# Patient Record
Sex: FEMALE | Race: WHITE | NOT HISPANIC OR LATINO | Employment: PART TIME
[De-identification: names, ages, dates, MRNs, and addresses within clinical notes are randomized per-mention and may not be internally consistent; named-entity substitution may affect disease eponyms.]

---

## 2017-02-21 ENCOUNTER — RECORDS - HEALTHEAST (OUTPATIENT)
Dept: ADMINISTRATIVE | Facility: OTHER | Age: 27
End: 2017-02-21

## 2017-03-01 ENCOUNTER — OFFICE VISIT - HEALTHEAST (OUTPATIENT)
Dept: FAMILY MEDICINE | Facility: CLINIC | Age: 27
End: 2017-03-01

## 2017-03-01 DIAGNOSIS — M35.9 AUTOIMMUNE DISEASE (H): ICD-10-CM

## 2017-03-01 ASSESSMENT — MIFFLIN-ST. JEOR: SCORE: 1395.39

## 2017-03-02 ENCOUNTER — COMMUNICATION - HEALTHEAST (OUTPATIENT)
Dept: ENDOCRINOLOGY | Facility: CLINIC | Age: 27
End: 2017-03-02

## 2017-03-02 LAB — ANA SER QL: 0 U

## 2017-03-07 ENCOUNTER — COMMUNICATION - HEALTHEAST (OUTPATIENT)
Dept: FAMILY MEDICINE | Facility: CLINIC | Age: 27
End: 2017-03-07

## 2017-03-08 ENCOUNTER — COMMUNICATION - HEALTHEAST (OUTPATIENT)
Dept: FAMILY MEDICINE | Facility: CLINIC | Age: 27
End: 2017-03-08

## 2017-03-17 ENCOUNTER — AMBULATORY - HEALTHEAST (OUTPATIENT)
Dept: LAB | Facility: CLINIC | Age: 27
End: 2017-03-17

## 2017-03-17 DIAGNOSIS — M35.9 AUTOIMMUNE DISEASE (H): ICD-10-CM

## 2017-03-31 ENCOUNTER — RECORDS - HEALTHEAST (OUTPATIENT)
Dept: ADMINISTRATIVE | Facility: OTHER | Age: 27
End: 2017-03-31

## 2017-03-31 LAB — PAP SMEAR - HIM PATIENT REPORTED: NORMAL

## 2017-04-07 ENCOUNTER — AMBULATORY - HEALTHEAST (OUTPATIENT)
Dept: FAMILY MEDICINE | Facility: CLINIC | Age: 27
End: 2017-04-07

## 2017-04-07 ENCOUNTER — OFFICE VISIT - HEALTHEAST (OUTPATIENT)
Dept: FAMILY MEDICINE | Facility: CLINIC | Age: 27
End: 2017-04-07

## 2017-04-07 DIAGNOSIS — M35.9 AUTOIMMUNE DISEASE (H): ICD-10-CM

## 2017-04-07 DIAGNOSIS — R53.83 FATIGUE: ICD-10-CM

## 2017-04-07 ASSESSMENT — MIFFLIN-ST. JEOR: SCORE: 1413.54

## 2017-04-12 ENCOUNTER — COMMUNICATION - HEALTHEAST (OUTPATIENT)
Dept: FAMILY MEDICINE | Facility: CLINIC | Age: 27
End: 2017-04-12

## 2017-04-28 ENCOUNTER — COMMUNICATION - HEALTHEAST (OUTPATIENT)
Dept: FAMILY MEDICINE | Facility: CLINIC | Age: 27
End: 2017-04-28

## 2017-05-02 ENCOUNTER — AMBULATORY - HEALTHEAST (OUTPATIENT)
Dept: LAB | Facility: CLINIC | Age: 27
End: 2017-05-02

## 2017-05-02 ENCOUNTER — COMMUNICATION - HEALTHEAST (OUTPATIENT)
Dept: FAMILY MEDICINE | Facility: CLINIC | Age: 27
End: 2017-05-02

## 2017-05-02 ENCOUNTER — OFFICE VISIT - HEALTHEAST (OUTPATIENT)
Dept: FAMILY MEDICINE | Facility: CLINIC | Age: 27
End: 2017-05-02

## 2017-05-02 DIAGNOSIS — G47.00 INSOMNIA: ICD-10-CM

## 2017-05-02 DIAGNOSIS — K58.9 IRRITABLE BOWEL SYNDROME: ICD-10-CM

## 2017-05-02 DIAGNOSIS — N92.1 METRORRHAGIA: ICD-10-CM

## 2017-05-02 DIAGNOSIS — M35.9 AUTOIMMUNE DISEASE (H): ICD-10-CM

## 2017-05-02 DIAGNOSIS — E06.3 HASHIMOTO'S DISEASE: ICD-10-CM

## 2017-05-02 ASSESSMENT — MIFFLIN-ST. JEOR: SCORE: 1427.14

## 2017-05-05 ENCOUNTER — COMMUNICATION - HEALTHEAST (OUTPATIENT)
Dept: FAMILY MEDICINE | Facility: CLINIC | Age: 27
End: 2017-05-05

## 2017-05-08 ENCOUNTER — AMBULATORY - HEALTHEAST (OUTPATIENT)
Dept: FAMILY MEDICINE | Facility: CLINIC | Age: 27
End: 2017-05-08

## 2017-05-08 ENCOUNTER — COMMUNICATION - HEALTHEAST (OUTPATIENT)
Dept: FAMILY MEDICINE | Facility: CLINIC | Age: 27
End: 2017-05-08

## 2017-05-09 ENCOUNTER — COMMUNICATION - HEALTHEAST (OUTPATIENT)
Dept: FAMILY MEDICINE | Facility: CLINIC | Age: 27
End: 2017-05-09

## 2017-05-09 ENCOUNTER — OFFICE VISIT - HEALTHEAST (OUTPATIENT)
Dept: FAMILY MEDICINE | Facility: CLINIC | Age: 27
End: 2017-05-09

## 2017-05-09 DIAGNOSIS — R42 POSTURAL DIZZINESS WITH PRESYNCOPE: ICD-10-CM

## 2017-05-09 DIAGNOSIS — K58.9 IRRITABLE BOWEL SYNDROME: ICD-10-CM

## 2017-05-09 DIAGNOSIS — R55 POSTURAL DIZZINESS WITH PRESYNCOPE: ICD-10-CM

## 2017-05-09 DIAGNOSIS — E27.9 ADRENAL DISORDER (H): ICD-10-CM

## 2017-05-09 DIAGNOSIS — E06.3 HASHIMOTO'S DISEASE: ICD-10-CM

## 2017-05-09 ASSESSMENT — MIFFLIN-ST. JEOR: SCORE: 1436.22

## 2017-05-10 ENCOUNTER — COMMUNICATION - HEALTHEAST (OUTPATIENT)
Dept: FAMILY MEDICINE | Facility: CLINIC | Age: 27
End: 2017-05-10

## 2017-05-12 ENCOUNTER — OFFICE VISIT - HEALTHEAST (OUTPATIENT)
Dept: ENDOCRINOLOGY | Facility: CLINIC | Age: 27
End: 2017-05-12

## 2017-05-12 ENCOUNTER — RECORDS - HEALTHEAST (OUTPATIENT)
Dept: ADMINISTRATIVE | Facility: OTHER | Age: 27
End: 2017-05-12

## 2017-05-12 ENCOUNTER — COMMUNICATION - HEALTHEAST (OUTPATIENT)
Dept: ENDOCRINOLOGY | Facility: CLINIC | Age: 27
End: 2017-05-12

## 2017-05-12 DIAGNOSIS — E03.9 HYPOTHYROID: ICD-10-CM

## 2017-05-12 LAB
CREAT SERPL-MCNC: 0.82 MG/DL (ref 0.6–1.1)
GFR SERPL CREATININE-BSD FRML MDRD: >60 ML/MIN/1.73M2
HBA1C MFR BLD: 5.3 % (ref 3.5–6)

## 2017-05-12 ASSESSMENT — MIFFLIN-ST. JEOR: SCORE: 1424.42

## 2017-05-15 LAB
GLIADIN IGA SER-ACNC: 0.4 U/ML
GLIADIN IGG SER-ACNC: 0.4 U/ML
IGA SERPL-MCNC: 86 MG/DL (ref 65–400)
TTG IGA SER-ACNC: <0.1 U/ML
TTG IGG SER-ACNC: <0.6 U/ML

## 2017-05-16 ENCOUNTER — COMMUNICATION - HEALTHEAST (OUTPATIENT)
Dept: FAMILY MEDICINE | Facility: CLINIC | Age: 27
End: 2017-05-16

## 2017-05-16 LAB
MISCELLANEOUS TEST DEPT. - HE HISTORICAL: NORMAL
PERFORMING LAB: NORMAL
SPECIMEN STATUS: NORMAL
TEST NAME: NORMAL

## 2017-05-18 ENCOUNTER — COMMUNICATION - HEALTHEAST (OUTPATIENT)
Dept: FAMILY MEDICINE | Facility: CLINIC | Age: 27
End: 2017-05-18

## 2017-05-18 ENCOUNTER — COMMUNICATION - HEALTHEAST (OUTPATIENT)
Dept: ENDOCRINOLOGY | Facility: CLINIC | Age: 27
End: 2017-05-18

## 2017-05-18 ENCOUNTER — AMBULATORY - HEALTHEAST (OUTPATIENT)
Dept: ENDOCRINOLOGY | Facility: CLINIC | Age: 27
End: 2017-05-18

## 2017-05-18 ENCOUNTER — AMBULATORY - HEALTHEAST (OUTPATIENT)
Dept: LAB | Facility: CLINIC | Age: 27
End: 2017-05-18

## 2017-05-18 DIAGNOSIS — M35.9 AUTOIMMUNE DISEASE (H): ICD-10-CM

## 2017-05-18 DIAGNOSIS — E06.3 HASHIMOTO'S DISEASE: ICD-10-CM

## 2017-05-18 DIAGNOSIS — E03.9 HYPOTHYROID: ICD-10-CM

## 2017-05-20 ENCOUNTER — COMMUNICATION - HEALTHEAST (OUTPATIENT)
Dept: FAMILY MEDICINE | Facility: CLINIC | Age: 27
End: 2017-05-20

## 2017-05-23 ENCOUNTER — COMMUNICATION - HEALTHEAST (OUTPATIENT)
Dept: FAMILY MEDICINE | Facility: CLINIC | Age: 27
End: 2017-05-23

## 2017-05-23 ENCOUNTER — COMMUNICATION - HEALTHEAST (OUTPATIENT)
Dept: ENDOCRINOLOGY | Facility: CLINIC | Age: 27
End: 2017-05-23

## 2017-05-23 ENCOUNTER — AMBULATORY - HEALTHEAST (OUTPATIENT)
Dept: LAB | Facility: CLINIC | Age: 27
End: 2017-05-23

## 2017-05-23 ENCOUNTER — AMBULATORY - HEALTHEAST (OUTPATIENT)
Dept: FAMILY MEDICINE | Facility: CLINIC | Age: 27
End: 2017-05-23

## 2017-05-23 DIAGNOSIS — R53.83 FATIGUE: ICD-10-CM

## 2017-05-23 DIAGNOSIS — M35.9 AUTOIMMUNE DISEASE (H): ICD-10-CM

## 2017-05-23 DIAGNOSIS — E06.3 HASHIMOTO'S DISEASE: ICD-10-CM

## 2017-05-25 ENCOUNTER — COMMUNICATION - HEALTHEAST (OUTPATIENT)
Dept: FAMILY MEDICINE | Facility: CLINIC | Age: 27
End: 2017-05-25

## 2017-05-26 ENCOUNTER — COMMUNICATION - HEALTHEAST (OUTPATIENT)
Dept: FAMILY MEDICINE | Facility: CLINIC | Age: 27
End: 2017-05-26

## 2017-05-30 ENCOUNTER — AMBULATORY - HEALTHEAST (OUTPATIENT)
Dept: ENDOCRINOLOGY | Facility: CLINIC | Age: 27
End: 2017-05-30

## 2017-05-30 DIAGNOSIS — E06.3 HASHIMOTO'S DISEASE: ICD-10-CM

## 2017-06-22 ENCOUNTER — COMMUNICATION - HEALTHEAST (OUTPATIENT)
Dept: FAMILY MEDICINE | Facility: CLINIC | Age: 27
End: 2017-06-22

## 2017-06-23 ENCOUNTER — RECORDS - HEALTHEAST (OUTPATIENT)
Dept: ADMINISTRATIVE | Facility: OTHER | Age: 27
End: 2017-06-23

## 2017-06-24 ENCOUNTER — COMMUNICATION - HEALTHEAST (OUTPATIENT)
Dept: FAMILY MEDICINE | Facility: CLINIC | Age: 27
End: 2017-06-24

## 2017-06-24 DIAGNOSIS — M35.9 AUTOIMMUNE DISEASE (H): ICD-10-CM

## 2017-07-12 ENCOUNTER — AMBULATORY - HEALTHEAST (OUTPATIENT)
Dept: LAB | Facility: CLINIC | Age: 27
End: 2017-07-12

## 2017-07-12 DIAGNOSIS — M35.9 AUTOIMMUNE DISEASE (H): ICD-10-CM

## 2017-07-12 LAB
CHOLEST SERPL-MCNC: 272 MG/DL
FASTING STATUS PATIENT QL REPORTED: YES
HBA1C MFR BLD: 5.1 % (ref 3.5–6)
HDLC SERPL-MCNC: 78 MG/DL
LDLC SERPL CALC-MCNC: 182 MG/DL
TRIGL SERPL-MCNC: 60 MG/DL

## 2017-07-16 ENCOUNTER — COMMUNICATION - HEALTHEAST (OUTPATIENT)
Dept: FAMILY MEDICINE | Facility: CLINIC | Age: 27
End: 2017-07-16

## 2017-07-28 ENCOUNTER — COMMUNICATION - HEALTHEAST (OUTPATIENT)
Dept: FAMILY MEDICINE | Facility: CLINIC | Age: 27
End: 2017-07-28

## 2017-07-31 ENCOUNTER — COMMUNICATION - HEALTHEAST (OUTPATIENT)
Dept: FAMILY MEDICINE | Facility: CLINIC | Age: 27
End: 2017-07-31

## 2017-07-31 ENCOUNTER — RECORDS - HEALTHEAST (OUTPATIENT)
Dept: ADMINISTRATIVE | Facility: OTHER | Age: 27
End: 2017-07-31

## 2017-08-07 ENCOUNTER — RECORDS - HEALTHEAST (OUTPATIENT)
Dept: ADMINISTRATIVE | Facility: OTHER | Age: 27
End: 2017-08-07

## 2017-08-07 ENCOUNTER — OFFICE VISIT - HEALTHEAST (OUTPATIENT)
Dept: FAMILY MEDICINE | Facility: CLINIC | Age: 27
End: 2017-08-07

## 2017-08-07 DIAGNOSIS — M35.9 AUTOIMMUNE DISEASE (H): ICD-10-CM

## 2017-08-07 DIAGNOSIS — T78.40XA ALLERGY: ICD-10-CM

## 2017-08-07 DIAGNOSIS — R53.83 OTHER MALAISE AND FATIGUE: ICD-10-CM

## 2017-08-07 DIAGNOSIS — M99.01 CERVICAL SEGMENT DYSFUNCTION: ICD-10-CM

## 2017-08-07 DIAGNOSIS — E06.3 HASHIMOTO'S DISEASE: ICD-10-CM

## 2017-08-07 DIAGNOSIS — E78.00 PURE HYPERCHOLESTEROLEMIA: ICD-10-CM

## 2017-08-07 DIAGNOSIS — R53.81 OTHER MALAISE AND FATIGUE: ICD-10-CM

## 2017-08-07 ASSESSMENT — MIFFLIN-ST. JEOR: SCORE: 1411.27

## 2017-08-17 ENCOUNTER — COMMUNICATION - HEALTHEAST (OUTPATIENT)
Dept: LAB | Facility: CLINIC | Age: 27
End: 2017-08-17

## 2017-08-23 ENCOUNTER — COMMUNICATION - HEALTHEAST (OUTPATIENT)
Dept: FAMILY MEDICINE | Facility: CLINIC | Age: 27
End: 2017-08-23

## 2017-08-27 ENCOUNTER — COMMUNICATION - HEALTHEAST (OUTPATIENT)
Dept: FAMILY MEDICINE | Facility: CLINIC | Age: 27
End: 2017-08-27

## 2017-08-28 ENCOUNTER — OFFICE VISIT - HEALTHEAST (OUTPATIENT)
Dept: ALLERGY | Facility: CLINIC | Age: 27
End: 2017-08-28

## 2017-08-28 ENCOUNTER — RECORDS - HEALTHEAST (OUTPATIENT)
Dept: ADMINISTRATIVE | Facility: OTHER | Age: 27
End: 2017-08-28

## 2017-08-28 DIAGNOSIS — R06.02 SHORTNESS OF BREATH: ICD-10-CM

## 2017-08-28 DIAGNOSIS — M99.01 CERVICAL SEGMENT DYSFUNCTION: ICD-10-CM

## 2017-08-28 DIAGNOSIS — R53.83 OTHER MALAISE AND FATIGUE: ICD-10-CM

## 2017-08-28 DIAGNOSIS — R09.81 NASAL CONGESTION: ICD-10-CM

## 2017-08-28 DIAGNOSIS — E78.00 PURE HYPERCHOLESTEROLEMIA: ICD-10-CM

## 2017-08-28 DIAGNOSIS — R53.81 OTHER MALAISE AND FATIGUE: ICD-10-CM

## 2017-08-29 ENCOUNTER — COMMUNICATION - HEALTHEAST (OUTPATIENT)
Dept: FAMILY MEDICINE | Facility: CLINIC | Age: 27
End: 2017-08-29

## 2017-09-08 ENCOUNTER — COMMUNICATION - HEALTHEAST (OUTPATIENT)
Dept: FAMILY MEDICINE | Facility: CLINIC | Age: 27
End: 2017-09-08

## 2017-09-12 ENCOUNTER — COMMUNICATION - HEALTHEAST (OUTPATIENT)
Dept: FAMILY MEDICINE | Facility: CLINIC | Age: 27
End: 2017-09-12

## 2017-09-14 ENCOUNTER — COMMUNICATION - HEALTHEAST (OUTPATIENT)
Dept: FAMILY MEDICINE | Facility: CLINIC | Age: 27
End: 2017-09-14

## 2017-09-18 ENCOUNTER — AMBULATORY - HEALTHEAST (OUTPATIENT)
Dept: LAB | Facility: CLINIC | Age: 27
End: 2017-09-18

## 2017-09-18 DIAGNOSIS — M99.01 CERVICAL SEGMENT DYSFUNCTION: ICD-10-CM

## 2017-09-18 DIAGNOSIS — R53.81 OTHER MALAISE AND FATIGUE: ICD-10-CM

## 2017-09-18 DIAGNOSIS — E78.00 PURE HYPERCHOLESTEROLEMIA: ICD-10-CM

## 2017-09-18 DIAGNOSIS — R53.83 OTHER MALAISE AND FATIGUE: ICD-10-CM

## 2017-10-03 ENCOUNTER — COMMUNICATION - HEALTHEAST (OUTPATIENT)
Dept: FAMILY MEDICINE | Facility: CLINIC | Age: 27
End: 2017-10-03

## 2017-10-03 DIAGNOSIS — E61.1 IRON DEFICIENCY: ICD-10-CM

## 2017-10-03 DIAGNOSIS — T45.2X1A VITAMIN D TOXICITY: ICD-10-CM

## 2017-10-03 DIAGNOSIS — R53.83 FATIGUE: ICD-10-CM

## 2017-11-10 ENCOUNTER — AMBULATORY - HEALTHEAST (OUTPATIENT)
Dept: LAB | Facility: CLINIC | Age: 27
End: 2017-11-10

## 2017-11-10 DIAGNOSIS — T45.2X1A VITAMIN D TOXICITY: ICD-10-CM

## 2017-11-10 DIAGNOSIS — R53.83 FATIGUE: ICD-10-CM

## 2017-11-10 DIAGNOSIS — E61.1 IRON DEFICIENCY: ICD-10-CM

## 2018-02-09 ENCOUNTER — COMMUNICATION - HEALTHEAST (OUTPATIENT)
Dept: FAMILY MEDICINE | Facility: CLINIC | Age: 28
End: 2018-02-09

## 2018-02-15 ENCOUNTER — AMBULATORY - HEALTHEAST (OUTPATIENT)
Dept: FAMILY MEDICINE | Facility: CLINIC | Age: 28
End: 2018-02-15

## 2018-02-15 DIAGNOSIS — R53.83 FATIGUE: ICD-10-CM

## 2018-02-15 DIAGNOSIS — E06.3 HASHIMOTO'S DISEASE: ICD-10-CM

## 2018-02-15 DIAGNOSIS — E61.1 IRON DEFICIENCY: ICD-10-CM

## 2018-02-15 DIAGNOSIS — T45.2X1A VITAMIN D TOXICITY: ICD-10-CM

## 2018-02-28 ENCOUNTER — AMBULATORY - HEALTHEAST (OUTPATIENT)
Dept: LAB | Facility: CLINIC | Age: 28
End: 2018-02-28

## 2018-02-28 DIAGNOSIS — E06.3 HASHIMOTO'S DISEASE: ICD-10-CM

## 2018-02-28 DIAGNOSIS — E61.1 IRON DEFICIENCY: ICD-10-CM

## 2018-02-28 DIAGNOSIS — R53.83 FATIGUE: ICD-10-CM

## 2018-02-28 DIAGNOSIS — T45.2X1A VITAMIN D TOXICITY: ICD-10-CM

## 2018-02-28 LAB
ALBUMIN SERPL-MCNC: 3.6 G/DL (ref 3.5–5)
ALP SERPL-CCNC: 47 U/L (ref 45–120)
ALT SERPL W P-5'-P-CCNC: 22 U/L (ref 0–45)
ANION GAP SERPL CALCULATED.3IONS-SCNC: 9 MMOL/L (ref 5–18)
AST SERPL W P-5'-P-CCNC: 20 U/L (ref 0–40)
BILIRUB SERPL-MCNC: 0.5 MG/DL (ref 0–1)
BUN SERPL-MCNC: 24 MG/DL (ref 8–22)
CALCIUM SERPL-MCNC: 8.9 MG/DL (ref 8.5–10.5)
CHLORIDE BLD-SCNC: 105 MMOL/L (ref 98–107)
CO2 SERPL-SCNC: 25 MMOL/L (ref 22–31)
CREAT SERPL-MCNC: 0.76 MG/DL (ref 0.6–1.1)
ERYTHROCYTE [DISTWIDTH] IN BLOOD BY AUTOMATED COUNT: 11.4 % (ref 11–14.5)
FERRITIN SERPL-MCNC: 48 NG/ML (ref 10–130)
GFR SERPL CREATININE-BSD FRML MDRD: >60 ML/MIN/1.73M2
GLUCOSE BLD-MCNC: 84 MG/DL (ref 70–125)
HCT VFR BLD AUTO: 40.4 % (ref 35–47)
HGB BLD-MCNC: 13.5 G/DL (ref 12–16)
IRON SATN MFR SERPL: 27 % (ref 20–50)
IRON SERPL-MCNC: 81 UG/DL (ref 42–175)
MCH RBC QN AUTO: 31.1 PG (ref 27–34)
MCHC RBC AUTO-ENTMCNC: 33.3 G/DL (ref 32–36)
MCV RBC AUTO: 93 FL (ref 80–100)
PLATELET # BLD AUTO: 296 THOU/UL (ref 140–440)
PMV BLD AUTO: 7.8 FL (ref 7–10)
POTASSIUM BLD-SCNC: 4.1 MMOL/L (ref 3.5–5)
PROT SERPL-MCNC: 6.7 G/DL (ref 6–8)
RBC # BLD AUTO: 4.33 MILL/UL (ref 3.8–5.4)
SODIUM SERPL-SCNC: 139 MMOL/L (ref 136–145)
T3FREE SERPL-MCNC: 2 PG/ML (ref 1.9–3.9)
T4 FREE SERPL-MCNC: 0.9 NG/DL (ref 0.7–1.8)
TIBC SERPL-MCNC: 305 UG/DL (ref 313–563)
TRANSFERRIN SERPL-MCNC: 244 MG/DL (ref 212–360)
TSH SERPL DL<=0.005 MIU/L-ACNC: 0.24 UIU/ML (ref 0.3–5)
WBC: 4.4 THOU/UL (ref 4–11)

## 2018-03-01 LAB
25(OH)D3 SERPL-MCNC: 54.3 NG/ML (ref 30–80)
FASTING STATUS PATIENT QL REPORTED: YES
HOMOCYSTEINE PLASMA - HISTORICAL: 4 UMOL/L (ref 0–13)
THYROGLOBULIN ANTIBODY, S - HISTORICAL: <1.8 IU/ML
THYROID PEROXIDASE ANTIBODIES - HISTORICAL: 25.7 IU/ML (ref 0–5.6)

## 2018-03-04 LAB
MAGNESIUM,RBC - HISTORICAL: 4.7 MG/DL (ref 3.5–7.1)
SPECIMEN STATUS: NORMAL

## 2018-03-06 ENCOUNTER — COMMUNICATION - HEALTHEAST (OUTPATIENT)
Dept: FAMILY MEDICINE | Facility: CLINIC | Age: 28
End: 2018-03-06

## 2018-03-16 ENCOUNTER — COMMUNICATION - HEALTHEAST (OUTPATIENT)
Dept: FAMILY MEDICINE | Facility: CLINIC | Age: 28
End: 2018-03-16

## 2018-03-21 ENCOUNTER — RECORDS - HEALTHEAST (OUTPATIENT)
Dept: ADMINISTRATIVE | Facility: OTHER | Age: 28
End: 2018-03-21

## 2018-03-23 ENCOUNTER — COMMUNICATION - HEALTHEAST (OUTPATIENT)
Dept: FAMILY MEDICINE | Facility: CLINIC | Age: 28
End: 2018-03-23

## 2018-04-04 ENCOUNTER — OFFICE VISIT - HEALTHEAST (OUTPATIENT)
Dept: FAMILY MEDICINE | Facility: CLINIC | Age: 28
End: 2018-04-04

## 2018-04-04 DIAGNOSIS — E06.3 HASHIMOTO'S DISEASE: ICD-10-CM

## 2018-04-04 DIAGNOSIS — K92.9 DIGESTIVE DISORDER: ICD-10-CM

## 2018-04-04 DIAGNOSIS — K58.9 IRRITABLE BOWEL SYNDROME: ICD-10-CM

## 2018-04-04 ASSESSMENT — MIFFLIN-ST. JEOR: SCORE: 1427.14

## 2018-04-06 ENCOUNTER — AMBULATORY - HEALTHEAST (OUTPATIENT)
Dept: FAMILY MEDICINE | Facility: CLINIC | Age: 28
End: 2018-04-06

## 2018-04-06 ENCOUNTER — AMBULATORY - HEALTHEAST (OUTPATIENT)
Dept: LAB | Facility: CLINIC | Age: 28
End: 2018-04-06

## 2018-04-06 DIAGNOSIS — K92.9 DIGESTIVE DISORDER: ICD-10-CM

## 2018-04-06 DIAGNOSIS — E03.9 HYPOTHYROID: ICD-10-CM

## 2018-04-09 LAB — O+P STL MICRO: NORMAL

## 2018-04-12 ENCOUNTER — RECORDS - HEALTHEAST (OUTPATIENT)
Dept: ADMINISTRATIVE | Facility: OTHER | Age: 28
End: 2018-04-12

## 2018-04-14 ENCOUNTER — COMMUNICATION - HEALTHEAST (OUTPATIENT)
Dept: FAMILY MEDICINE | Facility: CLINIC | Age: 28
End: 2018-04-14

## 2018-08-27 ENCOUNTER — COMMUNICATION - HEALTHEAST (OUTPATIENT)
Dept: FAMILY MEDICINE | Facility: CLINIC | Age: 28
End: 2018-08-27

## 2018-08-28 ENCOUNTER — COMMUNICATION - HEALTHEAST (OUTPATIENT)
Dept: FAMILY MEDICINE | Facility: CLINIC | Age: 28
End: 2018-08-28

## 2018-08-29 ENCOUNTER — RECORDS - HEALTHEAST (OUTPATIENT)
Dept: ADMINISTRATIVE | Facility: OTHER | Age: 28
End: 2018-08-29

## 2018-10-09 ENCOUNTER — COMMUNICATION - HEALTHEAST (OUTPATIENT)
Dept: FAMILY MEDICINE | Facility: CLINIC | Age: 28
End: 2018-10-09

## 2019-01-18 ENCOUNTER — COMMUNICATION - HEALTHEAST (OUTPATIENT)
Dept: FAMILY MEDICINE | Facility: CLINIC | Age: 29
End: 2019-01-18

## 2019-02-16 ENCOUNTER — COMMUNICATION - HEALTHEAST (OUTPATIENT)
Dept: FAMILY MEDICINE | Facility: CLINIC | Age: 29
End: 2019-02-16

## 2019-02-16 DIAGNOSIS — R53.83 FATIGUE, UNSPECIFIED TYPE: ICD-10-CM

## 2019-02-16 DIAGNOSIS — E06.3 HASHIMOTO'S DISEASE: ICD-10-CM

## 2019-02-19 ENCOUNTER — COMMUNICATION - HEALTHEAST (OUTPATIENT)
Dept: FAMILY MEDICINE | Facility: CLINIC | Age: 29
End: 2019-02-19

## 2019-02-19 DIAGNOSIS — R53.82 CHRONIC FATIGUE: ICD-10-CM

## 2019-02-19 DIAGNOSIS — M35.9 AUTOIMMUNE DISEASE (H): ICD-10-CM

## 2019-03-12 ENCOUNTER — AMBULATORY - HEALTHEAST (OUTPATIENT)
Dept: LAB | Facility: CLINIC | Age: 29
End: 2019-03-12

## 2019-03-12 DIAGNOSIS — M35.9 AUTOIMMUNE DISEASE (H): ICD-10-CM

## 2019-03-12 DIAGNOSIS — E06.3 HASHIMOTO'S DISEASE: ICD-10-CM

## 2019-03-12 DIAGNOSIS — R53.83 FATIGUE, UNSPECIFIED TYPE: ICD-10-CM

## 2019-03-12 DIAGNOSIS — R53.82 CHRONIC FATIGUE: ICD-10-CM

## 2019-03-12 LAB
ALBUMIN SERPL-MCNC: 3.4 G/DL (ref 3.5–5)
ALP SERPL-CCNC: 55 U/L (ref 45–120)
ALT SERPL W P-5'-P-CCNC: <9 U/L (ref 0–45)
ANION GAP SERPL CALCULATED.3IONS-SCNC: 9 MMOL/L (ref 5–18)
AST SERPL W P-5'-P-CCNC: 15 U/L (ref 0–40)
BILIRUB SERPL-MCNC: 0.5 MG/DL (ref 0–1)
BUN SERPL-MCNC: 7 MG/DL (ref 8–22)
CALCIUM SERPL-MCNC: 9.1 MG/DL (ref 8.5–10.5)
CHLORIDE BLD-SCNC: 107 MMOL/L (ref 98–107)
CO2 SERPL-SCNC: 26 MMOL/L (ref 22–31)
CREAT SERPL-MCNC: 0.66 MG/DL (ref 0.6–1.1)
ERYTHROCYTE [DISTWIDTH] IN BLOOD BY AUTOMATED COUNT: 11.7 % (ref 11–14.5)
FERRITIN SERPL-MCNC: 61 NG/ML (ref 10–130)
GFR SERPL CREATININE-BSD FRML MDRD: >60 ML/MIN/1.73M2
GLUCOSE BLD-MCNC: 79 MG/DL (ref 70–125)
HCT VFR BLD AUTO: 37.9 % (ref 35–47)
HGB BLD-MCNC: 12.6 G/DL (ref 12–16)
IRON SATN MFR SERPL: 46 % (ref 20–50)
IRON SERPL-MCNC: 129 UG/DL (ref 42–175)
MCH RBC QN AUTO: 31.6 PG (ref 27–34)
MCHC RBC AUTO-ENTMCNC: 33.2 G/DL (ref 32–36)
MCV RBC AUTO: 95 FL (ref 80–100)
PLATELET # BLD AUTO: 251 THOU/UL (ref 140–440)
PMV BLD AUTO: 7.8 FL (ref 7–10)
POTASSIUM BLD-SCNC: 4.2 MMOL/L (ref 3.5–5)
PROT SERPL-MCNC: 5.9 G/DL (ref 6–8)
RBC # BLD AUTO: 3.97 MILL/UL (ref 3.8–5.4)
SODIUM SERPL-SCNC: 142 MMOL/L (ref 136–145)
T3FREE SERPL-MCNC: 2.8 PG/ML (ref 1.9–3.9)
T4 FREE SERPL-MCNC: 1.1 NG/DL (ref 0.7–1.8)
THYROID PEROXIDASE ANTIBODIES - HISTORICAL: 28.1 IU/ML (ref 0–5.6)
TIBC SERPL-MCNC: 283 UG/DL (ref 313–563)
TRANSFERRIN SERPL-MCNC: 226 MG/DL (ref 212–360)
TSH SERPL DL<=0.005 MIU/L-ACNC: 0.08 UIU/ML (ref 0.3–5)
VIT B12 SERPL-MCNC: 1132 PG/ML (ref 213–816)
WBC: 5.2 THOU/UL (ref 4–11)

## 2019-03-13 LAB — 25(OH)D3 SERPL-MCNC: 38 NG/ML (ref 30–80)

## 2019-03-14 ENCOUNTER — COMMUNICATION - HEALTHEAST (OUTPATIENT)
Dept: FAMILY MEDICINE | Facility: CLINIC | Age: 29
End: 2019-03-14

## 2019-03-14 LAB
IF BLOCK AB SER QL RIA: NEGATIVE
MAGNESIUM,RBC - HISTORICAL: 5.2 MG/DL (ref 3.5–7.1)
SPECIMEN STATUS: NORMAL
T3REVERSE SERPL-MCNC: 20.9 NG/DL (ref 9–27)

## 2019-03-15 ENCOUNTER — OFFICE VISIT - HEALTHEAST (OUTPATIENT)
Dept: FAMILY MEDICINE | Facility: CLINIC | Age: 29
End: 2019-03-15

## 2019-03-15 DIAGNOSIS — Z00.00 ROUTINE GENERAL MEDICAL EXAMINATION AT A HEALTH CARE FACILITY: ICD-10-CM

## 2019-03-15 DIAGNOSIS — E06.3 HYPOTHYROIDISM DUE TO HASHIMOTO'S THYROIDITIS: ICD-10-CM

## 2019-03-15 ASSESSMENT — MIFFLIN-ST. JEOR: SCORE: 1445.85

## 2019-03-17 ENCOUNTER — COMMUNICATION - HEALTHEAST (OUTPATIENT)
Dept: FAMILY MEDICINE | Facility: CLINIC | Age: 29
End: 2019-03-17

## 2019-03-17 DIAGNOSIS — E03.9 HYPOTHYROID: ICD-10-CM

## 2019-03-19 ENCOUNTER — COMMUNICATION - HEALTHEAST (OUTPATIENT)
Dept: FAMILY MEDICINE | Facility: CLINIC | Age: 29
End: 2019-03-19

## 2019-03-19 DIAGNOSIS — E06.3 HYPOTHYROIDISM DUE TO HASHIMOTO'S THYROIDITIS: ICD-10-CM

## 2019-03-25 ENCOUNTER — RECORDS - HEALTHEAST (OUTPATIENT)
Dept: HEALTH INFORMATION MANAGEMENT | Facility: CLINIC | Age: 29
End: 2019-03-25

## 2019-07-02 ENCOUNTER — COMMUNICATION - HEALTHEAST (OUTPATIENT)
Dept: FAMILY MEDICINE | Facility: CLINIC | Age: 29
End: 2019-07-02

## 2019-07-02 DIAGNOSIS — Z00.00 PREVENTATIVE HEALTH CARE: ICD-10-CM

## 2020-01-29 ENCOUNTER — OFFICE VISIT - HEALTHEAST (OUTPATIENT)
Dept: FAMILY MEDICINE | Facility: CLINIC | Age: 30
End: 2020-01-29

## 2020-01-29 DIAGNOSIS — E06.3 HYPOTHYROIDISM DUE TO HASHIMOTO'S THYROIDITIS: ICD-10-CM

## 2020-01-29 DIAGNOSIS — N92.6 IRREGULAR MENSES: ICD-10-CM

## 2020-01-29 DIAGNOSIS — R53.82 CHRONIC FATIGUE: ICD-10-CM

## 2020-01-29 DIAGNOSIS — Z86.2 HISTORY OF PERNICIOUS ANEMIA: ICD-10-CM

## 2020-01-31 ENCOUNTER — AMBULATORY - HEALTHEAST (OUTPATIENT)
Dept: LAB | Facility: CLINIC | Age: 30
End: 2020-01-31

## 2020-01-31 DIAGNOSIS — E06.3 HYPOTHYROIDISM DUE TO HASHIMOTO'S THYROIDITIS: ICD-10-CM

## 2020-01-31 DIAGNOSIS — Z86.2 HISTORY OF PERNICIOUS ANEMIA: ICD-10-CM

## 2020-01-31 DIAGNOSIS — R53.82 CHRONIC FATIGUE: ICD-10-CM

## 2020-01-31 DIAGNOSIS — N92.6 IRREGULAR MENSES: ICD-10-CM

## 2020-01-31 LAB
BASOPHILS # BLD AUTO: 0 THOU/UL (ref 0–0.2)
BASOPHILS NFR BLD AUTO: 0 % (ref 0–2)
EOSINOPHIL # BLD AUTO: 0 THOU/UL (ref 0–0.4)
EOSINOPHIL NFR BLD AUTO: 1 % (ref 0–6)
ERYTHROCYTE [DISTWIDTH] IN BLOOD BY AUTOMATED COUNT: 11 % (ref 11–14.5)
FERRITIN SERPL-MCNC: 32 NG/ML
HCT VFR BLD AUTO: 39.2 %
HGB BLD-MCNC: 13 G/DL
IRON SATN MFR SERPL: 47 % (ref 20–50)
IRON SERPL-MCNC: 145 UG/DL
LYMPHOCYTES # BLD AUTO: 1.7 THOU/UL (ref 0.8–4.4)
LYMPHOCYTES NFR BLD AUTO: 39 % (ref 20–40)
MCH RBC QN AUTO: 33 PG (ref 27–34)
MCHC RBC AUTO-ENTMCNC: 33.3 G/DL (ref 32–36)
MCV RBC AUTO: 99 FL
MONOCYTES # BLD AUTO: 0.3 THOU/UL (ref 0–0.9)
MONOCYTES NFR BLD AUTO: 7 % (ref 2–10)
NEUTROPHILS # BLD AUTO: 2.4 THOU/UL (ref 2–7.7)
NEUTROPHILS NFR BLD AUTO: 53 % (ref 50–70)
PLATELET # BLD AUTO: 253 THOU/UL (ref 140–440)
PMV BLD AUTO: 8.1 FL (ref 7–10)
RBC # BLD AUTO: 3.95 MILL/UL
T3FREE SERPL-MCNC: 2.5 PG/ML (ref 1.9–3.9)
T4 FREE SERPL-MCNC: 0.9 NG/DL (ref 0.7–1.8)
TIBC SERPL-MCNC: 307 UG/DL (ref 313–563)
TRANSFERRIN SERPL-MCNC: 246 MG/DL (ref 212–360)
TSH SERPL DL<=0.005 MIU/L-ACNC: 0.94 UIU/ML (ref 0.3–5)
VIT B12 SERPL-MCNC: 1476 PG/ML (ref 213–816)
WBC: 4.5 THOU/UL (ref 4–11)

## 2020-02-03 LAB
25(OH)D3 SERPL-MCNC: 41.7 NG/ML (ref 30–80)
25(OH)D3 SERPL-MCNC: 41.7 NG/ML (ref 30–80)
T3REVERSE SERPL-MCNC: 17 NG/DL (ref 9–27)

## 2020-02-04 LAB — THYROID PEROXIDASE ANTIBODIES - HISTORICAL: 22.3 IU/ML (ref 0–5.6)

## 2020-03-13 ENCOUNTER — COMMUNICATION - HEALTHEAST (OUTPATIENT)
Dept: FAMILY MEDICINE | Facility: CLINIC | Age: 30
End: 2020-03-13

## 2020-03-13 DIAGNOSIS — E03.9 HYPOTHYROID: ICD-10-CM

## 2020-03-13 DIAGNOSIS — E06.3 HYPOTHYROIDISM DUE TO HASHIMOTO'S THYROIDITIS: ICD-10-CM

## 2020-03-16 ENCOUNTER — COMMUNICATION - HEALTHEAST (OUTPATIENT)
Dept: FAMILY MEDICINE | Facility: CLINIC | Age: 30
End: 2020-03-16

## 2020-06-03 ENCOUNTER — VIRTUAL VISIT (OUTPATIENT)
Dept: FAMILY MEDICINE | Facility: OTHER | Age: 30
End: 2020-06-03

## 2020-06-03 ENCOUNTER — AMBULATORY - HEALTHEAST (OUTPATIENT)
Dept: FAMILY MEDICINE | Facility: CLINIC | Age: 30
End: 2020-06-03

## 2020-06-03 DIAGNOSIS — Z20.822 SUSPECTED COVID-19 VIRUS INFECTION: ICD-10-CM

## 2020-06-04 ENCOUNTER — AMBULATORY - HEALTHEAST (OUTPATIENT)
Dept: FAMILY MEDICINE | Facility: CLINIC | Age: 30
End: 2020-06-04

## 2020-06-04 ENCOUNTER — NURSE TRIAGE (OUTPATIENT)
Dept: NURSING | Facility: CLINIC | Age: 30
End: 2020-06-04

## 2020-06-04 DIAGNOSIS — Z20.822 SUSPECTED COVID-19 VIRUS INFECTION: ICD-10-CM

## 2020-06-04 NOTE — PROGRESS NOTES
"Date: 2020 15:26:12  Clinician: Kuldip Wright  Clinician NPI: [id number]  Patient: Agustina Nam  Patient : 1990  Patient Address: [address], [place] [address]  Patient Phone: [phone]  Visit Protocol: URI  Patient Summary:  Agustina is a 29 year old ( : 1990 ) female who initiated a Visit for COVID-19 (Coronavirus) evaluation and screening. When asked the question \"Please sign me up to receive news, health information and promotions. \", Agustina responded \"No\".    Agustina states her symptoms started suddenly 3-4 days ago.   Her symptoms consist of a sore throat, malaise, myalgia, facial pain or pressure, and nasal congestion.   Symptom details     Nasal secretions: The color of her mucus is clear and white.    Sore throat: Agustina reports having mild throat pain (1-3 on a 10 point pain scale), does not have exudate on her tonsils, and can swallow liquids. She is not sure if the lymph nodes in her neck are enlarged. A rash has not appeared on the skin since the sore throat started.     Facial pain or pressure: The facial pain or pressure feels worse when bending over or leaning forward.      Agustina denies having wheezing, nausea, teeth pain, ageusia, diarrhea, anosmia, fever, cough, vomiting, rhinitis, ear pain, headache, and chills. She also denies having recent facial or sinus surgery in the past 60 days, double sickening (worsening symptoms after initial improvement), and taking antibiotic medication for the symptoms. She is not experiencing dyspnea.   Precipitating events  Agustina is not sure if she has been exposed to someone with strep throat. She has not recently been exposed to someone with influenza. Agustina has not been in close contact with any high risk individuals.   Pertinent COVID-19 (Coronavirus) information  In the past 14 days, Agustina has not worked in a congregate living setting.   She does not work or volunteer as healthcare worker or a  and does " not work or volunteer in a healthcare facility.   Agustina also has not lived in a congregate living setting in the past 14 days. She does not live with a healthcare worker.   Agustina has not had a close contact with a laboratory-confirmed COVID-19 patient within 14 days of symptom onset.   Pertinent medical history  Agustina does not get yeast infections when she takes antibiotics.   Agustina needs a return to work/school note.   Weight: 145 lbs   Agustina does not smoke or use smokeless tobacco.   She denies pregnancy and denies breastfeeding. She has menstruated in the past month.   Additional information as reported by the patient (free text): I have Hashimoto's autoimmune disease   Weight: 145 lbs    MEDICATIONS: liothyronine oral, levothyroxine oral, ALLERGIES: NKDA  Clinician Response:  Dear Agustina Berrios,  Let's get you tested. Please see the contact number below to schedule your test and general covid instructions. ROXANNA Wright MD        Your symptoms show that you may have coronavirus (COVID-19). This illness can cause fever, cough and trouble breathing. Many people get a mild case and get better on their own. Some people can get very sick.  What should I do?  We would like to test you for this virus. This will be a curbside test done outside the clinic.  Please call [phone] to schedule your visit. Explain that you were referred by [facility] to have a COVID-19 test. Be ready to share your [facility] visit ID number.  Starting now:  Stay at least 6 feet away from others. (If someone will drive you to your test, stay in the backseat, as far away from the  as you can.)   Don't go to work, school or anywhere else. When it's time for your test, go straight to the testing site. Don't make any stops on the way there or back.   Wash your hands and face often. Use soap and water.   Cover your mouth and nose with a mask, tissue or washcloth.   Don't touch anyone. No hugging, kissing or handshakes.  While at home   Stay  "home and away from others (self-isolate) until:  You've had no fever---and no medicine that reduces fever---for 3 full days (72 hours). And...  Your other symptoms have gotten better. For example, your cough or breathing has improved. And...  At least 10 days have passed since your symptoms started.  During this time:  Stay in your own room (and use your own bathroom), if you can.  Don't go to work, school or anywhere else.  Stay away from others in your home. No hugging, kissing or shaking hands.  Don't let anyone visit.  Cover your mouth and nose with a mask, tissue or washcloth to avoid spreading germs.  Clean \"high touch\" surfaces often (doorknobs, counters, handles, etc.). Use a household cleaning spray or wipes.  Wash your hands and face often. Use soap and water.  How can I take care of myself?  1. Get lots of rest. Drink extra fluids (unless your doctor has told you not to).  2. Take Tylenol (acetaminophen) for fever or pain. If you have liver or kidney problems, ask your family doctor if it's okay to take Tylenol.  Adults can take either:   650 mg (two 325 mg pills) every 4 to 6 hours, or...  1,000 mg (two 500 mg pills) every 8 hours as needed.   Note: Don't take more than 3,000 mg in one day.   Acetaminophen is found in many medicines (both prescribed and over-the-counter medicines). Read all labels to be sure you don't take too much.   For children, check the Tylenol bottle for the right dose. The dose is based on the child's age or weight.  3. If you have other health problems (like cancer, heart failure, an organ transplant or severe kidney disease): Call your specialty clinic if you don't feel better in the next 2 days.  4. Know when to call 911: If your breathing is so bad that it keeps you from doing normal activities, call 911 or go to the emergency room. Tell them that you've been staying home and may have COVID-19.  5. Sign up for [facility]. We know it's scary to hear that you might have " COVID-19. We want to track your symptoms to make sure you're okay over the next 2 weeks. Please look for an email from [employer]---this is a free, online program that we'll use to keep in touch. To sign up, follow the link in the email. Learn more at [email or web address].  6. The following will serve as your written order for this Covid Test ordered by me for the indication of suspected Covid [Z20.828]: The test will be ordered in [employer], our electronic health record after you are scheduled and will show as ordered and authorized by Oziel Schwartz MD   Order: Covid-19 (Coronavirus) PCR for SYMPTOMATIC testing from [facility]  Where can I get more information?  To learn more about COVID-19 and how to care for yourself at home, please visit the CDC website at https://www.cdc.gov/coronavirus/2019-ncov/about/steps-when-sick.html.  For more about your care at [facility], please visit [email or web address]  If you'd like to be part of a COVID-19 clinical trial (research study) at the [facility], go to [email or web address] for details.         Diagnosis: Cough  Diagnosis ICD: R05

## 2020-06-04 NOTE — TELEPHONE ENCOUNTER
Patient had visit and testing this morning but has additional questions.  I referred her to [facility] health website and [facility] for current up to date info.    Debi Mo RN  [facility] Nurse Advisor

## 2020-06-05 ENCOUNTER — COMMUNICATION - HEALTHEAST (OUTPATIENT)
Dept: FAMILY MEDICINE | Facility: CLINIC | Age: 30
End: 2020-06-05

## 2020-07-10 ENCOUNTER — COMMUNICATION - HEALTHEAST (OUTPATIENT)
Dept: FAMILY MEDICINE | Facility: CLINIC | Age: 30
End: 2020-07-10

## 2020-07-10 ENCOUNTER — OFFICE VISIT - HEALTHEAST (OUTPATIENT)
Dept: FAMILY MEDICINE | Facility: CLINIC | Age: 30
End: 2020-07-10

## 2020-07-10 DIAGNOSIS — M54.50 LOW BACK PAIN OF OVER 3 MONTHS DURATION: ICD-10-CM

## 2020-07-10 DIAGNOSIS — M79.661 RIGHT CALF PAIN: ICD-10-CM

## 2020-07-10 DIAGNOSIS — G44.209 TENSION HEADACHE: ICD-10-CM

## 2020-07-10 DIAGNOSIS — E06.3 HASHIMOTO'S DISEASE: ICD-10-CM

## 2020-08-19 ENCOUNTER — COMMUNICATION - HEALTHEAST (OUTPATIENT)
Dept: FAMILY MEDICINE | Facility: CLINIC | Age: 30
End: 2020-08-19

## 2020-08-19 DIAGNOSIS — E03.9 HYPOTHYROID: ICD-10-CM

## 2020-08-19 DIAGNOSIS — E06.3 HYPOTHYROIDISM DUE TO HASHIMOTO'S THYROIDITIS: ICD-10-CM

## 2020-10-27 ENCOUNTER — AMBULATORY - HEALTHEAST (OUTPATIENT)
Dept: FAMILY MEDICINE | Facility: CLINIC | Age: 30
End: 2020-10-27

## 2020-10-27 ENCOUNTER — VIRTUAL VISIT (OUTPATIENT)
Dept: FAMILY MEDICINE | Facility: OTHER | Age: 30
End: 2020-10-27

## 2020-10-27 DIAGNOSIS — Z20.822 SUSPECTED COVID-19 VIRUS INFECTION: ICD-10-CM

## 2020-10-27 NOTE — PROGRESS NOTES
"Date: 10/27/2020 09:37:19  Clinician: Michael Riley  Clinician NPI: [id number]  Patient: Agustina Nam  Patient : 1990  Patient Address: [address], [place] [address]  Patient Phone: [phone]  Visit Protocol: URI  Patient Summary:  Agustina is a 29 year old ( : 1990 ) female who initiated a [facility] Visit for COVID-19 (Coronavirus) evaluation and screening. When asked the question \"Please sign me up to receive news, health information and promotions. \", Agustina responded \"Yes\".    Agustina states her symptoms started gradually 3-4 days ago. After her symptoms started, they improved and then got worse again.   Her symptoms consist of a headache, nasal congestion, anosmia, facial pain or pressure, myalgia, malaise, and a sore throat. She is experiencing mild difficulty breathing with activities but can speak normally in full sentences.   Symptom details     Nasal secretions: The color of her mucus is white, clear, and yellow.    Sore throat: Agustina reports having moderate throat pain (4-6 on a 10 point pain scale), does not have exudate on her tonsils, and can swallow liquids. The lymph nodes in her neck are not enlarged. A rash has not appeared on the skin since the sore throat started.     Facial pain or pressure: The facial pain or pressure does not feel worse when bending or leaning forward.     Headache: She states the headache is mild (1-3 on a 10 point pain scale).      Agustina denies having ear pain, wheezing, fever, enlarged lymph nodes, cough, vomiting, rhinitis, nausea, chills, teeth pain, ageusia, and diarrhea. She also denies having a sinus infection within the past year, taking antibiotic medication in the past month, and having recent facial or sinus surgery in the past 60 days.   Precipitating events  Within the past week, Agustina has not been exposed to someone with strep throat. She has not recently been exposed to someone with influenza. Agustina has not been in close contact " with any high risk individuals.   Pertinent COVID-19 (Coronavirus) information  In the past 14 days, Agustina has not worked in a congregate living setting.   She does not work or volunteer as healthcare worker or a  and does not work or volunteer in a healthcare facility.   Agustina also has not lived in a congregate living setting in the past 14 days. She does not live with a healthcare worker.   Agustina has had a close contact with a laboratory-confirmed COVID-19 patient within 14 days of symptom onset. Additional information about contact with COVID-19 (Coronavirus) patient as reported by the patient (free text): Outside, socially distanced in a park, I saw my friend on October 10. He was diagnosed with COVID-19 on October 24. 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   Since December 2019, Agustina and has had upper respiratory infection (URI) or influenza-like illness. Has not been diagnosed with lab-confirmed COVID-19 test      Date(s) of previous URI or influenza-like illness (free-text): various and ongoing, don't remember details     Symptoms Agustina experienced during previous URI or influenza-like illness as reported by the patient (free-text): cought, sore throat, chest congestion, nasal congestion, stomach pains, IBS        Pertinent medical history  Agustina does not get yeast infections when she takes antibiotics.   Agustina does not need a return to work/school note.   Weight: 140 lbs   Agustina does not smoke or use smokeless tobacco.   She denies pregnancy and denies breastfeeding. She has menstruated in the past month.   Additional information as reported by the patient (free text): I have Hashimoto's Autoimmune disease   Weight: 140 lbs    MEDICATIONS: levothyroxine oral, liothyronine oral, ALLERGIES: NKDA  Clinician Response:  Dear Agustina,   Your symptoms show that you may have coronavirus (COVID-19). This illness  can cause fever, cough and trouble breathing. Many people get a mild case and get better on their own. Some people can get very sick.  What should I do?  We would like to test you for this virus.   1. Please call [phone] to schedule your visit. Explain that you were referred by [facility] to have a COVID-19 test. Be ready to share your [facility] visit ID number.  Please note that if you are assessed for Covid-19 testing and receive an order for testing from [facility], that the scheduling of your Covid test at [facility] may be delayed by three or four days or more due to limited availability for testing. Additional options for testing can be found on the [place] Covid-19 Response website. https://[place].gov/covid19/    The following will serve as your written order for this COVID Test, ordered by me, for the indication of suspected COVID [Z20.828]: The test will be ordered in [employer], our electronic health record, after you are scheduled. It will show as ordered and authorized by Romain Schwartz MD.  Order: COVID-19 (Coronavirus) PCR for SYMPTOMATIC testing from [facility].   2. When it's time for your COVID test:  Stay at least 6 feet away from others. (If someone will drive you to your test, stay in the backseat, as far away from the  as you can.)   Cover your mouth and nose with a mask, tissue or washcloth.  Go straight to the testing site. Don't make any stops on the way there or back.      3.Starting now: Stay home and away from others (self-isolate) until:   You've had no fever---and no medicine that reduces fever---for one full day (24 hours). And...   Your other symptoms have gotten better. For example, your cough or breathing has improved. And...   At least 10 days have passed since your symptoms started.       During this time, don't leave the house except for testing or medical care.   Stay in your own room, even for meals. Use your own bathroom if you can.   Stay away from others in your home. No hugging,  "kissing or shaking hands. No visitors.  Don't go to work, school or anywhere else.    Clean \"high touch\" surfaces often (doorknobs, counters, handles, etc.). Use a household cleaning spray or wipes. You'll find a full list of  on the EPA website: www.epa.gov/pesticide-registration/list-n-disinfectants-use-against-sars-cov-2.   Cover your mouth and nose with a mask, tissue or washcloth to avoid spreading germs.  Wash your hands and face often. Use soap and water.  Caregivers in these groups are at risk for severe illness due to COVID-19:  o People 65 years and older  o People who live in a nursing home or long-term care facility  o People with chronic disease (lung, heart, cancer, diabetes, kidney, liver, immunologic)  o People who have a weakened immune system, including those who:   Are in cancer treatment  Take medicine that weakens the immune system, such as corticosteroids  Had a bone marrow or organ transplant  Have an immune deficiency  Have poorly controlled HIV or AIDS  Are obese (body mass index of 40 or higher)  Smoke regularly   o Caregivers should wear gloves while washing dishes, handling laundry and cleaning bedrooms and bathrooms.  o Use caution when washing and drying laundry: Don't shake dirty laundry, and use the warmest water setting that you can.  o For more tips, go to www.cdc.gov/coronavirus/2019-ncov/downloads/10Things.pdf.    4.Sign up for [employer]. We know it's scary to hear that you might have COVID-19. We want to track your symptoms to make sure you're okay over the next 2 weeks. Please look for an email from [employer]---this is a free, online program that we'll use to keep in touch. To sign up, follow the link in the email. Learn more at [email or web address]  How can I take care of myself?   Get lots of rest. Drink extra fluids (unless a doctor has told you not to).   Take Tylenol (acetaminophen) for fever or pain. If you have liver or kidney problems, ask your " family doctor if it's okay to take Tylenol.   Adults can take either:    650 mg (two 325 mg pills) every 4 to 6 hours, or...   1,000 mg (two 500 mg pills) every 8 hours as needed.    Note: Don't take more than 3,000 mg in one day. Acetaminophen is found in many medicines (both prescribed and over-the-counter medicines). Read all labels to be sure you don't take too much.   For children, check the Tylenol bottle for the right dose. The dose is based on the child's age or weight.    If you have other health problems (like cancer, heart failure, an organ transplant or severe kidney disease): Call your specialty clinic if you don't feel better in the next 2 days.       Know when to call 911. Emergency warning signs include:    Trouble breathing or shortness of breath Pain or pressure in the chest that doesn't go away Feeling confused like you haven't felt before, or not being able to wake up Bluish-colored lips or face.  Where can I get more information?   [facility] -- About COVID-19: [email or web address]   CDC -- What to Do If You're Sick: www.cdc.gov/coronavirus/2019-ncov/about/steps-when-sick.html   CDC -- Ending Home Isolation: www.cdc.gov/coronavirus/2019-ncov/hcp/disposition-in-home-patients.html   CDC -- Caring for Someone: www.cdc.gov/coronavirus/2019-ncov/if-you-are-sick/care-for-someone.html   [facility] -- Interim Guidance for Hospital Discharge to Home: www.health.[facility].[place]/diseases/coronavirus/hcp/hospdischarge.pdf   [facility] clinical trials (COVID-19 research studies): [email or web address]/umn-[email or web address]-trials    Below are the COVID-19 hotlines at the [place] Department of Health ([facility]). Interpreters are available.    For health questions: Call 420-457-2058 or 1-436.771.8197 (7 a.m. to 7 p.m.) For questions about schools and childcare: Call 706-134-7276 or 1-268.581.7180 (7 a.m. to 7 p.m.)    Diagnosis: Contact with and (suspected) exposure to other viral communicable  diseases  Diagnosis ICD: Z20.828

## 2020-10-29 ENCOUNTER — COMMUNICATION - HEALTHEAST (OUTPATIENT)
Dept: SCHEDULING | Facility: CLINIC | Age: 30
End: 2020-10-29

## 2021-03-19 ENCOUNTER — COMMUNICATION - HEALTHEAST (OUTPATIENT)
Dept: FAMILY MEDICINE | Facility: CLINIC | Age: 31
End: 2021-03-19

## 2021-03-19 DIAGNOSIS — E03.9 HYPOTHYROID: ICD-10-CM

## 2021-03-19 DIAGNOSIS — E06.3 HYPOTHYROIDISM DUE TO HASHIMOTO'S THYROIDITIS: ICD-10-CM

## 2021-05-26 NOTE — TELEPHONE ENCOUNTER
Leia calling from  compounding pharmacy to ask about medication if it should be the same as before the compounding version.    Writer gave verbal RX/auth to do the compounding medication the same as before per Mylene Tracey MD note below with 30 day supply dispense and 1 year of refills.    Leia took verbal and will get RX ready for patient.

## 2021-05-30 VITALS — HEIGHT: 68 IN | BODY MASS INDEX: 21.67 KG/M2 | WEIGHT: 143 LBS

## 2021-05-30 VITALS — WEIGHT: 136 LBS | HEIGHT: 68 IN | BODY MASS INDEX: 20.61 KG/M2

## 2021-05-30 VITALS — BODY MASS INDEX: 21.22 KG/M2 | WEIGHT: 140 LBS | HEIGHT: 68 IN

## 2021-05-30 NOTE — TELEPHONE ENCOUNTER
Who is calling:  Patient  Reason for Call:  Patient is calling to ask her blood type. Unable to find in medical history or lab records. Patient is not sure if she is A-. Patient is asking if she can have her blood typed at her next appointment.  Date of last appointment with primary care: 3/15/19  Okay to leave a detailed message: Yes [phone]

## 2021-05-30 NOTE — TELEPHONE ENCOUNTER
"Informed patient that orders were placed. Patient said she only wanted to know if her blood type was on file. Other than that it is not urgent. Said if her insurance was not going to cover it then she didn't want to do it.     Informed patient that we can provide her with \"cost of care\" number to let her know how much it would be. Patient declined and said to cancel orders.     No further action required.     CARLEE/DARRELL   "

## 2021-05-30 NOTE — TELEPHONE ENCOUNTER
Left message to call back for: Patient  Information to relay to patient:  Please let pt know that I also double checked her chart and could not find any blood typing labs done on her.  I can forward the message to Dr. Tracey to see if she will place a lab order for this. Currently pt does not have any upcoming appointments, does she want to schedule something with Dr. Tracey?

## 2021-05-31 VITALS — BODY MASS INDEX: 21.98 KG/M2 | HEIGHT: 68 IN | WEIGHT: 145 LBS

## 2021-05-31 VITALS — WEIGHT: 139.5 LBS | HEIGHT: 68 IN | BODY MASS INDEX: 21.14 KG/M2

## 2021-05-31 VITALS — WEIGHT: 142.4 LBS | BODY MASS INDEX: 21.58 KG/M2 | HEIGHT: 68 IN

## 2021-06-01 VITALS — HEIGHT: 68 IN | WEIGHT: 143 LBS | BODY MASS INDEX: 21.67 KG/M2

## 2021-06-02 VITALS — BODY MASS INDEX: 22.43 KG/M2 | WEIGHT: 148 LBS | HEIGHT: 68 IN

## 2021-06-04 VITALS
WEIGHT: 146.12 LBS | DIASTOLIC BLOOD PRESSURE: 64 MMHG | BODY MASS INDEX: 22.38 KG/M2 | HEART RATE: 72 BPM | OXYGEN SATURATION: 98 % | SYSTOLIC BLOOD PRESSURE: 108 MMHG

## 2021-06-05 NOTE — PROGRESS NOTES
HPI:  Patient is a 28 yo female who sees Dr. Tracey who would like a follow up of the following issues.    1) Hypothyroidism: Difficult to control.  Diagnosed while living in [facility].  Low in T3 andT4.  Moved to [facility] after college, off of T3, and got really sick.  Has been taking current dose for the past two years.  Patient is working with  Nutritionist as well to decrease her TPO.    2) Hx of pernicious anemia.  Was able to go off of B12.  History of vegan and vegatarian.    Along with a history of iron and protein levels.  Now eating meat.  Doing more paleo, whole foods, nondairy.  Getting through whole foods and whole food supplement.    3) irregular menses:    Got menses 12-13.    Has always been irregular.  Would often miss a month, but then bleed for 7-10 days.  The first year of college had menses every 2 weeks - was vegan during that time.  Was on OCP - but had side effects.  Has a menses every 23 days, bleeds for 5 days.    LMP: 1/21/2020.  Bad cramping 1/22/2020 bad abdominal cramping.  Lasted through Thursday at 5pm.  No history of dysmenorrhea.  Not currently sexually active.  This menses - day 1 was heavier.  Typically heavy is middle of second.      Older sister - had a mirena.  Had one baby.  No issues with menses.    Mother: no relevant. History.     Stress: Now in [place] to become a .      Patient Active Problem List    Diagnosis Date Noted     Fatigue 10/11/2017     Postural dizziness with presyncope 05/16/2017     Adrenal disorder (H) 05/16/2017     Irritable bowel syndrome 05/07/2017     Hashimoto's disease 05/07/2017     Autoimmune disease (H) 04/09/2017     Metrorrhagia      Chronic Sinusitis        Current Outpatient Medications:      levothyroxine (SYNTHROID, LEVOTHROID) 75 MCG tablet, Take 1 tablet (75 mcg total) by mouth daily., Disp: 90 tablet, Rfl: 0     liothyronine (CYTOMEL) 5 MCG tablet, Taking 7 mcg daily- compounded formulation., Disp: 180 tablet,  Rfl: 3    Objective     /64 (Patient Site: Right Arm, Patient Position: Sitting, Cuff Size: Adult Regular)   Pulse 72   Wt 146 lb 1.9 oz (66.3 kg)   LMP 01/21/2020   SpO2 98%   BMI 22.38 kg/m    General appearance: alert, appears stated age and cooperative  Neck: no adenopathy, supple, symmetrical, trachea midline and thyroid not enlarged, symmetric, no tenderness/mass/nodules  Lungs: clear to auscultation bilaterally  Heart: regular rate and rhythm, S1, S2 normal, no murmur, click, rub or gallop  Extremities: extremities normal, atraumatic, no cyanosis or edema  Pulses: 2+ and symmetric     Agustina was seen today for labs only.    Diagnoses and all orders for this visit:    Hypothyroidism due to Hashimoto's thyroiditis  -     Thyroid Peroxidase Antibody; Future  -     T3 (Triiodthyronine), Free; Future  -     T3 (Triiodothyronine), Reverse; Future  -     T4, Free; Future  -     Thyroid Stimulating Hormone (TSH); Future  Patient is due for labs. She is following an antiinflammatory diet so would like the TPO Ab rechecked.  She is clinically euthyroid, but was difficult to control in the past.    Irregular menses  -     HM1 (CBC and Differential); Future  Discussed the workup for this and differential.  It could be thyroid, PCOS, or premature ovarian failure. Patient is not looking to get pregnant, nor would she want hormones to regulate her symptoms, and therefore declined further workup there.  Discussed if her symptoms were to worsen, fewer menses or new symptoms, would recommend a lower threshhold for her workup.    History of pernicious anemia  -     Ferritin; Future  -     Iron and Transferrin Iron Binding Capacity; Future  -     Vitamin B12; Future  Patient would like a follow up of her labs.    Chronic fatigue  -     Vitamin D, Total (25-Hydroxy); Future  Patient would like a follow up of her labs.    > 30 min spent with patient.  > 50% in counseling and coordination of care.

## 2021-06-06 NOTE — TELEPHONE ENCOUNTER
Medication Question or Clarification  Who is calling: Pharmacy  What medication are you calling about (include dose and sig)?:   levothyroxine (SYNTHROID, LEVOTHROID) 75 MCG tablet  90 tablet  0  3/13/2020      Sig - Route: Take 1 tablet (75 mcg total) by mouth daily. - Oral       liothyronine (CYTOMEL) 5 MCG tablet  180 tablet  0  3/13/2020      Sig: Taking 7 mcg daily- compounded formulation.           Who prescribed the medication?: Dr Tracey  What is your question/concern?: Pharmacy states they combine these two medication together so the patient only has to take one.  States needs to know if ok to dispense 180 quantity?  Please call.  Requested Pharmacy: [facility]  Okay to leave a detailed message?: Yes

## 2021-06-06 NOTE — TELEPHONE ENCOUNTER
FYI: Pt's informed. She saw Dr. Somers recently on 1/29/20. She is trying to avoid co-pay and prefer doing physical once a year for renewal. She did send in a my-chart message to Dr. Somers today and will wait for Dr. Somers to reply back instead of scheduling appointment right now. xl

## 2021-06-06 NOTE — TELEPHONE ENCOUNTER
OK to combine meds and dispense as #180 quantity.    Please let pt know she is due for virtual visit- either phone or e-visit.    Mylene Tracey

## 2021-06-09 NOTE — PROGRESS NOTES
ZACHERY Berrios is a 26 year old who is here to FU her issues. She reports that she is feeling somehwat better. She has a little more energy. She did see another clinician who started her back on her thyroid supplement and that made her feel better. She is still having trouble sleeping, althoiugh the L-tryptophan she is taking is helping somewhat. She returns to go over her recent labs.     O - VS_ BP - 88/52  Height and weight are noted. This young lady is alert and in NAD. We went over her labs today. Her abnormals were a low TSH, and IF blocking antibody which was positive, a B12 level which was high, and the remainder of the labs were normal. We discussed the low TSH and what that signified. She understood that this would mean she should decrease her dose of thyroid. We discussed that the IF could be a false + due to her taking B12. She also inquired about getting her hormones checked as she still has not had a period for about the last 5-6 months. She says she was told at one point that she had low progesterone. She also still has trouble with sleep.     ASS - 1. 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              2. Secondary amenorrhea              3. Overcorrectionn of thyroid              4. Insomnia     PLAN - 1. She will decrease her dose of thyroid and will plan to recheck FT4, FT3, Rt3, TSH in 6 weeks.                2. Will check EBV antibody titers as she also told me she has been told she has chronic EBV.               3. Hormones checked today                4. She is interested in medical marijuana so i will register her on the site.                5. She will make an appt. To see Dr. Tracey for her SIBO    Length of time spent was 35 minutes - >50% of this time spent in going over labs, discussion and plans for going forrward.

## 2021-06-09 NOTE — PROGRESS NOTES
"Agustina Nam is a 29 y.o. adult who is being evaluated via a billable telephone visit.      The patient has been notified of following:     \"This telephone visit will be conducted via a call between you and your physician/provider. We have found that certain health care needs can be provided without the need for a physical exam.  This service lets us provide the care you need with a short phone conversation.  If a prescription is necessary we can send it directly to your pharmacy.  If lab work is needed we can place an order for that and you can then stop by our lab to have the test done at a later time.    Telephone visits are billed at different rates depending on your insurance coverage. During this emergency period, for some insurers they may be billed the same as an in-person visit.  Please reach out to your insurance provider with any questions.    If during the course of the call the physician/provider feels a telephone visit is not appropriate, you will not be charged for this service.\"    Patient has given verbal consent to a Telephone visit? Yes    What phone number would you like to be contacted at? [phone]     Patient would like to receive their AVS by AVS Preference: Quan.    Additional provider notes: no     SUBJECTIVE: Agustina Nam is a 29 y.o. adult :  1) Hypothyroidism -  She renewed her thyroid medicine - taking levothyroxine and Cytomel.  Feels that this combination of medicines is working well for her.  Denies any fatigue.  Would like to continue on same dose.    2) Chronic pain - She has had ongoing issues with lower back pain and hips.  Pain flares up if she has prolonged sitting.  No radiation of pain or numbness in legs.  She does feel weak if she stands for periods of time.  She has seen chiro in past but no MRIs or other scans.    She also will get muscle spasms and tingling in her right calf.  Makes it hard to sleep at night.  Needs to wear compression stockings.  She is " taking some magnesium but magnesium alone not helpful for this.    She also gets frequent headaches.  Getting a headache once a week.  Has lot of tension in neck and upper back and feels this is causing her headaches.    She was getting bodywork before pandemic for her chronic pain but has not been able to go back to get this care.  Also has done acupuncture and chiropractic care but unable to get manual therapies. Is nervous about going back to these providers.  She is doing more work from home now at desk. Pain now is 4/10.  Pain will go up to 7-8 / 10. She sometimes uses tiger balm heat.  Rarely takes OTC pain meds.      She is interested in certification for the medial cannabis program.  She was using a combination of CBD / THC in [facility] in the past.  Just CBD was not helpful.  She was in the medical cannabis program in [place] several years ago - she used a pen and capsules.  Helped a lot with sleep.      SH: Lives with parents.  Working and going to seminary school.  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    OBJECTIVE: no distress    She has had normal free T3 / T4 in 1/20.    Assessment/Plan:  1. Hashimoto's disease  Thyroid function stable on replacement    2. Low back pain of over 3 months duration  I will certify her for medical cannabis program. Discussed other options for her pain - turmeric / anti-inflammatory botanicals.  She does have a number of allergies so needs to be careful with new supplements.  She needs yearly follow-ups.  Touch base with me in a couple of months by My Chart to see how she is doing.  Needs face to face visit if any changes in her pain.    3. Tension headache  As above    4. Right calf pain  Continue magnesium.  As above.        Phone call duration:  27 minutes    MAGDA oJn

## 2021-06-09 NOTE — PROGRESS NOTES
ZACHERY Berrios is a 26 year old lady who is here with Mom today. I regularlly see Nikhil. I have not seen Agustina for some time. She has been in [place]. While there she was diagnosed with Hashimoto's thyroiditis. She has been treated various ways, but has continued to have trouble with a wide variety of sx. Most recently she has been on natural thyroid, or porcine-derived thyroid. She says that this seems to work the best for her. She still, however, has other sx. These include excessive thrist, excessive urination, ENT sx ( swollen throat, runny nose, GI sx ( constipation plus diarrhea, excess gas), Neuro sx (headaches, eye twitiching, brain fog), Psychologic sx (anxiety, depression, irritability), muscle cramping in various locations, sweating, amennorrhea for the last 3 months, easy bruising, vertigo She has been diagnosed with various things - including Hashimoto's, Euthyroid sick syndrome, Adrenal fatigue, Mercury elevation, and SIBO.   She is on a large number of supplements that include miinerals, digestive enzymes , iodine . Even with all of these, she continues to feel ill. She has come home because she and her parents have felt that they want to oversee efforts to get her better.     O - VS _ BP - 100/62 Height and weight are noted. It is noted that she has lost, by her weights, 25 pounds in the last year unintentionally. This young lady is alert and in NAD.   We spent the time discussing her sx, what she has done for these so far and  What she needs.     ASS - 1. Constellation of sx with diagnosis of Hashimoto's thyroiditis. Other sx suggestive of a wide variety of diagnoses, including diabetes, pituitary problem, adrenal deficiency,                 Vs. Other.     PLAN - 1. She had a list of labs she would like drawn and I drew most of these. There are several that are not done in the context of a primary care office, such as hydrogen lactose and fructose    tests.                2. When labs come back, I will  "fill her thyroid. This will be with Osbaldo thyroid.               3. I did give referral to endocrinology.                4. I think I will consult with Dr. Tracey as well to see if these symptoms would be appropriate for a gut issue treatment course - i,e, \"leaky gut\".               5. She also requested consideration for medical marijuana. I will wait until we get the results back because certainly if there is another etiology for these symptoms                    It would be more appropriate to directly treat that.     Length of time spent was 30 minutes -  >50% of this time spent in review of her material, meds, and discussion on how to go forward with this.   "

## 2021-06-10 NOTE — TELEPHONE ENCOUNTER
Refill Approved    Rx renewed per Medication Renewal Policy. Medication was last renewed on 3/13.20    Fe Ayala, [facility] Triage/Med Refill 8/19/2020     Requested Prescriptions   Pending Prescriptions Disp Refills     levothyroxine (SYNTHROID, LEVOTHROID) 75 MCG tablet 90 tablet 0     Sig: Take 1 tablet (75 mcg total) by mouth daily.       Thyroid Hormones Protocol Passed - 8/19/2020  1:37 PM        Passed - Provider visit in past 12 months or next 3 months     Last office visit with prescriber/PCP: 4/4/2018 Mylene Tracey MD OR same dept: 1/29/2020 Chandrika Somers MD OR same specialty: 1/29/2020 Chandrika Somers MD  Last physical: 3/15/2019 Last MTM visit: Visit date not found   Next visit within 3 mo: Visit date not found  Next physical within 3 mo: Visit date not found  Prescriber OR PCP: Mylene Tracey MD  Last diagnosis associated with med order: 1. Hypothyroidism due to Hashimoto's thyroiditis  - levothyroxine (SYNTHROID, LEVOTHROID) 75 MCG tablet; Take 1 tablet (75 mcg total) by mouth daily.  Dispense: 90 tablet; Refill: 0    2. Hypothyroid  - liothyronine (CYTOMEL) 5 MCG tablet; Taking 7 mcg daily- compounded formulation.  Dispense: 180 tablet; Refill: 0    If protocol passes may refill for 12 months if within 3 months of last provider visit (or a total of 15 months).             Passed - TSH on file in past 12 months for patient age 12 & older     TSH   Date Value Ref Range Status   01/31/2020 0.94 0.30 - 5.00 uIU/mL Final                      liothyronine (CYTOMEL) 5 MCG tablet 180 tablet 0     Sig: Taking 7 mcg daily- compounded formulation.       Thyroid Hormones Protocol Passed - 8/19/2020  1:37 PM        Passed - Provider visit in past 12 months or next 3 months     Last office visit with prescriber/PCP: 4/4/2018 Mylene Tracey MD OR same dept: 1/29/2020 Chandrika Somers MD OR same specialty: 1/29/2020 Chandrika Somers MD  Last physical: 3/15/2019 Last MTM visit:  Visit date not found   Next visit within 3 mo: Visit date not found  Next physical within 3 mo: Visit date not found  Prescriber OR PCP: Mylene Tracey MD  Last diagnosis associated with med order: 1. Hypothyroidism due to Hashimoto's thyroiditis  - levothyroxine (SYNTHROID, LEVOTHROID) 75 MCG tablet; Take 1 tablet (75 mcg total) by mouth daily.  Dispense: 90 tablet; Refill: 0    2. Hypothyroid  - liothyronine (CYTOMEL) 5 MCG tablet; Taking 7 mcg daily- compounded formulation.  Dispense: 180 tablet; Refill: 0    If protocol passes may refill for 12 months if within 3 months of last provider visit (or a total of 15 months).             Passed - TSH on file in past 12 months for patient age 12 & older     TSH   Date Value Ref Range Status   01/31/2020 0.94 0.30 - 5.00 uIU/mL Final

## 2021-06-10 NOTE — PROGRESS NOTES
SUBJECTIVE: Agustina Nam is a 26 y.o. female with:  Chief Complaint   Patient presents with     Follow-up     from last visit 5/2/17    1) Insomnia- Sleep has been better.  She is taking 5-HTP before bed and she is sleeping better.  She slept well one night with no supplements.  She has turned of wi-fi in the home and avoids EMR exposure around her bed which she thinks is helpful.    2) Hypothyroidism-  She has been on thyroid replacement since Sept 2014.  She is taking WP thyroid 65 mg - 2 tabs daily since March 21.  She was taking 2 1/2 tabs before then.  She feels her anxiety/ panic attacks are much better.  She is also taking CBD oil daily.    3) SIBO - She was diagnosed with SIBO in [place] with a fructose breath test. Stool test showed low lactobacillus.  She took a course of oregano oil for 5 weeks but has not had further treatment. She felt well initially after the treatment but she has a lot of food intolerances now and eats a very limited diet.  She has daily bloating and acupuncture has helped with that.  She is taking digestive enzymes and feels they are helpful.  She follows a autoimmune paleo/ low FODMAP/ low histamine / low salcylate diet.  Overall she feels the best she has in awhile and thinks her current group of supplements has been helping.    4) She reports about 5 episodes of syncope in her life.  None recently. She has had numerous episodes of pre-syncope. It is hard to relate any episodes to certain food groups.      5) Adrenal dysregulation - She did have salivary cortisol testing that showed flat curve in am and elevation of cortisol in pm. She is taking a number of supplements to support her adrenals.  She has had elevated RT3 also last time she had blood work.    6) She has concerns about toxicity/ mold exposure / possible Lyme's. She does not have any amalgams.  No obvious toxic exposure.  She did have heavy metals test that came back elevated for mercury.  Conventional lyme testing  "is negative.    OBJECTIVE: /64 (Patient Site: Right Arm, Patient Position: Sitting, Cuff Size: Adult Regular)  Pulse 60  Resp 8  Ht 5' 8\" (1.727 m)  Wt 145 lb (65.8 kg)  LMP 04/14/2017 (Exact Date)  Breastfeeding? No  BMI 22.05 kg/m2 no distress    Recent Results (from the past 240 hour(s))   Creatinine   Result Value Ref Range    Creatinine 0.82 0.60 - 1.10 mg/dL    GFR MDRD Af Amer >60 >60 mL/min/1.73m2    GFR MDRD Non Af Amer >60 >60 mL/min/1.73m2   Electrolyte Profile   Result Value Ref Range    Sodium 144 136 - 145 mmol/L    Potassium 4.3 3.5 - 5.0 mmol/L    CO2 21 (L) 22 - 31 mmol/L    Chloride 109 (H) 98 - 107 mmol/L    Anion Gap, Calculation 14 5 - 18 mmol/L   Glycosylated Hemoglobin A1c   Result Value Ref Range    Hemoglobin A1c 5.3 3.5 - 6.0 %   T3, Total   Result Value Ref Range    T3, Total 153 45 - 175 ng/dL   T4, Free   Result Value Ref Range    Free T4 0.8 0.7 - 1.8 ng/dL   Thyroid Stimulating Hormone (TSH)   Result Value Ref Range    TSH 0.02 (L) 0.30 - 5.00 uIU/mL   Thyroid Peroxidase Antibody   Result Value Ref Range    Thyroid Peroxidase Ab 36.7 (H) 0.0 - 5.6 IU/mL   Calcium   Result Value Ref Range    Calcium 9.9 8.5 - 10.5 mg/dL   Parathyroid Hormone Intact   Result Value Ref Range    PTH 38 10 - 86 pg/mL   Vitamin D, Total (25-Hydroxy)   Result Value Ref Range    Vitamin D, Total (25-Hydroxy) 66.0 30.0 - 80.0 ng/mL   Phosphorus   Result Value Ref Range    Phosphorus 3.5 2.5 - 4.5 mg/dL   Cortisol   Result Value Ref Range    Cortisol 6.4 ug/dL   Luteinizing Hormone (LH)   Result Value Ref Range    LH 3.2 mIU/mL   Follicle Stimulating Hormone (FSH)   Result Value Ref Range    FSH 4.5 mIU/mL   Adrenocorticotropic Hormone (ACTH)   Result Value Ref Range    Adrenocorticotropic Hormone 12 pg/mL   Celiac(Gluten)Antibody Panel ($$$)   Result Value Ref Range    Gliadin IgA 0.4 0.0-<7.0 U/mL    Gliadin IgG 0.4 0.0-<7.0 U/mL    Tissue Transglutaminase IgG AB <0.6 0.0-<7.0 U/mL    Tissue " Transglutaminase IgA AB <0.1 0.0-<7.0 U/mL    Immunoglobulin A 86 65 - 400 mg/dL   Please check random urinary iodine. - Misc. Lab Test   Result Value Ref Range    Miscellanous Test Dept. Chemistry Reference Test     Test Name Iodine, Random, Urine      Performing Lab       [facility] - [address] [address]  : Jim Locke III, M.D.      Scan Result See Scanned Report       Agustina was seen today for follow-up.    Diagnoses and all orders for this visit:    Hashimoto's disease- Her TSH is a little low and T3/T4 normal so I do not recommend going up on her thyroid medicine.  We should recheck in a couple of months and have her stay at current dose.    Irritable bowel syndrome - I would like her to do Lachelle stool test but cost is an issue. We discussed using the Biocidin herbal supplement plan to treat SIBO.      Postural dizziness with presyncope - I suspect she has some instability with her autonomic nervous system.  Consider tilt-table testing in the future.    Adrenal disorder - Her last RT3 was normal range so I suspect this is improving with supplements/ lifestyle treatments.    Given her complex history and my limited experience in functional medicine, I strongly recommend she consider a consult with Dr. Adis Elias, an experienced functional medicine practitioner.  We also discussed a protocol to decrease toxins in the body - I think this is safe and she is going to look into it.      Patient Instructions   Mercout - test for mercury exposure    The Toxic Solution- Dr. Jeronimo Vang  Nine week program:    Two-week jumpstart Diet:  Remove toxins, Dirty Dozen / Clean Fifteen ( ewg.org)  Avoid processed foods/ gluten/ dairy/ beef/ chicken/ farmed fish/ soy/ alcohol/ salt and sugar    Two-week gut protocol: Kill bad bacteria/ repair the gut/ heal gut walls    Two-week liver detox protocol: Nutritional and herbal support for  liver's detox enzymes    Two-week kidney detox protocol: Nutritional and herbal support/ improve microcirculation    Optional week: Intense full-body detox- caloric restriction/ saunas/ green drinks    Consider evaluation for lyme disease-  Dr. Dionna Sung, [facility]     Bioclear Cleansing Program:    Biocidin- Start at 2 drops 3x a day before meals and work up to 5 drops 3x a day over a couple of weeks  Biotonic- 10 drops twice a day ( can take with Biocidin)  Proflora- 5 pumps a day in 1/4 cp water- take 1 hour apart from BiocThe Backscr[employer]chers    [email or web address]            She will update me by My Chart with her choices regarding this plan of care.    30 minutes spent with the patient.  Over 50% were spent in counseling and coordination of care.  Mylene Tracey

## 2021-06-10 NOTE — PROGRESS NOTES
Progress Note    Reason for Visit:  Chief Complaint     Consult          Progress Note:    HPI:    This patient seen in consultation at the request of the primary care physician because of hypothyroidism due to Hashimoto's thyroiditis.    Thank you for referring this pleasant 26-year-old female patient who came to the clinic accompanied by her mom.  In 2014 she had symptoms of fatigue and tiredness her TSH was checked and it was mildly elevated at 2.56 she has positive anti-TPO antibodies.    The patient was started on Synthroid and very quickly switched to [place] Thyroid/nature thyroid.    Right now she is taken WB nature thyroid 65 mg 2 tablets daily.    Component      Latest Ref Rng & Units 3/1/2017 4/7/2017 5/2/2017   Testosterone, Bioavailable,S      ng/dL  SEE BELOW    Testosterone, Total      8 - 60 ng/dL  22    Vitamin D, Total (25-Hydroxy)      30.0 - 80.0 ng/mL 64.4  77.5   Vitamin B-12      213 - 816 pg/mL >2000 (H) >2000 (H)    Free T4      0.7 - 1.8 ng/dL 1.0  0.7   Thyroglobulin Antibody, Serum      <4.0 IU/mL <1.8     TSH      0.30 - 5.00 uIU/mL 0.01 (L)  0.01 (L)   Thyroid Peroxidase Ab      0.0 - 5.6 IU/mL 41.7 (H)  35.8 (H)   Progesterone      ng/mL  0.2    FSH      mIU/mL  3.3    Estradiol      pg/mL  230    Thiamin (Vitamin B1), Whole Blood      70 - 180 nmol/L  118    Folate      >=3.5 ng/mL  16.8        Review of Systems:    Nervous System: No headache, dizziness, fainting or memory loss. No tingling sensation of hand or feet.  Ears: No hearing loss or ringing in the ears  Eyes: No blurring of vision, redness, itching or dryness.  Nose: No nosebleed or loss of smell  Mouth: No mouth sores or loss of taste  Throat: No hoarseness or difficulty swallowing  Neck: No enlarged thyroid or lymph nodes.  Heart: No chest pain, palpitation or irregular heartbeat. No swelling of hands or feet  Lungs: No shortness of breath, cough, night sweats, wheezing or hemoptysis.  Gastrointestinal: No nausea or  vomiting, constipation or diarrhea.  No acid reflux, abdominal pain or blood in stools.  Kidney/Bladdr: No polyuria, polydipsia, nocturia or hematuria.  Genital/Sexual: No loss of libido  Skin: No rash, hair loss or hirsutism.  No abnormal striae  Muscles/Joints/Bones: No morning stiffness, muscle aches and pain or loss of height.    Current Medications:  Current Outpatient Prescriptions   Medication Sig     ADRENAL CORTEX, PORCINE, ORAL Take 250 mg by mouth.     aluminum-magnesium hydroxide-simethicone 200-200-20 mg/5 mL Susp 15 mL, viscous lidocaine HC 2 % Soln 15 mL Take by mouth once. Adrenal Cocktail 2-3/day  Per Drink: 1 t acerola cherry powder (whole food Vit C)  1/4 Himalayan Salt (trace minerals)  1/4 t cream of tartat (potassium=124mg)  4oz hot water     BETAINE ORAL Take 650 mg by mouth 3 (three) times a day. With HCL/Pepsin , tk 5 caps per meal po     ENZYMES,DIGESTIVE (DIGESTIVE ENZYMES ORAL) Take 1 capsule by mouth 3 (three) times a day with meals. Brand: DigestGold ATP Pro  Amylase 23,000DU  (Enzymedica) Protease 80,000 HUT  Glucoamylase 50 LUL  ATPro: mag Vitrate, phytase, coq10 25mg - 300 million ICU  Alpha Galactosise 450 GaIU  Cellulase Therea-blend 3,000CU  Lipase Thera-blend 4,000 FCCFIP  Lactase 900 ALU  Beta Glucanase 25 BGU*  Maltase 200DP  Xylanase 550DP  Invertase 240 CROSS  Pectinase (w/Phytase)  Hemicellulase (30 HCU)     folic acid (FOLVITE) 400 MCG tablet Take 400 mcg by mouth daily. Folate L-5-MTHF (Pure Encapsulations)     MAGNESIUM MALATE MISC Take 360 mg by mouth daily.     MAGNESIUM ORAL Take 150 mg by mouth daily. Magnesium L-Threonate ('s Best)     OMEGA-3S/DHA/EPA/FISH OIL (OMEGA 3 ORAL) Take by mouth. Ultimate Omega (Nordic Natural)  1280mg  HRL080vi  DHA 450mg  Other omega 3s 1180mg     pyridoxine, vitamin B6, (B-6) 500 MG tablet Take 500 mg by mouth daily. P5P 50 Activated B6 (Pure Encapsulations     UNABLE TO FIND Take by mouth. Med Name: Trace Mineral Drops (Concen Trace)  1/2 tsp per tsp  Magnesium 250mg  cholride 650mg  Potassium 3mg  Sulfate:40mglithium 1.5mg  Boron 1mg     UNABLE TO FIND Take by mouth. Med Name:Nigella Seed Oil 1/2 T     WP THYROID 65 mg Tab Take 65 mg by mouth 2 (two) times a day.        Patients Active Problems:  Patient Active Problem List   Diagnosis     Metrorrhagia     Chronic Sinusitis     Autoimmune disease     Irritable bowel syndrome     Hashimoto's disease       History:   reports that she has never smoked. She has never used smokeless tobacco. She reports that she uses illicit drugs. She reports that she does not drink alcohol.   reports that she has never smoked. She has never used smokeless tobacco. She reports that she uses illicit drugs. She reports that she does not drink alcohol.  History   Smoking Status     Never Smoker   Smokeless Tobacco     Never Used      reports that she has never smoked. She has never used smokeless tobacco. She reports that she uses illicit drugs. She reports that she does not drink alcohol.  History   Sexual Activity     Sexual activity: No     No past medical history on file.  Family History   Problem Relation Age of Onset     Depression Mother      Arthritis Mother      Anxiety disorder Mother      Plantar fasciitis Mother      Chronic infections Mother      Chronic Yeast Infection     Arthritis Father      Depression Father      Anxiety disorder Father      Sinusitis Father      Irritable bowel syndrome Father      Sinusitis Maternal Grandmother      Chronic Sinus Congestion     Arthritis Maternal Grandmother      Uterine cancer Paternal Grandmother      Irritable bowel syndrome Paternal Grandmother      Hyperthyroidism Paternal Grandfather      Skin cancer Paternal Grandfather      Prostate cancer Paternal Grandfather      No past medical history on file.  No past surgical history on file.    Vitals   vitals were not taken for this visit.        Exam  General appearance: The patient looked well, not in acute  distress.  Eyes: no evidence of thyroid eye disease.   Retinal exam: No evidence of diabetic retinopathy.  Mouth and Throat: Normal  Neck: No evidence of thyromegaly, enlarged lymph node or tenderness  Chest: Trachea is central. Chest is clear to auscultation and percussion. Breat sounds are normal.  Cardiovascular exam: JVP is not raised. Heart sounds are normal, no murmurs or rub  Peripheral pulses are palpable.   Abdomen: No masses or tenderness.    Back: No vertebral tenderness or kyphosis.  Extremities: No evidence of leg edema.   Skin: Normal to touch.  No abnormal striae  Neurologic exam:  Visual fields are intact by confrontation, grossly intact. No evidence of peripheral neuropathy.  Detailed foot exam normal.        Diagnosis:  No diagnosis found.    Orders:   No orders of the defined types were placed in this encounter.        Assessment and Plan:  Hypothyroidism due to Hashimoto's thyroiditis.    The patient is taking WP nature thyroid 65 mg 2 tablets daily.    She is taking 1500  g of B12 injection once a week her B12 is more than 2000.    She is taken adrenal fatigue tablets 250 mg getting that over-the-counter she is taking a lot of other supplements.    She said that she is allergic to gluten but have never been tested for it.    She has a lot of anxiety difficulty sleeping palpitation.    Grandfather has hypothyroidism.    I did discuss the pathophysiology of the disease with the patient she is mainly treating herself and seeing different providers and chiropractors and having symptoms of fatigue cold intolerance and said that when she switches to levothyroxine while she was in [place] and she crashed when she did not take her B12 injection on a weekly basis she crashed.    I did advise the patient to discontinue the nature thyroid medication takes Synthroid 50  g plus Cytomel 5  g as her thyroid function is not normal now and we need to normalize her thyroid function since she has been  overmedicated for a long time.    I will check serum cortisol I will check for celiac disease.    We will check her thyroid function test every 6 weeks and she will return in 3 month    I did spend 60 minutes with the patient more than 50% was spent on counseling and managing her care.    I was asked to see the patient again she has multiple questions and she is dictating her treatment and management and what medication she wants to take and what medication she does not.  I think the patient is harming herself and I find it difficult to to continue looking after her if she continued in this track.
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
## 2021-06-12 NOTE — PROGRESS NOTES
"SUBJECTIVE: Agustina Nam is a 26 y.o. female with:  Chief Complaint   Patient presents with     Follow-up     new allergy sx     Medication Questions     need b12 medication - currently does not have once from PCP     Sinus Problem     chronic x 1 week     Sore Throat     itchy throat x 1 week    1) She feels she always has a sore throat but then gets more acute symptoms. She notices this when she touches raw foods and/ or eats certains foods.  She can also notice this if she drinks reverse osmosis water.  The throat feels \"cottony\" and is hard to swallow.  She is constantly pulling shirt way from neck.  She is constantly clearing her throat.  She will also get a constant drip from her nose.  She has been wearing gloves with food prep and stopped drinking reverse osmosis water. She is trying to avoid foods high in salcylates.  She has a scratchy sensation in her throat even now.  She would get symptoms daily and could last 5-60 minutes.  No hives or skin rashes.  She has not tried any meds for this problem.  She has never seen allergist in the past.  She was exposed to black mold when she was living in [place] and worried about exposure in her house here in [place].    2) Hypothyroidism - She is taking thyroid replacement from [facility] pharmacy which is compounded. She is on T3 and T4.  Just went up on her dose of levothyroxine to 75 mcg daily.    3) B12 deficiency - She was prescribed B12 shots by a functional medicine doctor in [place] and would like to continue them.  She feels poorly when stopping the shots.  She was diagnosed with pernicious anemia.    OBJECTIVE: /60 (Patient Site: Right Arm, Patient Position: Sitting, Cuff Size: Adult Regular)  Pulse 80  Temp 99.3  F (37.4  C) (Oral)   Resp 12  Ht 5' 8\" (1.727 m)  Wt 139 lb 8 oz (63.3 kg)  LMP 07/18/2017  Breastfeeding? No  BMI 21.21 kg/m2   No acute distress.  HEENT: Head is atraumatic and/normocephalic.  PERRL.  Conjunctiva clear.  " Tympanic membranes grey with normal landmarks and normal light reflexes.  No nasal discharge.  Oropharynx is pink and moist.  Sinuses nontender.  Neck: Supple.  No lymphadenopathy or thyromegaly.  Lungs: Clear to auscultation.  No wheezing, retractions, or tachypnea.  Heart: RRR. S1 and S2 normal.  No murmurs, rubs, or gallops.  Neuro: Awake, alert, oriented x 3.  Normal strength and tone.  Normal gait.     Agustina was seen today for follow-up, medication questions, sinus problem and sore throat.    Diagnoses and all orders for this visit:    Allergy- Will refer to allergy to see if she has IgE reaction to foods.  -     Ambulatory referral to Allergy    Hashimoto's disease- Continue on current dose of thyroid medicine and recheck labs in 8 weeks.    Autoimmune disease - She needs to update me about her B12 prescription if she wants a refill.    Mylene Tracey

## 2021-06-12 NOTE — PROGRESS NOTES
Assessment:    Chronic symptoms of throat tightness and drainage.  No found allergic sensitivity today.  History of shortness of breath.  Normal spirometry and nitric oxide testing today.    Possible globus sensation.  Also consider vocal cord dysfunction.    Symptoms suggestive of food sensitivities.  Symptoms are not consistent with classic IgE mediated food allergy.    Plan:    With negative allergy testing, I would recommend further evaluation by ENT and rhinoscopy with visualization of vocal cords.    A trial of fluticasone nasal spray 1 spray each nostril twice daily could be used.  ____________________________________________________________________________     Artery is here with persistent symptoms of throat tightness and irritation.  She feels something is in her throat.  Occasionally she coughs up yellow mucus.  She does have coughing occasional shortness of breath.  She describes heart palpitations.  She does describe nasal drainage especially with eating.  She has chronic nasal congestion.  Her nasal drainage tends to be posterior.  She has had throat irritation when handling fruits and vegetables and foods with salicylate.  This is touching it without ingestion.  She is identified gluten as a trigger for fatigue mental fogginess.  Also stomach cramps.  She avoids gluten and avoids milk and other grains.  Regarding her throat symptoms she reports that it always seems irritated.  No drooling.  Occasional hoarse voice.    Review of symptoms:  As above, otherwise negative    Past medical history: Hashimoto's thyroiditis, pernicious anemia, vertigo, irritable bowel syndrome    Allergies: No known allergies to medications, latex, foods or hymenoptera venom    Family history: Cousin and mother with environmental allergies.    Social history: Currently has lived in the same house since February 2017.  There is a history of water leaks within the home however none recently.  There are 2 cats at home.  Patient  works as a  and [employer].  Currently she is a non-smoker.    Medications: reviewed in chart    Physical Exam:  General:  Alert and Oriented X 3.  Eyes:  Sclera clear.  Ears: TMs translucent grey with bony landmarks visible. Nose: Pale, boggy mucosal membranes.  Throat: Pink, moist.  No lesions.  Neck: Supple.  No lymphadenopathy.  Lungs: CTA.  CV: Regular rate and rhythm. Extremities: Well perfused.  No clubbing or cyanosis. Skin: No rash    Last Percutaneous Allergy Test Results  Trees  Papo, White  1:20 H  (W/F in mm): 0/0 (08/28/17 1029)  Birch Mix 1:20 H (W/F in mm): 0/0 (08/28/17 1029)  Sharon, Common 1:20 H (W/F in mm): 0/0 (08/28/17 1029)  Elm, American 1:20 H (W/F in mm): 0/0 (08/28/17 1029)  Stanly, Shagbark 1:20 H (W/F in mm): 0/0 (08/28/17 1029)  Maple, Hard/Sugar 1:20 H (W/F in mm): 0/0 (08/28/17 1029)  Bowman Mix 1:20 H (W/F in mm): 0/0 (08/28/17 1029)  [place], Red 1:20 H (W/F in mm): 0/0 (08/28/17 1029)  [place], American 1:20 H (W/F in mm): 0/0 (08/28/17 1029)  [place] Tree 1:20 H (W/F in mm): 0/0 (08/28/17 1029)  Dust Mites  D. Pteronyssinus Mite 30,000 AU/ML H (W/F in mm): 0/0 (08/28/17 1029)  D. Farinae Mite 30,000 AU/ML H (W/F in mm: 0/0 (08/28/17 1029)  Grasses  Grass Mix #4 10,000 BAU/ML H: 0/0 (08/28/17 1029)  Yusef Grass 1:20 H (W/F in mm): 0/0 (08/28/17 1029)  Cockroach  Cockroach Mix 1:10 H (W/F in mm): 0/0 (08/28/17 1029)  Molds/Fungi  Alternaria Tenuis 1:10 H (W/F in mm): 0/0 (08/28/17 1029)  Aspergillus Fumigatus 1:10 H (W/F in mm): 0/0 (08/28/17 1029)  Homodendrum Cladosporioides 1:10 H (W/F in mm): 0/0 (08/28/17 1029)  Penicillin Notatum 1:10 H (W/F in mm): 0/0 (08/28/17 1029)  Epicoccum 1:10 H (W/F in mm): 0/0 (08/28/17 1029)  Weeds  Ragweed, Short 1:20 H (W/F in mm): 0/0 (08/28/17 1029)  Dock, Sorrel 1:20 H (W/F in mm): 0/0 (08/28/17 1029)  Lamb's Quarter 1:20 H (W/F in mm): 0/0 (08/28/17 1029)  Pigweed, Rough Red Root 1:20 H  (W/F in mm): 0/0 (08/28/17  1029)  Plantain, English 1:20 H  (W/F in mm): 0/0 (08/28/17 1029)  Sagebrush, Mugwort 1:20 H  (W/F in mm): 0/0 (08/28/17 1029)  Animal  Cat 10,000 BAU/ML H (W/F in mm): 0/0 (08/28/17 1029)  Dog 1:10 H (W/F in mm): 0/0 (08/28/17 1029)  Controls  Device Type: QUINTIP (08/28/17 1029)  Neg. control: 50% Glycerine/Saline H (W/F in mm): 0/0 (08/28/17 1029)  Pos. control: Histamine 6mg/ML (W/F in mms): 6/28 (08/28/17 1029)     Spirometry: FEV1 to FVC ratio is 87%.  FEV1 is 3.57 L which is 97% of predicted.  FVC is 4.12 L which is 96% of predicted.  This is normal spirometry    Nitric oxide is 7 ppb which is within normal limits    This transcription uses voice recognition software, which may contain typographical errors.

## 2021-06-15 PROBLEM — R42 POSTURAL DIZZINESS WITH PRESYNCOPE: Status: ACTIVE | Noted: 2017-05-16

## 2021-06-15 PROBLEM — E27.9 ADRENAL DISORDER (H): Status: ACTIVE | Noted: 2017-05-16

## 2021-06-15 PROBLEM — M35.9 AUTOIMMUNE DISEASE (H): Status: ACTIVE | Noted: 2017-04-09

## 2021-06-15 PROBLEM — K58.9 IRRITABLE BOWEL SYNDROME: Status: ACTIVE | Noted: 2017-05-07

## 2021-06-15 PROBLEM — E06.3 HASHIMOTO'S DISEASE: Status: ACTIVE | Noted: 2017-05-07

## 2021-06-15 PROBLEM — R55 POSTURAL DIZZINESS WITH PRESYNCOPE: Status: ACTIVE | Noted: 2017-05-16

## 2021-06-16 ENCOUNTER — OFFICE VISIT - HEALTHEAST (OUTPATIENT)
Dept: FAMILY MEDICINE | Facility: CLINIC | Age: 31
End: 2021-06-16

## 2021-06-16 DIAGNOSIS — E03.9 HYPOTHYROID: ICD-10-CM

## 2021-06-16 DIAGNOSIS — Z86.2 HISTORY OF ANEMIA: ICD-10-CM

## 2021-06-16 DIAGNOSIS — E06.3 HYPOTHYROIDISM DUE TO HASHIMOTO'S THYROIDITIS: ICD-10-CM

## 2021-06-16 PROBLEM — G44.209 TENSION HEADACHE: Status: ACTIVE | Noted: 2020-07-10

## 2021-06-16 PROBLEM — R53.83 FATIGUE: Status: ACTIVE | Noted: 2017-10-11

## 2021-06-16 PROBLEM — M79.661 RIGHT CALF PAIN: Status: ACTIVE | Noted: 2020-07-10

## 2021-06-16 PROBLEM — M54.50 LOW BACK PAIN OF OVER 3 MONTHS DURATION: Status: ACTIVE | Noted: 2020-07-10

## 2021-06-16 LAB
FERRITIN SERPL-MCNC: 9 NG/ML
IRON SATN MFR SERPL: 40 % (ref 20–50)
IRON SERPL-MCNC: 133 UG/DL
T3FREE SERPL-MCNC: 3.9 PG/ML (ref 1.9–3.9)
T4 FREE SERPL-MCNC: 1.2 NG/DL (ref 0.7–1.8)
TIBC SERPL-MCNC: 329 UG/DL (ref 313–563)
TRANSFERRIN SERPL-MCNC: 263 MG/DL (ref 212–360)
TSH SERPL DL<=0.005 MIU/L-ACNC: 0.17 UIU/ML (ref 0.3–5)

## 2021-06-16 RX ORDER — LIOTHYRONINE SODIUM 5 UG/1
TABLET ORAL
Qty: 90 TABLET | Refills: 3 | Status: SHIPPED | OUTPATIENT
Start: 2021-06-16 | End: 2021-09-21

## 2021-06-16 RX ORDER — LEVOTHYROXINE SODIUM 75 UG/1
75 TABLET ORAL DAILY
Qty: 90 TABLET | Refills: 3 | Status: SHIPPED | OUTPATIENT
Start: 2021-06-16 | End: 2021-09-21

## 2021-06-16 NOTE — TELEPHONE ENCOUNTER
Call pt - Rustam is due for lab work and I wanted to follow-up regarding symptoms discussed during our last visit.  Please let Ray know about changes in the system as need to know f/u plan before refilling meds.    The [facility] is permanently closed.  I will be practicing at the [facility] [place].  I am currently doing both face to face and virtual visits ( phone/ video).  If you feel that it is too far to travel there for care and/ or prefer a provider in the [facility], I highly recommend establishing care with one of the following two new providers:  Dr. Debi Lowery at [facility] or Dr. Chelo Park at [facility].

## 2021-06-17 LAB — THYROID PEROXIDASE ANTIBODIES - HISTORICAL: 11.2 IU/ML (ref 0–5.6)

## 2021-06-17 NOTE — PROGRESS NOTES
"SUBJECTIVE: Agustina Nam is a 27 y.o. female with:  Chief Complaint   Patient presents with     Follow-up     labs     Parasite concerns    She has been seeing a nutritionist who did muscle testing which suggested parasitic infection.  She is doing some enemas and taking parasitic herbs.  She is doing garlic and coffee enemas on a daily basis and stool is formed to loose.  She is using Standard Process products for her digestion.  Overall she feels better.  Has more energy. Still with lot of food intolerances. She thinks she is seeing evidence of worms in her stool and wants my advice about whether she should continue with her current plan.    She continues to have problems with eating fruits/ vegetables, even if cooked.  Two days ago,  she had a migraine headache and had hard time sleeping.  She ate cooked zucchini in the am.  She used to get lot of bloating from eating certain foods but is avoiding a lot now. She is eating beef / broth.  Taking a digestive enzyme.    She continues to feel well on compounded thyroid replacement.  She is taking a combination of T3/T4.  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    OBJECTIVE: /68 (Patient Site: Left Arm, Patient Position: Sitting, Cuff Size: Adult Regular)  Pulse 70  Ht 5' 8\" (1.727 m)  Wt 143 lb (64.9 kg)  BMI 21.74 kg/m2 no distress    Lab Results   Component Value Date    TSH 0.24 (L) 02/28/2018     Free T3 - 0.9  Free T4- 2    Agustina was seen today for follow-up and parasite concerns.    Diagnoses and all orders for this visit:    Digestive disorder- I advised her it was difficult for me to comment on the issue of parasitic infection since I did not have any objective data.  I advised her that kinesiology or muscle testing has not really been validated in scientific studies.  I suggested we submit a specimen for analysis.  I would recommend the [facility] Stool Effects testing and she will consider.  I also expressed my concern " about doing so many enemas.  -     Ova and Parasite, Stool; Future    Hashimoto's disease- Her TSH is a little suppressed but free T3/T4 are normal and she has more energy so will continue current dose of thyroid replacement.    Irritable bowel syndrome- Recommend Lachel[facility] testing as next step and she will consider.         Mylene Tracey

## 2021-06-20 NOTE — LETTER
Letter by Carmen Young RN at      Author: Carmen Young RN Service: -- Author Type: --    Filed:  Encounter Date: 6/5/2020 Status: (Other)       6/5/2020        Agustina Nam  [address]  [place] [address]    This letter provides a written record that you were tested for COVID-19 on 6/4/20.     Your result was negative.    This means that we didnt find the virus that causes COVID-19 in your sample. A test may show negative when you do actually have the virus. This can happen when the virus is in the early stages of infection, before you feel illness symptoms.    Even if you dont have symptoms, they may still appear. For safety, its very important to follow these rules.    Keep yourself away from others (self-isolation):      Stay home. Dont go to work, school or anywhere else.     Stay in your own room (and use your own bathroom), if you can.    Stay away from others in your home. No hugging, kissing or shaking hands. No visitors.    Clean high touch surfaces often (doorknobs, counters, handles, etc.). Use a household cleaning spray or wipes.    Cover your mouth and nose with a mask, tissue or washcloth to avoid spreading germs.    Wash your hands and face often with soap and water.    Stay in self-isolation until you meet ALL of the guidelines below:    1. You have had no fever for at least 72 hours (that is 3 full days of no fever without the use of medicine that reduces fevers), AND  2. other symptoms (such as cough, shortness of breath) have gotten better, AND  3. at least 10 days have passed since your symptoms first appeared.    Going back to work  Check with your employer for any guidelines to follow for going back to work.    Employers: This document serves as formal notice that your employee tested negative for COVID-19, as of the testing date shown above.    For questions regarding this letter or your Negative COVID-19 result, call [phone] between 8A to 6:30P (M-F) and 10A to  6:30P (weekends).

## 2021-06-23 NOTE — TELEPHONE ENCOUNTER
RN cannot approve Refill Request    RN can NOT refill this medication Protocol failed and NO refill given. Last office visit: 4/4/2018 Mylene Tracey MD Last Physical: Visit date not found Last MTM visit: Visit date not found Last visit same specialty: 4/4/2018 Mylene Tracey MD.  Next visit within 3 mo: Visit date not found  Next physical within 3 mo: Visit date not found      Ruma Brito, [facility] Triage/Med Refill 1/18/2019    Requested Prescriptions   Pending Prescriptions Disp Refills     hydroxocobalamin 1,000 mcg/mL Soln  0     Sig: 10 ml/week    Cyanocobalamin (Vitamin B12)  Refill Protocol Failed - 1/18/2019  9:00 AM       Failed - Vitamin B12 level in last 12 months    Vitamin B-12   Date Value Ref Range Status   09/18/2017 >2,000 (H) 213 - 816 pg/mL Final            Passed - PCP or prescribing provider visit in past 12 months      Last office visit with prescriber/PCP: 4/4/2018 Mylene Tracey MD OR same dept: 4/4/2018 Mylene Tracey MD OR same specialty: 4/4/2018 Mylene Tracey MD Last physical: Visit date not found Last MTM visit: Visit date not found    Next visit within 3 mo: Visit date not found  Next physical within 3 mo: Visit date not found  Prescriber OR PCP: Mylene Tracey MD  Last diagnosis associated with med order: There are no diagnoses linked to this encounter.            Passed - CBC in last 12 months    WBC   Date Value Ref Range Status   02/28/2018 4.4 4.0 - 11.0 thou/uL Final     RBC   Date Value Ref Range Status   02/28/2018 4.33 3.80 - 5.40 mill/uL Final     Hemoglobin   Date Value Ref Range Status   02/28/2018 13.5 12.0 - 16.0 g/dL Final     Hematocrit   Date Value Ref Range Status   02/28/2018 40.4 35.0 - 47.0 % Final     MCV   Date Value Ref Range Status   02/28/2018 93 80 - 100 fL Final     MCH   Date Value Ref Range Status   02/28/2018 31.1 27.0 - 34.0 pg Final     MCHC   Date Value Ref Range  Status   02/28/2018 33.3 32.0 - 36.0 g/dL Final     RDW   Date Value Ref Range Status   02/28/2018 11.4 11.0 - 14.5 % Final     Platelets   Date Value Ref Range Status   02/28/2018 296 140 - 440 thou/uL Final     MPV   Date Value Ref Range Status   02/28/2018 7.8 7.0 - 10.0 fL Final

## 2021-06-24 NOTE — TELEPHONE ENCOUNTER
Spoke to pt, she states disregard request as pt would perfer appointment to discuss medication and get blood drawn. I made an appointment for Annual exam with you for March 15th but pt is wondering if you need lab done prior to appt or during visit? Pt states you can respond back via [employer]. Please send [employer] message to pt regarding your suggestion on LAB work. Thank you.

## 2021-06-24 NOTE — TELEPHONE ENCOUNTER
Medication Request  Medication name: hydroxocobalamin 1,000 mcg/mL Soln  Pharmacy Name and Location: [facility]  Reason for request: Fax received from pharmacy requesting refill of the above medication  When did you use medication last?:  Unknown- RX is listed as no print on chart  Okay to leave a detailed message: no-speak to pharmacy staff

## 2021-06-25 NOTE — TELEPHONE ENCOUNTER
I do not have the request but call pharmacy and do verbal order if they will accept as I would like to continue both the compounded levothyroxine and the Cytomel at the same dose / same formulation as in the past - fill for 1 month with refills for 1 year.  I am out of office until 3/27/19 so may need DOD to sign for me.

## 2021-06-25 NOTE — TELEPHONE ENCOUNTER
Please call pharmacy and have them fax over a request for thyroid medicine as this was a special order.

## 2021-06-25 NOTE — TELEPHONE ENCOUNTER
Cytomel was refilled on 3/18/19.  DR. Somers, since you are doc on call and Dr. Tracey is out until 3/27, will you refill the levothyroxine?

## 2021-06-25 NOTE — PROGRESS NOTES
FEMALE ADULT PREVENTIVE EXAM    CHIEF COMPLAINT:  Female preventive exam.    SUBJECTIVE:  Agustina Nam is a 28 y.o. female who presents for her routine physical exam.    Patient would like to address the following concerns today:   Here to f/u on hypothyroidism/ B12 deficiency/ chronic fatigue.  She is seeing chiropractor / acupuncurist and using Standard Process supplements.  Overall feel better.      GYNE HISTORY  Menses: Irregular  Sexually Active: no  Contraception: no  Last Pap: 2017 normal  Abnormal Pap: no      She  has no past medical history on file.    Lab Results   Component Value Date    WBC 5.2 03/12/2019    HGB 12.6 03/12/2019    HCT 37.9 03/12/2019    MCV 95 03/12/2019     03/12/2019     03/12/2019    K 4.2 03/12/2019    BUN 7 (L) 03/12/2019     Lab Results   Component Value Date    CHOL 272 (H) 07/12/2017    HDL 78 07/12/2017    LDLCALC 182 (H) 07/12/2017    TRIG 60 07/12/2017     Lab Results   Component Value Date    TSH 0.08 (L) 03/12/2019     BP Readings from Last 3 Encounters:   03/15/19 104/70   04/04/18 110/68   08/28/17 96/64       Surgeries:  No past surgical history on file.    Family History:  Her family history includes Anxiety disorder in her father and mother; Arthritis in her father, maternal grandmother, and mother; Chronic infections in her mother; Depression in her father and mother; Hyperthyroidism in her paternal grandfather; Irritable bowel syndrome in her father and paternal grandmother; Plantar fasciitis in her mother; Prostate cancer in her paternal grandfather; Sinusitis in her father and maternal grandmother; Skin cancer in her paternal grandfather; Uterine cancer in her paternal grandmother.    Social History:  She  reports that  has never smoked. she has never used smokeless tobacco. She reports that she uses drugs. She reports that she does not drink alcohol. Single. Works at youth ministry.  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    Medications:    Current Outpatient Medications:      levothyroxine (SYNTHROID, LEVOTHROID) 75 MCG tablet, Take 1 tablet (75 mcg total) by mouth daily., Disp: 90 tablet, Rfl: 3     liothyronine (CYTOMEL) 5 MCG tablet, Taking 7 mcg daily- compounded formulation., Disp: 180 tablet, Rfl: 1     hydroxocobalamin 1,000 mcg/mL Soln, 10 ml/week, Disp: , Rfl: 0     safety needles 25 x 5/8 \" Ndle, Use 1 each As Directed once a week., Disp: 50 each, Rfl: 1  HELD MEDICATIONS: None.  Supplements: Standard Process    Allergies:  No latex allergies.  No Known Allergies         RISK BEHAVIOR & HEALTH HABITS  Regular Exercise: walks.  Balanced diet: yes- more variety now.  Calcium/vitamin D: yes  Stress management: no  Seat Belt Use: yes  Dental Care: YES    REVIEW OF SYSTEMS:  Complete head to toe review of systems is otherwise negative except as above.    OBJECTIVE:  VITAL SIGNS:    Vitals:    03/15/19 1111   BP: 104/70   Pulse: (!) 105   SpO2: 98%     GENERAL:  Patient alert, in no acute distress.  EYES: PERRLA. Extraoccular movements intact, pupils equal, reactive to light and accommodation.  Normal conjunctiva and lids.   ENT:  Hearing grossly normal.  Normal appearance to ears and nose.  Bilateral TM s, external canals, oropharynx normal. Normal lips, gums and teeth.   NECK:  Supple, without thyromegaly or mass.  RESP:  Clear to auscultation without crackles, wheezes or distress.  Normal respiratory effort.   CV:  Regular rate and rhythm without murmurs, rubs or gallops.  Normal pedal pulses.  No varicosities or edema.  ABDOMEN:  Soft, non-tender, without hepatosplenomegaly, masses, or hernias.   BREASTS:  Nontender, without masses, nipple discharge, erythema, or axillary adenopathy.    LYMPHATIC: No cervical or axillary lymphadenopathy.  No bruising.  NEURO:  CN II-XII intact, motor & sensory function all intact.  DTR and reflexes normal.  PSYCHIATRIC:  Alert & oriented with normal mood and affect.  Good judgment and " insight.  SKIN:  Normal inspection and palpation.  MUSCULOSKELETAL: Normal gait and station.  - Spine / Ribs / Pelvis: Normal inspection, ROM, stability and strength: Spine, Head, Neck, Upper and Lower Extremities.      Agustina was seen today for annual exam.    Diagnoses and all orders for this visit:    Routine general medical examination at a health care facility  Up to date with pap.  She thinks she had tetanus in [place] when she had finger injury in 2013- she will try to get records.    Hypothyroidism due to Hashimoto's thyroiditis- TPO antibodies stable.  TSH slightly suppressed but normal T3/ T4 so will continue her same dose thyroid hormone.    F/U yearly.         Mylene Tracey

## 2021-06-25 NOTE — TELEPHONE ENCOUNTER
Medication:  levothyroxine (SYNTHROID, LEVOTHROID) 75 MCG tablet  Pharmacy: [facility] pharmacy   Last OV: 03/15/19   Last Refill: HISTORICAL     Please advise on medication request.     CARLEE/MAGDA

## 2021-06-25 NOTE — TELEPHONE ENCOUNTER
Medication Request  Medication name: Levothyroxine 75 mcg tab   Pharmacy Name and Location: [facility]   Reason for request: Historical medication   When did you use medication last?:  Unknown   Patient offered appointment:  no, pharmacy calling  Okay to leave a detailed message: yes

## 2021-06-25 NOTE — TELEPHONE ENCOUNTER
Refill Approved    Rx renewed per Medication Renewal Policy. Medication was last renewed on 4/6/18.    eF Ayala, [facility] Triage/Med Refill 3/18/2019     Requested Prescriptions   Pending Prescriptions Disp Refills     liothyronine (CYTOMEL) 5 MCG tablet 180 tablet 1     Sig: Taking 7 mcg daily- compounded formulation.    Thyroid Hormones Protocol Passed - 3/17/2019  3:13 PM       Passed - Provider visit in past 12 months or next 3 months    Last office visit with prescriber/PCP: 4/4/2018 Mylene Tracey MD OR same dept: 4/4/2018 Mylene Tracey MD OR same specialty: 4/4/2018 Mylene Tracey MD  Last physical: 3/15/2019 Last MTM visit: Visit date not found   Next visit within 3 mo: Visit date not found  Next physical within 3 mo: Visit date not found  Prescriber OR PCP: Mylene Tracey MD  Last diagnosis associated with med order: 1. Hypothyroid  - liothyronine (CYTOMEL) 5 MCG tablet; Taking 7 mcg daily- compounded formulation.  Dispense: 180 tablet; Refill: 1    If protocol passes may refill for 12 months if within 3 months of last provider visit (or a total of 15 months).            Passed - TSH on file in past 12 months for patient age 12 & older    TSH   Date Value Ref Range Status   03/12/2019 0.08 (L) 0.30 - 5.00 uIU/mL Final

## 2021-06-26 NOTE — PROGRESS NOTES
Assessment & Plan     Agustina was seen today for medication check.    Diagnoses and all orders for this visit:    Hypothyroidism due to Hashimoto's thyroiditis  -     Thyroid Peroxidase Antibody  -     Thyroid Stimulating Hormone (TSH)  -     T4, Free  -     T3 (Triiodothyronine), Free  -     levothyroxine (SYNTHROID, LEVOTHROID) 75 MCG tablet; Take 1 tablet (75 mcg total) by mouth daily.  Patient is due for labs today.  Clinically, she is euthyroid.  Patient notes that she had thyroid scans in [place], but I do not see those scanned into the media.  Patient will send me a copy of this through her [employer]hart for her records.    Hypothyroid  -     liothyronine (CYTOMEL) 5 MCG tablet; Taking 7 mcg daily- compounded formulation.    History of anemia  -     Ferritin  -     Iron and Transferrin Iron Binding Capacity  Patient has a history of vitamin B12 deficiency leading to anemia.  That has since been corrected, we will recheck her iron levels.  Patient has been engaging in a high iron diet    Other orders  -     Tdap vaccine,  8yo or older,  IM  Patient states that she had her Tdap vaccine in [place] when she was getting stitches, that was in 2012, recommend her Tdap vaccine today.       Return in about 6 months (around 12/16/2021) for Annual physical.    Chandrika Somers MD  [facility]    Subjective   Agustina Nam is 30 y.o. and presents today for the following health issues   HPI     Patient is a 30-year-old female who presents for medication check.  Patient previously saw Dr. Tracey.    Her main medical issue is hypothyroidism: Patient was diagnosed in 2014 with Hashimoto's thyroiditis.  Patient has been taking a combination of levothyroxine and liothyronine thyroid knee for years.  Her current dose of levothyroxine 75 mcg and 70 mcg of liothyronine has been helpful for her for the past 3 years.  Patient is doing homeopathy as well is engaging in to manage her inflammation.   Patient has found that it has decreased her TPO antibodies in that time.  She notes no hair or skin changes.    Patient Active Problem List    Diagnosis Date Noted     Low back pain of over 3 months duration 07/10/2020     Tension headache 07/10/2020     Right calf pain 07/10/2020     Fatigue 10/11/2017     Postural dizziness with presyncope 05/16/2017     Adrenal disorder (H) 05/16/2017     Irritable bowel syndrome 05/07/2017     Hashimoto's disease 05/07/2017     Autoimmune disease (H) 04/09/2017     Metrorrhagia      Chronic Sinusitis      Current Outpatient Medications   Medication Instructions     levothyroxine (SYNTHROID, LEVOTHROID) 75 mcg, Oral, DAILY     liothyronine (CYTOMEL) 5 MCG tablet Taking 7 mcg daily- compounded formulation.     medical cannabis (Patient's own supply) Other, See admin instructions, (The purpose of this order is to document that the patient reports taking medical cannabis. This is not a prescription, and is not used to certify that the patient has a  qualifying medical condition.)      NON FORMULARY THC 5mg Caps 1 daily        Objective    BP 98/60 (Patient Site: Left Arm, Patient Position: Sitting, Cuff Size: Adult Regular)   Pulse 92   Wt 139 lb 4 oz (63.2 kg)   LMP 06/04/2021   SpO2 98%   BMI 21.33 kg/m    Body mass index is 21.33 kg/m .  Physical Exam  General: Patient is alert and oriented no acute distress  HEENT: TMs are clear, neck is supple no lymphadenopathy, no thyromegaly, no palpable nodules.  CV: Regular rate and rhythm without murmur  Lungs: Clear to auscultation bilaterally  Extremities: Pulses 2+, skin is warm and dry

## 2021-07-03 NOTE — ADDENDUM NOTE
Addendum Note by Britany Farris RT (R) at 9/12/2017  3:34 PM     Author: Britany Farris RT (R) Service: -- Author Type: Radiologic Technologist    Filed: 9/12/2017  3:34 PM Encounter Date: 8/28/2017 Status: Signed    : Britany Farris RT (R) (Radiologic Technologist)    Addended by: BRITANY FARRIS on: 9/12/2017 03:34 PM        Modules accepted: Orders

## 2021-07-03 NOTE — ADDENDUM NOTE
Addendum Note by Britany Farris RT (R) at 9/13/2017 10:13 AM     Author: Britany Farris RT (R) Service: -- Author Type: Radiologic Technologist    Filed: 9/13/2017 10:13 AM Encounter Date: 8/7/2017 Status: Signed    : Britany Farris RT (R) (Radiologic Technologist)    Addended by: BRITANY FARRIS on: 9/13/2017 10:13 AM        Modules accepted: Orders

## 2021-07-03 NOTE — ADDENDUM NOTE
Addendum Note by Ryan Padron RT (R) at 3/7/2017  2:00 PM     Author: Ryan Padron RT (R) Service: -- Author Type: Radiologic Technologist    Filed: 3/7/2017  2:00 PM Encounter Date: 3/1/2017 Status: Signed    : Ryan Padron RT (R) (Radiologic Technologist)    Addended by: RYAN PADRON on: 3/7/2017 02:00 PM        Modules accepted: Orders

## 2021-07-03 NOTE — ADDENDUM NOTE
Addendum Note by Marii Elise MD at 3/7/2017  1:26 PM     Author: Marii Elise MD Service: -- Author Type: Physician    Filed: 3/7/2017  1:26 PM Encounter Date: 3/1/2017 Status: Signed    : Marii Elise MD (Physician)    Addended by: MARII ELISE on: 3/7/2017 01:26 PM        Modules accepted: Orders

## 2021-07-03 NOTE — ADDENDUM NOTE
Addendum Note by Mylene Gastelum MD at 10/11/2017  5:23 PM     Author: Mylene Gastelum MD Service: -- Author Type: Physician    Filed: 10/11/2017  5:23 PM Encounter Date: 10/3/2017 Status: Signed    : Mylene Gastelum MD (Physician)    Addended by: MYLENE GASTELUM on: 10/11/2017 05:23 PM        Modules accepted: Orders

## 2021-07-03 NOTE — ADDENDUM NOTE
Addendum Note by Mylene Gastelum MD at 11/9/2017  5:15 PM     Author: Mylene Gastelum MD Service: -- Author Type: Physician    Filed: 11/9/2017  5:15 PM Encounter Date: 10/3/2017 Status: Signed    : Mylene Gastelum MD (Physician)    Addended by: MYLENE GASTELUM on: 11/9/2017 05:15 PM        Modules accepted: Orders

## 2021-07-03 NOTE — ADDENDUM NOTE
Addendum Note by Debi Caba CMA at 3/10/2017  9:14 AM     Author: Debi Caba CMA Service: -- Author Type: Certified Medical Assistant    Filed: 3/10/2017  9:14 AM Encounter Date: 3/1/2017 Status: Signed    : Debi Caba CMA (Certified Medical Assistant)    Addended by: DEBI CABA on: 3/10/2017 09:14 AM        Modules accepted: Orders

## 2021-07-04 NOTE — ADDENDUM NOTE
Addendum Note by Neftali Faria CMA at 4/1/2021  9:58 AM     Author: Neftali Faria CMA Service: -- Author Type: Medical Assistant    Filed: 4/1/2021  9:58 AM Encounter Date: 3/19/2021 Status: Signed    : Neftali Faria CMA (Medical Assistant)    Addended by: NEFTALI FARIA on: 4/1/2021 09:58 AM        Modules accepted: Orders

## 2021-07-06 VITALS
OXYGEN SATURATION: 98 % | BODY MASS INDEX: 21.33 KG/M2 | WEIGHT: 139.25 LBS | SYSTOLIC BLOOD PRESSURE: 98 MMHG | DIASTOLIC BLOOD PRESSURE: 60 MMHG | HEART RATE: 92 BPM

## 2021-08-15 ENCOUNTER — HEALTH MAINTENANCE LETTER (OUTPATIENT)
Age: 31
End: 2021-08-15

## 2021-08-17 ENCOUNTER — OFFICE VISIT (OUTPATIENT)
Dept: FAMILY MEDICINE | Facility: CLINIC | Age: 31
End: 2021-08-17
Payer: COMMERCIAL

## 2021-08-17 VITALS
HEART RATE: 81 BPM | HEIGHT: 68 IN | WEIGHT: 141 LBS | DIASTOLIC BLOOD PRESSURE: 60 MMHG | SYSTOLIC BLOOD PRESSURE: 122 MMHG | BODY MASS INDEX: 21.37 KG/M2 | OXYGEN SATURATION: 98 %

## 2021-08-17 DIAGNOSIS — M25.50 MULTIPLE JOINT PAIN: ICD-10-CM

## 2021-08-17 DIAGNOSIS — R53.82 CHRONIC FATIGUE: ICD-10-CM

## 2021-08-17 DIAGNOSIS — R10.2 PELVIC PAIN IN FEMALE: ICD-10-CM

## 2021-08-17 DIAGNOSIS — N92.1 METRORRHAGIA: ICD-10-CM

## 2021-08-17 DIAGNOSIS — N94.10 PAIN IN FEMALE GENITALIA ON INTERCOURSE: ICD-10-CM

## 2021-08-17 DIAGNOSIS — Z00.00 ENCOUNTER FOR ANNUAL HEALTH EXAMINATION: Primary | ICD-10-CM

## 2021-08-17 LAB
C REACTIVE PROTEIN LHE: <0.1 MG/DL (ref 0–0.8)
ERYTHROCYTE [DISTWIDTH] IN BLOOD BY AUTOMATED COUNT: 12.9 % (ref 10–15)
ERYTHROCYTE [SEDIMENTATION RATE] IN BLOOD BY WESTERGREN METHOD: 5 MM/HR (ref 0–20)
HCT VFR BLD AUTO: 37.4 % (ref 35–53)
HGB BLD-MCNC: 12.3 G/DL (ref 11.7–17.7)
MCH RBC QN AUTO: 30.8 PG (ref 26.5–33)
MCHC RBC AUTO-ENTMCNC: 32.9 G/DL (ref 31.5–36.5)
MCV RBC AUTO: 94 FL (ref 78–100)
PLATELET # BLD AUTO: 264 10E3/UL (ref 150–450)
RBC # BLD AUTO: 4 10E6/UL (ref 3.8–5.9)
WBC # BLD AUTO: 7.9 10E3/UL (ref 4–11)

## 2021-08-17 PROCEDURE — 82306 VITAMIN D 25 HYDROXY: CPT | Performed by: STUDENT IN AN ORGANIZED HEALTH CARE EDUCATION/TRAINING PROGRAM

## 2021-08-17 PROCEDURE — 85027 COMPLETE CBC AUTOMATED: CPT | Performed by: STUDENT IN AN ORGANIZED HEALTH CARE EDUCATION/TRAINING PROGRAM

## 2021-08-17 PROCEDURE — 85652 RBC SED RATE AUTOMATED: CPT | Performed by: STUDENT IN AN ORGANIZED HEALTH CARE EDUCATION/TRAINING PROGRAM

## 2021-08-17 PROCEDURE — 36415 COLL VENOUS BLD VENIPUNCTURE: CPT | Performed by: STUDENT IN AN ORGANIZED HEALTH CARE EDUCATION/TRAINING PROGRAM

## 2021-08-17 PROCEDURE — 99214 OFFICE O/P EST MOD 30 MIN: CPT | Mod: 25 | Performed by: STUDENT IN AN ORGANIZED HEALTH CARE EDUCATION/TRAINING PROGRAM

## 2021-08-17 PROCEDURE — 99395 PREV VISIT EST AGE 18-39: CPT | Performed by: STUDENT IN AN ORGANIZED HEALTH CARE EDUCATION/TRAINING PROGRAM

## 2021-08-17 PROCEDURE — 86141 C-REACTIVE PROTEIN HS: CPT | Performed by: STUDENT IN AN ORGANIZED HEALTH CARE EDUCATION/TRAINING PROGRAM

## 2021-08-17 ASSESSMENT — MIFFLIN-ST. JEOR: SCORE: 1419.82

## 2021-08-17 NOTE — PROGRESS NOTES
SUBJECTIVE:   CC: Augstina Nam is an 30 year old woman who presents for preventive health visit.     Patient has been advised of split billing requirements and indicates understanding: Yes  Healthy Habits:     Getting at least 3 servings of Calcium per day:  Yes    Bi-annual eye exam:  Yes    Dental care twice a year:  NO    Sleep apnea or symptoms of sleep apnea:  None    Diet:  Gluten-free/reduced and Other    Frequency of exercise:  None    Taking medications regularly:  Yes    Medication side effects:  None    PHQ-2 Total Score: 0    Additional concerns today:  Yes    Sees dental once yearly due to insurance coverage  Not exercising, too much pain    Patient presents to establish care. She has multiple concerns today.    Patient sees a naturopath and a chiropractor as well. Patient has a history of Hashimoto's disease. She is currently taking T4 and T3 for this. Her antibody levels have been coming down. Her TSH, T4, and T3 have been stable for a while. Patient's last TSH was a bit low and her T3 was high. The plan was to recheck in 3 months and change doses as appropriate then. Patient does not see an endocrinologist. Denies any anxiety. Patient does note some hair loss. Patient denies any palpitations.    Patient has been struggling with her periods. Patient notes in college she had quite sporadic periods. She did not have pain associated with them. Over the last few years she has developed more regular periods and they have become quite painful. Patient currently gets her cycle every 23 to 25 days. She utilizes Thinxs, notes she is changing them 4-5 times a day at max. Patient normally buys the 2 pad equivalents. Patient does note she gets constipation with her periods. She locates the pain in her lower back and abdomen. Patient is concerned for PCOS or endometriosis.    Patient has been struggling with throat pain recently. She notes that she has a fullness. She denies any history of allergies or  asthma. She notes it worsened with the air pollution. It is improved minimally. She does have some mucus and congestion. She is currently using a [place] pot. Patient does use sterile water. Patient is not currently taking any allergy medications or Flonase. Patient denies any gastric reflux. Discussed trying Flonase to see if helping with the postnasal drip could help with throat pain. Patient in agreement this plan.    Patient has been struggling with allover joint pain. When questioned further she notes it mostly locates in her low back hip flexors and right calf. Patient notes it is mostly affecting her right side. Patient notes that the pain in her calf comes and goes. She describes it as paresthesia, weakness, and cool sensation. Patient notes it is positional dependent but also will happen with activity. Patient denies any other issues with any other muscles. Patient has noted no change in the bulk of the muscle. Discussed keeping an eye on it. Advised that if it was worsened may consider further work-up if progressing or beginning to affect other muscles. In addition we will collect a CRP and ESR today.    Today's PHQ-2 Score:   PHQ-2 ( 1999 Pfizer) 8/16/2021   Q1: Little interest or pleasure in doing things 0   Q2: Feeling down, depressed or hopeless 0   PHQ-2 Score 0   Q1: Little interest or pleasure in doing things Not at all   Q2: Feeling down, depressed or hopeless Not at all   PHQ-2 Score 0     Abuse: Current or Past (Physical, Sexual or Emotional) - No  Do you feel safe in your environment? Yes    Have you ever done Advance Care Planning? (For example, a Health Directive, POLST, or a discussion with a medical provider or your loved ones about your wishes): No, advance care planning information given to patient to review.  Advanced care planning was discussed at today's visit.    Social History     Tobacco Use     Smoking status: Never Smoker     Smokeless tobacco: Never Used   Substance Use Topics      "Alcohol use: No       Alcohol Use 8/16/2021   Prescreen: >3 drinks/day or >7 drinks/week? Not Applicable       Reviewed orders with patient.  Reviewed health maintenance and updated orders accordingly - Yes  Lab work is in process    Breast Cancer Screening:  Any new diagnosis of family breast, ovarian, or bowel cancer? No    FHS-7: No flowsheet data found.    Patient under 40 years of age: Routine Mammogram Screening not recommended.   Pertinent mammograms are reviewed under the imaging tab.    History of abnormal Pap smear: NO - age 21-29 PAP every 3 years recommended  Patient on period today and would like to come back at a different time      Reviewed and updated as needed this visit by clinical staff  Tobacco  Allergies  Meds  Problems  Med Hx  Surg Hx  Fam Hx          Reviewed and updated as needed this visit by Provider  Tobacco  Allergies  Meds  Problems  Med Hx  Surg Hx  Fam Hx         Review of Systems  CONSTITUTIONAL: NEGATIVE for fever, chills, change in weight, some fatigue noted  INTEGUMENTARU/SKIN: NEGATIVE for worrisome rashes, moles or lesions  EYES: NEGATIVE for vision changes or irritation  ENT: NEGATIVE for ear, mouth, positive for throat pain  RESP: NEGATIVE for significant cough or SOB  BREAST: NEGATIVE for masses, tenderness or discharge  CV: NEGATIVE for chest pain, palpitations or peripheral edema  GI: NEGATIVE for nausea, abdominal pain, heartburn, or change in bowel habits  : NEGATIVE for unusual urinary or vaginal symptoms. Periods are regular.  MUSCULOSKELETAL: NEGATIVE for significant arthralgias. Having some paresthesias in the calf and myalgias of hip flexors and back   NEURO: NEGATIVE for weakness, dizziness or paresthesias  PSYCHIATRIC: NEGATIVE for changes in mood or affect     OBJECTIVE:   /60   Pulse 81   Ht 1.73 m (5' 8.11\")   Wt 64 kg (141 lb)   LMP 08/16/2021   SpO2 98%   BMI 21.37 kg/m    Physical Exam  GENERAL: healthy, alert and no " distress  EYES: Eyes grossly normal to inspection, PERRL and conjunctivae and sclerae normal  HENT: ear canals and TM's normal, nose and mouth without ulcers or lesions  NECK: no adenopathy, no asymmetry, masses, or scars and thyroid normal to palpation  RESP: lungs clear to auscultation - no rales, rhonchi or wheezes  CV: regular rate and rhythm, normal S1 S2, no S3 or S4, no murmur, click or rub, no peripheral edema and peripheral pulses strong  ABDOMEN: soft, nontender, no hepatosplenomegaly, no masses and bowel sounds normal  MS: no gross musculoskeletal defects noted, no edema, calves symmetric bilaterally  SKIN: no suspicious lesions or rashes  NEURO: Normal strength and tone, mentation intact and speech normal  PSYCH: mentation appears normal, affect normal/bright    Diagnostic Test Results:  Labs reviewed in Epic    ASSESSMENT/PLAN:   Agustina was seen today for physical and establish care.    Diagnoses and all orders for this visit:    Encounter for annual health examination: Patient here for annual wellness today. Patient having multiple concerns. See below. Patient due for Pap smear but has her menstrual cycle today. Patient does not want a completed at this time and will return. Patient due for HIV and hepatitis C screen. Patient notes she recently had these completed outside clinic and they were negative. She will work on getting these records for us.    Metrorrhagia: Patient is struggling with painful heavy periods. Currently they still are within the realm of normal. Patient has had a history of iron deficiency anemia that was recently treated. We will collect a CBC today and ensure that she is not having too much blood loss. Because of the pain will have her do a pelvic ultrasound to evaluate for possible polycystic ovary disease or fibroids. If she wanted to pursue further work-up for possible endometriosis we will send her over to gynecology. Patient will continue to monitor her cycles and let me  "know what she wants to do.  -     CBC with platelets; Future  -     US Pelvic Complete with Transvaginal; Future  -     CBC with platelets    Chronic fatigue: Patient has recently been struggling with fatigue. She is concerned about this in addition to her joint pain. Patient does have a history of Hashimoto's which her thyroid has currently been on the high side. She is not due for recheck again for another month. We will also check her hemoglobin and vitamin D levels.  -     CBC with platelets; Future  -     Vitamin D Deficiency; Future  -     CBC with platelets  -     Vitamin D Deficiency    Multiple joint pain: Patient having multiple joint or muscle pain. We will collect a screening ESR and CRP  -     ESR: Erythrocyte sedimentation rate; Future  -     CRP, inflammation; Future  -     ESR: Erythrocyte sedimentation rate  -     CRP, inflammation    Pelvic pain in female  -     US Pelvic Complete with Transvaginal; Future  -     Physical Therapy Referral; Future    Pain in female genitalia on intercourse: Patient did note pain with intercourse. She is interested in doing a pelvic floor physical therapy referral to treat this at this time.  -     Physical Therapy Referral; Future    Patient looking to renew her medical cannabis waiver. Advised that she will need to see Dr. Mckay to do this.    Patient has been advised of split billing requirements and indicates understanding: No  COUNSELING:  Reviewed preventive health counseling, as reflected in patient instructions       Regular exercise       Healthy diet/nutrition       Consider Hep C screening for all patients one time for ages 18-79 years       HIV screeninx in teen years, 1x in adult years, and at intervals if high risk    Estimated body mass index is 21.37 kg/m  as calculated from the following:    Height as of this encounter: 1.73 m (5' 8.11\").    Weight as of this encounter: 64 kg (141 lb).        Rustam Nam reports that Rustam Nam has never " smoked. Rustam Nam has never used smokeless tobacco.      Counseling Resources:  ATP IV Guidelines  Pooled Cohorts Equation Calculator  Breast Cancer Risk Calculator  BRCA-Related Cancer Risk Assessment: FHS-7 Tool  FRAX Risk Assessment  ICSI Preventive Guidelines  Dietary Guidelines for Americans, 2010  USDA's MyPlate  ASA Prophylaxis  Lung CA Screening    Chelo Park [facility]

## 2021-08-17 NOTE — PATIENT INSTRUCTIONS
Try Flonase for Nasal Congestion    Pelvic US, If wanting to work up further will send to GYN     May consider birthcontrol options for pain and bleeding    Schedule with Dr. Mckay for medical cannibus      [facility] , for pelvic floor PT

## 2021-08-18 LAB — DEPRECATED CALCIDIOL+CALCIFEROL SERPL-MC: 44 UG/L (ref 30–80)

## 2021-08-23 ENCOUNTER — VIRTUAL VISIT (OUTPATIENT)
Dept: FAMILY MEDICINE | Facility: CLINIC | Age: 31
End: 2021-08-23
Payer: COMMERCIAL

## 2021-08-23 DIAGNOSIS — M54.50 LOW BACK PAIN OF OVER 3 MONTHS DURATION: ICD-10-CM

## 2021-08-23 DIAGNOSIS — E06.3 HASHIMOTO'S DISEASE: Primary | ICD-10-CM

## 2021-08-23 PROCEDURE — 99213 OFFICE O/P EST LOW 20 MIN: CPT | Mod: GT | Performed by: FAMILY MEDICINE

## 2021-08-23 NOTE — PATIENT INSTRUCTIONS
Really nice to meet you Ray    Continue the medical cannabis    Follow through with Dr Park for tweaking of the thyroid medications    I appreciated a slight fullness in your left neck during the video exam today  I think he should just go ahead and do thyroid ultrasound and follow-up with those results with either myself or Dr Park    Actively Pursue Wellness    Eat Whole Foods with High Nutritional Value  -----High Fats Nuts, Olive Oil, Fish, Avocados  -----Lean Meats  -----Vegetables Colorful and In abundance  -----Fresh fruits  -----Beans, Legumes  and Whole Grains    Engage in Moderate Exercise  -----30-60 minutes daily    Get Intentional Restorative Sleep  -----8-10 hours    Cultivate and Maintain Healthy Relationships  -----Family and Friends  -----Work Place  -----Community      Remove and Harmful Factors  -----Stressors  Work and Home   -----Toxins:  Tobacco, Alcohol in Excess, Processed Sugars (White Stuff and High Fructose Corn Syrup, Pollutants (Air and Water)      Be Present and Mindful for Yourself and Family  -----Laugh Together  -----Talk Together and Listen to your Body and Family and Friends  -----Enjoy Meals together        There are Five Documented ways to Reduce Risk  for Chronic Illness:    Exercise Daily  30-60 minutes  No Tobacco Products  Red Wine 4-8 oz daily  BMI less than 25  Mediterranean Style Diet     Eat more Plants>> in abundance and of many colors!  Phyto-nutrients activate our health potential.  Choose:  More Colors. More Diversity  Get More Benefit.    Cultivate that inner Compost bin!    Jennifer

## 2021-08-23 NOTE — ASSESSMENT & PLAN NOTE
"Sleep helpful  Full body   Headaches   Neck Tension     4/10     If take too late \"sleep Hangover\"      Otherwise doing well managing pain    Plan continue medical cannabis  Certification done today        "

## 2021-08-23 NOTE — PROGRESS NOTES
"Rustam is a 30 year old who is being evaluated via a billable video visit.      How would you like to obtain your AVS? MyChart  If the video visit is dropped, the invitation should be resent by: Text to cell phone: [phone]  Will anyone else be joining your video visit? No          Video Start Time: Start: 08/23/2021 11:19 am   Stop: 08/23/2021 11:32 am    Problem List Items Addressed This Visit        Nervous and Auditory    Low back pain of over 3 months duration     Sleep helpful  Full body   Headaches   Neck Tension     4/10     If take too late \"sleep Hangover\"      Otherwise doing well managing pain    Plan continue medical cannabis  Certification done today                  Endocrine    Hashimoto's disease - Primary    Relevant Orders    US Thyroid (Completed)            Subjective   Rustam is a 30 year old who presents for the following health issues  HPI     Problem List Items Addressed This Visit        Nervous and Auditory    Low back pain of over 3 months duration     Sleep helpful  Full body   Headaches   Neck Tension     4/10     If take too late \"sleep Hangover\"      Otherwise doing well managing pain    Plan continue medical cannabis  Certification done today                  Endocrine    Hashimoto's disease - Primary    Relevant Orders    US Thyroid (Completed)          Patient Instructions   Really nice to meet you Rustam    Continue the medical cannabis    Follow through with Dr Park for tweaking of the thyroid medications    I appreciated a slight fullness in your left neck during the video exam today  I think he should just go ahead and do thyroid ultrasound and follow-up with those results with either myself or Dr Park    Actively Pursue Wellness    Eat Whole Foods with High Nutritional Value  -----High Fats Nuts, Olive Oil, Fish, Avocados  -----Lean Meats  -----Vegetables Colorful and In abundance  -----Fresh fruits  -----Beans, Legumes  and Whole Grains    Engage in Moderate " Exercise  -----30-60 minutes daily    Get Intentional Restorative Sleep  -----8-10 hours    Cultivate and Maintain Healthy Relationships  -----Family and Friends  -----Work Place  -----Community      Remove and Harmful Factors  -----Stressors  Work and Home   -----Toxins:  Tobacco, Alcohol in Excess, Processed Sugars (White Stuff and High Fructose Corn Syrup, Pollutants (Air and Water)      Be Present and Mindful for Yourself and Family  -----Laugh Together  -----Talk Together and Listen to your Body and Family and Friends  -----Enjoy Meals together        There are Five Documented ways to Reduce Risk  for Chronic Illness:    Exercise Daily  30-60 minutes  No Tobacco Products  Red Wine 4-8 oz daily  BMI less than 25  Mediterranean Style Diet     Eat more Plants>> in abundance and of many colors!  Phyto-nutrients activate our health potential.  Choose:  More Colors. More Diversity  Get More Benefit.    Cultivate that inner Compost bin!    DanL          Review of Systems         Objective           Vitals:  No vitals were obtained today due to virtual visit.    Physical Exam   Full range of affect  Nonpressured speech  Fullness of the left thyroid visualized on video interface    Tsh 0.12         Video-Visit Details    Type of service:  Video Visit    Video End Time:11:33 AM    Originating Location (pt. Location): Home    Distant Location (provider location):  [facility]     Platform used for Video Visit: [employer]

## 2021-08-27 ENCOUNTER — HOSPITAL ENCOUNTER (OUTPATIENT)
Dept: ULTRASOUND IMAGING | Facility: HOSPITAL | Age: 31
End: 2021-08-27
Attending: STUDENT IN AN ORGANIZED HEALTH CARE EDUCATION/TRAINING PROGRAM
Payer: COMMERCIAL

## 2021-08-27 ENCOUNTER — HOSPITAL ENCOUNTER (OUTPATIENT)
Dept: ULTRASOUND IMAGING | Facility: HOSPITAL | Age: 31
End: 2021-08-27
Attending: FAMILY MEDICINE
Payer: COMMERCIAL

## 2021-08-27 DIAGNOSIS — N92.1 METRORRHAGIA: ICD-10-CM

## 2021-08-27 DIAGNOSIS — R10.2 PELVIC PAIN IN FEMALE: ICD-10-CM

## 2021-08-27 DIAGNOSIS — E06.3 HASHIMOTO'S DISEASE: ICD-10-CM

## 2021-08-27 PROCEDURE — 76830 TRANSVAGINAL US NON-OB: CPT

## 2021-08-27 PROCEDURE — 76536 US EXAM OF HEAD AND NECK: CPT

## 2021-09-02 ENCOUNTER — MYC MEDICAL ADVICE (OUTPATIENT)
Dept: FAMILY MEDICINE | Facility: CLINIC | Age: 31
End: 2021-09-02

## 2021-09-02 DIAGNOSIS — E06.3 HASHIMOTO'S DISEASE: Primary | ICD-10-CM

## 2021-09-07 ENCOUNTER — LAB (OUTPATIENT)
Dept: LAB | Facility: CLINIC | Age: 31
End: 2021-09-07
Payer: COMMERCIAL

## 2021-09-07 DIAGNOSIS — E06.3 HASHIMOTO'S DISEASE: ICD-10-CM

## 2021-09-07 LAB
T3FREE SERPL-MCNC: 3.7 PG/ML (ref 1.9–3.9)
T4 FREE SERPL-MCNC: 1.04 NG/DL (ref 0.7–1.8)
TSH SERPL DL<=0.005 MIU/L-ACNC: 0.24 UIU/ML (ref 0.3–5)

## 2021-09-07 PROCEDURE — 36415 COLL VENOUS BLD VENIPUNCTURE: CPT

## 2021-09-07 PROCEDURE — 84439 ASSAY OF FREE THYROXINE: CPT

## 2021-09-07 PROCEDURE — 84443 ASSAY THYROID STIM HORMONE: CPT

## 2021-09-07 PROCEDURE — 84481 FREE ASSAY (FT-3): CPT

## 2021-09-09 ENCOUNTER — MYC MEDICAL ADVICE (OUTPATIENT)
Dept: FAMILY MEDICINE | Facility: CLINIC | Age: 31
End: 2021-09-09

## 2021-09-09 DIAGNOSIS — E06.3 HASHIMOTO'S DISEASE: Primary | ICD-10-CM

## 2021-09-10 RX ORDER — LEVOTHYROXINE SODIUM 50 UG/1
50 TABLET ORAL DAILY
Qty: 90 TABLET | Refills: 1 | Status: SHIPPED | OUTPATIENT
Start: 2021-09-10 | End: 2021-09-21

## 2021-09-10 NOTE — TELEPHONE ENCOUNTER
Now that I am reading this I think it would be better to drop your Liothyronine.  I just want you to confirm that you are on 7 because the lowest dose in pill form appears to be 5.  Do not see a 7 mcg pill.  If that is the case would stop you down to the fives and keep you on 75 mg of levothyroxine.    Chelo Park

## 2021-09-14 RX ORDER — LIOTHYRONINE SODIUM 5 UG/1
5 TABLET ORAL DAILY
Qty: 90 TABLET | Refills: 0 | Status: SHIPPED | OUTPATIENT
Start: 2021-09-14 | End: 2021-09-21

## 2021-09-15 ENCOUNTER — NURSE TRIAGE (OUTPATIENT)
Dept: NURSING | Facility: CLINIC | Age: 31
End: 2021-09-15

## 2021-09-15 NOTE — TELEPHONE ENCOUNTER
Lisette with [facility] and is needing clarification on Liothyronine 5 mcg if they actually intended to write for what she usually gets is a compound and what was written for was commercial.  Please phone Lisette at [phone].      COVID 19 Nurse Triage Plan/Patient Instructions    Please be aware that novel coronavirus (COVID-19) may be circulating in the community. If you develop symptoms such as fever, cough, or SOB or if you have concerns about the presence of another infection including coronavirus (COVID-19), please contact your health care provider or visit [email or web address].     Disposition/Instructions    Home care recommended. Follow home care protocol based instructions.    Thank you for taking steps to prevent the spread of this virus.  o Limit your contact with others.  o Wear a simple mask to cover your cough.  o Wash your hands well and often.    Resources    [facility]: About COVID-19: [email or web address]    CDC: What to Do If You're Sick: www.cdc.gov/coronavirus/2019-ncov/about/steps-when-sick.html    CDC: Ending Home Isolation: www.cdc.gov/coronavirus/2019-ncov/hcp/disposition-in-home-patients.html     CDC: Caring for Someone: www.cdc.gov/coronavirus/2019-ncov/if-you-are-sick/care-for-someone.html     [facility]: Interim Guidance for Hospital Discharge to Home: www.health.[facility].[place]/diseases/coronavirus/hcp/hospdischarge.pdf    [facility] clinical trials (COVID-19 research studies): clinicalaffairs.[facility]/n-clinical-trials     Below are the COVID-19 hotlines at the [place] Department of Health ([facility]). Interpreters are available.   o For health questions: Call 306-521-2537 or 1-635.664.6896 (7 a.m. to 7 p.m.)  o For questions about schools and childcare: Call 452-093-6960 or 1-672.397.8179 (7 a.m. to 7 p.m.)

## 2021-09-17 ENCOUNTER — MYC MEDICAL ADVICE (OUTPATIENT)
Dept: FAMILY MEDICINE | Facility: CLINIC | Age: 31
End: 2021-09-17

## 2021-09-17 DIAGNOSIS — E06.3 HASHIMOTO'S DISEASE: Primary | ICD-10-CM

## 2021-09-20 ENCOUNTER — NURSE TRIAGE (OUTPATIENT)
Dept: NURSING | Facility: CLINIC | Age: 31
End: 2021-09-20

## 2021-09-20 NOTE — TELEPHONE ENCOUNTER
"Triage Call:    -Patient calling, \"I am having really bad pain.\"  -Patient is crying on the phone.   -Low back pain started within the last week.  -Pain was significantly worse last night and even worse this morning.   -Denies any injuries.   -Denies any abdominal pain.   -Severe back pain currently. Pain is sudden. Sharp/stabbing.   -Weakness in both legs that is new, patient states is hard to bear weight on her legs.       -Per protocol, recommendations are for patient to go to ED/UCC or office with PCP approval. RN advised patient based on her symptoms ED is recommended. Patient agrees with disposition. Patient will have her mom and dad drive her to ER now. Advised to call back with any new or worsening sx. Patient verbalized understanding and agrees with plan.     Gale Rubio RN, BSN Nurse Triage Advisor 7:46 AM 9/20/2021     Reason for Disposition    Weakness of a leg or foot (e.g., unable to bear weight, dragging foot)    Patient sounds very sick or weak to the triager    Additional Information    Negative: Shock suspected (e.g., cold/pale/clammy skin, too weak to stand, low BP, rapid pulse)    Negative: Passed out (i.e., fainted, collapsed and was not responding)    Negative: Sounds like a life-threatening emergency to the triager    Negative: SEVERE back pain of sudden onset and age > 60    Negative: Major injury to the back (e.g., MVA, fall > 10 feet or 3 meters, penetrating injury, etc.)    Negative: Pain in the upper back over the ribs (rib cage) that radiates (travels) into the chest    Negative: Pain in the upper back over the ribs (rib cage) and worsened by coughing (or clearly increases with breathing)    Negative: Unable to urinate (or only a few drops) and bladder feels very full    Negative: Abdominal pain and age > 60    Negative: SEVERE abdominal pain (e.g., excruciating)    Negative: Loss of bladder or bowel control (urine or bowel incontinence; wetting self, leaking stool) of new " onset    Negative: Numbness (loss of sensation) in groin or rectal area    Negative: Blood in urine (red, pink, or tea-colored)    Negative: Pain radiates into groin, scrotum    Negative: Vomiting and pain over lower ribs of back (i.e., flank - kidney area)    Protocols used: BACK PAIN-A-[place]    COVID 19 Nurse Triage Plan/Patient Instructions    Please be aware that novel coronavirus (COVID-19) may be circulating in the community. If you develop symptoms such as fever, cough, or SOB or if you have concerns about the presence of another infection including coronavirus (COVID-19), please contact your health care provider or visit [email or web address].     Disposition/Instructions    ED Visit recommended. Follow protocol based instructions.     Bring Your Own Device:  Please also bring your smart device(s) (smart phones, tablets, laptops) and their charging cables for your personal use and to communicate with your care team during your visit.    Thank you for taking steps to prevent the spread of this virus.  o Limit your contact with others.  o Wear a simple mask to cover your cough.  o Wash your hands well and often.    Resources    [facility]: About COVID-19: [email or web address]    CDC: What to Do If You're Sick: www.cdc.gov/coronavirus/2019-ncov/about/steps-when-sick.html    CDC: Ending Home Isolation: www.cdc.gov/coronavirus/2019-ncov/hcp/disposition-in-home-patients.html     CDC: Caring for Someone: www.cdc.gov/coronavirus/2019-ncov/if-you-are-sick/care-for-someone.html     [facility]: Interim Guidance for Hospital Discharge to Home: www.health.[facility].[place]/diseases/coronavirus/hcp/hospdischarge.pdf    [facility] clinical trials (COVID-19 research studies): clinicalaffairs.[facility]/umn-clinical-trials     Below are the COVID-19 hotlines at the [place] Department of Health ([facility]). Interpreters are available.   o For health questions: Call 719-253-9318 or 1-689.887.6968 (7 a.m. to 7  p.m.)  o For questions about schools and childcare: Call 967-346-9251 or 1-641.107.3302 (7 a.m. to 7 p.m.)

## 2021-09-21 DIAGNOSIS — E06.3 HASHIMOTO'S DISEASE: ICD-10-CM

## 2021-09-22 ENCOUNTER — TELEPHONE (OUTPATIENT)
Dept: FAMILY MEDICINE | Facility: CLINIC | Age: 31
End: 2021-09-22

## 2021-09-22 NOTE — TELEPHONE ENCOUNTER
Reason for call: Patient needs an appointment to F/U as soon as possible     Phone number to reach patient:  [phone]    Best Time:  Anytime    Can we leave a detailed message on this number?  YES

## 2021-09-29 ENCOUNTER — OFFICE VISIT (OUTPATIENT)
Dept: FAMILY MEDICINE | Facility: CLINIC | Age: 31
End: 2021-09-29
Payer: COMMERCIAL

## 2021-09-29 VITALS — OXYGEN SATURATION: 98 % | WEIGHT: 140 LBS | HEART RATE: 96 BPM | BODY MASS INDEX: 21.22 KG/M2

## 2021-09-29 DIAGNOSIS — M54.50 LUMBAR BACK PAIN: ICD-10-CM

## 2021-09-29 DIAGNOSIS — M24.551 RIGHT HIP FLEXOR TIGHTNESS: ICD-10-CM

## 2021-09-29 DIAGNOSIS — K59.01 SLOW TRANSIT CONSTIPATION: Primary | ICD-10-CM

## 2021-09-29 PROBLEM — F41.9 ANXIETY: Status: ACTIVE | Noted: 2017-03-31

## 2021-09-29 PROCEDURE — 99214 OFFICE O/P EST MOD 30 MIN: CPT | Performed by: STUDENT IN AN ORGANIZED HEALTH CARE EDUCATION/TRAINING PROGRAM

## 2021-09-29 RX ORDER — LEVOTHYROXINE SODIUM 75 UG/1
75 TABLET ORAL DAILY
COMMUNITY
End: 2021-12-14 | Stop reason: ALTCHOICE

## 2021-09-29 RX ORDER — LIOTHYRONINE SODIUM 50 UG/1
50 TABLET ORAL DAILY
COMMUNITY
Start: 2021-09-21 | End: 2021-12-14 | Stop reason: ALTCHOICE

## 2021-09-29 RX ORDER — ACETAMINOPHEN 500 MG
500-1000 TABLET ORAL EVERY 6 HOURS PRN
COMMUNITY
Start: 2021-09-20 | End: 2022-02-04

## 2021-09-29 RX ORDER — LIDOCAINE 4 G/G
1 PATCH TOPICAL EVERY 12 HOURS
COMMUNITY
Start: 2021-09-20 | End: 2022-02-04

## 2021-09-29 RX ORDER — IBUPROFEN 600 MG/1
600 TABLET, FILM COATED ORAL PRN
COMMUNITY
Start: 2021-09-20 | End: 2022-06-10

## 2021-09-29 RX ORDER — CYCLOBENZAPRINE HCL 10 MG
10 TABLET ORAL 3 TIMES DAILY PRN
COMMUNITY
Start: 2021-09-20 | End: 2022-02-04

## 2021-09-29 NOTE — PROGRESS NOTES
ASSESSMENT AND PLAN     Agustina was seen today for hospital f/u, nausea and constipation.    Diagnoses and all orders for this visit:    Slow transit constipation: Patient struggles with ongoing constipation.  Patient notes this has not been a problem for a while due to diet changes made by her and her naturopath.  No eliciting cause of constipation noted.  Feels she is getting vagal symptoms associated with the constipation such as fever chills and vomiting.  Advised trial of MiraLAX or fiber supplementation.  In addition patient may follow-up with her naturopath to see if she has any additional options.  Patient in agreement with this plan.    Right hip flexor tightness: Currently struggling with ongoing right and left hip flexor tightness.  We will send her to PT for treatment of any muscle imbalances.  -     Physical Therapy Referral; Future    Lumbar back pain: Pain reproducible with palpation paraspinal muscles.  No red flag symptoms concerning for disc herniation or nerve impingement.  Discussed possibility of association with constipation due to sympathetic innervation to the colon.  Advised that we should focus on treating the constipation.  In addition advised treatment with physical therapy for core support.  -     Physical Therapy Referral; Future    RTC in 4 weeks for follow-up or sooner if develops new or worsening symptoms.    Spent 30 minutes face-to-face with patient evaluating symptoms and providing education    Chelo Park DO           SUBJECTIVE       Agustina Nam is a 30 year old  adult with a PMH significant for:     Patient Active Problem List   Diagnosis     Metrorrhagia     Chronic Sinusitis     Autoimmune disease (H)     Irritable bowel syndrome     Hashimoto's disease     Postural dizziness with presyncope     Adrenal disorder (H)     Fatigue     Low back pain of over 3 months duration     Tension headache     Right calf pain     Anxiety     Insomnia     Iodine deficiency      Pernicious anemia     Small intestinal bacterial overgrowth     Ray A Hommeyer presents with ongoing back pain and constipation.    Patient notes she has had on and off back pain for years.  Patient notes this often coincides with constipation episodes.  Patient has had normal bowel movements for the last 2 years after working with a naturopath.  Patient notes 1 week ago she woke up with an acute back ache.  Patient notes this pain is the worst that it has ever been.  This brought her to the ER.  Reviewed ER visit.  Urine was negative. patient was provided pain medications and sent home.  Patient notes she has been struggling with constipation since that time.  She has been utilizing enemas to help have a normal bowel movements.  Today she notes is the first day she has had a normal bowel movement for a while.  Patient notes while she was feeling ill she would get episodes of fevers chills and vomiting.  Patient felt lightheaded during these episodes.  Patient notes that these have happened in the past.  Patient denies any burning with urination.  Patient denies any overt fevers.  Patient denies any recent nasal drainage or congestion.  Patient denies any other signs or symptoms of a viral or bacterial infection.  She locates the pain over her left paraspinals.  Patient notes it will radiate to the gluten hip flexor as well.  The pain is associated with positioning.      PMH, Medications and Allergies were reviewed and updated as needed.        REVIEW OF SYSTEMS     Pertinent items are noted in HPI.        OBJECTIVE     Vitals:    09/29/21 1045   Pulse: 96   SpO2: 98%   Weight: 63.5 kg (140 lb)     Body mass index is 21.22 kg/m .    Constitutional: Awake, alert, cooperative, no apparent distress, and appears stated age.  Eyes: Lids and lashes normal, sclera clear, conjunctiva normal.  ENT: Normocephalic, without obvious abnormality, atramatic,   Musculoskeletal: Negative Edy sign bilaterally. no redness, warmth,  or swelling of the joints.  Full range of motion noted.  Pain to palpation of left paraspinal.  No pain to palpation of lumbosacral ligaments.    Neurologic: Awake, alert, oriented to name, place and time.  Cranial nerves II-XII are grossly intact.    No results found for this or any previous visit (from the past 24 hour(s)).

## 2021-09-29 NOTE — PATIENT INSTRUCTIONS
FIBER OPTIONS (start with two severing)     LONG CHAIN CARB  (START WITH 1 CAP FULL AND ADJUST as needed for soft stool every day (as little or as much as you need).       Aslo may touch base with natural path        Consider [facility] digestive [facility]

## 2021-10-10 ENCOUNTER — HEALTH MAINTENANCE LETTER (OUTPATIENT)
Age: 31
End: 2021-10-10

## 2021-10-28 ENCOUNTER — HOSPITAL ENCOUNTER (OUTPATIENT)
Dept: PHYSICAL THERAPY | Facility: REHABILITATION | Age: 31
End: 2021-10-28
Attending: STUDENT IN AN ORGANIZED HEALTH CARE EDUCATION/TRAINING PROGRAM
Payer: COMMERCIAL

## 2021-10-28 DIAGNOSIS — M54.50 CHRONIC RIGHT-SIDED LOW BACK PAIN WITHOUT SCIATICA: ICD-10-CM

## 2021-10-28 DIAGNOSIS — M79.661 RIGHT CALF PAIN: ICD-10-CM

## 2021-10-28 DIAGNOSIS — M24.551 RIGHT HIP FLEXOR TIGHTNESS: ICD-10-CM

## 2021-10-28 DIAGNOSIS — M54.50 LUMBAR BACK PAIN: ICD-10-CM

## 2021-10-28 DIAGNOSIS — M24.551 HIP FLEXOR TIGHTNESS, RIGHT: Primary | ICD-10-CM

## 2021-10-28 DIAGNOSIS — G89.29 CHRONIC RIGHT-SIDED LOW BACK PAIN WITHOUT SCIATICA: ICD-10-CM

## 2021-10-28 PROCEDURE — 97161 PT EVAL LOW COMPLEX 20 MIN: CPT | Mod: GP

## 2021-10-28 NOTE — PROGRESS NOTES
10/28/21 0900   General Information   Type of Visit Initial OP Ortho PT Evaluation   Start of Care Date 10/28/21   Referring Physician Dr. Park   Patient/Family Goals Statement Determine which stretches/exercises help her and which could potentially hurt her.    Orders Evaluate and Treat   Date of Order 09/29/21   Certification Required? No   Medical Diagnosis Right hip flexor tightness, low back pain   Surgical/Medical history reviewed Yes   General Information Comments Exercise: everytime she tries to exercise she gets injured.  Pt has been very active in the past. Walking and stretching are her main form of exercise.        Present No   Body Part(s)   Body Part(s) Lumbar Spine/SI   Presentation and Etiology   Pertinent history of current problem (include personal factors and/or comorbidities that impact the POC) Visited the ER a month ago (9/20) for severe low back pain.  Pt has chronic right-sided LBP hypothesized to be caused by constipation, pt also has pain in right hip flexor that is aggravated by activity and constant pain/tingling in right calf.   Sitting for too long causes pain in R low back, somtimes left. Low back pain started 8 years ago, and shortly after pt started noticing R calf and hip flexor pain.     Impairments A. Pain;L. Tingling;P. Bowel or bladder problems;F. Decreased strength and endurance   Functional Limitations perform desired leisure / sports activities   Symptom Location R calf pain - constant, R hip flexor pain - flares up with overuse, R low back pain - constant - occasional L low back pain   How/Where did it occur Other  (Chronic pain that began after a backpacking trip 8 yrs ago)   Onset date of current episode/exacerbation 10/28/13  (acute R low back pain 9/20 )   Chronicity Chronic   Pain rating (0-10 point scale) Best (/10);Worst (/10)   Best (/10) 3 (6/10 today in hip flexor - pain with walking)    Worst (/10) 9   9/20/21 severe low back pain went  to ER (not her acute px)    Pain quality H. Other  (tingling in R calf/weakness - worse with walking )   Frequency of pain/symptoms D. Other   Pain frequency comment LBP and calf tingling/pain constant and variable in intensity, hip flexor with activity   Pain/symptoms are: Other   Pain symptoms comment LBP aggrevated by constipation, hip from movement (walking/running), and calf tingling always present (fluctuates in intensity based on activity)   Pain/symptoms exacerbated by A. Sitting;B. Walking;J. ADL   Pain/symptoms eased by K. Other  (calf massage, stretch - low lunge with rotation, chiropract )   Pain eased by comment Stretching hip flexors with lunge, LBP reduced with successful bowel movements   Prior Level of Function   Prior Level of Function-ADLs Patient noted several intense activites and hobbies she can no longer participate in due to pain and discomfort.   Current Level of Function   Patient role/employment history A. Employed;B. Student   Employment Comments [employer]   Current equipment-Gait/Locomotion Other  (calf compression )   Fall Risk Screen   Fall screen completed by PT   Have you fallen 2 or more times in the past year? Yes   Have you fallen and had an injury in the past year? No   Is patient a fall risk? No   Abuse Screen (yes response referral indicated)   Feels Unsafe at Home or Work/School no   Feels Threatened by Someone no   Does Anyone Try to Keep You From Having Contact with Others or Doing Things Outside Your Home? no   Physical Signs of Abuse Present no   Lumbar Spine/SI Objective Findings   Posture normal    Flexion ROM fingertips to floor - neck pain - hinged in mid thoracic region   Extension ROM WFL - no pain    Right Side Bending ROM past lateral joint line - stretch on L    Left Side Bending ROM past lateral joint line - stretch on R    Lumbar ROM Comment Rotation: WFL    Hip Flexion (L2) Strength 4 B    Hip Abduction Strength screen in sitting 5  / 5 in S/L    Hip Adduction Strength screen in sitting 5    Hip Extension Strength screen in prone: 4 on L 5 on R    Knee Flexion Strength 5   Knee Extension (L3) Strength 5   Ankle Dorsiflexion (L4) Strength 5   Great Toe Extension (L5) Strength 5   Ankle Plantar Flexion (S1) Strength in sitting 5    Lumbar/Hip/Knee/Foot Strength Comments hip ER: 5 / IR 5   Hip Flexor Flexibility pain with overpressure on R side    Lumbar/SI Flexibility Comments ER screen in sitting pain on R    SLR Negative   Slump Test negative    Segmental Mobility Lumbar PIVMS indicate segmental mobility WNL   Palpation psoas pain provoked by palpation greater on R than L, proximal quad attachment tighter on R, QL normal, erectors tight from L1-T9   Planned Therapy Interventions   Planned Therapy Interventions manual therapy;stretching;strengthening;joint mobilization;ROM   Clinical Impression   Criteria for Skilled Therapeutic Interventions Met yes, treatment indicated   PT Diagnosis Right sided LBP w/o sciatica, right hip flexor pain, right calf pain   Influenced by the following impairments Pain with palpation to psoas, proximal quad femoris, paraspinals. Patient also demonstrated signs of hypermobility.     Functional limitations due to impairments Can no longer participate in active hobbies without pain or discomfort, proglonged sitting and walking    Clinical Presentation Stable/Uncomplicated   Clinical Presentation Rationale Unchanging   Clinical Decision Making (Complexity) Low complexity   Therapy Frequency 1 time/week   Predicted Duration of Therapy Intervention (days/wks) 12 Weeks   Risk & Benefits of therapy have been explained Yes   Patient, Family & other staff in agreement with plan of care Yes   Education Assessment   Barriers to Learning No barriers   ORTHO GOALS   PT Ortho Eval Goals 1;2;3;4   Ortho Goal 1   Goal Identifier Exercise   Goal Description Patient will return to desired exercise regimen without pain or discomfort  to allow for participation in active hobbies.   (Reassess goal based on findings of following treatment.)   Target Date 01/20/22   Ortho Goal 2   Goal Identifier Sitting   Goal Description Patient will be able to sit for 2 hours without provocation of LBP to allow for comfort in class.  (Reassess goal based on findings of following treatment.)   Target Date 01/20/22   Ortho Goal 3   Goal Identifier Walking   Goal Description Patient will be capable of walking 3 miles with pain < or = to 2/10 to facilitate her return to hiking.  (Reassess based on findings of following treatment)   Target Date 01/20/22   Ortho Goal 4   Goal Identifier Strength   Goal Description Patient will be able to achieve a hip flexion MMT grade of 5/5 to demonstrate imrpovement in hip flexor strength needed for desired hobbies.  (Reassess based on findings of following treatment)   Target Date 01/20/22   Total Evaluation Time   PT Idaal, Low Complexity Minutes (31446) 53

## 2021-10-29 NOTE — ADDENDUM NOTE
Encounter addended by: Raeann Melton, PT on: 10/29/2021 9:45 AM   Actions taken: Document created, Document edited, Clinical Note Signed, Flowsheet accepted

## 2021-10-29 NOTE — PROGRESS NOTES
[facility]          OUTPATIENT PHYSICAL THERAPY ORTHOPEDIC EVALUATION  PLAN OF TREATMENT FOR OUTPATIENT REHABILITATION  (COMPLETE FOR INITIAL CLAIMS ONLY)  Patient's Last Name, First Name, M.I.  YOB: 1990  Agustina Nam    Provider s Name:  [facility]   Medical Record No.  [id number]   Start of Care Date:  10/28/21   Onset Date:  10/28/13 (acute R low back pain 9/20 )   Type:     _X__PT   ___OT   ___SLP Medical Diagnosis:        PT Diagnosis:  Right sided LBP w/o sciatica, right hip flexor pain, right calf pain   Visits from SOC:  1      _________________________________________________________________________________  Plan of Treatment/Functional Goals:  manual therapy, stretching, strengthening, joint mobilization, ROM           Goals  Goal Identifier: Exercise  Goal Description: Patient will return to desired exercise regimen without pain or discomfort to allow for participation in active hobbies.  (Reassess goal based on findings of following treatment.)  Target Date: 01/20/22    Goal Identifier: Sitting  Goal Description: Patient will be able to sit for 2 hours without provocation of LBP to allow for comfort in class. (Reassess goal based on findings of following treatment.)  Target Date: 01/20/22    Goal Identifier: Walking  Goal Description: Patient will be capable of walking 3 miles with pain < or = to 2/10 to facilitate her return to hiking. (Reassess based on findings of following treatment)  Target Date: 01/20/22    Goal Identifier: Strength  Goal Description: Patient will be able to achieve a hip flexion MMT grade of 5/5 to demonstrate imrpovement in hip flexor strength needed for desired hobbies. (Reassess based on findings of following treatment)  Target Date: 01/20/22                                                Therapy Frequency:  1 time/week  Predicted Duration of Therapy Intervention:  12 Weeks    Raeann Melton,  PT                 I CERTIFY THE NEED FOR THESE SERVICES FURNISHED UNDER        THIS PLAN OF TREATMENT AND WHILE UNDER MY CARE     (Physician co-signature of this document indicates review and certification of the therapy plan).                       Certification Date From:      Certification Date To:       Referring Provider:  Dr. Park    Initial Assessment        See Epic Evaluation Start of Care Date: 10/28/21

## 2021-11-04 ENCOUNTER — HOSPITAL ENCOUNTER (OUTPATIENT)
Dept: PHYSICAL THERAPY | Facility: REHABILITATION | Age: 31
End: 2021-11-04
Payer: COMMERCIAL

## 2021-11-04 DIAGNOSIS — M24.551 RIGHT HIP FLEXOR TIGHTNESS: Primary | ICD-10-CM

## 2021-11-04 DIAGNOSIS — M54.50 LUMBAR BACK PAIN: ICD-10-CM

## 2021-11-04 DIAGNOSIS — M54.50 CHRONIC RIGHT-SIDED LOW BACK PAIN WITHOUT SCIATICA: ICD-10-CM

## 2021-11-04 DIAGNOSIS — M24.551 HIP FLEXOR TIGHTNESS, RIGHT: ICD-10-CM

## 2021-11-04 DIAGNOSIS — G89.29 CHRONIC RIGHT-SIDED LOW BACK PAIN WITHOUT SCIATICA: ICD-10-CM

## 2021-11-04 DIAGNOSIS — M79.661 RIGHT CALF PAIN: ICD-10-CM

## 2021-11-04 PROCEDURE — 97140 MANUAL THERAPY 1/> REGIONS: CPT | Mod: GP | Performed by: PHYSICAL THERAPIST

## 2021-11-04 PROCEDURE — 97110 THERAPEUTIC EXERCISES: CPT | Mod: GP | Performed by: PHYSICAL THERAPIST

## 2021-11-12 ENCOUNTER — HOSPITAL ENCOUNTER (OUTPATIENT)
Dept: PHYSICAL THERAPY | Facility: REHABILITATION | Age: 31
End: 2021-11-12
Payer: COMMERCIAL

## 2021-11-12 DIAGNOSIS — M54.50 CHRONIC RIGHT-SIDED LOW BACK PAIN WITHOUT SCIATICA: ICD-10-CM

## 2021-11-12 DIAGNOSIS — M24.551 HIP FLEXOR TIGHTNESS, RIGHT: ICD-10-CM

## 2021-11-12 DIAGNOSIS — M24.551 RIGHT HIP FLEXOR TIGHTNESS: Primary | ICD-10-CM

## 2021-11-12 DIAGNOSIS — G89.29 CHRONIC RIGHT-SIDED LOW BACK PAIN WITHOUT SCIATICA: ICD-10-CM

## 2021-11-12 DIAGNOSIS — M54.50 LUMBAR BACK PAIN: ICD-10-CM

## 2021-11-12 PROCEDURE — 97140 MANUAL THERAPY 1/> REGIONS: CPT | Mod: GP | Performed by: PHYSICAL THERAPIST

## 2021-11-12 NOTE — PROGRESS NOTES
11/12/21 1500   Signing Clinician's Name / Credentials   Signing clinician's name / credentials Lashay Lozano PT   Session Number   Session Number 3/12   Progress Note/Recertification   Progress Note Due Date 01/20/22   Adult Goals   PT Ortho Eval Goals 1;2;3;4   Ortho Goal 1   Goal Identifier Exercise   Goal Description Patient will return to desired exercise regimen without pain or discomfort to allow for participation in active hobbies. 30 min such as skiing.   (Reassess goal based on findings of following treatment.)   Target Date 01/20/22   Ortho Goal 2   Goal Identifier Sitting   Goal Description Patient will be able to sit for 2 hours without provocation of LBP to allow for comfort in class.  (Reassess goal based on findings of following treatment.)   Target Date 01/20/22   Ortho Goal 3   Goal Identifier Walking   Goal Description Patient will be capable of walking 2-3 miles with pain < or = to 2/10 to facilitate her return to hiking.  (Reassess based on findings of following treatment)   Target Date 01/20/22   Ortho Goal 4   Goal Identifier Strength   Goal Description Patient will be able to achieve a hip flexion MMT grade of 5/5 to demonstrate imrpovement in hip flexor strength needed for desired hobbies.  (Reassess based on findings of following treatment)   Target Date 01/20/22   Subjective Report   Subjective Report Vomited 1 time due to symptoms this week. Usually has associated hot flash type symptoms, (B) low back pain, (R) upper lumbar pain, (R) abdominal pain, Tends to be constipated. Long history of chronic symptoms. Taping helped with hip flexor soreness.    Objective Measure 1   Details Cranial scan (+) for vagus, LS post ganglionics, sacral parasympathetics, (B) abdominal viscera   Objective Measure 2   Details mod motility/fascial restrictions of colon, duodenum, sm intestine, sphincters, cecum, sigmoid, stomach, liver.    Objective Measure 3   Details Tender pts noted in (R) LQ.    Manual  Therapy   Manual Therapy: Mobilization, MFR, MLD, friction massage minutes (31267) 55   Skilled Intervention MFR, counterstrain, additional assessment of abdominal viscera and associated systems/structures.    Patient Response good. tolerated well.    Treatment Detail colon motility, sigmoid, iliocecal valve, DJ jct, duodenum, colon    Education   Learner Patient   Readiness Acceptance   Method Booklet/handout;Explanation;Demonstration   Response Verbalizes Understanding;Demonstrates Understanding   Plan   Home program hip flexor stretch with leg off table, S/L hip abd, bridge, S/L bridge, 90/90, bicycles with LE only    Plan for next session  review LE strengthening, counterstrain, K-tape    Comments   Comments  Patient presents with choronic (8 year) constant right sided LBP and low level tingling in R calf that can become painful. Patient also presents with tightness and pain in right hip flexors that is aggrevated with acitivity such as walking.  Mild hip flexor weakness on R 4/5.  Palpation: tightness pain in R psoas and hip flexor.   Total Session Time   Timed Code Treatment Minutes 55   Total Treatment Time (sum of timed and untimed services) 55   AMBULATORY CLINICS ONLY-MEDICAL AND TREATMENT DIAGNOSIS   PT Diagnosis Right sided LBP w/o sciatica, right hip flexor pain, right calf pain

## 2021-11-24 ENCOUNTER — HOSPITAL ENCOUNTER (OUTPATIENT)
Dept: PHYSICAL THERAPY | Facility: REHABILITATION | Age: 31
End: 2021-11-24
Payer: COMMERCIAL

## 2021-11-24 ENCOUNTER — MYC MEDICAL ADVICE (OUTPATIENT)
Dept: FAMILY MEDICINE | Facility: CLINIC | Age: 31
End: 2021-11-24

## 2021-11-24 DIAGNOSIS — M54.6 CHRONIC THORACIC BACK PAIN, UNSPECIFIED BACK PAIN LATERALITY: Primary | ICD-10-CM

## 2021-11-24 DIAGNOSIS — M54.50 LUMBAR BACK PAIN: ICD-10-CM

## 2021-11-24 DIAGNOSIS — M79.661 RIGHT CALF PAIN: ICD-10-CM

## 2021-11-24 DIAGNOSIS — G89.29 CHRONIC RIGHT-SIDED LOW BACK PAIN WITHOUT SCIATICA: Primary | ICD-10-CM

## 2021-11-24 DIAGNOSIS — M24.551 RIGHT HIP FLEXOR TIGHTNESS: ICD-10-CM

## 2021-11-24 DIAGNOSIS — G89.29 CHRONIC THORACIC BACK PAIN, UNSPECIFIED BACK PAIN LATERALITY: Primary | ICD-10-CM

## 2021-11-24 DIAGNOSIS — M54.50 CHRONIC RIGHT-SIDED LOW BACK PAIN WITHOUT SCIATICA: Primary | ICD-10-CM

## 2021-11-24 PROCEDURE — 97110 THERAPEUTIC EXERCISES: CPT | Mod: GP | Performed by: PHYSICAL THERAPIST

## 2021-11-24 PROCEDURE — 97140 MANUAL THERAPY 1/> REGIONS: CPT | Mod: GP | Performed by: PHYSICAL THERAPIST

## 2021-11-29 ENCOUNTER — HOSPITAL ENCOUNTER (OUTPATIENT)
Dept: PHYSICAL THERAPY | Facility: REHABILITATION | Age: 31
End: 2021-11-29
Payer: COMMERCIAL

## 2021-11-29 DIAGNOSIS — G89.29 CHRONIC RIGHT-SIDED LOW BACK PAIN WITHOUT SCIATICA: ICD-10-CM

## 2021-11-29 DIAGNOSIS — M24.551 HIP FLEXOR TIGHTNESS, RIGHT: ICD-10-CM

## 2021-11-29 DIAGNOSIS — M24.551 RIGHT HIP FLEXOR TIGHTNESS: Primary | ICD-10-CM

## 2021-11-29 DIAGNOSIS — M54.50 LUMBAR BACK PAIN: ICD-10-CM

## 2021-11-29 DIAGNOSIS — M79.661 RIGHT CALF PAIN: ICD-10-CM

## 2021-11-29 DIAGNOSIS — M54.50 CHRONIC RIGHT-SIDED LOW BACK PAIN WITHOUT SCIATICA: ICD-10-CM

## 2021-11-29 PROCEDURE — 97110 THERAPEUTIC EXERCISES: CPT | Mod: GP | Performed by: PHYSICAL THERAPIST

## 2021-11-29 PROCEDURE — 97140 MANUAL THERAPY 1/> REGIONS: CPT | Mod: GP | Performed by: PHYSICAL THERAPIST

## 2021-12-06 ENCOUNTER — HOSPITAL ENCOUNTER (OUTPATIENT)
Dept: PHYSICAL THERAPY | Facility: REHABILITATION | Age: 31
End: 2021-12-06
Payer: COMMERCIAL

## 2021-12-06 DIAGNOSIS — K59.00 CONSTIPATION, UNSPECIFIED CONSTIPATION TYPE: ICD-10-CM

## 2021-12-06 DIAGNOSIS — G89.29 CHRONIC RIGHT-SIDED LOW BACK PAIN WITHOUT SCIATICA: ICD-10-CM

## 2021-12-06 DIAGNOSIS — M24.551 HIP FLEXOR TIGHTNESS, RIGHT: ICD-10-CM

## 2021-12-06 DIAGNOSIS — M79.661 RIGHT CALF PAIN: ICD-10-CM

## 2021-12-06 DIAGNOSIS — M24.551 RIGHT HIP FLEXOR TIGHTNESS: Primary | ICD-10-CM

## 2021-12-06 DIAGNOSIS — M54.50 LUMBAR BACK PAIN: ICD-10-CM

## 2021-12-06 DIAGNOSIS — M54.50 CHRONIC RIGHT-SIDED LOW BACK PAIN WITHOUT SCIATICA: ICD-10-CM

## 2021-12-06 PROCEDURE — 97140 MANUAL THERAPY 1/> REGIONS: CPT | Mod: GP | Performed by: PHYSICAL THERAPIST

## 2021-12-06 NOTE — PROGRESS NOTES
12/06/21 0800   Signing Clinician's Name / Credentials   Signing clinician's name / credentials Lashay Lozano PT   Session Number   Session Number 6/12   Progress Note/Recertification   Progress Note Due Date 01/20/22   Adult Goals   PT Ortho Eval Goals 1;2;3;4   Ortho Goal 1   Goal Identifier Exercise   Goal Description Patient will return to desired exercise regimen without pain or discomfort to allow for participation in active hobbies. 30 min such as skiing.   (Reassess goal based on findings of following treatment.)   Goal Progress Recommended pt start to walk 1.5-2 miles and assess pain level at hip flexor    Target Date 01/20/22   Ortho Goal 2   Goal Identifier Sitting   Goal Description Patient will be able to sit for 2 hours without provocation of LBP to allow for comfort in class.  (Reassess goal based on findings of following treatment.)   Goal Progress Progressing pt able to sit on the floor with pillows for 90 min without pain.  Mild discomfort sitting for 1 hour in low back/hip on right but able to shift and stretch   Target Date 01/20/22   Ortho Goal 3   Goal Identifier Walking   Goal Description Patient will be capable of walking 2-3 miles with pain < or = to 2/10 to facilitate her return to hiking.  (Reassess based on findings of following treatment)   Goal Progress Progressing - Patient able to walk 1 mile w/o too much issue, doesn't try to go much further   Target Date 01/20/22   Ortho Goal 4   Goal Identifier Strength   Goal Description Patient will be able to achieve a hip flexion MMT grade of 5/5 to demonstrate imrpovement in hip flexor strength needed for desired hobbies.  (Reassess based on findings of following treatment)   Target Date 01/20/22   Subjective Report   Subjective Report Doing better. Legs hurt a lot yesterday, did the foam roller. Seems worse with prolonged sitting, standing. Tolerance has improved since the SOC.Has tried physillium husk and miralax, but having bloating and  smaller bowel movements throughout the day. Not emptying bowel completely with defecation.    Objective Measure 1   Details cranial scan (+) for (L) ganglia, sacral parasympathetics.    Objective Measure 2   Details Mult tender pts upper and lower abdomen, (B) mid/lower rib tubercles, Tenderness of (R0 upper quadrant, hepatic flexure. .    Manual Therapy   Manual Therapy: Mobilization, MFR, MLD, friction massage minutes ([id number]) 55   Skilled Intervention MFR, counterstrain   Patient Response good   Treatment Detail (L) KENDAL-N, SMES-N, (R) AREN-N, IMES-N, (L) HYPO-N, (R) PELV-N, (B) PGT8-12-N, hepatic flexure, transv colon,    Assessments Completed   Assessments Completed Patient is responding appropriately to PT interventions. HEP compliance is good.    Education   Learner Patient   Readiness Acceptance   Method Booklet/handout;Explanation;Demonstration   Response Verbalizes Understanding;Demonstrates Understanding   Plan   Homework strengthening 2-3x a week, stretching daily    Home program hip flexor stretch with leg off table, S/L hip abd, bridge, S/L bridge, 90/90, bicycles with LE only, plank, side plank, pigeon stretch, downward dog, heel drops off step, rows, scap retraction, foam roller pec stretch, foam roller thoracic mobs and upper back rolling    Updates to plan of care added ex's for postural retraining    Plan for next session Continue to modify HEP based on patient progress. Continue with counterstrain therapy.  Calf strengthening   Comments   Comments  Patient presents with chronic (8 year) constant right sided LBP and low level tingling in R calf that can become painful. Patient also presents with tightness and pain in right hip flexors that is aggrevated with acitivity such as walking. Patient reports feeling much better recently with minimal pain in low back (2/10) and no pain in right hip flexor.   Assessed chest pain today.    Total Session Time   Timed Code Treatment Minutes 55   Total Treatment  Time (sum of timed and untimed services) 55   AMBULATORY CLINICS ONLY-MEDICAL AND TREATMENT DIAGNOSIS   Medical Diagnosis Right hip flexor tightness, low back pain, chronic thoracic  back pain (new order added )   PT Diagnosis Right sided LBP w/o sciatica, right hip flexor pain, right calf pain

## 2021-12-13 ENCOUNTER — HOSPITAL ENCOUNTER (OUTPATIENT)
Dept: PHYSICAL THERAPY | Facility: REHABILITATION | Age: 31
End: 2021-12-13
Payer: COMMERCIAL

## 2021-12-13 ENCOUNTER — MYC MEDICAL ADVICE (OUTPATIENT)
Dept: FAMILY MEDICINE | Facility: CLINIC | Age: 31
End: 2021-12-13

## 2021-12-13 DIAGNOSIS — M79.661 RIGHT CALF PAIN: ICD-10-CM

## 2021-12-13 DIAGNOSIS — G89.29 CHRONIC RIGHT-SIDED LOW BACK PAIN WITHOUT SCIATICA: ICD-10-CM

## 2021-12-13 DIAGNOSIS — M24.551 HIP FLEXOR TIGHTNESS, RIGHT: ICD-10-CM

## 2021-12-13 DIAGNOSIS — E06.3 HASHIMOTO'S DISEASE: Primary | ICD-10-CM

## 2021-12-13 DIAGNOSIS — K59.00 CONSTIPATION, UNSPECIFIED CONSTIPATION TYPE: ICD-10-CM

## 2021-12-13 DIAGNOSIS — M24.551 RIGHT HIP FLEXOR TIGHTNESS: Primary | ICD-10-CM

## 2021-12-13 DIAGNOSIS — M54.50 LUMBAR BACK PAIN: ICD-10-CM

## 2021-12-13 DIAGNOSIS — M54.50 CHRONIC RIGHT-SIDED LOW BACK PAIN WITHOUT SCIATICA: ICD-10-CM

## 2021-12-13 PROCEDURE — 97140 MANUAL THERAPY 1/> REGIONS: CPT | Mod: GP | Performed by: PHYSICAL THERAPIST

## 2021-12-13 RX ORDER — LEVOTHYROXINE SODIUM 75 UG/1
75 TABLET ORAL DAILY
Qty: 30 TABLET | Refills: 0 | Status: CANCELLED | OUTPATIENT
Start: 2021-12-13

## 2021-12-13 NOTE — PROGRESS NOTES
12/13/21 0800   Signing Clinician's Name / Credentials   Signing clinician's name / credentials Lashay Lozano PT   Session Number   Session Number 7/12   Progress Note/Recertification   Progress Note Due Date 01/20/22   Ortho Goal 1   Goal Identifier Exercise   Goal Description Patient will return to desired exercise regimen without pain or discomfort to allow for participation in active hobbies. 30 min such as skiing.   (Reassess goal based on findings of following treatment.)   Goal Progress Recommended pt start to walk 1.5-2 miles and assess pain level at hip flexor    Target Date 01/20/22   Ortho Goal 2   Goal Identifier Sitting   Goal Description Patient will be able to sit for 2 hours without provocation of LBP to allow for comfort in class.  (Reassess goal based on findings of following treatment.)   Goal Progress Progressing pt able to sit on the floor with pillows for 90 min without pain.  Mild discomfort sitting for 1 hour in low back/hip on right but able to shift and stretch   Target Date 01/20/22   Ortho Goal 3   Goal Identifier Walking   Goal Description Patient will be capable of walking 2-3 miles with pain < or = to 2/10 to facilitate her return to hiking.  (Reassess based on findings of following treatment)   Goal Progress Progressing - Patient able to walk 1 mile w/o too much issue, doesn't try to go much further   Target Date 01/20/22   Ortho Goal 4   Goal Identifier Strength   Goal Description Patient will be able to achieve a hip flexion MMT grade of 5/5 to demonstrate imrpovement in hip flexor strength needed for desired hobbies.  (Reassess based on findings of following treatment)   Target Date 01/20/22   Subjective Report   Subjective Report Doing exercises every other day. Walked 2 miles yesterday and tolerated it well. Felt really constipated last night, had a bowel movement. Experimenting with an elimination diet (dairy, fiber). Improved bowel motility after treatment. Moderate  constipation today.  Hip flexor has been better.    Objective Measure 1   Details cranial scan (+) for parasympathetics, post ganglionics, abd/pelvic  viscers   Objective Measure 2   Details Mult tender pts (B) sacrum, post pelvis, upper lumbar spine, .Fascial restrictions of colon and sigmoid.    Manual Therapy   Manual Therapy: Mobilization, MFR, MLD, friction massage minutes ([id number]) 53   Skilled Intervention MFR, counterstrain   Patient Response good   Treatment Detail (L) PGl1-N, (R) PGL2-N, (L) PS2-N, (R) PS3-N, (R) PS4-N, (R) IMP-N, (R) EPF-V, pelvic diaphragm, desc colon, sigmoid,    Assessments Completed   Assessments Completed Patient is responding appropriately to PT interventions. HEP compliance is good.    Education   Learner Patient   Readiness Acceptance   Method Booklet/handout;Explanation;Demonstration   Response Verbalizes Understanding;Demonstrates Understanding   Plan   Homework strengthening 2-3x a week, stretching daily    Home program hip flexor stretch with leg off table, S/L hip abd, bridge, S/L bridge, 90/90, bicycles with LE only, plank, side plank, pigeon stretch, downward dog, heel drops off step, rows, scap retraction, foam roller pec stretch, foam roller thoracic mobs and upper back rolling    Updates to plan of care added ex's for postural retraining    Plan for next session Continue to modify HEP based on patient progress. Continue with counterstrain therapy.  Calf strengthening   Comments   Comments  Patient presents with chronic (8 year) constant right sided LBP and low level tingling in R calf that can become painful. Patient also presents with tightness and pain in right hip flexors that is aggrevated with acitivity such as walking. Patient reports feeling much better recently with minimal pain in low back (2/10) and no pain in right hip flexor.   Assessed chest pain today.    Total Session Time   Timed Code Treatment Minutes 53   Total Treatment Time (sum of timed and untimed  services) 53   AMBULATORY CLINICS ONLY-MEDICAL AND TREATMENT DIAGNOSIS   Medical Diagnosis Right hip flexor tightness, low back pain, chronic thoracic  back pain (new order added )   PT Diagnosis Right sided LBP w/o sciatica, right hip flexor pain, right calf pain

## 2021-12-17 ENCOUNTER — HOSPITAL ENCOUNTER (OUTPATIENT)
Dept: PHYSICAL THERAPY | Facility: REHABILITATION | Age: 31
End: 2021-12-17
Payer: COMMERCIAL

## 2021-12-17 DIAGNOSIS — M24.551 HIP FLEXOR TIGHTNESS, RIGHT: ICD-10-CM

## 2021-12-17 DIAGNOSIS — K59.00 CONSTIPATION, UNSPECIFIED CONSTIPATION TYPE: ICD-10-CM

## 2021-12-17 DIAGNOSIS — M54.50 LUMBAR BACK PAIN: ICD-10-CM

## 2021-12-17 DIAGNOSIS — M24.551 RIGHT HIP FLEXOR TIGHTNESS: Primary | ICD-10-CM

## 2021-12-17 DIAGNOSIS — M79.661 RIGHT CALF PAIN: ICD-10-CM

## 2021-12-17 DIAGNOSIS — M54.50 CHRONIC RIGHT-SIDED LOW BACK PAIN WITHOUT SCIATICA: ICD-10-CM

## 2021-12-17 DIAGNOSIS — G89.29 CHRONIC RIGHT-SIDED LOW BACK PAIN WITHOUT SCIATICA: ICD-10-CM

## 2021-12-17 PROCEDURE — 97140 MANUAL THERAPY 1/> REGIONS: CPT | Mod: GP | Performed by: PHYSICAL THERAPIST

## 2021-12-17 NOTE — PROGRESS NOTES
12/17/21 1300   Signing Clinician's Name / Credentials   Signing clinician's name / credentials Lashay Lozano PT   Session Number   Session Number 8/12   Progress Note/Recertification   Progress Note Due Date 01/20/22   Ortho Goal 1   Goal Identifier Exercise   Goal Description Patient will return to desired exercise regimen without pain or discomfort to allow for participation in active hobbies. 30 min such as skiing.   (Reassess goal based on findings of following treatment.)   Goal Progress Recommended pt start to walk 1.5-2 miles and assess pain level at hip flexor    Target Date 01/20/22   Ortho Goal 2   Goal Identifier Sitting   Goal Description Patient will be able to sit for 2 hours without provocation of LBP to allow for comfort in class.  (Reassess goal based on findings of following treatment.)   Goal Progress Progressing pt able to sit on the floor with pillows for 90 min without pain.  Mild discomfort sitting for 1 hour in low back/hip on right but able to shift and stretch   Target Date 01/20/22   Ortho Goal 3   Goal Identifier Walking   Goal Description Patient will be capable of walking 2-3 miles with pain < or = to 2/10 to facilitate her return to hiking.  (Reassess based on findings of following treatment)   Goal Progress Progressing - Patient able to walk 1 mile w/o too much issue, doesn't try to go much further   Target Date 01/20/22   Ortho Goal 4   Goal Identifier Strength   Goal Description Patient will be able to achieve a hip flexion MMT grade of 5/5 to demonstrate imrpovement in hip flexor strength needed for desired hobbies.  (Reassess based on findings of following treatment)   Target Date 01/20/22   Subjective Report   Subjective Report Has been doing pretty well. Started menstrual cycle. Has started some medicinal herbs, seems helpful. Had a bowel movement this morning, minimal constipation symptoms today (2/10). Had more lower abdominal pain last night, up to 7/10.    Objective  Measure 1   Details cranial scan (+) for sphincters, (B) abd viscera, visceral lymphatics.    Objective Measure 2   Details fascial restrictions of sphincters and (R) post/lat lower ribs.    Manual Therapy   Manual Therapy: Mobilization, MFR, MLD, friction massage minutes ([id number]) 30   Skilled Intervention MFR, counterstrain   Patient Response good   Treatment Detail iliocecal valve, DJ jct, pylorus, (R) LGI-LV, (R) MGI-LV,    Assessments Completed   Assessments Completed Patient is responding appropriately to PT interventions. HEP compliance is good.    Education   Learner Patient   Readiness Acceptance   Method Booklet/handout;Explanation;Demonstration   Response Verbalizes Understanding;Demonstrates Understanding   Plan   Homework strengthening 2-3x a week, stretching daily    Home program hip flexor stretch with leg off table, S/L hip abd, bridge, S/L bridge, 90/90, bicycles with LE only, plank, side plank, pigeon stretch, downward dog, heel drops off step, rows, scap retraction, foam roller pec stretch, foam roller thoracic mobs and upper back rolling    Updates to plan of care added ex's for postural retraining    Plan for next session Continue to modify HEP based on patient progress. Continue with counterstrain therapy.  Calf strengthening   Comments   Comments  Patient presents with chronic (8 year) constant right sided LBP and low level tingling in R calf that can become painful. Patient also presents with tightness and pain in right hip flexors that is aggrevated with acitivity such as walking. Patient reports feeling much better recently with minimal pain in low back (2/10) and no pain in right hip flexor.   Assessed chest pain today.    Total Session Time   Timed Code Treatment Minutes 30   Total Treatment Time (sum of timed and untimed services) 30   AMBULATORY CLINICS ONLY-MEDICAL AND TREATMENT DIAGNOSIS   Medical Diagnosis Right hip flexor tightness, low back pain, chronic thoracic  back pain (new  order added )   PT Diagnosis Right sided LBP w/o sciatica, right hip flexor pain, right calf pain

## 2022-01-04 ENCOUNTER — HOSPITAL ENCOUNTER (OUTPATIENT)
Dept: PHYSICAL THERAPY | Facility: REHABILITATION | Age: 32
End: 2022-01-04
Payer: COMMERCIAL

## 2022-01-04 DIAGNOSIS — M54.50 CHRONIC RIGHT-SIDED LOW BACK PAIN WITHOUT SCIATICA: ICD-10-CM

## 2022-01-04 DIAGNOSIS — M24.551 HIP FLEXOR TIGHTNESS, RIGHT: ICD-10-CM

## 2022-01-04 DIAGNOSIS — M54.50 LUMBAR BACK PAIN: ICD-10-CM

## 2022-01-04 DIAGNOSIS — G89.29 CHRONIC RIGHT-SIDED LOW BACK PAIN WITHOUT SCIATICA: ICD-10-CM

## 2022-01-04 DIAGNOSIS — M79.661 RIGHT CALF PAIN: ICD-10-CM

## 2022-01-04 DIAGNOSIS — K59.00 CONSTIPATION, UNSPECIFIED CONSTIPATION TYPE: ICD-10-CM

## 2022-01-04 DIAGNOSIS — M24.551 RIGHT HIP FLEXOR TIGHTNESS: Primary | ICD-10-CM

## 2022-01-04 PROCEDURE — 97140 MANUAL THERAPY 1/> REGIONS: CPT | Mod: GP | Performed by: PHYSICAL THERAPIST

## 2022-01-04 PROCEDURE — 97110 THERAPEUTIC EXERCISES: CPT | Mod: GP | Performed by: PHYSICAL THERAPIST

## 2022-01-10 ENCOUNTER — HOSPITAL ENCOUNTER (OUTPATIENT)
Dept: PHYSICAL THERAPY | Facility: REHABILITATION | Age: 32
End: 2022-01-10
Payer: COMMERCIAL

## 2022-01-10 ENCOUNTER — LAB (OUTPATIENT)
Dept: LAB | Facility: CLINIC | Age: 32
End: 2022-01-10

## 2022-01-10 DIAGNOSIS — E06.3 HASHIMOTO'S DISEASE: ICD-10-CM

## 2022-01-10 DIAGNOSIS — M54.50 LUMBAR BACK PAIN: ICD-10-CM

## 2022-01-10 DIAGNOSIS — M54.50 CHRONIC RIGHT-SIDED LOW BACK PAIN WITHOUT SCIATICA: ICD-10-CM

## 2022-01-10 DIAGNOSIS — M24.551 HIP FLEXOR TIGHTNESS, RIGHT: ICD-10-CM

## 2022-01-10 DIAGNOSIS — K59.00 CONSTIPATION, UNSPECIFIED CONSTIPATION TYPE: ICD-10-CM

## 2022-01-10 DIAGNOSIS — M24.551 RIGHT HIP FLEXOR TIGHTNESS: Primary | ICD-10-CM

## 2022-01-10 DIAGNOSIS — M79.661 RIGHT CALF PAIN: ICD-10-CM

## 2022-01-10 DIAGNOSIS — G89.29 CHRONIC RIGHT-SIDED LOW BACK PAIN WITHOUT SCIATICA: ICD-10-CM

## 2022-01-10 LAB
T3FREE SERPL-MCNC: 2.4 PG/ML (ref 1.6–3.9)
T4 FREE SERPL-MCNC: 0.98 NG/DL (ref 0.7–1.8)
TSH SERPL DL<=0.005 MIU/L-ACNC: 0.45 UIU/ML (ref 0.3–5)

## 2022-01-10 PROCEDURE — 84481 FREE ASSAY (FT-3): CPT

## 2022-01-10 PROCEDURE — 36415 COLL VENOUS BLD VENIPUNCTURE: CPT

## 2022-01-10 PROCEDURE — 97140 MANUAL THERAPY 1/> REGIONS: CPT | Mod: GP | Performed by: PHYSICAL THERAPIST

## 2022-01-10 PROCEDURE — 84439 ASSAY OF FREE THYROXINE: CPT

## 2022-01-10 PROCEDURE — 84443 ASSAY THYROID STIM HORMONE: CPT

## 2022-01-10 NOTE — PROGRESS NOTES
01/10/22 1200   Signing Clinician's Name / Credentials   Signing clinician's name / credentials Lashay Blake, PT   Session Number   Session Number 10/12   Progress Note/Recertification   Progress Note Due Date 01/20/22   Ortho Goal 1   Goal Identifier Exercise   Goal Description Patient will return to desired exercise regimen without pain or discomfort to allow for participation in active hobbies. 30 min such as skiing.   (Reassess goal based on findings of following treatment.)   Goal Progress Pt was walking 1.5-2 miles without hip flexor pain on occasion before the holidays.  December is very busy with finals, going out of town and [employer].  Goal: January to have some routine again - strengthening every other day 30 min.  Skiing in the past has been challenging.   Will focus on walking and trial skiing.    Target Date 01/20/22   Ortho Goal 2   Goal Identifier Sitting   Goal Description Patient will be able to sit for 2 hours without provocation of LBP to allow for comfort in class.  (Reassess goal based on findings of following treatment.)   Goal Progress Made it through plane rides without a lot of pain. Pressure in R low back.   Able to sit 4 hours.    Target Date 01/20/22   Ortho Goal 3   Goal Identifier Walking   Goal Description Patient will be capable of walking 2-3 miles with pain < or = to 2/10 to facilitate her return to hiking.  (Reassess based on findings of following treatment)   Goal Progress able to walk 1.5-2 miles.    Target Date 01/20/22   Ortho Goal 4   Goal Identifier Strength   Goal Description Patient will be able to achieve a hip flexion MMT grade of 5/5 to demonstrate imrpovement in hip flexor strength needed for desired hobbies.  (Reassess based on findings of following treatment)   Goal Progress 4/5 on R    Target Date 01/20/22   Subjective Report   Subjective Report Has been feeling better since returning home and getting back to her normal routine. Has been working on her exercises every  other day. Has switched to decaf coffee with benefit. Started menstrual cycle today. Usually is more constipated before/during period.     Objective Measure 1   Details Initial cranial scan (+) for lower quadrant viscera, sphincters.    Objective Measure 2   Details  Mod fascial restrictions of pelvic diaphragm, iliocecal valve, sphincter of oddi, pylorus. Tenderness of (B) sacrotub lig.    Objective Measure 3   Details moderately decreased motility/mobility of duodenum. sigmoid, desc colon,    Manual Therapy   Manual Therapy: Mobilization, MFR, MLD, friction massage minutes ([id number]) 55   Skilled Intervention  counterstrain, MFR   Patient Response good   Treatment Detail pelvic diaphragm, iliocecal valve, oddi, pylorus, duodenum motility, duodenum/D2/D3, sigmoid, desc colon, (B) EPF-V     Assessments Completed   Assessments Completed Patient is responding appropriately to PT interventions. HEP compliance is good.    Education   Learner Patient   Readiness Acceptance   Method Booklet/handout;Explanation;Demonstration   Response Verbalizes Understanding;Demonstrates Understanding   Plan   Homework strengthening 2-3x a week, stretching daily    Home program Stretching: hip flexor stretch with leg off table, pigeon stretch, downward dog (not printed on PTRX), heel drops off step, foam roller pec stretch, foam roller thoracic mobs and upper back rolling, lateral stretch on bolster (on her own HS/Add/ITB with strap), Strengthing: S/L hip abd, bridge/S/L bridge, bicycles, plank, side plank, rows, scapular retractions, birddog.    Plan for next session Continue to modify HEP based on patient progress. Continue with counterstrain therapy.  Add superman. Check arterial, visceral and sympathetic scans.    Comments   Comments  Patient presents with chronic (8 year) constant right sided LBP and low level tingling in R calf that can become painful. Patient also presents with tightness and pain in right hip flexors that is  aggrevated with acitivity such as walking. Patient reports feeling much better recently with minimal pain in low back (2/10) and no pain in right hip flexor.  Overall pt is improving - with mild setback from traveling during the holidays.    Total Session Time   Timed Code Treatment Minutes 55   Total Treatment Time (sum of timed and untimed services) 55   AMBULATORY CLINICS ONLY-MEDICAL AND TREATMENT DIAGNOSIS   Medical Diagnosis Right hip flexor tightness, low back pain, chronic thoracic  back pain (new order added )   PT Diagnosis Right sided LBP w/o sciatica, right hip flexor pain, right calf pain

## 2022-01-11 DIAGNOSIS — E06.3 HASHIMOTO'S DISEASE: ICD-10-CM

## 2022-01-17 ENCOUNTER — HOSPITAL ENCOUNTER (OUTPATIENT)
Dept: PHYSICAL THERAPY | Facility: REHABILITATION | Age: 32
End: 2022-01-17
Payer: COMMERCIAL

## 2022-01-17 DIAGNOSIS — K59.00 CONSTIPATION, UNSPECIFIED CONSTIPATION TYPE: ICD-10-CM

## 2022-01-17 DIAGNOSIS — G89.29 CHRONIC RIGHT-SIDED LOW BACK PAIN WITHOUT SCIATICA: ICD-10-CM

## 2022-01-17 DIAGNOSIS — M54.50 CHRONIC RIGHT-SIDED LOW BACK PAIN WITHOUT SCIATICA: ICD-10-CM

## 2022-01-17 DIAGNOSIS — M24.551 RIGHT HIP FLEXOR TIGHTNESS: Primary | ICD-10-CM

## 2022-01-17 DIAGNOSIS — M79.661 RIGHT CALF PAIN: ICD-10-CM

## 2022-01-17 DIAGNOSIS — M54.50 LUMBAR BACK PAIN: ICD-10-CM

## 2022-01-17 DIAGNOSIS — M24.551 HIP FLEXOR TIGHTNESS, RIGHT: ICD-10-CM

## 2022-01-17 PROCEDURE — 97140 MANUAL THERAPY 1/> REGIONS: CPT | Mod: GP | Performed by: PHYSICAL THERAPIST

## 2022-01-17 NOTE — PROGRESS NOTES
01/17/22 1400   Signing Clinician's Name / Credentials   Signing clinician's name / credentials Lashay Lozano PT   Session Number   Session Number 11/12 (needs updated POC/PN next visit)   Progress Note/Recertification   Progress Note Due Date 01/20/22   Ortho Goal 1   Goal Identifier Exercise   Goal Description Patient will return to desired exercise regimen without pain or discomfort to allow for participation in active hobbies. 30 min such as skiing.   (Reassess goal based on findings of following treatment.)   Goal Progress Pt was walking 1.5-2 miles without hip flexor pain on occasion before the holidays.  December is very busy with finals, going out of town and [employer].  Goal: January to have some routine again - strengthening every other day 30 min.  Skiing in the past has been challenging.   Will focus on walking and trial skiing.    Target Date 01/20/22   Ortho Goal 2   Goal Identifier Sitting   Goal Description Patient will be able to sit for 2 hours without provocation of LBP to allow for comfort in class.  (Reassess goal based on findings of following treatment.)   Goal Progress Made it through plane rides without a lot of pain. Pressure in R low back.   Able to sit 4 hours.    Target Date 01/20/22   Ortho Goal 3   Goal Identifier Walking   Goal Description Patient will be capable of walking 2-3 miles with pain < or = to 2/10 to facilitate her return to hiking.  (Reassess based on findings of following treatment)   Goal Progress able to walk 1.5-2 miles.    Target Date 01/20/22   Ortho Goal 4   Goal Identifier Strength   Goal Description Patient will be able to achieve a hip flexion MMT grade of 5/5 to demonstrate imrpovement in hip flexor strength needed for desired hobbies.  (Reassess based on findings of following treatment)   Goal Progress 4/5 on R    Target Date 01/20/22   Subjective Report   Subjective Report Drinking decaf coffee has still been helping with bowels. Consistently having morning  bowel movement. Noticing (R) hip flexor and calf symptoms at times, sore and achy. Some discomfort in (R) abdomen.    Objective Measure 1   Details initial cranial scan (+) for (R) lumbar myochains, (B) abdominal viscera.    Objective Measure 2   Details Tenderness/fascial restrictions (R) lumbar spine, (B) medial mid abdomen.    Objective Measure 3   Details Mod fascial restrictions of sphincters, cecum, small intestine,    Manual Therapy   Manual Therapy: Mobilization, MFR, MLD, friction massage minutes ([id number]) 55   Skilled Intervention  counterstrain, MFR   Patient Response good   Treatment Detail (R) LEL1-MS, (R) LEL3-MS, iliocecal valve, (R) kidney, CECM-V, (B) JEJ-V, sm intestine, DJ jct, pylorus,    Assessments Completed   Assessments Completed Patient is responding appropriately to PT interventions. HEP compliance is good.    Education   Learner Patient   Readiness Acceptance   Method Booklet/handout;Explanation;Demonstration   Response Verbalizes Understanding;Demonstrates Understanding   Plan   Homework strengthening 2-3x a week, stretching daily    Home program Stretching: hip flexor stretch with leg off table, pigeon stretch, downward dog (not printed on PTRX), heel drops off step, foam roller pec stretch, foam roller thoracic mobs and upper back rolling, lateral stretch on bolster (on her own HS/Add/ITB with strap), Strengthing: S/L hip abd, bridge/S/L bridge, bicycles, plank, side plank, rows, scapular retractions, birddog.    Plan for next session Continue to modify HEP based on patient progress. Continue with counterstrain therapy.  Add superman. Check arterial, visceral and sympathetic scans.    Comments   Comments  Patient presents with chronic (8 year) constant right sided LBP and low level tingling in R calf that can become painful. Patient also presents with tightness and pain in right hip flexors that is aggrevated with acitivity such as walking. Patient reports feeling much better recently  with minimal pain in low back (2/10) and no pain in right hip flexor.  Overall pt is improving - with mild setback from traveling during the holidays.    Total Session Time   Timed Code Treatment Minutes 55   Total Treatment Time (sum of timed and untimed services) 55   AMBULATORY CLINICS ONLY-MEDICAL AND TREATMENT DIAGNOSIS   Medical Diagnosis Right hip flexor tightness, low back pain, chronic thoracic  back pain (new order added )   PT Diagnosis Right sided LBP w/o sciatica, right hip flexor pain, right calf pain

## 2022-01-20 ENCOUNTER — HOSPITAL ENCOUNTER (OUTPATIENT)
Dept: PHYSICAL THERAPY | Facility: REHABILITATION | Age: 32
End: 2022-01-20
Payer: COMMERCIAL

## 2022-01-20 DIAGNOSIS — M24.551 HIP FLEXOR TIGHTNESS, RIGHT: ICD-10-CM

## 2022-01-20 DIAGNOSIS — K59.00 CONSTIPATION, UNSPECIFIED CONSTIPATION TYPE: ICD-10-CM

## 2022-01-20 DIAGNOSIS — M79.661 RIGHT CALF PAIN: ICD-10-CM

## 2022-01-20 DIAGNOSIS — M24.551 RIGHT HIP FLEXOR TIGHTNESS: Primary | ICD-10-CM

## 2022-01-20 DIAGNOSIS — G89.29 CHRONIC RIGHT-SIDED LOW BACK PAIN WITHOUT SCIATICA: ICD-10-CM

## 2022-01-20 DIAGNOSIS — M54.50 CHRONIC RIGHT-SIDED LOW BACK PAIN WITHOUT SCIATICA: ICD-10-CM

## 2022-01-20 DIAGNOSIS — M54.50 LUMBAR BACK PAIN: ICD-10-CM

## 2022-01-20 PROCEDURE — 97140 MANUAL THERAPY 1/> REGIONS: CPT | Mod: GP | Performed by: PHYSICAL THERAPIST

## 2022-01-20 NOTE — PROGRESS NOTES
[facility]    Outpatient Physical Therapy Progress Note  Patient: Agustina Nam  : 1990    Beginning/End Dates of Reporting Period:  10/21/21 to 22    Referring Provider: Chelo Park MD    Therapy Diagnosis: Right sided LBP w/o sciatica, right hip flexor pain, right calf pain     Client Self Report: Increased (R) hip/LBP the last few days. Consistent pain 4-5/10.. Digestion is okay, has found apples are helpful.     Objective Measurements:     Details: cranial scan (+) for abd viscera, mesentary.      Details: Mod tenderness of (R) lumbar region, interspinous spaces.      Goals:  Goal Identifier Exercise   Goal Description Patient will return to desired exercise regimen without pain or discomfort to allow for participation in active hobbies. 30 min such as skiing.  (Reassess goal based on findings of following treatment.)   Target Date 22   Date Met      Progress (detail required for progress note): Pt was walking 1.5-2 miles without hip flexor pain on occasion before the holidays.  December is very busy with finals, going out of town and [employer].  Goal: January to have some routine again - strengthening every other day 30 min.  Skiing in the past has been challenging.   Will focus on walking and trial skiing.      Goal Identifier Sitting   Goal Description Patient will be able to sit for 2 hours without provocation of LBP to allow for comfort in class. (Reassess goal based on findings of following treatment.)   Target Date 22   Date Met      Progress (detail required for progress note): Made it through plane rides without a lot of pain. Pressure in R low back.   Able to sit 4 hours.      Goal Identifier Walking   Goal Description Patient will be capable of walking 2-3 miles with pain < or = to 2/10 to facilitate her return to hiking. (Reassess based on findings of following  treatment)   Target Date 01/20/22   Date Met      Progress (detail required for progress note): able to walk 1.5-2 miles.      Goal Identifier Strength   Goal Description Patient will be able to achieve a hip flexion MMT grade of 5/5 to demonstrate imrpovement in hip flexor strength needed for desired hobbies. (Reassess based on findings of following treatment)   Target Date 01/20/22   Date Met      Progress (detail required for progress note): 4/5 on R          Plan:  Continue therapy per current plan of care.       01/20/22 1300   Signing Clinician's Name / Credentials   Signing clinician's name / credentials Lashay Lozano PT   Session Number   Session Number 12/12+12    Additional Session Number 1/20/22: update POC 12 visits   Progress Note/Recertification   Progress Note Completed Date 01/20/22   Ortho Goal 1   Goal Identifier Exercise   Goal Description Patient will return to desired exercise regimen without pain or discomfort to allow for participation in active hobbies. 30 min such as skiing.   (Reassess goal based on findings of following treatment.)   Goal Progress Pt was walking 1.5-2 miles without hip flexor pain on occasion before the holidays.  December is very busy with finals, going out of town and [employer].  Goal: January to have some routine again - strengthening every other day 30 min.  Skiing in the past has been challenging.   Will focus on walking and trial skiing.    Target Date 01/20/22   Ortho Goal 2   Goal Identifier Sitting   Goal Description Patient will be able to sit for 2 hours without provocation of LBP to allow for comfort in class.  (Reassess goal based on findings of following treatment.)   Goal Progress Made it through plane rides without a lot of pain. Pressure in R low back.   Able to sit 4 hours.    Target Date 01/20/22   Ortho Goal 3   Goal Identifier Walking   Goal Description Patient will be capable of walking 2-3 miles with pain < or = to 2/10 to facilitate her return to  hiking.  (Reassess based on findings of following treatment)   Goal Progress able to walk 1.5-2 miles.    Target Date 01/20/22   Ortho Goal 4   Goal Identifier Strength   Goal Description Patient will be able to achieve a hip flexion MMT grade of 5/5 to demonstrate imrpovement in hip flexor strength needed for desired hobbies.  (Reassess based on findings of following treatment)   Goal Progress 4/5 on R    Target Date 01/20/22   Subjective Report   Subjective Report Increased (R) hip/LBP the last few days. Consistent pain 4-5/10.. Digestion is okay, has found apples are helpful.    Objective Measure 1   Details cranial scan (+) for abd viscera, mesentary.    Objective Measure 2   Details Mod tenderness of (R) lumbar region, interspinous spaces.    Manual Therapy   Manual Therapy: Mobilization, MFR, MLD, friction massage minutes ([id number]) 30   Skilled Intervention  counterstrain, MFR   Patient Response good   Treatment Detail (R) kidney, (R) lumbar plexus, (R) psoas, CECM-V, (R) MESL1-2-V,    Assessments Completed   Assessments Completed Patient is responding appropriately to PT interventions. HEP compliance is good.    Education   Learner Patient   Readiness Acceptance   Method Booklet/handout;Explanation;Demonstration   Response Verbalizes Understanding;Demonstrates Understanding   Plan   Homework strengthening 2-3x a week, stretching daily    Home program Stretching: hip flexor stretch with leg off table, pigeon stretch, downward dog (not printed on PTRX), heel drops off step, foam roller pec stretch, foam roller thoracic mobs and upper back rolling, lateral stretch on bolster (on her own HS/Add/ITB with strap), Strengthing: S/L hip abd, bridge/S/L bridge, bicycles, plank, side plank, rows, scapular retractions, birddog.    Updates to plan of care 1/20/22: update POC 12 visits   Plan for next session Continue to modify HEP based on patient progress. Continue with counterstrain therapy.  Add superman. Check arterial,  visceral and sympathetic scans.    Comments   Comments  Patient presents with chronic (8 year) constant right sided LBP and low level tingling in R calf that can become painful. Patient also presents with tightness and pain in right hip flexors that is aggrevated with acitivity such as walking. Patient reports feeling much better recently with minimal pain in low back (2/10) and no pain in right hip flexor.  Overall pt is improving - with mild setback from traveling during the holidays.    Total Session Time   Timed Code Treatment Minutes 30   Total Treatment Time (sum of timed and untimed services) 30   AMBULATORY CLINICS ONLY-MEDICAL AND TREATMENT DIAGNOSIS   Medical Diagnosis Right hip flexor tightness, low back pain, chronic thoracic  back pain (new order added )   PT Diagnosis Right sided LBP w/o sciatica, right hip flexor pain, right calf pain

## 2022-01-27 ENCOUNTER — HOSPITAL ENCOUNTER (OUTPATIENT)
Dept: PHYSICAL THERAPY | Facility: REHABILITATION | Age: 32
End: 2022-01-27
Payer: COMMERCIAL

## 2022-01-27 DIAGNOSIS — M54.50 CHRONIC RIGHT-SIDED LOW BACK PAIN WITHOUT SCIATICA: ICD-10-CM

## 2022-01-27 DIAGNOSIS — M24.551 HIP FLEXOR TIGHTNESS, RIGHT: ICD-10-CM

## 2022-01-27 DIAGNOSIS — K59.00 CONSTIPATION, UNSPECIFIED CONSTIPATION TYPE: ICD-10-CM

## 2022-01-27 DIAGNOSIS — M24.551 RIGHT HIP FLEXOR TIGHTNESS: Primary | ICD-10-CM

## 2022-01-27 DIAGNOSIS — G89.29 CHRONIC RIGHT-SIDED LOW BACK PAIN WITHOUT SCIATICA: ICD-10-CM

## 2022-01-27 DIAGNOSIS — M79.661 RIGHT CALF PAIN: ICD-10-CM

## 2022-01-27 DIAGNOSIS — M54.50 LUMBAR BACK PAIN: ICD-10-CM

## 2022-01-27 PROCEDURE — 97140 MANUAL THERAPY 1/> REGIONS: CPT | Mod: GP | Performed by: PHYSICAL THERAPIST

## 2022-01-27 NOTE — PROGRESS NOTES
01/27/22 1300   Signing Clinician's Name / Credentials   Signing clinician's name / credentials Lashay Lozano, PT   Session Number   Session Number 13/12+12    Additional Session Number 1/20/22: update POC 12 visits   Ortho Goal 1   Goal Identifier Exercise   Goal Description Patient will return to desired exercise regimen without pain or discomfort to allow for participation in active hobbies. 30 min such as skiing.   (Reassess goal based on findings of following treatment.)   Goal Progress Pt was walking 1.5-2 miles without hip flexor pain on occasion before the holidays.  December is very busy with finals, going out of town and [employer].  Goal: January to have some routine again - strengthening every other day 30 min.  Skiing in the past has been challenging.   Will focus on walking and trial skiing.    Target Date 01/20/22   Ortho Goal 2   Goal Identifier Sitting   Goal Description Patient will be able to sit for 2 hours without provocation of LBP to allow for comfort in class.  (Reassess goal based on findings of following treatment.)   Goal Progress Made it through plane rides without a lot of pain. Pressure in R low back.   Able to sit 4 hours.    Target Date 01/20/22   Ortho Goal 3   Goal Identifier Walking   Goal Description Patient will be capable of walking 2-3 miles with pain < or = to 2/10 to facilitate her return to hiking.  (Reassess based on findings of following treatment)   Goal Progress able to walk 1.5-2 miles.    Target Date 01/20/22   Ortho Goal 4   Goal Identifier Strength   Goal Description Patient will be able to achieve a hip flexion MMT grade of 5/5 to demonstrate imrpovement in hip flexor strength needed for desired hobbies.  (Reassess based on findings of following treatment)   Goal Progress 4/5 on R    Target Date 01/20/22   Subjective Report   Subjective Report Pain 2-3/10.  Bowel movements have been more regular with PT interventions. However, still having pain/tightness (R) lat trunk,  low back and (R) glut and leg. Difficulty determining aggravating factors; can be worse with being too sedentary or doing too much. Doing HEP every other day.    Objective Measure 1   Details Fascial restrictions of (R) pelvis, LS plexi. Mod tenderness R>L SI joint.    Objective Measure 2   Details Fascial restrictions of sacrum noted in multiple planes.    Manual Therapy   Manual Therapy: Mobilization, MFR, MLD, friction massage minutes ([id number]) 55   Skilled Intervention  counterstrain, MFR   Patient Response good   Treatment Detail (R) hemipelvis, (R) lumbar plexus, (R) sacral plexus, sacral release, ((B) UPL5, (R) LPL5, (R) HFO-SI, SACF(P), prone LS traction, (R) piriformis,    Assessments Completed   Assessments Completed Patient is responding appropriately to PT interventions. HEP compliance is good.    Education   Learner Patient   Readiness Acceptance   Method Booklet/handout;Explanation;Demonstration   Response Verbalizes Understanding;Demonstrates Understanding   Plan   Homework strengthening 2-3x a week, stretching daily    Home program Stretching: hip flexor stretch with leg off table, pigeon stretch, downward dog (not printed on PTRX), heel drops off step, foam roller pec stretch, foam roller thoracic mobs and upper back rolling, lateral stretch on bolster (on her own HS/Add/ITB with strap), Strengthing: S/L hip abd, bridge/S/L bridge, bicycles, plank, side plank, rows, scapular retractions, birddog.    Updates to plan of care 1/20/22: update POC 12 visits   Plan for next session Continue to modify HEP based on patient progress. Continue with counterstrain therapy.  Add superman. Check arterial, visceral and sympathetic scans.    Comments   Comments  Patient presents with chronic (8 year) constant right sided LBP and low level tingling in R calf that can become painful. Patient also presents with tightness and pain in right hip flexors that is aggrevated with acitivity such as walking. Patient reports  feeling much better recently with minimal pain in low back (2/10) and no pain in right hip flexor.  Overall pt is improving - with mild setback from traveling during the holidays.    Total Session Time   Timed Code Treatment Minutes 55   Total Treatment Time (sum of timed and untimed services) 55   AMBULATORY CLINICS ONLY-MEDICAL AND TREATMENT DIAGNOSIS   Medical Diagnosis Right hip flexor tightness, low back pain, chronic thoracic  back pain (new order added )   PT Diagnosis Right sided LBP w/o sciatica, right hip flexor pain, right calf pain

## 2022-02-03 ENCOUNTER — HOSPITAL ENCOUNTER (OUTPATIENT)
Dept: PHYSICAL THERAPY | Facility: REHABILITATION | Age: 32
End: 2022-02-03
Payer: COMMERCIAL

## 2022-02-03 DIAGNOSIS — K59.00 CONSTIPATION, UNSPECIFIED CONSTIPATION TYPE: ICD-10-CM

## 2022-02-03 DIAGNOSIS — M79.661 RIGHT CALF PAIN: ICD-10-CM

## 2022-02-03 DIAGNOSIS — M54.50 LUMBAR BACK PAIN: ICD-10-CM

## 2022-02-03 DIAGNOSIS — M54.50 CHRONIC RIGHT-SIDED LOW BACK PAIN WITHOUT SCIATICA: ICD-10-CM

## 2022-02-03 DIAGNOSIS — M24.551 RIGHT HIP FLEXOR TIGHTNESS: Primary | ICD-10-CM

## 2022-02-03 DIAGNOSIS — M24.551 HIP FLEXOR TIGHTNESS, RIGHT: ICD-10-CM

## 2022-02-03 DIAGNOSIS — G89.29 CHRONIC RIGHT-SIDED LOW BACK PAIN WITHOUT SCIATICA: ICD-10-CM

## 2022-02-03 PROCEDURE — 97140 MANUAL THERAPY 1/> REGIONS: CPT | Mod: GP | Performed by: PHYSICAL THERAPIST

## 2022-02-03 NOTE — PROGRESS NOTES
02/03/22 1400   Signing Clinician's Name / Credentials   Signing clinician's name / credentials Lashay Lozano, PT   Session Number   Session Number 14/12+12    Additional Session Number 1/20/22: update POC 12 visits   Progress Note/Recertification   Progress Note Completed Date 01/20/22   Ortho Goal 1   Goal Identifier Exercise   Goal Description Patient will return to desired exercise regimen without pain or discomfort to allow for participation in active hobbies. 30 min such as skiing.   (Reassess goal based on findings of following treatment.)   Goal Progress Pt was walking 1.5-2 miles without hip flexor pain on occasion before the holidays.  December is very busy with finals, going out of town and [employer]ism.  Goal: January to have some routine again - strengthening every other day 30 min.  Skiing in the past has been challenging.   Will focus on walking and trial skiing.    Target Date 01/20/22   Ortho Goal 2   Goal Identifier Sitting   Goal Description Patient will be able to sit for 2 hours without provocation of LBP to allow for comfort in class.  (Reassess goal based on findings of following treatment.)   Goal Progress Made it through plane rides without a lot of pain. Pressure in R low back.   Able to sit 4 hours.    Target Date 01/20/22   Ortho Goal 3   Goal Identifier Walking   Goal Description Patient will be capable of walking 2-3 miles with pain < or = to 2/10 to facilitate her return to hiking.  (Reassess based on findings of following treatment)   Goal Progress able to walk 1.5-2 miles.    Target Date 01/20/22   Ortho Goal 4   Goal Identifier Strength   Goal Description Patient will be able to achieve a hip flexion MMT grade of 5/5 to demonstrate imrpovement in hip flexor strength needed for desired hobbies.  (Reassess based on findings of following treatment)   Goal Progress 4/5 on R    Target Date 01/20/22   Subjective Report   Subjective Report Low back felt a lot better after the last session. It  was looser, then tightness returned. Pain in (B) LB and (R) psoas area today. Pain 4/10.    Objective Measure 1   Details cranial scan (+) for (R) arterials and periosteals.    Objective Measure 2   Details fascial restrictions and tenderness of R>L post sacrum.    Objective Measure 3   Details Limited sacral mobility in ext and torsion.    Manual Therapy   Manual Therapy: Mobilization, MFR, MLD, friction massage minutes ([id number]) 55   Skilled Intervention  counterstrain, MFR   Patient Response good   Treatment Detail supine LS traction, iliac gap, (B) SAFS-A, SACF(P), (R) FST(P), (R) LPL5, (R) HFO-SI, sacral release,    Assessments Completed   Assessments Completed Patient is responding appropriately to PT interventions. HEP compliance is good.    Education   Learner Patient   Readiness Acceptance   Method Booklet/handout;Explanation;Demonstration   Response Verbalizes Understanding;Demonstrates Understanding   Plan   Homework strengthening 2-3x a week, stretching daily    Home program Stretching: hip flexor stretch with leg off table, pigeon stretch, downward dog (not printed on PTRX), heel drops off step, foam roller pec stretch, foam roller thoracic mobs and upper back rolling, lateral stretch on bolster (on her own HS/Add/ITB with strap), Strengthing: S/L hip abd, bridge/S/L bridge, bicycles, plank, side plank, rows, scapular retractions, birddog.    Updates to plan of care 1/20/22: update POC 12 visits   Plan for next session Continue to modify HEP based on patient progress. Continue with counterstrain therapy.  Add superman. Check arterial, visceral and sympathetic scans.    Comments   Comments  Patient presents with chronic (8 year) constant right sided LBP and low level tingling in R calf that can become painful. Patient also presents with tightness and pain in right hip flexors that is aggrevated with acitivity such as walking. Patient reports feeling much better recently with minimal pain in low back (2/10)  and no pain in right hip flexor.  Overall pt is improving - with mild setback from traveling during the holidays.    Total Session Time   Timed Code Treatment Minutes 55   Total Treatment Time (sum of timed and untimed services) 55   AMBULATORY CLINICS ONLY-MEDICAL AND TREATMENT DIAGNOSIS   Medical Diagnosis Right hip flexor tightness, low back pain, chronic thoracic  back pain (new order added )   PT Diagnosis Right sided LBP w/o sciatica, right hip flexor pain, right calf pain

## 2022-02-04 ENCOUNTER — OFFICE VISIT (OUTPATIENT)
Dept: FAMILY MEDICINE | Facility: CLINIC | Age: 32
End: 2022-02-04
Payer: COMMERCIAL

## 2022-02-04 ENCOUNTER — HOSPITAL ENCOUNTER (OUTPATIENT)
Dept: PHYSICAL THERAPY | Facility: REHABILITATION | Age: 32
End: 2022-02-04
Payer: COMMERCIAL

## 2022-02-04 VITALS
HEART RATE: 76 BPM | BODY MASS INDEX: 20.82 KG/M2 | OXYGEN SATURATION: 99 % | DIASTOLIC BLOOD PRESSURE: 64 MMHG | SYSTOLIC BLOOD PRESSURE: 124 MMHG | HEIGHT: 68 IN | WEIGHT: 137.4 LBS

## 2022-02-04 DIAGNOSIS — M24.551 HIP FLEXOR TIGHTNESS, RIGHT: ICD-10-CM

## 2022-02-04 DIAGNOSIS — M24.551 RIGHT HIP FLEXOR TIGHTNESS: Primary | ICD-10-CM

## 2022-02-04 DIAGNOSIS — G89.29 CHRONIC RIGHT-SIDED LOW BACK PAIN WITHOUT SCIATICA: ICD-10-CM

## 2022-02-04 DIAGNOSIS — M79.661 RIGHT CALF PAIN: ICD-10-CM

## 2022-02-04 DIAGNOSIS — M54.50 LUMBAR BACK PAIN: ICD-10-CM

## 2022-02-04 DIAGNOSIS — M54.50 CHRONIC RIGHT-SIDED LOW BACK PAIN WITHOUT SCIATICA: ICD-10-CM

## 2022-02-04 DIAGNOSIS — Z12.4 CERVICAL CANCER SCREENING: Primary | ICD-10-CM

## 2022-02-04 DIAGNOSIS — K59.00 CONSTIPATION, UNSPECIFIED CONSTIPATION TYPE: ICD-10-CM

## 2022-02-04 PROCEDURE — 90471 IMMUNIZATION ADMIN: CPT | Performed by: STUDENT IN AN ORGANIZED HEALTH CARE EDUCATION/TRAINING PROGRAM

## 2022-02-04 PROCEDURE — 99213 OFFICE O/P EST LOW 20 MIN: CPT | Mod: 25 | Performed by: STUDENT IN AN ORGANIZED HEALTH CARE EDUCATION/TRAINING PROGRAM

## 2022-02-04 PROCEDURE — 90707 MMR VACCINE SC: CPT | Performed by: STUDENT IN AN ORGANIZED HEALTH CARE EDUCATION/TRAINING PROGRAM

## 2022-02-04 PROCEDURE — G0123 SCREEN CERV/VAG THIN LAYER: HCPCS | Performed by: STUDENT IN AN ORGANIZED HEALTH CARE EDUCATION/TRAINING PROGRAM

## 2022-02-04 PROCEDURE — 90686 IIV4 VACC NO PRSV 0.5 ML IM: CPT | Performed by: STUDENT IN AN ORGANIZED HEALTH CARE EDUCATION/TRAINING PROGRAM

## 2022-02-04 PROCEDURE — 87624 HPV HI-RISK TYP POOLED RSLT: CPT | Performed by: STUDENT IN AN ORGANIZED HEALTH CARE EDUCATION/TRAINING PROGRAM

## 2022-02-04 PROCEDURE — 90472 IMMUNIZATION ADMIN EACH ADD: CPT | Performed by: STUDENT IN AN ORGANIZED HEALTH CARE EDUCATION/TRAINING PROGRAM

## 2022-02-04 PROCEDURE — 97110 THERAPEUTIC EXERCISES: CPT | Mod: GP | Performed by: PHYSICAL THERAPIST

## 2022-02-04 ASSESSMENT — MIFFLIN-ST. JEOR: SCORE: 1396.74

## 2022-02-04 NOTE — PROGRESS NOTES
ASSESSMENT AND PLAN     Agustina was seen today for gyn exam and imm/inj.    Diagnoses and all orders for this visit:    Cervical cancer screening: We will complete Pap smear today  -     Gynecologic Cytology (PAP Smear); Future  -     Gynecologic Cytology (PAP Smear)    Other orders  -     MMR, SUBQ (12+ MO)  -     INFLUENZA VACCINE IM >6 MO VALENT IIV4 (ALFURIA/FLUZONE)  -     REVIEW OF HEALTH MAINTENANCE PROTOCOL ORDERS      RTC for annual wellness visit or sooner if develops new or worsening symptoms.    Chelo Park DO           SUBJECTIVE       Agustina Nam is a 31 year old  adult with a PMH significant for:     Patient Active Problem List   Diagnosis     Metrorrhagia     Chronic Sinusitis     Autoimmune disease (H)     Irritable bowel syndrome     Hashimoto's disease     Postural dizziness with presyncope     Adrenal disorder (H)     Fatigue     Low back pain of over 3 months duration     Tension headache     Right calf pain     Anxiety     Insomnia     Iodine deficiency     Pernicious anemia     Small intestinal bacterial overgrowth     Rustam Nam presents for follow-up and a Pap smear.    Since our last visit patient has been working with physical therapy for hip and back pain.  Patient notes that this is been tremendous.  She is noting lots of improvement especially with myofascial release.  Patient is also been working on her constipation.  She has no working on different diet changes.  Patient notes currently that Michelle Elias decaf coffee and apples daily seem to be helpful.  Patient is also working with her naturopath.    Patient is due for her Pap smear.  She began her period 2 days ago.  We will complete this today.  Patient also due for a couple of vaccinations that she did not receive as a child.  P patient looking to complete her MMR dose.  She would also be due for hepatitis B.  Patients can check with her college to see if she is had this done in the past    PMH, Medications and  "Allergies were reviewed and updated as needed.        REVIEW OF SYSTEMS     Pertinent items are noted in HPI.        OBJECTIVE     Vitals:    02/04/22 1018   BP: 124/64   BP Location: Left arm   Patient Position: Sitting   Cuff Size: Adult Regular   Pulse: 76   SpO2: 99%   Weight: 62.3 kg (137 lb 6.4 oz)   Height: 1.727 m (5' 8\")     Body mass index is 20.89 kg/m .    Constitutional: Awake, alert, cooperative, no apparent distress, and appears stated age.  Eyes: Lids and lashes normal, sclera clear, conjunctiva normal.  ENT: Normocephalic, without obvious abnormality, atramatic,   Genitalia: Normal female genitalia, vaginal mucosa moist, normal menstrual flow present, cervix no parous smooth  Musculoskeletal: No redness, warmth, or swelling of the joints.  Full range of motion noted.   Neurologic: Awake, alert, oriented to name, place and time.  Cranial nerves II-XII are grossly intact.    No results found for this or any previous visit (from the past 24 hour(s)).              "

## 2022-02-08 ENCOUNTER — HOSPITAL ENCOUNTER (OUTPATIENT)
Dept: PHYSICAL THERAPY | Facility: REHABILITATION | Age: 32
End: 2022-02-08
Payer: COMMERCIAL

## 2022-02-08 DIAGNOSIS — M79.661 RIGHT CALF PAIN: ICD-10-CM

## 2022-02-08 DIAGNOSIS — M24.551 RIGHT HIP FLEXOR TIGHTNESS: Primary | ICD-10-CM

## 2022-02-08 DIAGNOSIS — G89.29 CHRONIC RIGHT-SIDED LOW BACK PAIN WITHOUT SCIATICA: ICD-10-CM

## 2022-02-08 DIAGNOSIS — M54.50 CHRONIC RIGHT-SIDED LOW BACK PAIN WITHOUT SCIATICA: ICD-10-CM

## 2022-02-08 DIAGNOSIS — K59.00 CONSTIPATION, UNSPECIFIED CONSTIPATION TYPE: ICD-10-CM

## 2022-02-08 DIAGNOSIS — M54.50 LUMBAR BACK PAIN: ICD-10-CM

## 2022-02-08 DIAGNOSIS — M24.551 HIP FLEXOR TIGHTNESS, RIGHT: ICD-10-CM

## 2022-02-08 LAB
HUMAN PAPILLOMA VIRUS 16 DNA: NEGATIVE
HUMAN PAPILLOMA VIRUS 18 DNA: NEGATIVE
HUMAN PAPILLOMA VIRUS FINAL DIAGNOSIS: NORMAL
HUMAN PAPILLOMA VIRUS OTHER HR: NEGATIVE

## 2022-02-08 PROCEDURE — 97140 MANUAL THERAPY 1/> REGIONS: CPT | Mod: GP | Performed by: PHYSICAL THERAPIST

## 2022-02-08 NOTE — PROGRESS NOTES
02/08/22 1100   Signing Clinician's Name / Credentials   Signing clinician's name / credentials Lashay Lozano PT   Session Number   Session Number 16/12+12    Additional Session Number 1/20/22: update POC 12 visits   Progress Note/Recertification   Progress Note Completed Date 01/20/22   Ortho Goal 1   Goal Identifier Exercise   Goal Description Patient will return to desired exercise regimen without pain or discomfort to allow for participation in active hobbies. 30 min such as skiing.   (Reassess goal based on findings of following treatment.)   Goal Progress Performing strengthening routine for 30 min 3-4x a week without increase in pain    Target Date 01/20/22   Date Met 02/04/22   Ortho Goal 2   Goal Identifier Sitting   Goal Description Patient will be able to sit for 2 hours without provocation of LBP to allow for comfort in class.  (Reassess goal based on findings of following treatment.)   Goal Progress Made it through plane rides without a lot of pain. Pressure in R low back.   Able to sit 4 hours.    Target Date 01/20/22   Ortho Goal 3   Goal Identifier Walking   Goal Description Patient will be capable of walking 2-3 miles with pain < or = to 2/10 to facilitate her return to hiking.  (Reassess based on findings of following treatment)   Goal Progress able to walk 1.5-2 miles but has not been walking recently due to cold weather.    Target Date 04/01/22   Ortho Goal 4   Goal Identifier Strength   Goal Description Patient will be able to achieve a hip flexion MMT grade of 5/5 to demonstrate imrpovement in hip flexor strength needed for desired hobbies.  (Reassess based on findings of following treatment)   Goal Progress 4+/5 bilaterally on 2/4/22 - progressing    Target Date 04/01/22   Subjective Report   Subjective Report Still feels a lot looser after treatment, but things are tightening up again. Chiropractor told her back muscles are weak compared to hip flexors. Pain in (R) ant hip/hip flexors today,  "GI function is \"okay\", feels okay during the day. Sometimes wakes at night with constipation.    Objective Measure 1   Details cranial scan (+) for mesentery.    Objective Measure 2   Details Multiple tender pts and associated fascial restrictions of (B) lower ribs and lumbar paravertebrals.    Manual Therapy   Manual Therapy: Mobilization, MFR, MLD, friction massage minutes (96089) 55   Skilled Intervention MFR, counterstrain   Patient Response good   Treatment Detail respiratory diaphragm, pelvic diaphragm, (B) [id number], (R) psoas, cecum    Assessments Completed   Assessments Completed Patient is responding appropriately to PT interventions. HEP compliance is good.    Education   Learner Patient   Readiness Acceptance   Method Booklet/handout;Explanation;Demonstration   Response Verbalizes Understanding;Demonstrates Understanding   Plan   Homework Has been doing HEP every other day.  Stretch every night before bed.  Every other day is performing 1/2 exercises.  Moving forward pt will continue to see Lashay Lozano, PT for manual therapy.  Exercsie pt will continue to walk and perform HEP - pt is pleased that she has a program that does not cause pain or injury.     Home program Stretching: hip flexor stretch with leg off table, pigeon stretch, downward dog (not printed on PTRX), heel drops off step, foam roller pec stretch, foam roller thoracic mobs and upper back rolling, lateral stretch on bolster (on her own HS/Add/ITB with strap), Strengthing: S/L hip abd, bridge/S/L bridge, bicycles, plank, side plank, rows, scapular retractions, birddog, lat pull down L2, monster walks L3, side stepping L3, standing hip abd L2    Updates to plan of care 1/20/22: update POC 12 visits   Plan for next session Continue to modify HEP based on patient progress. Continue with counterstrain therapy. Check arterial, visceral and sympathetic scans.    Comments   Comments  Patient presents with chronic (8 year) constant right sided LBP " and low level tingling in R calf that can become painful. Patient also presents with tightness and pain in right hip flexors that is aggrevated with acitivity such as walking. Pt reports overal feeling 20% improvement.    AMBULATORY CLINICS ONLY-MEDICAL AND TREATMENT DIAGNOSIS   Medical Diagnosis Right hip flexor tightness, low back pain, chronic thoracic  back pain (new order added )   PT Diagnosis Right sided LBP w/o sciatica, right hip flexor pain, right calf pain

## 2022-02-09 LAB
BKR LAB AP GYN ADEQUACY: NORMAL
BKR LAB AP GYN INTERPRETATION: NORMAL
BKR LAB AP HPV REFLEX: NORMAL
BKR LAB AP LMP: NORMAL
BKR LAB AP PREVIOUS ABNORMAL: NORMAL
PATH REPORT.COMMENTS IMP SPEC: NORMAL
PATH REPORT.COMMENTS IMP SPEC: NORMAL
PATH REPORT.RELEVANT HX SPEC: NORMAL

## 2022-02-10 ENCOUNTER — HOSPITAL ENCOUNTER (OUTPATIENT)
Dept: PHYSICAL THERAPY | Facility: REHABILITATION | Age: 32
End: 2022-02-10
Payer: COMMERCIAL

## 2022-02-10 DIAGNOSIS — G89.29 CHRONIC RIGHT-SIDED LOW BACK PAIN WITHOUT SCIATICA: ICD-10-CM

## 2022-02-10 DIAGNOSIS — M54.50 LUMBAR BACK PAIN: ICD-10-CM

## 2022-02-10 DIAGNOSIS — M24.551 HIP FLEXOR TIGHTNESS, RIGHT: ICD-10-CM

## 2022-02-10 DIAGNOSIS — M54.50 CHRONIC RIGHT-SIDED LOW BACK PAIN WITHOUT SCIATICA: ICD-10-CM

## 2022-02-10 DIAGNOSIS — M79.661 RIGHT CALF PAIN: ICD-10-CM

## 2022-02-10 DIAGNOSIS — K59.00 CONSTIPATION, UNSPECIFIED CONSTIPATION TYPE: ICD-10-CM

## 2022-02-10 DIAGNOSIS — M24.551 RIGHT HIP FLEXOR TIGHTNESS: Primary | ICD-10-CM

## 2022-02-10 PROCEDURE — 97140 MANUAL THERAPY 1/> REGIONS: CPT | Mod: GP | Performed by: PHYSICAL THERAPIST

## 2022-02-10 NOTE — PROGRESS NOTES
02/10/22 1400   Signing Clinician's Name / Credentials   Signing clinician's name / credentials Lashay Lozano PT   Session Number   Session Number 17/12+12    Additional Session Number 1/20/22: update POC 12 visits   Progress Note/Recertification   Progress Note Completed Date 01/20/22   Ortho Goal 1   Goal Identifier Exercise   Goal Description Patient will return to desired exercise regimen without pain or discomfort to allow for participation in active hobbies. 30 min such as skiing.   (Reassess goal based on findings of following treatment.)   Goal Progress Performing strengthening routine for 30 min 3-4x a week without increase in pain    Target Date 01/20/22   Date Met 02/04/22   Ortho Goal 2   Goal Identifier Sitting   Goal Description Patient will be able to sit for 2 hours without provocation of LBP to allow for comfort in class.  (Reassess goal based on findings of following treatment.)   Goal Progress Made it through plane rides without a lot of pain. Pressure in R low back.   Able to sit 4 hours.    Target Date 01/20/22   Ortho Goal 3   Goal Identifier Walking   Goal Description Patient will be capable of walking 2-3 miles with pain < or = to 2/10 to facilitate her return to hiking.  (Reassess based on findings of following treatment)   Goal Progress able to walk 1.5-2 miles but has not been walking recently due to cold weather.    Target Date 04/01/22   Ortho Goal 4   Goal Identifier Strength   Goal Description Patient will be able to achieve a hip flexion MMT grade of 5/5 to demonstrate imrpovement in hip flexor strength needed for desired hobbies.  (Reassess based on findings of following treatment)   Goal Progress 4+/5 bilaterally on 2/4/22 - progressing    Target Date 04/01/22   Subjective Report   Subjective Report has a headache today. Sleep has been messed up. Generally feeling stiff. Had a bowel movement right after the last session.    Objective Measure 1   Details Mod fascial restrictions of  (R) post pelvis, piriformis, psoas,    Manual Therapy   Manual Therapy: Mobilization, MFR, MLD, friction massage minutes ([id number]) 25   Skilled Intervention MFR, counterstrain   Patient Response good   Treatment Detail pelvic diaphragm, (R) PIR, (R) psoas release, cecum, respiratory diaphragm,    Assessments Completed   Assessments Completed Patient is responding appropriately to PT interventions. HEP compliance is good.    Education   Learner Patient   Readiness Acceptance   Method Booklet/handout;Explanation;Demonstration   Response Verbalizes Understanding;Demonstrates Understanding   Plan   Homework Has been doing HEP every other day.  Stretch every night before bed.  Every other day is performing 1/2 exercises.  Moving forward pt will continue to see Lashay Lozano, PT for manual therapy.  Exercsie pt will continue to walk and perform HEP - pt is pleased that she has a program that does not cause pain or injury.     Home program Stretching: hip flexor stretch with leg off table, pigeon stretch, downward dog (not printed on PTRX), heel drops off step, foam roller pec stretch, foam roller thoracic mobs and upper back rolling, lateral stretch on bolster (on her own HS/Add/ITB with strap), Strengthing: S/L hip abd, bridge/S/L bridge, bicycles, plank, side plank, rows, scapular retractions, birddog, lat pull down L2, monster walks L3, side stepping L3, standing hip abd L2    Updates to plan of care 1/20/22: update POC 12 visits   Plan for next session Continue to modify HEP based on patient progress. Continue with counterstrain therapy. Check arterial, visceral and sympathetic scans.    Comments   Comments  Patient presents with chronic (8 year) constant right sided LBP and low level tingling in R calf that can become painful. Patient also presents with tightness and pain in right hip flexors that is aggrevated with acitivity such as walking. Pt reports overal feeling 20% improvement.    Total Session Time   Timed Code  Treatment Minutes 25   Total Treatment Time (sum of timed and untimed services) 25   AMBULATORY CLINICS ONLY-MEDICAL AND TREATMENT DIAGNOSIS   Medical Diagnosis Right hip flexor tightness, low back pain, chronic thoracic  back pain (new order added )   PT Diagnosis Right sided LBP w/o sciatica, right hip flexor pain, right calf pain

## 2022-02-14 ENCOUNTER — NURSE TRIAGE (OUTPATIENT)
Dept: NURSING | Facility: CLINIC | Age: 32
End: 2022-02-14
Payer: COMMERCIAL

## 2022-02-14 NOTE — TELEPHONE ENCOUNTER
"Pt is calling.    Rash that started last night.  Rash is on her toroso, front and back.  Red splotches, possibly raised. Skin feels \"bumpy\",  Rash is pinkish red.  Feels worse if she scratches at it.  Not painful, but uncomfortable.  Rash is going up towards her neck today.  No new meds, lotions, detergents, soaps. Only change has been change in diet. She took cheese out of her diet x 2 mos and then ate some cheese last night.  Flo some Gatorade last night as well, and that it new.  Sore throat. She is unsure, but thinks that her throat, may be tighter, but then stated that it is just sore. She was out in the cold last night for a long walk.  Took 50 mg Benadryl 10 min ago.  Denies shortness of breath, wheezing, difficulty breathing, chest tightness, chest pain.  Denies swelling of face, lips, tongue.     Care advice reviewed. I encouraged her to be seen if the throat is truly sore, or worsens.  To ED with any shortness of breath, swelling of lips, tongue, throat, face, wheezing, chest or throat tightness.  Encouraged to continue with Benadryl 50 mg every 6 hrs prn along with Zyrtec or Claritin daily.  She verbalized understanding and agreed to follow care advice.      COVID 19 Nurse Triage Plan/Patient Instructions    Please be aware that novel coronavirus (COVID-19) may be circulating in the community. If you develop symptoms such as fever, cough, or SOB or if you have concerns about the presence of another infection including coronavirus (COVID-19), please contact your health care provider or visit [email or web address].     Disposition/Instructions    In-Person Visit with provider recommended. Reference Visit Selection Guide.    Thank you for taking steps to prevent the spread of this virus.  o Limit your contact with others.  o Wear a simple mask to cover your cough.  o Wash your hands well and often.    Resources    [facility]: About COVID-19: [email or web address]    CDC: What to Do " If You're Sick: www.cdc.gov/coronavirus/2019-ncov/about/steps-when-sick.html    CDC: Ending Home Isolation: www.cdc.gov/coronavirus/2019-ncov/hcp/disposition-in-home-patients.html     CDC: Caring for Someone: www.cdc.gov/coronavirus/2019-ncov/if-you-are-sick/care-for-someone.html     [facility]: Interim Guidance for Hospital Discharge to Home: [email or web address]/hcp/hospdischarge.pdf    [facility] clinical trials (COVID-19 research studies): [email or web address]     Below are the COVID-19 hotlines at the [place] Department of Health ([facility]). Interpreters are available.   o For health questions: Call 208-960-3151 or 1-262.348.5256 (7 a.m. to 7 p.m.)  o For questions about schools and childcare: Call 941-134-6727 or 1-287.199.1766 (7 a.m. to 7 p.m.)     Reason for Disposition    Sore throat    Additional Information    Negative: [1] Life-threatening reaction (anaphylaxis) in the past to similar substance (e.g., food, insect bite/sting, chemical, etc.) AND [2] < 2 hours since exposure    Negative: [1] Sudden onset of rash (within last 2 hours) AND [2] difficulty with breathing or swallowing    Negative: Shock suspected (e.g., cold/pale/clammy skin, too weak to stand, low BP, rapid pulse)    Negative: Difficult to awaken or acting confused (e.g., disoriented, slurred speech)    Negative: [1] Purple or blood-colored spots or dots AND [2] fever    Negative: Sounds like a life-threatening emergency to the triager    Negative: Insect bites suspected    Negative: Swimmer's Itch suspected    Negative: Sunburn suspected    Negative: Hives suspected    Negative: Measles suspected AND [2] known exposure to measles in past 3 weeks    Negative: [1] Chickenpox suspected AND [2] known exposure to chickenpox in past 3 weeks    Negative: [1] Drug rash suspected AND [2] started taking new medicine within last 2 weeks(Exception: antihistamine, eye drops, ear drops, decongestant or  other OTC cough/cold medicines)    Negative: [1] Widespread rash AND [2] bright red, sunburn-like AND [3] current tampon use or nasal packing    Negative: [1] Widespread rash AND [2] bright red, sunburn-like AND [3] wound infection or recent surgery    Negative: [1] Bright red skin AND [2] peels off in sheets    Negative: Stiff neck (unable to touch chin to chest)    Negative: Fever    Negative: Joint pain or swelling    Negative: Rash looks like large or small blisters (i.e., fluid filled bubbles or sacs on the skin)    Negative: Patient sounds very sick or weak to the triager    Negative: [1] Purple or blood-colored rash (spots or dots) AND [2] no fever AND [3] sounds well to triager    Negative: Sores in mouth    Negative: Face becomes swollen    Negative: [1] Headache AND [2] no fever    Negative: SEVERE itching (i.e., interferes with sleep, normal activities or school)    Protocols used: RASH OR REDNESS - WIDESPREAD-A-    Malini Mcgarry RN  [facility] Nurse Advisor  2/14/2022 at 5:36 PM

## 2022-02-15 ENCOUNTER — HOSPITAL ENCOUNTER (OUTPATIENT)
Dept: PHYSICAL THERAPY | Facility: REHABILITATION | Age: 32
End: 2022-02-15
Payer: COMMERCIAL

## 2022-02-15 DIAGNOSIS — K59.00 CONSTIPATION, UNSPECIFIED CONSTIPATION TYPE: ICD-10-CM

## 2022-02-15 DIAGNOSIS — M79.661 RIGHT CALF PAIN: ICD-10-CM

## 2022-02-15 DIAGNOSIS — G89.29 CHRONIC RIGHT-SIDED LOW BACK PAIN WITHOUT SCIATICA: ICD-10-CM

## 2022-02-15 DIAGNOSIS — M24.551 HIP FLEXOR TIGHTNESS, RIGHT: ICD-10-CM

## 2022-02-15 DIAGNOSIS — M24.551 RIGHT HIP FLEXOR TIGHTNESS: Primary | ICD-10-CM

## 2022-02-15 DIAGNOSIS — M54.50 LUMBAR BACK PAIN: ICD-10-CM

## 2022-02-15 DIAGNOSIS — M54.50 CHRONIC RIGHT-SIDED LOW BACK PAIN WITHOUT SCIATICA: ICD-10-CM

## 2022-02-15 PROCEDURE — 97140 MANUAL THERAPY 1/> REGIONS: CPT | Mod: GP | Performed by: PHYSICAL THERAPIST

## 2022-02-15 NOTE — PROGRESS NOTES
02/15/22 1100   Signing Clinician's Name / Credentials   Signing clinician's name / credentials Lashay Lozano PT   Session Number   Session Number 18/12+12    Additional Session Number 1/20/22: update POC 12 visits   Progress Note/Recertification   Progress Note Completed Date 01/20/22   Ortho Goal 1   Goal Identifier Exercise   Goal Description Patient will return to desired exercise regimen without pain or discomfort to allow for participation in active hobbies. 30 min such as skiing.   (Reassess goal based on findings of following treatment.)   Goal Progress Performing strengthening routine for 30 min 3-4x a week without increase in pain    Target Date 01/20/22   Date Met 02/04/22   Ortho Goal 2   Goal Identifier Sitting   Goal Description Patient will be able to sit for 2 hours without provocation of LBP to allow for comfort in class.  (Reassess goal based on findings of following treatment.)   Goal Progress Made it through plane rides without a lot of pain. Pressure in R low back.   Able to sit 4 hours.    Target Date 01/20/22   Ortho Goal 3   Goal Identifier Walking   Goal Description Patient will be capable of walking 2-3 miles with pain < or = to 2/10 to facilitate her return to hiking.  (Reassess based on findings of following treatment)   Goal Progress able to walk 1.5-2 miles but has not been walking recently due to cold weather.    Target Date 04/01/22   Ortho Goal 4   Goal Identifier Strength   Goal Description Patient will be able to achieve a hip flexion MMT grade of 5/5 to demonstrate imrpovement in hip flexor strength needed for desired hobbies.  (Reassess based on findings of following treatment)   Goal Progress 4+/5 bilaterally on 2/4/22 - progressing    Target Date 04/01/22   Subjective Report   Subjective Report HA got worse the day after the last session and has fluctuated since then. Tested 2x for covid; neg both times. Has broken out in hives. No pain in (R) LB or pelvis for 2 days after the  last session, also having loose stools since then. Has been resting a lot due to symptoms. Has low back pain.    Objective Measure 1   Details cranial scan (+) (L) LS ganglionics, parasympathetics.    Objective Measure 2   Details Mod tenderness and associated fascial restrictions of lower abdomen, (B) ant/lat sacrum, cecum, (B) psoas, asc colon.    Manual Therapy   Manual Therapy: Mobilization, MFR, MLD, friction massage minutes (56236) 55   Skilled Intervention MFR, counterstrain   Patient Response good   Treatment Detail (L) PS3-N, (R) PS2-N, (L) HYPO-N, (L) PELV-N, sm intestine, (B) psoas, cecum, asc colon    Assessments Completed   Assessments Completed Patient is responding appropriately to PT interventions. HEP compliance is good.    Education   Learner Patient   Readiness Acceptance   Method Booklet/handout;Explanation;Demonstration   Response Verbalizes Understanding;Demonstrates Understanding   Plan   Homework Has been doing HEP every other day.  Stretch every night before bed.  Every other day is performing 1/2 exercises.  Moving forward pt will continue to see Lashay Lozano PT for manual therapy.  Exercsie pt will continue to walk and perform HEP - pt is pleased that she has a program that does not cause pain or injury.     Home program Stretching: hip flexor stretch with leg off table, pigeon stretch, downward dog (not printed on PTRX), heel drops off step, foam roller pec stretch, foam roller thoracic mobs and upper back rolling, lateral stretch on bolster (on her own HS/Add/ITB with strap), Strengthing: S/L hip abd, bridge/S/L bridge, bicycles, plank, side plank, rows, scapular retractions, birddog, lat pull down L2, monster walks L3, side stepping L3, standing hip abd L2    Updates to plan of care 1/20/22: update POC 12 visits   Plan for next session Continue to modify HEP based on patient progress. Continue with counterstrain therapy. Check arterial, visceral and sympathetic scans.    Comments    Comments  Patient presents with chronic (8 year) constant right sided LBP and low level tingling in R calf that can become painful. Patient also presents with tightness and pain in right hip flexors that is aggrevated with acitivity such as walking. Pt reports overal feeling 20% improvement.    Total Session Time   Timed Code Treatment Minutes 55   Total Treatment Time (sum of timed and untimed services) 55   AMBULATORY CLINICS ONLY-MEDICAL AND TREATMENT DIAGNOSIS   Medical Diagnosis Right hip flexor tightness, low back pain, chronic thoracic  back pain (new order added )   PT Diagnosis Right sided LBP w/o sciatica, right hip flexor pain, right calf pain

## 2022-02-22 ENCOUNTER — HOSPITAL ENCOUNTER (OUTPATIENT)
Dept: PHYSICAL THERAPY | Facility: REHABILITATION | Age: 32
End: 2022-02-22
Payer: COMMERCIAL

## 2022-02-22 DIAGNOSIS — M79.661 RIGHT CALF PAIN: ICD-10-CM

## 2022-02-22 DIAGNOSIS — M54.50 LUMBAR BACK PAIN: ICD-10-CM

## 2022-02-22 DIAGNOSIS — M54.50 CHRONIC RIGHT-SIDED LOW BACK PAIN WITHOUT SCIATICA: ICD-10-CM

## 2022-02-22 DIAGNOSIS — K59.00 CONSTIPATION, UNSPECIFIED CONSTIPATION TYPE: ICD-10-CM

## 2022-02-22 DIAGNOSIS — M24.551 HIP FLEXOR TIGHTNESS, RIGHT: ICD-10-CM

## 2022-02-22 DIAGNOSIS — M24.551 RIGHT HIP FLEXOR TIGHTNESS: Primary | ICD-10-CM

## 2022-02-22 DIAGNOSIS — G89.29 CHRONIC RIGHT-SIDED LOW BACK PAIN WITHOUT SCIATICA: ICD-10-CM

## 2022-02-22 PROCEDURE — 97140 MANUAL THERAPY 1/> REGIONS: CPT | Mod: GP | Performed by: PHYSICAL THERAPIST

## 2022-02-22 NOTE — PROGRESS NOTES
02/22/22 1100   Signing Clinician's Name / Credentials   Signing clinician's name / credentials Lashay Lozano PT   Session Number   Session Number 19/12+12    Additional Session Number 1/20/22: update POC 12 visits   Progress Note/Recertification   Progress Note Completed Date 01/20/22   Ortho Goal 1   Goal Identifier Exercise   Goal Description Patient will return to desired exercise regimen without pain or discomfort to allow for participation in active hobbies. 30 min such as skiing.   (Reassess goal based on findings of following treatment.)   Goal Progress Performing strengthening routine for 30 min 3-4x a week without increase in pain    Target Date 01/20/22   Date Met 02/04/22   Ortho Goal 2   Goal Identifier Sitting   Goal Description Patient will be able to sit for 2 hours without provocation of LBP to allow for comfort in class.  (Reassess goal based on findings of following treatment.)   Goal Progress Made it through plane rides without a lot of pain. Pressure in R low back.   Able to sit 4 hours.    Target Date 01/20/22   Ortho Goal 3   Goal Identifier Walking   Goal Description Patient will be capable of walking 2-3 miles with pain < or = to 2/10 to facilitate her return to hiking.  (Reassess based on findings of following treatment)   Goal Progress able to walk 1.5-2 miles but has not been walking recently due to cold weather.    Target Date 04/01/22   Ortho Goal 4   Goal Identifier Strength   Goal Description Patient will be able to achieve a hip flexion MMT grade of 5/5 to demonstrate imrpovement in hip flexor strength needed for desired hobbies.  (Reassess based on findings of following treatment)   Goal Progress 4+/5 bilaterally on 2/4/22 - progressing    Target Date 04/01/22   Subjective Report   Subjective Report Having some symptoms in mid/lower abdomen. Previously was having loose stools, now has been constipated the past week. Stomach cramping with some foods. Still achy in (R) LB and (R) ant  pelvis.    Objective Measure 1   Details cranial scan (+) (B) LS ganglionics, R>L vagus.    Objective Measure 2   Details decreased fascial mobility of duodenum, sphincters, flexures.    Objective Measure 3   Details Multiple tender pts (B) mid/lower rib cage.    Manual Therapy   Manual Therapy: Mobilization, MFR, MLD, friction massage minutes (89637) 55   Skilled Intervention MFR, counterstrain   Patient Response good   Treatment Detail duodenum/D3, DJ jct, ileocecal valve, hepatic flexure, VLIV7-N, VHF-N, VCEC-N, (L) VLIV6-N, VSPL-N, VILM-N, VSF-N, VSPL-N, IMES-N, (R) PGL1-N, colon motility   Assessments Completed   Assessments Completed Patient is responding appropriately to PT interventions. HEP compliance is good.    Education   Learner Patient   Readiness Acceptance   Method Booklet/handout;Explanation;Demonstration   Response Verbalizes Understanding;Demonstrates Understanding   Plan   Homework Has been doing HEP every other day.  Stretch every night before bed.  Every other day is performing 1/2 exercises.  Moving forward pt will continue to see Lashay Lozano, PT for manual therapy.  Exercsie pt will continue to walk and perform HEP - pt is pleased that she has a program that does not cause pain or injury.     Home program Stretching: hip flexor stretch with leg off table, pigeon stretch, downward dog (not printed on PTRX), heel drops off step, foam roller pec stretch, foam roller thoracic mobs and upper back rolling, lateral stretch on bolster (on her own HS/Add/ITB with strap), Strengthing: S/L hip abd, bridge/S/L bridge, bicycles, plank, side plank, rows, scapular retractions, birddog, lat pull down L2, monster walks L3, side stepping L3, standing hip abd L2    Updates to plan of care 1/20/22: update POC 12 visits   Plan for next session Continue to modify HEP based on patient progress. Continue with counterstrain therapy. Check arterial, visceral and sympathetic scans.    Comments   Comments  Patient  presents with chronic (8 year) constant right sided LBP and low level tingling in R calf that can become painful. Patient also presents with tightness and pain in right hip flexors that is aggrevated with acitivity such as walking. Pt reports overal feeling 20% improvement.    Total Session Time   Timed Code Treatment Minutes 55   Total Treatment Time (sum of timed and untimed services) 55   AMBULATORY CLINICS ONLY-MEDICAL AND TREATMENT DIAGNOSIS   Medical Diagnosis Right hip flexor tightness, low back pain, chronic thoracic  back pain (new order added )   PT Diagnosis Right sided LBP w/o sciatica, right hip flexor pain, right calf pain

## 2022-03-07 ENCOUNTER — HOSPITAL ENCOUNTER (OUTPATIENT)
Dept: PHYSICAL THERAPY | Facility: REHABILITATION | Age: 32
End: 2022-03-07
Payer: COMMERCIAL

## 2022-03-07 DIAGNOSIS — G89.29 CHRONIC RIGHT-SIDED LOW BACK PAIN WITHOUT SCIATICA: ICD-10-CM

## 2022-03-07 DIAGNOSIS — M54.50 CHRONIC RIGHT-SIDED LOW BACK PAIN WITHOUT SCIATICA: ICD-10-CM

## 2022-03-07 DIAGNOSIS — M54.50 LUMBAR BACK PAIN: ICD-10-CM

## 2022-03-07 DIAGNOSIS — M24.551 HIP FLEXOR TIGHTNESS, RIGHT: ICD-10-CM

## 2022-03-07 DIAGNOSIS — M79.661 RIGHT CALF PAIN: ICD-10-CM

## 2022-03-07 DIAGNOSIS — K59.00 CONSTIPATION, UNSPECIFIED CONSTIPATION TYPE: ICD-10-CM

## 2022-03-07 DIAGNOSIS — M24.551 RIGHT HIP FLEXOR TIGHTNESS: Primary | ICD-10-CM

## 2022-03-07 PROCEDURE — 97140 MANUAL THERAPY 1/> REGIONS: CPT | Mod: GP | Performed by: PHYSICAL THERAPIST

## 2022-03-07 NOTE — PROGRESS NOTES
03/07/22 1100   Signing Clinician's Name / Credentials   Signing clinician's name / credentials Lashay Lozano PT   Session Number   Session Number 20/12+12    Additional Session Number 1/20/22: update POC 12 visits   Progress Note/Recertification   Progress Note Completed Date 01/20/22   Ortho Goal 1   Goal Identifier Exercise   Goal Description Patient will return to desired exercise regimen without pain or discomfort to allow for participation in active hobbies. 30 min such as skiing.   (Reassess goal based on findings of following treatment.)   Goal Progress Performing strengthening routine for 30 min 3-4x a week without increase in pain    Target Date 01/20/22   Date Met 02/04/22   Ortho Goal 2   Goal Identifier Sitting   Goal Description Patient will be able to sit for 2 hours without provocation of LBP to allow for comfort in class.  (Reassess goal based on findings of following treatment.)   Goal Progress Made it through plane rides without a lot of pain. Pressure in R low back.   Able to sit 4 hours.    Target Date 01/20/22   Ortho Goal 3   Goal Identifier Walking   Goal Description Patient will be capable of walking 2-3 miles with pain < or = to 2/10 to facilitate her return to hiking.  (Reassess based on findings of following treatment)   Goal Progress able to walk 1.5-2 miles but has not been walking recently due to cold weather.    Target Date 04/01/22   Ortho Goal 4   Goal Identifier Strength   Goal Description Patient will be able to achieve a hip flexion MMT grade of 5/5 to demonstrate imrpovement in hip flexor strength needed for desired hobbies.  (Reassess based on findings of following treatment)   Goal Progress 4+/5 bilaterally on 2/4/22 - progressing    Target Date 04/01/22   Subjective Report   Subjective Report Returns after about 2 weeks. Continues to do enemas 2x/day, and still feeling constipated. Decreased appetite. She walked a lot on vacation, which was helpful. (R) LB, (R) ant hip pain  noticeable with sleeping supine.   Objective Measure 1   Details Moderately decreased colon and sigmoid motility. Multiple tender pts (B) ant lower rib cage.    Manual Therapy   Manual Therapy: Mobilization, MFR, MLD, friction massage minutes ([id number]) 25   Skilled Intervention MFR, counterstrain   Patient Response good   Treatment Detail colon motility, (B) FOT10-V, (B) TMC-V, sigmoid colon,    Assessments Completed   Assessments Completed Patient is responding appropriately to PT interventions. HEP compliance is good.    Education   Learner Patient   Readiness Acceptance   Method Booklet/handout;Explanation;Demonstration   Response Verbalizes Understanding;Demonstrates Understanding   Plan   Homework Has been doing HEP every other day.  Stretch every night before bed.  Every other day is performing 1/2 exercises.  Moving forward pt will continue to see Lashay Lozano, PT for manual therapy.  Exercsie pt will continue to walk and perform HEP - pt is pleased that she has a program that does not cause pain or injury.     Home program Stretching: hip flexor stretch with leg off table, pigeon stretch, downward dog (not printed on PTRX), heel drops off step, foam roller pec stretch, foam roller thoracic mobs and upper back rolling, lateral stretch on bolster (on her own HS/Add/ITB with strap), Strengthing: S/L hip abd, bridge/S/L bridge, bicycles, plank, side plank, rows, scapular retractions, birddog, lat pull down L2, monster walks L3, side stepping L3, standing hip abd L2    Updates to plan of care 1/20/22: update POC 12 visits   Plan for next session Continue to modify HEP based on patient progress. Continue with counterstrain therapy. Check arterial, visceral and sympathetic scans.    Comments   Comments  Patient presents with chronic (8 year) constant right sided LBP and low level tingling in R calf that can become painful. Patient also presents with tightness and pain in right hip flexors that is aggrevated with  acitivity such as walking. Pt reports overal feeling 20% improvement.    Total Session Time   Timed Code Treatment Minutes 25   Total Treatment Time (sum of timed and untimed services) 25   AMBULATORY CLINICS ONLY-MEDICAL AND TREATMENT DIAGNOSIS   Medical Diagnosis Right hip flexor tightness, low back pain, chronic thoracic  back pain (new order added )   PT Diagnosis Right sided LBP w/o sciatica, right hip flexor pain, right calf pain

## 2022-03-11 ENCOUNTER — HOSPITAL ENCOUNTER (OUTPATIENT)
Dept: PHYSICAL THERAPY | Facility: REHABILITATION | Age: 32
Discharge: HOME | End: 2022-03-11
Payer: COMMERCIAL

## 2022-03-11 DIAGNOSIS — K59.00 CONSTIPATION, UNSPECIFIED CONSTIPATION TYPE: ICD-10-CM

## 2022-03-11 DIAGNOSIS — M24.551 RIGHT HIP FLEXOR TIGHTNESS: Primary | ICD-10-CM

## 2022-03-11 DIAGNOSIS — G89.29 CHRONIC RIGHT-SIDED LOW BACK PAIN WITHOUT SCIATICA: ICD-10-CM

## 2022-03-11 DIAGNOSIS — M54.50 LUMBAR BACK PAIN: ICD-10-CM

## 2022-03-11 DIAGNOSIS — M54.50 CHRONIC RIGHT-SIDED LOW BACK PAIN WITHOUT SCIATICA: ICD-10-CM

## 2022-03-11 DIAGNOSIS — M79.661 RIGHT CALF PAIN: ICD-10-CM

## 2022-03-11 DIAGNOSIS — M24.551 HIP FLEXOR TIGHTNESS, RIGHT: ICD-10-CM

## 2022-03-11 PROCEDURE — 97140 MANUAL THERAPY 1/> REGIONS: CPT | Mod: GP | Performed by: PHYSICAL THERAPIST

## 2022-03-11 NOTE — PROGRESS NOTES
"   03/11/22 1500   Signing Clinician's Name / Credentials   Signing clinician's name / credentials Lashay Lozano PT   Session Number   Session Number 21/12+12    Additional Session Number 1/20/22: update POC 12 visits   Progress Note/Recertification   Progress Note Completed Date 01/20/22   Ortho Goal 1   Goal Identifier Exercise   Goal Description Patient will return to desired exercise regimen without pain or discomfort to allow for participation in active hobbies. 30 min such as skiing.   (Reassess goal based on findings of following treatment.)   Goal Progress Performing strengthening routine for 30 min 3-4x a week without increase in pain    Target Date 01/20/22   Date Met 02/04/22   Ortho Goal 2   Goal Identifier Sitting   Goal Description Patient will be able to sit for 2 hours without provocation of LBP to allow for comfort in class.  (Reassess goal based on findings of following treatment.)   Goal Progress Made it through plane rides without a lot of pain. Pressure in R low back.   Able to sit 4 hours.    Target Date 01/20/22   Ortho Goal 3   Goal Identifier Walking   Goal Description Patient will be capable of walking 2-3 miles with pain < or = to 2/10 to facilitate her return to hiking.  (Reassess based on findings of following treatment)   Goal Progress able to walk 1.5-2 miles but has not been walking recently due to cold weather.    Target Date 04/01/22   Ortho Goal 4   Goal Identifier Strength   Goal Description Patient will be able to achieve a hip flexion MMT grade of 5/5 to demonstrate imrpovement in hip flexor strength needed for desired hobbies.  (Reassess based on findings of following treatment)   Goal Progress 4+/5 bilaterally on 2/4/22 - progressing    Target Date 04/01/22   Subjective Report   Subjective Report Doing about the same as last time. Sleeping still \"erratic\". Still doing enemas, and having bowel movements in the morning. Feeling discomfort/pain in (R) lower abdomen, (R) leg.  "   Objective Measure 1   Details cranial scan (+) for L>R abdominal viscera, sphincters, sympathetics.    Objective Measure 2   Details multiple tender pts (B) medial abdomen, (B) ant sacum.   Objective Measure 3   Details Fascial restrictions of iliocecal valve, pylorus, duodenum   Manual Therapy   Manual Therapy: Mobilization, MFR, MLD, friction massage minutes ([id number]) 55   Skilled Intervention MFR, counterstrain   Patient Response good   Treatment Detail IMES-N, (B) HYPO-N, (R) PGL2-N, (L) PGL1-N, (B) KENDAL-N, (B) PS3-N, pelvic diaphragm, sigmoid, colon motility, iliocecal valve, pylorus, duodenum/D1-3   Assessments Completed   Assessments Completed Patient is responding appropriately to PT interventions. HEP compliance is good.    Education   Learner Patient   Readiness Acceptance   Method Booklet/handout;Explanation;Demonstration   Response Verbalizes Understanding;Demonstrates Understanding   Plan   Homework Has been doing HEP every other day.  Stretch every night before bed.  Every other day is performing 1/2 exercises.  Moving forward pt will continue to see Lashay Lozano PT for manual therapy.  Exercsie pt will continue to walk and perform HEP - pt is pleased that she has a program that does not cause pain or injury.     Home program Stretching: hip flexor stretch with leg off table, pigeon stretch, downward dog (not printed on PTRX), heel drops off step, foam roller pec stretch, foam roller thoracic mobs and upper back rolling, lateral stretch on bolster (on her own HS/Add/ITB with strap), Strengthing: S/L hip abd, bridge/S/L bridge, bicycles, plank, side plank, rows, scapular retractions, birddog, lat pull down L2, monster walks L3, side stepping L3, standing hip abd L2    Updates to plan of care 1/20/22: update POC 12 visits   Plan for next session Continue to modify HEP based on patient progress. Continue with counterstrain therapy. Check arterial, visceral and sympathetic scans.    Comments   Comments  ref  provider: [place] DO. Patient presents with chronic (8 year) constant right sided LBP and low level tingling in R calf that can become painful. Patient also presents with tightness and pain in right hip flexors that is aggrevated with acitivity such as walking. Pt reports overal feeling 20% improvement.    Total Session Time   Timed Code Treatment Minutes 55   Total Treatment Time (sum of timed and untimed services) 55   AMBULATORY CLINICS ONLY-MEDICAL AND TREATMENT DIAGNOSIS   Medical Diagnosis Right hip flexor tightness, low back pain, chronic thoracic  back pain (new order added )   PT Diagnosis Right sided LBP w/o sciatica, right hip flexor pain, right calf pain

## 2022-03-14 ENCOUNTER — HOSPITAL ENCOUNTER (OUTPATIENT)
Dept: PHYSICAL THERAPY | Facility: REHABILITATION | Age: 32
Discharge: HOME | End: 2022-03-14
Payer: COMMERCIAL

## 2022-03-14 DIAGNOSIS — M54.50 CHRONIC RIGHT-SIDED LOW BACK PAIN WITHOUT SCIATICA: ICD-10-CM

## 2022-03-14 DIAGNOSIS — K59.00 CONSTIPATION, UNSPECIFIED CONSTIPATION TYPE: ICD-10-CM

## 2022-03-14 DIAGNOSIS — M79.661 RIGHT CALF PAIN: ICD-10-CM

## 2022-03-14 DIAGNOSIS — G89.29 CHRONIC RIGHT-SIDED LOW BACK PAIN WITHOUT SCIATICA: ICD-10-CM

## 2022-03-14 DIAGNOSIS — M54.50 LUMBAR BACK PAIN: ICD-10-CM

## 2022-03-14 DIAGNOSIS — M24.551 RIGHT HIP FLEXOR TIGHTNESS: Primary | ICD-10-CM

## 2022-03-14 PROCEDURE — 97140 MANUAL THERAPY 1/> REGIONS: CPT | Mod: GP | Performed by: PHYSICAL THERAPIST

## 2022-03-14 NOTE — PROGRESS NOTES
03/14/22 1000   Signing Clinician's Name / Credentials   Signing clinician's name / credentials Lashay Lozano PT   Session Number   Session Number 22/12+12    Additional Session Number 1/20/22: update POC 12 visits   Progress Note/Recertification   Progress Note Completed Date 01/20/22   Ortho Goal 1   Goal Identifier Exercise   Goal Description Patient will return to desired exercise regimen without pain or discomfort to allow for participation in active hobbies. 30 min such as skiing.   (Reassess goal based on findings of following treatment.)   Goal Progress Performing strengthening routine for 30 min 3-4x a week without increase in pain    Target Date 01/20/22   Date Met 02/04/22   Ortho Goal 2   Goal Identifier Sitting   Goal Description Patient will be able to sit for 2 hours without provocation of LBP to allow for comfort in class.  (Reassess goal based on findings of following treatment.)   Goal Progress Made it through plane rides without a lot of pain. Pressure in R low back.   Able to sit 4 hours.    Target Date 01/20/22   Ortho Goal 3   Goal Identifier Walking   Goal Description Patient will be capable of walking 2-3 miles with pain < or = to 2/10 to facilitate her return to hiking.  (Reassess based on findings of following treatment)   Goal Progress able to walk 1.5-2 miles but has not been walking recently due to cold weather.    Target Date 04/01/22   Ortho Goal 4   Goal Identifier Strength   Goal Description Patient will be able to achieve a hip flexion MMT grade of 5/5 to demonstrate imrpovement in hip flexor strength needed for desired hobbies.  (Reassess based on findings of following treatment)   Goal Progress 4+/5 bilaterally on 2/4/22 - progressing    Target Date 04/01/22   Subjective Report   Subjective Report Has been fine since the last session, Just fatigued from weekend activities. Adjusted magnesium and stools were looser and more frequent. Still achy in (R) ant hip and pelvis.     Objective Measure 1   Details cranial scan (+) for (B) visceral lymphatics, mesentery,    Objective Measure 2   Details Fascial restrictions/tender pts (B) lower post/lat ribs,    Objective Measure 3   Details Mod fascial restrictions of mesenteric root, sphincters.    Manual Therapy   Manual Therapy: Mobilization, MFR, MLD, friction massage minutes (04176) 55   Skilled Intervention MFR, counterstrain   Patient Response good   Treatment Detail (B) BL-LV, (B) LGI-LV, (B) MGI-LV, ST-LV, (L) MEST7-10-V, (R) MEST7-11-V, mesenteric root, iliocecal valve,    Assessments Completed   Assessments Completed Patient is responding appropriately to PT interventions. HEP compliance is good.    Education   Learner Patient   Readiness Acceptance   Method Booklet/handout;Explanation;Demonstration   Response Verbalizes Understanding;Demonstrates Understanding   Plan   Homework Has been doing HEP every other day.  Stretch every night before bed.  Every other day is performing 1/2 exercises.  Moving forward pt will continue to see Lashay Lozano, PT for manual therapy.  Exercsie pt will continue to walk and perform HEP - pt is pleased that she has a program that does not cause pain or injury.     Home program Stretching: hip flexor stretch with leg off table, pigeon stretch, downward dog (not printed on PTRX), heel drops off step, foam roller pec stretch, foam roller thoracic mobs and upper back rolling, lateral stretch on bolster (on her own HS/Add/ITB with strap), Strengthing: S/L hip abd, bridge/S/L bridge, bicycles, plank, side plank, rows, scapular retractions, birddog, lat pull down L2, monster walks L3, side stepping L3, standing hip abd L2    Updates to plan of care 1/20/22: update POC 12 visits   Plan for next session Continue to modify HEP based on patient progress. Continue with counterstrain therapy. Check arterial, visceral and sympathetic scans.    Comments   Comments  ref provider: Chelo Park DO. Patient presents  with chronic (8 year) constant right sided LBP and low level tingling in R calf that can become painful. Patient also presents with tightness and pain in right hip flexors that is aggrevated with acitivity such as walking. Pt reports overal feeling 20% improvement.    Total Session Time   Timed Code Treatment Minutes 55   Total Treatment Time (sum of timed and untimed services) 55   [facility]ORY CLINICS ONLY-MEDICAL AND TREATMENT DIAGNOSIS   Medical Diagnosis Right hip flexor tightness, low back pain, chronic thoracic  back pain (new order added )   PT Diagnosis Right sided LBP w/o sciatica, right hip flexor pain, right calf pain

## 2022-03-21 ENCOUNTER — HOSPITAL ENCOUNTER (OUTPATIENT)
Dept: PHYSICAL THERAPY | Facility: REHABILITATION | Age: 32
Discharge: HOME | End: 2022-03-21
Payer: COMMERCIAL

## 2022-03-21 DIAGNOSIS — M79.661 RIGHT CALF PAIN: ICD-10-CM

## 2022-03-21 DIAGNOSIS — M24.551 HIP FLEXOR TIGHTNESS, RIGHT: ICD-10-CM

## 2022-03-21 DIAGNOSIS — M54.50 CHRONIC RIGHT-SIDED LOW BACK PAIN WITHOUT SCIATICA: ICD-10-CM

## 2022-03-21 DIAGNOSIS — M24.551 RIGHT HIP FLEXOR TIGHTNESS: Primary | ICD-10-CM

## 2022-03-21 DIAGNOSIS — M54.50 LUMBAR BACK PAIN: ICD-10-CM

## 2022-03-21 DIAGNOSIS — G89.29 CHRONIC RIGHT-SIDED LOW BACK PAIN WITHOUT SCIATICA: ICD-10-CM

## 2022-03-21 DIAGNOSIS — K59.00 CONSTIPATION, UNSPECIFIED CONSTIPATION TYPE: ICD-10-CM

## 2022-03-21 PROCEDURE — 97140 MANUAL THERAPY 1/> REGIONS: CPT | Mod: GP | Performed by: PHYSICAL THERAPIST

## 2022-03-21 NOTE — PROGRESS NOTES
03/21/22 1000   Signing Clinician's Name / Credentials   Signing clinician's name / credentials Lashay Lozano PT   Session Number   Session Number 23/12+12    Additional Session Number 1/20/22: update POC 12 visits   Progress Note/Recertification   Progress Note Completed Date 01/20/22   Ortho Goal 1   Goal Identifier Exercise   Goal Description Patient will return to desired exercise regimen without pain or discomfort to allow for participation in active hobbies. 30 min such as skiing.   (Reassess goal based on findings of following treatment.)   Goal Progress Performing strengthening routine for 30 min 3-4x a week without increase in pain    Target Date 01/20/22   Date Met 02/04/22   Ortho Goal 2   Goal Identifier Sitting   Goal Description Patient will be able to sit for 2 hours without provocation of LBP to allow for comfort in class.  (Reassess goal based on findings of following treatment.)   Goal Progress Made it through plane rides without a lot of pain. Pressure in R low back.   Able to sit 4 hours.    Target Date 01/20/22   Ortho Goal 3   Goal Identifier Walking   Goal Description Patient will be capable of walking 2-3 miles with pain < or = to 2/10 to facilitate her return to hiking.  (Reassess based on findings of following treatment)   Goal Progress able to walk 1.5-2 miles but has not been walking recently due to cold weather.    Target Date 04/01/22   Ortho Goal 4   Goal Identifier Strength   Goal Description Patient will be able to achieve a hip flexion MMT grade of 5/5 to demonstrate imrpovement in hip flexor strength needed for desired hobbies.  (Reassess based on findings of following treatment)   Goal Progress 4+/5 bilaterally on 2/4/22 - progressing    Target Date 04/01/22   Subjective Report   Subjective Report Having menstrual cycle. Notes they normally have pre-menstrual constipation. Having midline LBP, also typical of menstrual symptoms. Not sure how magnesium is effecting stools, hasn't  changed dosage in the past week.    Objective Measure 1   Details cranial scan (+) for (B) medial mesentery.    Objective Measure 2   Details Multiple tender pts (B) mid/lower rib cage, (B) lumbar paraspinals/transv proc interspaces,    Manual Therapy   Manual Therapy: Mobilization, MFR, MLD, friction massage minutes (14204) 55   Skilled Intervention MFR, counterstrain   Patient Response good, less cramping post treatment    Treatment Detail (L) MMEST6-10-V, (L) YHUAK22-V, (L) MMESL4-5-V, (R) MMEST6-12-V, (R) MMESL2, 4-5-V   Education   Learner Patient   Readiness Acceptance   Method Booklet/handout;Explanation;Demonstration   Response Verbalizes Understanding;Demonstrates Understanding   Plan   Homework Has been doing HEP every other day.  Stretch every night before bed.  Every other day is performing 1/2 exercises.  Moving forward pt will continue to see Lashay Lozano PT for manual therapy.  Exercsie pt will continue to walk and perform HEP - pt is pleased that she has a program that does not cause pain or injury.     Home program Stretching: hip flexor stretch with leg off table, pigeon stretch, downward dog (not printed on PTRX), heel drops off step, foam roller pec stretch, foam roller thoracic mobs and upper back rolling, lateral stretch on bolster (on her own HS/Add/ITB with strap), Strengthing: S/L hip abd, bridge/S/L bridge, bicycles, plank, side plank, rows, scapular retractions, birddog, lat pull down L2, monster walks L3, side stepping L3, standing hip abd L2    Updates to plan of care 1/20/22: update POC 12 visits   Plan for next session Continue to modify HEP based on patient progress. Continue with counterstrain therapy. Check arterial, visceral and sympathetic scans.    Comments   Comments  ref provider: Chelo Park DO. Patient presents with chronic (8 year) constant right sided LBP and low level tingling in R calf that can become painful. Patient also presents with tightness and pain in right  hip flexors that is aggrevated with acitivity such as walking. Pt reports overal feeling 20% improvement.    Total Session Time   Timed Code Treatment Minutes 55   Total Treatment Time (sum of timed and untimed services) 55   [facility]ORY CLINICS ONLY-MEDICAL AND TREATMENT DIAGNOSIS   Medical Diagnosis Right hip flexor tightness, low back pain, chronic thoracic  back pain (new order added )   PT Diagnosis Right sided LBP w/o sciatica, right hip flexor pain, right calf pain

## 2022-03-25 ENCOUNTER — HOSPITAL ENCOUNTER (OUTPATIENT)
Dept: PHYSICAL THERAPY | Facility: REHABILITATION | Age: 32
Discharge: HOME | End: 2022-03-25
Payer: COMMERCIAL

## 2022-03-25 DIAGNOSIS — G89.29 CHRONIC RIGHT-SIDED LOW BACK PAIN WITHOUT SCIATICA: ICD-10-CM

## 2022-03-25 DIAGNOSIS — M79.661 RIGHT CALF PAIN: ICD-10-CM

## 2022-03-25 DIAGNOSIS — M54.50 LUMBAR BACK PAIN: ICD-10-CM

## 2022-03-25 DIAGNOSIS — M24.551 RIGHT HIP FLEXOR TIGHTNESS: Primary | ICD-10-CM

## 2022-03-25 DIAGNOSIS — K59.00 CONSTIPATION, UNSPECIFIED CONSTIPATION TYPE: ICD-10-CM

## 2022-03-25 DIAGNOSIS — M54.50 CHRONIC RIGHT-SIDED LOW BACK PAIN WITHOUT SCIATICA: ICD-10-CM

## 2022-03-25 PROCEDURE — 97140 MANUAL THERAPY 1/> REGIONS: CPT | Mod: GP | Performed by: PHYSICAL THERAPIST

## 2022-03-25 NOTE — PROGRESS NOTES
03/25/22 1500   Signing Clinician's Name / Credentials   Signing clinician's name / credentials Lashay Lozano PT   Session Number   Session Number 24/12+12+12 (36)   Additional Session Number 3/24/22: update POC 12 visits   Progress Note/Recertification   Progress Note Completed Date 01/20/22   Ortho Goal 1   Goal Identifier Exercise   Goal Description Patient will return to desired exercise regimen without pain or discomfort to allow for participation in active hobbies. 30 min such as skiing.   (Reassess goal based on findings of following treatment.)   Goal Progress Performing strengthening routine for 30 min 3-4x a week without increase in pain    Target Date 01/20/22   Date Met 02/04/22   Ortho Goal 2   Goal Identifier Sitting   Goal Description Patient will be able to sit for 2 hours without provocation of LBP to allow for comfort in class.  (Reassess goal based on findings of following treatment.)   Goal Progress Made it through plane rides without a lot of pain. Pressure in R low back.   Able to sit 4 hours.    Target Date 01/20/22   Ortho Goal 3   Goal Identifier Walking   Goal Description Patient will be capable of walking 2-3 miles with pain < or = to 2/10 to facilitate her return to hiking.  (Reassess based on findings of following treatment)   Goal Progress able to walk 1.5-2 miles but has not been walking recently due to cold weather.    Target Date 04/01/22   Ortho Goal 4   Goal Identifier Strength   Goal Description Patient will be able to achieve a hip flexion MMT grade of 5/5 to demonstrate imrpovement in hip flexor strength needed for desired hobbies.  (Reassess based on findings of following treatment)   Goal Progress 4+/5 bilaterally on 2/4/22 - progressing    Target Date 04/01/22   Subjective Report   Subjective Report Has been doing pretty good. Has been having bowel movements without doing an enema the last few days. Hasn't been doing her exercises this week. Some (R) LBP, (L) chest. Mild  achiness in (R) hip and calf.    Objective Measure 1   Details Mod fascial restrictions of mesenteric root, small intestine, sigmoid.    Objective Measure 2   Details Colon motility is fair.    Manual Therapy   Manual Therapy: Mobilization, MFR, MLD, friction massage minutes (30023) 30   Skilled Intervention MFR, counterstrain   Patient Response good,    Treatment Detail pelvic diaphragm, desc colon, small intestine, mesenteric root,colon motility, sigmoid,    Assessments Completed   Assessments Completed Patient is responding appropriately to PT interventions. HEP compliance is good.    Education   Learner Patient   Readiness Acceptance   Method Booklet/handout;Explanation;Demonstration   Response Verbalizes Understanding;Demonstrates Understanding   Plan   Homework Has been doing HEP every other day.  Stretch every night before bed.  Every other day is performing 1/2 exercises.  Moving forward pt will continue to see Lashay Lozano PT for manual therapy.  Exercsie pt will continue to walk and perform HEP - pt is pleased that she has a program that does not cause pain or injury.     Home program Stretching: hip flexor stretch with leg off table, pigeon stretch, downward dog (not printed on PTRX), heel drops off step, foam roller pec stretch, foam roller thoracic mobs and upper back rolling, lateral stretch on bolster (on her own HS/Add/ITB with strap), Strengthing: S/L hip abd, bridge/S/L bridge, bicycles, plank, side plank, rows, scapular retractions, birddog, lat pull down L2, monster walks L3, side stepping L3, standing hip abd L2    Updates to plan of care 3/24/22: update POC 12 visits   Plan for next session Continue to modify HEP based on patient progress. Continue with counterstrain therapy. Check arterial, visceral and sympathetic scans.    Comments   Comments  ref provider: Chelo Park DO. Patient presents with chronic (8 year) constant right sided LBP and low level tingling in R calf that can become  painful. Patient also presents with tightness and pain in right hip flexors that is aggrevated with acitivity such as walking. Pt reports overal feeling 20% improvement.    Total Session Time   Timed Code Treatment Minutes 30   Total Treatment Time (sum of timed and untimed services) 30   AMBUL[facility]ORY CLINICS ONLY-MEDICAL AND TREATMENT DIAGNOSIS   Medical Diagnosis Right hip flexor tightness, low back pain, chronic thoracic  back pain (new order added )   PT Diagnosis Right sided LBP w/o sciatica, right hip flexor pain, right calf pain

## 2022-03-28 ENCOUNTER — HOSPITAL ENCOUNTER (OUTPATIENT)
Dept: PHYSICAL THERAPY | Facility: REHABILITATION | Age: 32
Discharge: HOME | End: 2022-03-28
Payer: COMMERCIAL

## 2022-03-28 DIAGNOSIS — M54.50 CHRONIC RIGHT-SIDED LOW BACK PAIN WITHOUT SCIATICA: ICD-10-CM

## 2022-03-28 DIAGNOSIS — G89.29 CHRONIC RIGHT-SIDED LOW BACK PAIN WITHOUT SCIATICA: ICD-10-CM

## 2022-03-28 DIAGNOSIS — K59.00 CONSTIPATION, UNSPECIFIED CONSTIPATION TYPE: ICD-10-CM

## 2022-03-28 DIAGNOSIS — M24.551 RIGHT HIP FLEXOR TIGHTNESS: Primary | ICD-10-CM

## 2022-03-28 DIAGNOSIS — M54.50 LUMBAR BACK PAIN: ICD-10-CM

## 2022-03-28 PROCEDURE — 97140 MANUAL THERAPY 1/> REGIONS: CPT | Mod: GP | Performed by: PHYSICAL THERAPIST

## 2022-03-28 NOTE — PROGRESS NOTES
03/28/22 1000   Signing Clinician's Name / Credentials   Signing clinician's name / credentials Lashay Lozano PT   Session Number   Session Number 25/12+12+12 (36)   Additional Session Number 3/24/22: update POC 12 visits   Progress Note/Recertification   Progress Note Completed Date 01/20/22   Ortho Goal 1   Goal Identifier Exercise   Goal Description Patient will return to desired exercise regimen without pain or discomfort to allow for participation in active hobbies. 30 min such as skiing.   (Reassess goal based on findings of following treatment.)   Goal Progress Performing strengthening routine for 30 min 3-4x a week without increase in pain    Target Date 01/20/22   Date Met 02/04/22   Ortho Goal 2   Goal Identifier Sitting   Goal Description Patient will be able to sit for 2 hours without provocation of LBP to allow for comfort in class.  (Reassess goal based on findings of following treatment.)   Goal Progress Depends on the day. Tolerance 30-60 min w/out increased pain.    Target Date 01/20/22   Ortho Goal 3   Goal Identifier Walking   Goal Description Patient will be capable of walking 2-3 miles with pain < or = to 2/10 to facilitate her return to hiking.  (Reassess based on findings of following treatment)   Goal Progress able to walk 2 miles without exacerbation   Target Date 04/01/22   Date Met 03/28/22   Ortho Goal 4   Goal Identifier Strength   Goal Description Patient will be able to achieve a hip flexion MMT grade of 5/5 to demonstrate imrpovement in hip flexor strength needed for desired hobbies.  (Reassess based on findings of following treatment)   Goal Progress 4+/5 bilaterally on 2/4/22 - progressing    Target Date 04/01/22   Subjective Report   Subjective Report Colon motility is better, things are moving and having a lot of bowel movements. Less pain in (R) ant/post pelvis and hip and (L) chest/ant shoulder.    Objective Measure 1   Details cranial scan (+) for LS sympathetics,  parasympathetics.    Objective Measure 2   Details Tenderness of sciatic notch, medial abdomen, ant sacrum    Objective Measure 3   Details Fascial restrictions of liver, (R) kidney, hip flexor.    Manual Therapy   Manual Therapy: Mobilization, MFR, MLD, friction massage minutes (20023) 55   Skilled Intervention MFR, counterstrain   Patient Response good,    Treatment Detail (B) PELV-N, IMES-N, (L) HYPO-N, (L) PS2-3-N, (R) PS3-N, liver, (R) kidney, (R) psoas,    Assessments Completed   Assessments Completed Patient is responding appropriately to PT interventions. HEP compliance is good.    Education   Learner Patient   Readiness Acceptance   Method Booklet/handout;Explanation;Demonstration   Response Verbalizes Understanding;Demonstrates Understanding   Plan   Homework Has been doing HEP every other day.  Stretch every night before bed.  Every other day is performing 1/2 exercises.  Moving forward pt will continue to see Lashay Lozano, PT for manual therapy.  Exercsie pt will continue to walk and perform HEP - pt is pleased that she has a program that does not cause pain or injury.     Home program Stretching: hip flexor stretch with leg off table, pigeon stretch, downward dog (not printed on PTRX), heel drops off step, foam roller pec stretch, foam roller thoracic mobs and upper back rolling, lateral stretch on bolster (on her own HS/Add/ITB with strap), Strengthing: S/L hip abd, bridge/S/L bridge, bicycles, plank, side plank, rows, scapular retractions, birddog, lat pull down L2, monster walks L3, side stepping L3, standing hip abd L2    Updates to plan of care 3/24/22: update POC 12 visits   Plan for next session Continue to modify HEP based on patient progress. Continue with counterstrain therapy. Check arterial, visceral and sympathetic scans.    Comments   Comments  ref provider: Chelo Park DO. Patient presents with chronic (8 year) constant right sided LBP and low level tingling in R calf that can  become painful. Patient also presents with tightness and pain in right hip flexors that is aggrevated with acitivity such as walking. Pt reports overal feeling 20% improvement.    Total Session Time   Timed Code Treatment Minutes 55   Total Treatment Time (sum of timed and untimed services) 55   AMBUL[facility]ORY CLINICS ONLY-MEDICAL AND TREATMENT DIAGNOSIS   Medical Diagnosis Right hip flexor tightness, low back pain, chronic thoracic  back pain (new order added )   PT Diagnosis Right sided LBP w/o sciatica, right hip flexor pain, right calf pain

## 2022-04-01 ENCOUNTER — HOSPITAL ENCOUNTER (OUTPATIENT)
Dept: PHYSICAL THERAPY | Facility: REHABILITATION | Age: 32
Discharge: HOME | End: 2022-04-01
Payer: COMMERCIAL

## 2022-04-01 DIAGNOSIS — K59.00 CONSTIPATION, UNSPECIFIED CONSTIPATION TYPE: ICD-10-CM

## 2022-04-01 DIAGNOSIS — M54.50 LUMBAR BACK PAIN: ICD-10-CM

## 2022-04-01 DIAGNOSIS — M79.661 RIGHT CALF PAIN: ICD-10-CM

## 2022-04-01 DIAGNOSIS — G89.29 CHRONIC RIGHT-SIDED LOW BACK PAIN WITHOUT SCIATICA: ICD-10-CM

## 2022-04-01 DIAGNOSIS — M24.551 RIGHT HIP FLEXOR TIGHTNESS: Primary | ICD-10-CM

## 2022-04-01 DIAGNOSIS — M54.50 CHRONIC RIGHT-SIDED LOW BACK PAIN WITHOUT SCIATICA: ICD-10-CM

## 2022-04-01 PROCEDURE — 97140 MANUAL THERAPY 1/> REGIONS: CPT | Mod: GP | Performed by: PHYSICAL THERAPIST

## 2022-04-01 NOTE — PROGRESS NOTES
04/01/22 1500   Signing Clinician's Name / Credentials   Signing clinician's name / credentials Lashay Lozano PT   Session Number   Session Number 26/12+12+12 (36)   Additional Session Number 3/24/22: update POC 12 visits   Progress Note/Recertification   Progress Note Completed Date 01/20/22   Ortho Goal 1   Goal Identifier Exercise   Goal Description Patient will return to desired exercise regimen without pain or discomfort to allow for participation in active hobbies. 30 min such as skiing.   (Reassess goal based on findings of following treatment.)   Goal Progress Performing strengthening routine for 30 min 3-4x a week without increase in pain    Target Date 01/20/22   Date Met 02/04/22   Ortho Goal 2   Goal Identifier Sitting   Goal Description Patient will be able to sit for 2 hours without provocation of LBP to allow for comfort in class.  (Reassess goal based on findings of following treatment.)   Goal Progress Depends on the day. Tolerance 30-60 min w/out increased pain.    Target Date 01/20/22   Ortho Goal 3   Goal Identifier Walking   Goal Description Patient will be capable of walking 2-3 miles with pain < or = to 2/10 to facilitate her return to hiking.  (Reassess based on findings of following treatment)   Goal Progress able to walk 2 miles without exacerbation   Target Date 04/01/22   Date Met 03/28/22   Ortho Goal 4   Goal Identifier Strength   Goal Description Patient will be able to achieve a hip flexion MMT grade of 5/5 to demonstrate imrpovement in hip flexor strength needed for desired hobbies.  (Reassess based on findings of following treatment)   Goal Progress 4+/5 bilaterally on 2/4/22 - progressing    Target Date 04/01/22   Subjective Report   Subjective Report More pain starting yesterday, in sacrum and (B) lower abdomen/pelvis. Enema didn't help this time. Woke with 8-9/10 pain during the night.    Objective Measure 1   Details cranial scan (+) for visceral lymphatics.    Objective  Measure 2   Details Mod tenderness of (B) post/lat mid/lower rib cage.    Objective Measure 3   Details fascial restrictions of colon, flexures, sigmoid colon, (L) SI joint   Manual Therapy   Manual Therapy: Mobilization, MFR, MLD, friction massage minutes (64802) 55   Skilled Intervention MFR, counterstrain   Patient Response good,    Treatment Detail (B) BL-LV, (B) LGI-LV, (B) MGI-LV, ST-LV, EMILY-LV, (B) UGI-LV, SIGM-V, prox/distal sigmoid, desc colon, splenic flexure, transv colon, asc colon, hepatic flexure,    Assessments Completed   Assessments Completed Patient is responding appropriately to PT interventions. HEP compliance is good.    Education   Learner Patient   Readiness Acceptance   Method Booklet/handout;Explanation;Demonstration   Response Verbalizes Understanding;Demonstrates Understanding   Plan   Homework Has been doing HEP every other day.  Stretch every night before bed.  Every other day is performing 1/2 exercises.  Moving forward pt will continue to see Lashay Lozano PT for manual therapy.  Exercsie pt will continue to walk and perform HEP - pt is pleased that she has a program that does not cause pain or injury.     Home program Stretching: hip flexor stretch with leg off table, pigeon stretch, downward dog (not printed on PTRX), heel drops off step, foam roller pec stretch, foam roller thoracic mobs and upper back rolling, lateral stretch on bolster (on her own HS/Add/ITB with strap), Strengthing: S/L hip abd, bridge/S/L bridge, bicycles, plank, side plank, rows, scapular retractions, birddog, lat pull down L2, monster walks L3, side stepping L3, standing hip abd L2    Updates to plan of care 3/24/22: update POC 12 visits   Plan for next session Continue to modify HEP based on patient progress. Continue with counterstrain therapy. Check arterial, visceral and sympathetic scans.    Comments   Comments  ref provider: Chelo Park DO. Patient presents with chronic (8 year) constant right  sided LBP and low level tingling in R calf that can become painful. Patient also presents with tightness and pain in right hip flexors that is aggrevated with acitivity such as walking. Pt reports overal feeling 20% improvement.    Total Session Time   Timed Code Treatment Minutes 55   Total Treatment Time (sum of timed and untimed services) 55   AMBUL[facility]ORY CLINICS ONLY-MEDICAL AND TREATMENT DIAGNOSIS   Medical Diagnosis Right hip flexor tightness, low back pain, chronic thoracic  back pain (new order added )   PT Diagnosis Right sided LBP w/o sciatica, right hip flexor pain, right calf pain

## 2022-04-08 ENCOUNTER — HOSPITAL ENCOUNTER (OUTPATIENT)
Dept: PHYSICAL THERAPY | Facility: REHABILITATION | Age: 32
Discharge: HOME | End: 2022-04-08
Payer: COMMERCIAL

## 2022-04-08 DIAGNOSIS — K59.00 CONSTIPATION, UNSPECIFIED CONSTIPATION TYPE: ICD-10-CM

## 2022-04-08 DIAGNOSIS — M24.551 RIGHT HIP FLEXOR TIGHTNESS: Primary | ICD-10-CM

## 2022-04-08 DIAGNOSIS — M54.50 LUMBAR BACK PAIN: ICD-10-CM

## 2022-04-08 DIAGNOSIS — M79.661 RIGHT CALF PAIN: ICD-10-CM

## 2022-04-08 DIAGNOSIS — M54.50 CHRONIC RIGHT-SIDED LOW BACK PAIN WITHOUT SCIATICA: ICD-10-CM

## 2022-04-08 DIAGNOSIS — G89.29 CHRONIC RIGHT-SIDED LOW BACK PAIN WITHOUT SCIATICA: ICD-10-CM

## 2022-04-08 PROCEDURE — 97140 MANUAL THERAPY 1/> REGIONS: CPT | Mod: GP | Performed by: PHYSICAL THERAPIST

## 2022-04-08 NOTE — PROGRESS NOTES
"   04/08/22 1400   Signing Clinician's Name / Credentials   Signing clinician's name / credentials Lashay Lozano PT   Session Number   Session Number 27/12+12+12 (36)   Additional Session Number 3/24/22: update POC 12 visits   Progress Note/Recertification   Progress Note Completed Date 01/20/22   Ortho Goal 1   Goal Identifier Exercise   Goal Description Patient will return to desired exercise regimen without pain or discomfort to allow for participation in active hobbies. 30 min such as skiing.   (Reassess goal based on findings of following treatment.)   Goal Progress Performing strengthening routine for 30 min 3-4x a week without increase in pain    Target Date 01/20/22   Date Met 02/04/22   Ortho Goal 2   Goal Identifier Sitting   Goal Description Patient will be able to sit for 2 hours without provocation of LBP to allow for comfort in class.  (Reassess goal based on findings of following treatment.)   Goal Progress Depends on the day. Tolerance 30-60 min w/out increased pain.    Target Date 01/20/22   Ortho Goal 3   Goal Identifier Walking   Goal Description Patient will be capable of walking 2-3 miles with pain < or = to 2/10 to facilitate her return to hiking.  (Reassess based on findings of following treatment)   Goal Progress able to walk 2 miles without exacerbation   Target Date 04/01/22   Date Met 03/28/22   Ortho Goal 4   Goal Identifier Strength   Goal Description Patient will be able to achieve a hip flexion MMT grade of 5/5 to demonstrate imrpovement in hip flexor strength needed for desired hobbies.  (Reassess based on findings of following treatment)   Goal Progress 4+/5 bilaterally on 2/4/22 - progressing    Target Date 04/01/22   Subjective Report   Subjective Report Returns after a week. Abdominal pain is improved. Constipation has improved and is now \"moderate\", but still having to do an enema most days. Having some bowel movements without an enema. Considering contacting provider for some lab " tests or possible imaging.    Objective Measure 1   Details cranial scan (+) for abdominal viscera, sphincters, T/L/S sympathetics, parasympathetics.    Objective Measure 2   Details Mod tenderness of (L) mid/lower rib tubercles, (B) ant/lat sacrum.    Objective Measure 3   Details Decreased motility of duodenum, small intestine, iliocecal valve.    Manual Therapy   Manual Therapy: Mobilization, MFR, MLD, friction massage minutes ([id number]) 55   Skilled Intervention MFR, counterstrain   Patient Response good,    Treatment Detail (L) PGT8-10-N, (L) PGL1-N, (L) PS2-3-N, (R) [id number], (R) PS3-N, duodenum/D1-3, iliocecal valve, sm intestine    Assessments Completed   Assessments Completed Patient is responding appropriately to PT interventions. HEP compliance is good.    Education   Learner Patient   Readiness Acceptance   Method Booklet/handout;Explanation;Demonstration   Response Verbalizes Understanding;Demonstrates Understanding   Plan   Homework Has been doing HEP every other day.  Stretch every night before bed.  Every other day is performing 1/2 exercises.  Moving forward pt will continue to see Lashay Lozano, PT for manual therapy.  Exercsie pt will continue to walk and perform HEP - pt is pleased that she has a program that does not cause pain or injury.     Home program Stretching: hip flexor stretch with leg off table, pigeon stretch, downward dog (not printed on PTRX), heel drops off step, foam roller pec stretch, foam roller thoracic mobs and upper back rolling, lateral stretch on bolster (on her own HS/Add/ITB with strap), Strengthing: S/L hip abd, bridge/S/L bridge, bicycles, plank, side plank, rows, scapular retractions, birddog, lat pull down L2, monster walks L3, side stepping L3, standing hip abd L2    Updates to plan of care 3/24/22: update POC 12 visits   Plan for next session Continue to modify HEP based on patient progress. Continue with counterstrain therapy. Check arterial, visceral and sympathetic  scans.    Comments   Comments  ref provider: Chelo Park DO. Patient presents with chronic (8 year) constant right sided LBP and low level tingling in R calf that can become painful. Patient also presents with tightness and pain in right hip flexors that is aggrevated with acitivity such as walking. Pt reports overal feeling 20% improvement.    Total Session Time   Timed Code Treatment Minutes 55   Total Treatment Time (sum of timed and untimed services) 55   AMBULATORY CLINICS ONLY-MEDICAL AND TREATMENT DIAGNOSIS   Medical Diagnosis Right hip flexor tightness, low back pain, chronic thoracic  back pain (new order added )   PT Diagnosis Right sided LBP w/o sciatica, right hip flexor pain, right calf pain

## 2022-04-18 ENCOUNTER — HOSPITAL ENCOUNTER (OUTPATIENT)
Dept: PHYSICAL THERAPY | Facility: REHABILITATION | Age: 32
Discharge: HOME | End: 2022-04-18
Payer: COMMERCIAL

## 2022-04-18 DIAGNOSIS — M24.551 RIGHT HIP FLEXOR TIGHTNESS: Primary | ICD-10-CM

## 2022-04-18 DIAGNOSIS — K59.00 CONSTIPATION, UNSPECIFIED CONSTIPATION TYPE: ICD-10-CM

## 2022-04-18 DIAGNOSIS — M54.50 CHRONIC RIGHT-SIDED LOW BACK PAIN WITHOUT SCIATICA: ICD-10-CM

## 2022-04-18 DIAGNOSIS — M79.661 RIGHT CALF PAIN: ICD-10-CM

## 2022-04-18 DIAGNOSIS — M54.50 LUMBAR BACK PAIN: ICD-10-CM

## 2022-04-18 DIAGNOSIS — G89.29 CHRONIC RIGHT-SIDED LOW BACK PAIN WITHOUT SCIATICA: ICD-10-CM

## 2022-04-18 PROCEDURE — 97140 MANUAL THERAPY 1/> REGIONS: CPT | Mod: GP | Performed by: PHYSICAL THERAPIST

## 2022-04-18 NOTE — PROGRESS NOTES
04/18/22 1500   Signing Clinician's Name / Credentials   Signing clinician's name / credentials Lashay Lozano PT   Session Number   Session Number 28/12+12+12 (36)   Additional Session Number 3/24/22: update POC 12 visits   Progress Note/Recertification   Progress Note Completed Date 01/20/22   Ortho Goal 1   Goal Identifier Exercise   Goal Description Patient will return to desired exercise regimen without pain or discomfort to allow for participation in active hobbies. 30 min such as skiing.   (Reassess goal based on findings of following treatment.)   Goal Progress Performing strengthening routine for 30 min 3-4x a week without increase in pain    Target Date 01/20/22   Date Met 02/04/22   Ortho Goal 2   Goal Identifier Sitting   Goal Description Patient will be able to sit for 2 hours without provocation of LBP to allow for comfort in class.  (Reassess goal based on findings of following treatment.)   Goal Progress Depends on the day. Tolerance 30-60 min w/out increased pain.    Target Date 01/20/22   Ortho Goal 3   Goal Identifier Walking   Goal Description Patient will be capable of walking 2-3 miles with pain < or = to 2/10 to facilitate her return to hiking.  (Reassess based on findings of following treatment)   Goal Progress able to walk 2 miles without exacerbation   Target Date 04/01/22   Date Met 03/28/22   Ortho Goal 4   Goal Identifier Strength   Goal Description Patient will be able to achieve a hip flexion MMT grade of 5/5 to demonstrate imrpovement in hip flexor strength needed for desired hobbies.  (Reassess based on findings of following treatment)   Goal Progress 4+/5 bilaterally on 2/4/22 - progressing    Target Date 04/01/22   Subjective Report   Subjective Report Has been pretty good. Has been status quo. Generally having a bowel movement in the morning, but has to do some intervention to have another later in the day. Went for a couple walks without too much exacerbation. Not having much  discomfort right now, but had some severe symptoms after lunch.   Objective Measure 1   Details Mod fascial restrictions of sigmoid, small intestine, duodenum, sphincters.   Objective Measure 2   Details Colon motility moderately restricted.   Manual Therapy   Manual Therapy: Mobilization, MFR, MLD, friction massage minutes (71808) 55   Skilled Intervention MFR, counterstrain   Patient Response good,    Treatment Detail respiratory diaphragm, duodenum, sm intestine, sigmoid, splenic flexure, cecum, colon motility, pelvic diaphragm,   Assessments Completed   Assessments Completed Patient is responding appropriately to PT interventions. HEP compliance is good.    Education   Learner Patient   Readiness Acceptance   Method Booklet/handout;Explanation;Demonstration   Response Verbalizes Understanding;Demonstrates Understanding   Plan   Homework Has been doing HEP every other day.  Stretch every night before bed.  Every other day is performing 1/2 exercises.  Moving forward pt will continue to see Lashay Lozano PT for manual therapy.  Exercsie pt will continue to walk and perform HEP - pt is pleased that she has a program that does not cause pain or injury.     Home program Stretching: hip flexor stretch with leg off table, pigeon stretch, downward dog (not printed on PTRX), heel drops off step, foam roller pec stretch, foam roller thoracic mobs and upper back rolling, lateral stretch on bolster (on her own HS/Add/ITB with strap), Strengthing: S/L hip abd, bridge/S/L bridge, bicycles, plank, side plank, rows, scapular retractions, birddog, lat pull down L2, monster walks L3, side stepping L3, standing hip abd L2    Updates to plan of care 3/24/22: update POC 12 visits   Plan for next session Continue to modify HEP based on patient progress. Continue with counterstrain therapy. Check arterial, visceral and sympathetic scans.    Comments   Comments  ref provider: Chelo Park DO. Patient presents with chronic (8  year) constant right sided LBP and low level tingling in R calf that can become painful. Patient also presents with tightness and pain in right hip flexors that is aggrevated with acitivity such as walking. Pt reports overal feeling 20% improvement.    Total Session Time   Timed Code Treatment Minutes 55   Total Treatment Time (sum of timed and untimed services) 55   AMBULATORY CLINICS ONLY-MEDICAL AND TREATMENT DIAGNOSIS   Medical Diagnosis Right hip flexor tightness, low back pain, chronic thoracic  back pain (new order added )   PT Diagnosis Right sided LBP w/o sciatica, right hip flexor pain, right calf pain

## 2022-04-22 ENCOUNTER — HOSPITAL ENCOUNTER (OUTPATIENT)
Dept: PHYSICAL THERAPY | Facility: REHABILITATION | Age: 32
Discharge: HOME | End: 2022-04-22
Payer: COMMERCIAL

## 2022-04-22 DIAGNOSIS — M24.551 RIGHT HIP FLEXOR TIGHTNESS: Primary | ICD-10-CM

## 2022-04-22 DIAGNOSIS — K59.00 CONSTIPATION, UNSPECIFIED CONSTIPATION TYPE: ICD-10-CM

## 2022-04-22 DIAGNOSIS — M54.50 CHRONIC RIGHT-SIDED LOW BACK PAIN WITHOUT SCIATICA: ICD-10-CM

## 2022-04-22 DIAGNOSIS — M54.50 LUMBAR BACK PAIN: ICD-10-CM

## 2022-04-22 DIAGNOSIS — G89.29 CHRONIC RIGHT-SIDED LOW BACK PAIN WITHOUT SCIATICA: ICD-10-CM

## 2022-04-22 DIAGNOSIS — M79.661 RIGHT CALF PAIN: ICD-10-CM

## 2022-04-22 PROCEDURE — 97140 MANUAL THERAPY 1/> REGIONS: CPT | Mod: GP | Performed by: PHYSICAL THERAPIST

## 2022-04-22 NOTE — PROGRESS NOTES
"   04/22/22 1400   Signing Clinician's Name / Credentials   Signing clinician's name / credentials Lashay Lozano PT   Session Number   Session Number 29/12+12+12 (36)   Additional Session Number 3/24/22: update POC 12 visits   Progress Note/Recertification   Progress Note Completed Date 01/20/22   Ortho Goal 1   Goal Identifier Exercise   Goal Description Patient will return to desired exercise regimen without pain or discomfort to allow for participation in active hobbies. 30 min such as skiing.   (Reassess goal based on findings of following treatment.)   Goal Progress Performing strengthening routine for 30 min 3-4x a week without increase in pain    Target Date 01/20/22   Date Met 02/04/22   Ortho Goal 2   Goal Identifier Sitting   Goal Description Patient will be able to sit for 2 hours without provocation of LBP to allow for comfort in class.  (Reassess goal based on findings of following treatment.)   Goal Progress Depends on the day. Tolerance 30-60 min w/out increased pain.    Target Date 01/20/22   Ortho Goal 3   Goal Identifier Walking   Goal Description Patient will be capable of walking 2-3 miles with pain < or = to 2/10 to facilitate her return to hiking.  (Reassess based on findings of following treatment)   Goal Progress able to walk 2 miles without exacerbation   Target Date 04/01/22   Date Met 03/28/22   Ortho Goal 4   Goal Identifier Strength   Goal Description Patient will be able to achieve a hip flexion MMT grade of 5/5 to demonstrate imrpovement in hip flexor strength needed for desired hobbies.  (Reassess based on findings of following treatment)   Goal Progress 4+/5 bilaterally on 2/4/22 - progressing    Target Date 04/01/22   Subjective Report   Subjective Report \"Okay\". Back to having multiple bowel movements/day. Feels better than being constipated. Sometimes doing enema in evening so constipation pain doesn't disrupt sleep. Still having some chronic constant pain in (L) upper chest/thorax. " Back and leg are not bothering her as much today. Seems better if she does some exercises daily.   Objective Measure 1   Details cranial scan (+) for costal cartilage.   Objective Measure 2   Details Mod/major tenderness (L) sternochondral jct and ant/lat upper rib cage.   Objective Measure 3   Details decreased motility of duodenum, pancreas. Fascial restrictions of mesenteric root.   Manual Therapy   Manual Therapy: Mobilization, MFR, MLD, friction massage minutes ([id number]) 55   Skilled Intervention MFR, counterstrain   Patient Response good,    Treatment Detail (L) R3-5(C), thoracic inlet, (L) upper thorax, (L) sternochondral jct, (R) MR-V, mesenteric root, duodenum/D1-3, pancreas motility, pylorus,   Assessments Completed   Assessments Completed Patient is responding appropriately to PT interventions. HEP compliance is good.    Education   Learner Patient   Readiness Acceptance   Method Booklet/handout;Explanation;Demonstration   Response Verbalizes Understanding;Demonstrates Understanding   Plan   Homework Has been doing HEP every other day.  Stretch every night before bed.  Every other day is performing 1/2 exercises.  Moving forward pt will continue to see Lashay Lozano PT for manual therapy.  Exercsie pt will continue to walk and perform HEP - pt is pleased that she has a program that does not cause pain or injury.     Home program Stretching: hip flexor stretch with leg off table, pigeon stretch, downward dog (not printed on PTRX), heel drops off step, foam roller pec stretch, foam roller thoracic mobs and upper back rolling, lateral stretch on bolster (on her own HS/Add/ITB with strap), Strengthing: S/L hip abd, bridge/S/L bridge, bicycles, plank, side plank, rows, scapular retractions, birddog, lat pull down L2, monster walks L3, side stepping L3, standing hip abd L2    Updates to plan of care 3/24/22: update POC 12 visits   Plan for next session Continue to modify HEP based on patient progress. Continue with  counterstrain therapy. Check arterial, visceral and sympathetic scans.    Comments   Comments  ref provider: Chelo Park DO. Patient presents with chronic (8 year) constant right sided LBP and low level tingling in R calf that can become painful. Patient also presents with tightness and pain in right hip flexors that is aggrevated with acitivity such as walking. Pt reports overal feeling 20% improvement.    Total Session Time   Timed Code Treatment Minutes 55   Total Treatment Time (sum of timed and untimed services) 55   AMBULATORY CLINICS ONLY-MEDICAL AND TREATMENT DIAGNOSIS   Medical Diagnosis Right hip flexor tightness, low back pain, chronic thoracic  back pain (new order added )   PT Diagnosis Right sided LBP w/o sciatica, right hip flexor pain, right calf pain

## 2022-05-06 ENCOUNTER — HOSPITAL ENCOUNTER (OUTPATIENT)
Dept: PHYSICAL THERAPY | Facility: REHABILITATION | Age: 32
Discharge: HOME | End: 2022-05-06
Payer: COMMERCIAL

## 2022-05-06 DIAGNOSIS — M54.50 CHRONIC RIGHT-SIDED LOW BACK PAIN WITHOUT SCIATICA: ICD-10-CM

## 2022-05-06 DIAGNOSIS — M79.661 RIGHT CALF PAIN: ICD-10-CM

## 2022-05-06 DIAGNOSIS — M54.50 LUMBAR BACK PAIN: ICD-10-CM

## 2022-05-06 DIAGNOSIS — M24.551 RIGHT HIP FLEXOR TIGHTNESS: Primary | ICD-10-CM

## 2022-05-06 DIAGNOSIS — K59.00 CONSTIPATION, UNSPECIFIED CONSTIPATION TYPE: ICD-10-CM

## 2022-05-06 DIAGNOSIS — G89.29 CHRONIC RIGHT-SIDED LOW BACK PAIN WITHOUT SCIATICA: ICD-10-CM

## 2022-05-06 PROCEDURE — 97140 MANUAL THERAPY 1/> REGIONS: CPT | Mod: GP | Performed by: PHYSICAL THERAPIST

## 2022-05-06 NOTE — PROGRESS NOTES
05/06/22 1400   Signing Clinician's Name / Credentials   Signing clinician's name / credentials Lashay Lozano PT   Session Number   Session Number 30/12+12+12 (36)   Additional Session Number 3/24/22: update POC 12 visits   Progress Note/Recertification   Progress Note Completed Date 05/06/22   Ortho Goal 1   Goal Identifier Exercise   Goal Description Patient will return to desired exercise regimen without pain or discomfort to allow for participation in active hobbies. 30 min such as skiing.   (Reassess goal based on findings of following treatment.)   Goal Progress Performing strengthening routine for 30 min 3-4x a week without increase in pain    Target Date 01/20/22   Date Met 02/04/22   Ortho Goal 2   Goal Identifier Sitting   Goal Description Patient will be able to sit for 2 hours without provocation of LBP to allow for comfort in class.  (Reassess goal based on findings of following treatment.)   Goal Progress Depends on the day. Tolerance 30-60 min w/out increased pain.    Target Date 01/20/22   Ortho Goal 3   Goal Identifier Walking   Goal Description Patient will be capable of walking 2-3 miles with pain < or = to 2/10 to facilitate her return to hiking.  (Reassess based on findings of following treatment)   Goal Progress able to walk 2 miles without exacerbation   Target Date 04/01/22   Date Met 03/28/22   Ortho Goal 4   Goal Identifier Strength   Goal Description Patient will be able to achieve a hip flexion MMT grade of 5/5 to demonstrate imrpovement in hip flexor strength needed for desired hobbies.  (Reassess based on findings of following treatment)   Goal Progress 4+/5 bilaterally on 2/4/22 - progressing    Target Date 04/01/22   Subjective Report   Subjective Report Patient returns after having covid infection, sx onset on 4/26, (+) test on 4/28, quarantined for 8 days.. Now has congestion, fatigue. Still having regular bowel movements but still feeing constipated, (R) sided pain. Has difficulty  sleeping unless she has a BM in the evening. They do an enema if they can't have a BM in the evening. They report strategies that work for stimulating a BM in the morning don't work in the evening.(L) chest/ant shoulder felt a lot better for several days after the last session.   Objective Measure 1   Details Mod fascial restrictions of mesenteric root to small intestine.   Objective Measure 2   Details cranial scan (+) for LS mesentery.   Objective Measure 3   Details Fascial restrictions of (L) medial SI joint.   Manual Therapy   Manual Therapy: Mobilization, MFR, MLD, friction massage minutes ([id number]) 55   Skilled Intervention MFR, counterstrain   Patient Response good,    Treatment Detail asc colon, mesenteric root, small intestine, (L) LMESS1-2-V, sigmoid/upper and lower , desc colon, (L) LQ, rectum,   Assessments Completed   Assessments Completed Patient is responding appropriately to PT interventions. HEP compliance is good.    Education   Learner Patient   Readiness Acceptance   Method Booklet/handout;Explanation;Demonstration   Response Verbalizes Understanding;Demonstrates Understanding   Plan   Homework Has been doing HEP every other day.  Stretch every night before bed.  Every other day is performing 1/2 exercises.  Moving forward pt will continue to see Lashay Lozano PT for manual therapy.  Exercsie pt will continue to walk and perform HEP - pt is pleased that she has a program that does not cause pain or injury.     Home program Stretching: hip flexor stretch with leg off table, pigeon stretch, downward dog (not printed on PTRX), heel drops off step, foam roller pec stretch, foam roller thoracic mobs and upper back rolling, lateral stretch on bolster (on her own HS/Add/ITB with strap), Strengthing: S/L hip abd, bridge/S/L bridge, bicycles, plank, side plank, rows, scapular retractions, birddog, lat pull down L2, monster walks L3, side stepping L3, standing hip abd L2    Updates to plan of care 3/24/22:  update POC 12 visits   Plan for next session Continue to modify HEP based on patient progress. Continue with counterstrain therapy. Check arterial, visceral and sympathetic scans.    Comments   Comments  ref provider: Chelo Park DO. Patient presents with chronic (8 year) constant right sided LBP and low level tingling in R calf that can become painful. Patient also presents with tightness and pain in right hip flexors that is aggrevated with acitivity such as walking. Pt reports overal feeling 20% improvement.    Total Session Time   Timed Code Treatment Minutes 55   Total Treatment Time (sum of timed and untimed services) 55   AMBULATORY CLINICS ONLY-MEDICAL AND TREATMENT DIAGNOSIS   Medical Diagnosis Right hip flexor tightness, low back pain, chronic thoracic  back pain (new order added )   PT Diagnosis Right sided LBP w/o sciatica, right hip flexor pain, right calf pain                                                                                  [facility]    Outpatient Physical Therapy Progress Note  Patient: Agustina Nam  : 1990    Beginning/End Dates of Reporting Period:  22 to 22    Referring Provider: Chelo Park DO    Therapy Diagnosis: Right sided LBP w/o sciatica, right hip flexor pain, right calf pain, constipation     Client Self Report: Patient returns after having covid infection, sx onset on , (+) test on , quarantined for 8 days.. Now has congestion, fatigue. Still having regular bowel movements but still feeing constipated, (R) sided pain. Has difficulty sleeping unless she has a BM in the evening. They do an enema if they can't have a BM in the evening. They report strategies that work for stimulating a BM in the morning don't work in the evening.(L) chest/ant shoulder felt a lot better for several days after the last session.    Objective Measurements:     Details: Mod fascial restrictions of mesenteric root to  small intestine.     Details: cranial scan (+) for LS mesentery.     Details: Fascial restrictions of (L) medial SI joint.      Goals:  Goal Identifier Exercise   Goal Description Patient will return to desired exercise regimen without pain or discomfort to allow for participation in active hobbies. 30 min such as skiing.  (Reassess goal based on findings of following treatment.)   Target Date 01/20/22   Date Met  02/04/22   Progress (detail required for progress note): Performing strengthening routine for 30 min 3-4x a week without increase in pain      Goal Identifier Sitting   Goal Description Patient will be able to sit for 2 hours without provocation of LBP to allow for comfort in class. (Reassess goal based on findings of following treatment.)   Target Date 01/20/22   Date Met      Progress (detail required for progress note): Depends on the day. Tolerance 30-60 min w/out increased pain.      Goal Identifier Walking   Goal Description Patient will be capable of walking 2-3 miles with pain < or = to 2/10 to facilitate her return to hiking. (Reassess based on findings of following treatment)   Target Date 04/01/22   Date Met  03/28/22   Progress (detail required for progress note): able to walk 2 miles without exacerbation     Goal Identifier Strength   Goal Description Patient will be able to achieve a hip flexion MMT grade of 5/5 to demonstrate imrpovement in hip flexor strength needed for desired hobbies. (Reassess based on findings of following treatment)   Target Date 04/01/22   Date Met      Progress (detail required for progress note): 4+/5 bilaterally on 2/4/22 - progressing      Plan:  Continue therapy per current plan of care.    Discharge:  No

## 2022-05-09 ENCOUNTER — HOSPITAL ENCOUNTER (OUTPATIENT)
Dept: PHYSICAL THERAPY | Facility: REHABILITATION | Age: 32
Discharge: HOME | End: 2022-05-09
Payer: COMMERCIAL

## 2022-05-09 DIAGNOSIS — G89.29 CHRONIC RIGHT-SIDED LOW BACK PAIN WITHOUT SCIATICA: ICD-10-CM

## 2022-05-09 DIAGNOSIS — M54.50 CHRONIC RIGHT-SIDED LOW BACK PAIN WITHOUT SCIATICA: ICD-10-CM

## 2022-05-09 DIAGNOSIS — M79.661 RIGHT CALF PAIN: ICD-10-CM

## 2022-05-09 DIAGNOSIS — K59.00 CONSTIPATION, UNSPECIFIED CONSTIPATION TYPE: ICD-10-CM

## 2022-05-09 DIAGNOSIS — M24.551 RIGHT HIP FLEXOR TIGHTNESS: Primary | ICD-10-CM

## 2022-05-09 DIAGNOSIS — M54.50 LUMBAR BACK PAIN: ICD-10-CM

## 2022-05-09 PROCEDURE — 97140 MANUAL THERAPY 1/> REGIONS: CPT | Mod: GP | Performed by: PHYSICAL THERAPIST

## 2022-05-09 NOTE — PROGRESS NOTES
05/09/22 1500   Signing Clinician's Name / Credentials   Signing clinician's name / credentials Lashay Lozano PT   Session Number   Session Number 31/12+12+12 (36)   Additional Session Number 3/24/22: update POC 12 visits   Progress Note/Recertification   Progress Note Completed Date 05/06/22   Ortho Goal 1   Goal Identifier Exercise   Goal Description Patient will return to desired exercise regimen without pain or discomfort to allow for participation in active hobbies. 30 min such as skiing.   (Reassess goal based on findings of following treatment.)   Goal Progress Performing strengthening routine for 30 min 3-4x a week without increase in pain    Target Date 01/20/22   Date Met 02/04/22   Ortho Goal 2   Goal Identifier Sitting   Goal Description Patient will be able to sit for 2 hours without provocation of LBP to allow for comfort in class.  (Reassess goal based on findings of following treatment.)   Goal Progress Depends on the day. Tolerance 30-60 min w/out increased pain.    Target Date 01/20/22   Ortho Goal 3   Goal Identifier Walking   Goal Description Patient will be capable of walking 2-3 miles with pain < or = to 2/10 to facilitate her return to hiking.  (Reassess based on findings of following treatment)   Goal Progress able to walk 2 miles without exacerbation   Target Date 04/01/22   Date Met 03/28/22   Ortho Goal 4   Goal Identifier Strength   Goal Description Patient will be able to achieve a hip flexion MMT grade of 5/5 to demonstrate imrpovement in hip flexor strength needed for desired hobbies.  (Reassess based on findings of following treatment)   Goal Progress 4+/5 bilaterally on 2/4/22 - progressing    Target Date 04/01/22   Subjective Report   Subjective Report Tested (-) over the weekend. The last session really helped. They were able to have a BM later and didn't have to do an enema to sleep. Saw naturopath and started something for colon inflammation.   Objective Measure 2   Details  cranial scan (+) for (B) LS mesentery, (B) pelvic viscera, sm intestine.   Objective Measure 3   Details Tender pts and associated fascial restrictions of (B) lumbar paravertebrals, medial PIIS, ant PSIS, medial abdomen.   Objective Measure 4   Details Significant tenderness of mesenteric root, (L) R12.   Manual Therapy   Manual Therapy: Mobilization, MFR, MLD, friction massage minutes (35444) 55   Skilled Intervention MFR, counterstrain   Patient Response good,    Treatment Detail (L) MMESL3-5-V, (L) MMESS1-2-V, (L) LMESL4-5-V, (L) LMESL4-5-V, (R) MR-V,(L) JEJ-V, (L) ILM-V, (L) broad lig   Assessments Completed   Assessments Completed Patient is responding appropriately to PT interventions. HEP compliance is good.    Education   Learner Patient   Readiness Acceptance   Method Booklet/handout;Explanation;Demonstration   Response Verbalizes Understanding;Demonstrates Understanding   Plan   Homework Has been doing HEP every other day.  Stretch every night before bed.  Every other day is performing 1/2 exercises.  Moving forward pt will continue to see Lashay Lozano, PT for manual therapy.  Exercsie pt will continue to walk and perform HEP - pt is pleased that she has a program that does not cause pain or injury.     Home program Stretching: hip flexor stretch with leg off table, pigeon stretch, downward dog (not printed on PTRX), heel drops off step, foam roller pec stretch, foam roller thoracic mobs and upper back rolling, lateral stretch on bolster (on her own HS/Add/ITB with strap), Strengthing: S/L hip abd, bridge/S/L bridge, bicycles, plank, side plank, rows, scapular retractions, birddog, lat pull down L2, monster walks L3, side stepping L3, standing hip abd L2    Updates to plan of care 3/24/22: update POC 12 visits   Plan for next session Continue to modify HEP based on patient progress. Continue with counterstrain therapy.   Comments   Comments ref provider: Chelo Park DO. Patient presents with chronic  (8 year) constant right sided LBP and low level tingling in R calf that can become painful. Patient also presents with tightness and pain in right hip flexors that is aggrevated with acitivity such as walking.   Total Session Time   Timed Code Treatment Minutes 55   Total Treatment Time (sum of timed and untimed services) 55   AMBULATORY CLINICS ONLY-MEDICAL AND TREATMENT DIAGNOSIS   Medical Diagnosis Right hip flexor tightness, low back pain, chronic thoracic  back pain (new order added )   PT Diagnosis Right sided LBP w/o sciatica, right hip flexor pain, right calf pain

## 2022-05-13 ENCOUNTER — HOSPITAL ENCOUNTER (OUTPATIENT)
Dept: PHYSICAL THERAPY | Facility: REHABILITATION | Age: 32
Discharge: HOME | End: 2022-05-13
Payer: COMMERCIAL

## 2022-05-13 DIAGNOSIS — M24.551 RIGHT HIP FLEXOR TIGHTNESS: Primary | ICD-10-CM

## 2022-05-13 DIAGNOSIS — M54.50 LUMBAR BACK PAIN: ICD-10-CM

## 2022-05-13 DIAGNOSIS — K59.00 CONSTIPATION, UNSPECIFIED CONSTIPATION TYPE: ICD-10-CM

## 2022-05-13 DIAGNOSIS — M54.50 CHRONIC RIGHT-SIDED LOW BACK PAIN WITHOUT SCIATICA: ICD-10-CM

## 2022-05-13 DIAGNOSIS — M79.661 RIGHT CALF PAIN: ICD-10-CM

## 2022-05-13 DIAGNOSIS — G89.29 CHRONIC RIGHT-SIDED LOW BACK PAIN WITHOUT SCIATICA: ICD-10-CM

## 2022-05-13 PROCEDURE — 97140 MANUAL THERAPY 1/> REGIONS: CPT | Mod: GP | Performed by: PHYSICAL THERAPIST

## 2022-05-13 NOTE — PROGRESS NOTES
05/13/22 1500   Signing Clinician's Name / Credentials   Signing clinician's name / credentials Lashay Lozano PT   Session Number   Session Number 32/12+12+12 (36)   Additional Session Number 3/24/22: update POC 12 visits   Progress Note/Recertification   Progress Note Completed Date 05/06/22   Ortho Goal 1   Goal Identifier Exercise   Goal Description Patient will return to desired exercise regimen without pain or discomfort to allow for participation in active hobbies. 30 min such as skiing.   (Reassess goal based on findings of following treatment.)   Goal Progress Performing strengthening routine for 30 min 3-4x a week without increase in pain    Target Date 01/20/22   Date Met 02/04/22   Ortho Goal 2   Goal Identifier Sitting   Goal Description Patient will be able to sit for 2 hours without provocation of LBP to allow for comfort in class.  (Reassess goal based on findings of following treatment.)   Goal Progress Depends on the day. Tolerance 30-60 min w/out increased pain.    Target Date 01/20/22   Ortho Goal 3   Goal Identifier Walking   Goal Description Patient will be capable of walking 2-3 miles with pain < or = to 2/10 to facilitate her return to hiking.  (Reassess based on findings of following treatment)   Goal Progress able to walk 2 miles without exacerbation   Target Date 04/01/22   Date Met 03/28/22   Ortho Goal 4   Goal Identifier Strength   Goal Description Patient will be able to achieve a hip flexion MMT grade of 5/5 to demonstrate imrpovement in hip flexor strength needed for desired hobbies.  (Reassess based on findings of following treatment)   Goal Progress 4+/5 bilaterally on 2/4/22 - progressing    Target Date 04/01/22   Subjective Report   Subjective Report Continues to have constipation in the evening. It's better in the 1st 1/2 of the day. Has been trialing homeopathic treatment and may try adjusting when they take it. (L) ant chest/shoulder/rib cage pain, also (R) low back and hip.    Objective Measure 2   Details Mod fascial restrictions of (L) upper thorax and chest, mesenteric root,   Objective Measure 3   Details Mod tightness of (L) pectorals, (R) hip rotators   Objective Measure 4   Details Mod restrictions of colon, sigmoid.   Manual Therapy   Manual Therapy: Mobilization, MFR, MLD, friction massage minutes (34265) 55   Skilled Intervention MFR, counterstrain   Patient Response good,    Treatment Detail (L) upper thorax, (L) pec release, diagonal upper thorax release, mesenteric root, sm intestine, (L) JEJ-V, desc colon, sigmoid/upper and lower, (R) hemipelvis, (R) deep hip rotators   Assessments Completed   Assessments Completed Patient is responding appropriately to PT interventions. HEP compliance is good.    Education   Learner Patient   Readiness Acceptance   Method Booklet/handout;Explanation;Demonstration   Response Verbalizes Understanding;Demonstrates Understanding   Plan   Homework Has been doing HEP every other day.  Stretch every night before bed.  Every other day is performing 1/2 exercises.  Moving forward pt will continue to see Lashay Lozano PT for manual therapy.  Exercsie pt will continue to walk and perform HEP - pt is pleased that she has a program that does not cause pain or injury.     Home program Stretching: hip flexor stretch with leg off table, pigeon stretch, downward dog (not printed on PTRX), heel drops off step, foam roller pec stretch, foam roller thoracic mobs and upper back rolling, lateral stretch on bolster (on her own HS/Add/ITB with strap), Strengthing: S/L hip abd, bridge/S/L bridge, bicycles, plank, side plank, rows, scapular retractions, birddog, lat pull down L2, monster walks L3, side stepping L3, standing hip abd L2    Updates to plan of care 3/24/22: update POC 12 visits   Plan for next session Continue to modify HEP based on patient progress. Continue with counterstrain therapy.   Comments   Comments ref provider: Chelo Park DO.  Patient presents with chronic (8 year) constant right sided LBP and low level tingling in R calf that can become painful. Patient also presents with tightness and pain in right hip flexors that is aggrevated with acitivity such as walking.   Total Session Time   Timed Code Treatment Minutes 55   Total Treatment Time (sum of timed and untimed services) 55   AMBULATORY CLINICS ONLY-MEDICAL AND TREATMENT DIAGNOSIS   Medical Diagnosis Right hip flexor tightness, low back pain, chronic thoracic  back pain (new order added )   PT Diagnosis Right sided LBP w/o sciatica, right hip flexor pain, right calf pain

## 2022-05-16 ENCOUNTER — HOSPITAL ENCOUNTER (OUTPATIENT)
Dept: PHYSICAL THERAPY | Facility: REHABILITATION | Age: 32
Discharge: HOME | End: 2022-05-16
Payer: COMMERCIAL

## 2022-05-16 DIAGNOSIS — G89.29 CHRONIC RIGHT-SIDED LOW BACK PAIN WITHOUT SCIATICA: ICD-10-CM

## 2022-05-16 DIAGNOSIS — M54.50 CHRONIC RIGHT-SIDED LOW BACK PAIN WITHOUT SCIATICA: ICD-10-CM

## 2022-05-16 DIAGNOSIS — K59.00 CONSTIPATION, UNSPECIFIED CONSTIPATION TYPE: ICD-10-CM

## 2022-05-16 DIAGNOSIS — M54.50 LUMBAR BACK PAIN: ICD-10-CM

## 2022-05-16 DIAGNOSIS — M79.661 RIGHT CALF PAIN: ICD-10-CM

## 2022-05-16 DIAGNOSIS — M24.551 RIGHT HIP FLEXOR TIGHTNESS: Primary | ICD-10-CM

## 2022-05-16 PROCEDURE — 97140 MANUAL THERAPY 1/> REGIONS: CPT | Mod: GP | Performed by: PHYSICAL THERAPIST

## 2022-05-16 NOTE — PROGRESS NOTES
05/16/22 1500   Signing Clinician's Name / Credentials   Signing clinician's name / credentials Lashay Lozano PT   Session Number   Session Number 33/12+12+12 (36)   Additional Session Number 3/24/22: update POC 12 visits   Progress Note/Recertification   Progress Note Completed Date 05/06/22   Ortho Goal 1   Goal Identifier Exercise   Goal Description Patient will return to desired exercise regimen without pain or discomfort to allow for participation in active hobbies. 30 min such as skiing.   (Reassess goal based on findings of following treatment.)   Goal Progress Performing strengthening routine for 30 min 3-4x a week without increase in pain    Target Date 01/20/22   Date Met 02/04/22   Ortho Goal 2   Goal Identifier Sitting   Goal Description Patient will be able to sit for 2 hours without provocation of LBP to allow for comfort in class.  (Reassess goal based on findings of following treatment.)   Goal Progress Depends on the day. Tolerance 30-60 min w/out increased pain.    Target Date 01/20/22   Ortho Goal 3   Goal Identifier Walking   Goal Description Patient will be capable of walking 2-3 miles with pain < or = to 2/10 to facilitate her return to hiking.  (Reassess based on findings of following treatment)   Goal Progress able to walk 2 miles without exacerbation   Target Date 04/01/22   Date Met 03/28/22   Ortho Goal 4   Goal Identifier Strength   Goal Description Patient will be able to achieve a hip flexion MMT grade of 5/5 to demonstrate imrpovement in hip flexor strength needed for desired hobbies.  (Reassess based on findings of following treatment)   Goal Progress 4+/5 bilaterally on 2/4/22 - progressing    Target Date 04/01/22   Subjective Report   Subjective Report (L) upper chest felt good after the last session, but has been bothering her at night the last couple days. (R) calf and hip flexor have been bothering her. Continues to have constipation in the evening. Still trialing naturopathic  treatment.   Objective Measure 2   Details cranial scan (+) for abdominal ganglions.   Objective Measure 3   Details fascial restrictions of colon and flexures, (L) pectorals and upper thorax, diaphragms.   Objective Measure 4   Details Multiple tender pts upper and lower medial abdomen.   Manual Therapy   Manual Therapy: Mobilization, MFR, MLD, friction massage minutes ([id number]) 55   Skilled Intervention MFR, counterstrain   Patient Response good,    Treatment Detail pelvic diaphragm, (R) hip flexors, respiratory diaphragm, splenic flexure, desc colon, (L) upper thorax, pec release, (B) KENDAL-N, (B) AREN-N, IMES-N, (R) HYPO-N,   Assessments Completed   Assessments Completed Patient is responding appropriately to PT interventions. HEP compliance is good.    Education   Learner Patient   Readiness Acceptance   Method Booklet/handout;Explanation;Demonstration   Response Verbalizes Understanding;Demonstrates Understanding   Plan   Homework Has been doing HEP every other day.  Stretch every night before bed.  Every other day is performing 1/2 exercises.  Moving forward pt will continue to see Lashay Lozano PT for manual therapy.  Exercsie pt will continue to walk and perform HEP - pt is pleased that she has a program that does not cause pain or injury.     Home program Stretching: hip flexor stretch with leg off table, pigeon stretch, downward dog (not printed on PTRX), heel drops off step, foam roller pec stretch, foam roller thoracic mobs and upper back rolling, lateral stretch on bolster (on her own HS/Add/ITB with strap), Strengthing: S/L hip abd, bridge/S/L bridge, bicycles, plank, side plank, rows, scapular retractions, birddog, lat pull down L2, monster walks L3, side stepping L3, standing hip abd L2    Updates to plan of care 3/24/22: update POC 12 visits   Plan for next session Continue to modify HEP based on patient progress. Continue with counterstrain therapy.   Comments   Comments ref provider: Chelo Park  R, DO. Patient presents with chronic (8 year) constant right sided LBP and low level tingling in R calf that can become painful. Patient also presents with tightness and pain in right hip flexors that is aggrevated with acitivity such as walking.   Total Session Time   Timed Code Treatment Minutes 55   Total Treatment Time (sum of timed and untimed services) 55   AMBULATORY CLINICS ONLY-MEDICAL AND TREATMENT DIAGNOSIS   Medical Diagnosis Right hip flexor tightness, low back pain, chronic thoracic  back pain (new order added )   PT Diagnosis Right sided LBP w/o sciatica, right hip flexor pain, right calf pain

## 2022-05-19 ENCOUNTER — HOSPITAL ENCOUNTER (OUTPATIENT)
Dept: PHYSICAL THERAPY | Facility: REHABILITATION | Age: 32
Discharge: HOME | End: 2022-05-19
Payer: COMMERCIAL

## 2022-05-19 DIAGNOSIS — M24.551 RIGHT HIP FLEXOR TIGHTNESS: Primary | ICD-10-CM

## 2022-05-19 DIAGNOSIS — M79.661 RIGHT CALF PAIN: ICD-10-CM

## 2022-05-19 DIAGNOSIS — M54.50 LUMBAR BACK PAIN: ICD-10-CM

## 2022-05-19 DIAGNOSIS — K59.00 CONSTIPATION, UNSPECIFIED CONSTIPATION TYPE: ICD-10-CM

## 2022-05-19 DIAGNOSIS — G89.29 CHRONIC RIGHT-SIDED LOW BACK PAIN WITHOUT SCIATICA: ICD-10-CM

## 2022-05-19 DIAGNOSIS — M54.50 CHRONIC RIGHT-SIDED LOW BACK PAIN WITHOUT SCIATICA: ICD-10-CM

## 2022-05-19 PROCEDURE — 97140 MANUAL THERAPY 1/> REGIONS: CPT | Mod: GP | Performed by: PHYSICAL THERAPIST

## 2022-05-19 NOTE — PROGRESS NOTES
05/19/22 1400   Signing Clinician's Name / Credentials   Signing clinician's name / credentials Lashay Lozano PT   Session Number   Session Number 34/12+12+12 (36)   Additional Session Number 3/24/22: update POC 12 visits   Progress Note/Recertification   Progress Note Completed Date 05/06/22   Ortho Goal 1   Goal Identifier Exercise   Goal Description Patient will return to desired exercise regimen without pain or discomfort to allow for participation in active hobbies. 30 min such as skiing.   (Reassess goal based on findings of following treatment.)   Goal Progress Performing strengthening routine for 30 min 3-4x a week without increase in pain    Target Date 01/20/22   Date Met 02/04/22   Ortho Goal 2   Goal Identifier Sitting   Goal Description Patient will be able to sit for 2 hours without provocation of LBP to allow for comfort in class.  (Reassess goal based on findings of following treatment.)   Goal Progress Depends on the day. Tolerance 30-60 min w/out increased pain.    Target Date 01/20/22   Ortho Goal 3   Goal Identifier Walking   Goal Description Patient will be capable of walking 2-3 miles with pain < or = to 2/10 to facilitate her return to hiking.  (Reassess based on findings of following treatment)   Goal Progress able to walk 2 miles without exacerbation   Target Date 04/01/22   Date Met 03/28/22   Ortho Goal 4   Goal Identifier Strength   Goal Description Patient will be able to achieve a hip flexion MMT grade of 5/5 to demonstrate imrpovement in hip flexor strength needed for desired hobbies.  (Reassess based on findings of following treatment)   Goal Progress 4+/5 bilaterally on 2/4/22 - progressing    Target Date 04/01/22   Subjective Report   Subjective Report tried some laxatives since the last session. Not great results, had small bowel movements a few hours apart. Ideally, they would like to have about 3 BMs/day.   Objective Measure 2   Details cranial scan (+) for L>R vagus nn.    Objective Measure 3   Details Multiple tender pts (B) mid to lower rib cage and lower sternocostal jts.   Manual Therapy   Manual Therapy: Mobilization, MFR, MLD, friction massage minutes (00840) 55   Skilled Intervention MFR, counterstrain   Patient Response good,    Treatment Detail (B) VESO-N, VSPL-N, VILM-N, VSF-N, VLIV7-N, VHF-N, VCEC-N, VSTOM-N, (L) VPAN-N,VLIV6-N,   Self Care/home Management   ADL/Home Mgmt Training (51995) 5   Skilled Intervention abdominal massage   Patient Response pt verbalizes understanding   Treatment Detail Patient instructed in colon massage - cecum to sigmoid and rectum, also colon motility in CW direction   Assessments Completed   Assessments Completed Patient is responding appropriately to PT interventions. HEP compliance is good.    Education   Learner Patient   Readiness Acceptance   Method Booklet/handout;Explanation;Demonstration   Response Verbalizes Understanding;Demonstrates Understanding   Plan   Homework Has been doing HEP every other day.  Stretch every night before bed.  Every other day is performing 1/2 exercises.  Moving forward pt will continue to see Lashay Lozano, PT for manual therapy.  Exercsie pt will continue to walk and perform HEP - pt is pleased that she has a program that does not cause pain or injury.     Home program Stretching: hip flexor stretch with leg off table, pigeon stretch, downward dog (not printed on PTRX), heel drops off step, foam roller pec stretch, foam roller thoracic mobs and upper back rolling, lateral stretch on bolster (on her own HS/Add/ITB with strap), Strengthing: S/L hip abd, bridge/S/L bridge, bicycles, plank, side plank, rows, scapular retractions, birddog, lat pull down L2, monster walks L3, side stepping L3, standing hip abd L2    Updates to plan of care 3/24/22: update POC 12 visits   Plan for next session Continue to modify HEP based on patient progress. Continue with counterstrain therapy.   Comments   Comments ref provider:  Chelo Park, DO. Patient presents with chronic (8 year) constant right sided LBP and low level tingling in R calf that can become painful. Patient also presents with tightness and pain in right hip flexors that is aggrevated with acitivity such as walking.   Total Session Time   Timed Code Treatment Minutes 60   Total Treatment Time (sum of timed and untimed services) 60   AMBULATORY CLINICS ONLY-MEDICAL AND TREATMENT DIAGNOSIS   Medical Diagnosis Right hip flexor tightness, low back pain, chronic thoracic  back pain (new order added )   PT Diagnosis Right sided LBP w/o sciatica, right hip flexor pain, right calf pain

## 2022-05-24 ENCOUNTER — HOSPITAL ENCOUNTER (OUTPATIENT)
Dept: PHYSICAL THERAPY | Facility: REHABILITATION | Age: 32
Discharge: HOME | End: 2022-05-24
Payer: COMMERCIAL

## 2022-05-24 DIAGNOSIS — M79.661 RIGHT CALF PAIN: ICD-10-CM

## 2022-05-24 DIAGNOSIS — M54.50 CHRONIC RIGHT-SIDED LOW BACK PAIN WITHOUT SCIATICA: ICD-10-CM

## 2022-05-24 DIAGNOSIS — M54.50 LUMBAR BACK PAIN: ICD-10-CM

## 2022-05-24 DIAGNOSIS — K59.00 CONSTIPATION, UNSPECIFIED CONSTIPATION TYPE: ICD-10-CM

## 2022-05-24 DIAGNOSIS — M24.551 RIGHT HIP FLEXOR TIGHTNESS: Primary | ICD-10-CM

## 2022-05-24 DIAGNOSIS — G89.29 CHRONIC RIGHT-SIDED LOW BACK PAIN WITHOUT SCIATICA: ICD-10-CM

## 2022-05-24 PROCEDURE — 97140 MANUAL THERAPY 1/> REGIONS: CPT | Mod: GP | Performed by: PHYSICAL THERAPIST

## 2022-05-24 NOTE — PROGRESS NOTES
05/24/22 1000   Signing Clinician's Name / Credentials   Signing clinician's name / credentials Lashay Lozano PT   Session Number   Session Number 35/12+12+12 (36)   Additional Session Number 3/24/22: update POC 12 visits   Progress Note/Recertification   Progress Note Completed Date 05/06/22   Ortho Goal 1   Goal Identifier Exercise   Goal Description Patient will return to desired exercise regimen without pain or discomfort to allow for participation in active hobbies. 30 min such as skiing.   (Reassess goal based on findings of following treatment.)   Goal Progress Performing strengthening routine for 30 min 3-4x a week without increase in pain    Target Date 01/20/22   Date Met 02/04/22   Ortho Goal 2   Goal Identifier Sitting   Goal Description Patient will be able to sit for 2 hours without provocation of LBP to allow for comfort in class.  (Reassess goal based on findings of following treatment.)   Goal Progress Depends on the day. Tolerance 30-60 min w/out increased pain.    Target Date 01/20/22   Ortho Goal 3   Goal Identifier Walking   Goal Description Patient will be capable of walking 2-3 miles with pain < or = to 2/10 to facilitate her return to hiking.  (Reassess based on findings of following treatment)   Goal Progress able to walk 2 miles without exacerbation   Target Date 04/01/22   Date Met 03/28/22   Ortho Goal 4   Goal Identifier Strength   Goal Description Patient will be able to achieve a hip flexion MMT grade of 5/5 to demonstrate imrpovement in hip flexor strength needed for desired hobbies.  (Reassess based on findings of following treatment)   Goal Progress 4+/5 bilaterally on 2/4/22 - progressing    Target Date 04/01/22   Subjective Report   Subjective Report Bowel function is about the same. Low back is achy, also (L) shoulder. She liked the vagus nn work, states it helped with breathing and anxiety. She is having a home visit to have some stool testing done.   Objective Measure 1    Details cranial scan (+) for upper cervical periosteals. cervical lymphatics   Objective Measure 2   Details Tenderness of (B) upper cervical spine, inf occiput, (L) med/lat prox arm, aant/upper rib cage   Manual Therapy   Manual Therapy: Mobilization, MFR, MLD, friction massage minutes (75272) 55   Skilled Intervention MFR, counterstrain   Patient Response good, upper cervical area feels less compressed   Treatment Detail (Assessed cranial clearing check list) (B) C1F(P), (B) C1E(P), (R) C1L(P), (L) C1PA(P), (R) MAGLM(P), (L) C1LM(P), (R) MAGPA(P), (L) EPIC1-3-LV, (L) PEPIC1-4-LV, (R) SVEC1-2-LV, (R) IJ-LV, (R) UJUG-LV,   Assessments Completed   Assessments Completed Patient is responding appropriately to PT interventions. HEP compliance is good.    Education   Learner Patient   Readiness Acceptance   Method Booklet/handout;Explanation;Demonstration   Response Verbalizes Understanding;Demonstrates Understanding   Plan   Homework Has been doing HEP every other day.  Stretch every night before bed.  Every other day is performing 1/2 exercises.  Moving forward pt will continue to see Lashay Lozano PT for manual therapy.  Exercsie pt will continue to walk and perform HEP - pt is pleased that she has a program that does not cause pain or injury.     Home program Stretching: hip flexor stretch with leg off table, pigeon stretch, downward dog (not printed on PTRX), heel drops off step, foam roller pec stretch, foam roller thoracic mobs and upper back rolling, lateral stretch on bolster (on her own HS/Add/ITB with strap), Strengthing: S/L hip abd, bridge/S/L bridge, bicycles, plank, side plank, rows, scapular retractions, birddog, lat pull down L2, monster walks L3, side stepping L3, standing hip abd L2    Updates to plan of care 3/24/22: update POC 12 visits   Plan for next session Continue to modify HEP based on patient progress. Continue with counterstrain therapy.   Comments   Comments ref provider: Chelo Park  R, DO. Patient presents with chronic (8 year) constant right sided LBP and low level tingling in R calf that can become painful. Patient also presents with tightness and pain in right hip flexors that is aggrevated with acitivity such as walking.   Total Session Time   Timed Code Treatment Minutes 55   Total Treatment Time (sum of timed and untimed services) 55   AMBULATORY CLINICS ONLY-MEDICAL AND TREATMENT DIAGNOSIS   Medical Diagnosis Right hip flexor tightness, low back pain, chronic thoracic  back pain (new order added )   PT Diagnosis Right sided LBP w/o sciatica, right hip flexor pain, right calf pain

## 2022-05-27 ENCOUNTER — HOSPITAL ENCOUNTER (OUTPATIENT)
Dept: PHYSICAL THERAPY | Facility: REHABILITATION | Age: 32
Discharge: HOME | End: 2022-05-27
Payer: COMMERCIAL

## 2022-05-27 DIAGNOSIS — M54.50 CHRONIC RIGHT-SIDED LOW BACK PAIN WITHOUT SCIATICA: ICD-10-CM

## 2022-05-27 DIAGNOSIS — G89.29 CHRONIC RIGHT-SIDED LOW BACK PAIN WITHOUT SCIATICA: ICD-10-CM

## 2022-05-27 DIAGNOSIS — M79.661 RIGHT CALF PAIN: ICD-10-CM

## 2022-05-27 DIAGNOSIS — M54.50 LUMBAR BACK PAIN: ICD-10-CM

## 2022-05-27 DIAGNOSIS — K59.00 CONSTIPATION, UNSPECIFIED CONSTIPATION TYPE: ICD-10-CM

## 2022-05-27 DIAGNOSIS — M24.551 RIGHT HIP FLEXOR TIGHTNESS: Primary | ICD-10-CM

## 2022-05-27 PROCEDURE — 97140 MANUAL THERAPY 1/> REGIONS: CPT | Mod: GP | Performed by: PHYSICAL THERAPIST

## 2022-05-27 NOTE — PROGRESS NOTES
05/27/22 1500   Signing Clinician's Name / Credentials   Signing clinician's name / credentials Lashay Lozano PT   Session Number   Session Number 36/12+12+12+12 (48)   Additional Session Number 5/27/22: update POC 12 visits   Progress Note/Recertification   Progress Note Completed Date 05/06/22   Ortho Goal 1   Goal Identifier Exercise   Goal Description Patient will return to desired exercise regimen without pain or discomfort to allow for participation in active hobbies. 30 min such as skiing.   (Reassess goal based on findings of following treatment.)   Goal Progress Performing strengthening routine for 30 min 3-4x a week without increase in pain    Target Date 01/20/22   Date Met 02/04/22   Ortho Goal 2   Goal Identifier Sitting   Goal Description Patient will be able to sit for 2 hours without provocation of LBP to allow for comfort in class.  (Reassess goal based on findings of following treatment.)   Goal Progress Depends on the day. Tolerance 30-60 min w/out increased pain.    Target Date 01/20/22   Ortho Goal 3   Goal Identifier Walking   Goal Description Patient will be capable of walking 2-3 miles with pain < or = to 2/10 to facilitate her return to hiking.  (Reassess based on findings of following treatment)   Goal Progress able to walk 2 miles without exacerbation   Target Date 04/01/22   Date Met 03/28/22   Ortho Goal 4   Goal Identifier Strength   Goal Description Patient will be able to achieve a hip flexion MMT grade of 5/5 to demonstrate imrpovement in hip flexor strength needed for desired hobbies.  (Reassess based on findings of following treatment)   Goal Progress 4+/5 bilaterally on 2/4/22 - progressing    Target Date 04/01/22   Subjective Report   Subjective Report Had bowel movement immediately after the last session, and several times during the day. Shoulder and low back both felt better after the last session. Head and neck felt lighter and better. Has done some enemas due to discomfort  after eating.   Objective Measure 1   Details cranial scan (+) for cranial periosteals.   Objective Measure 2   Details motion testing restricted in vomer, maxilla, ethmoid,   Objective Measure 3   Details Tender pts along nasal aperture, lacrimal bone,   Objective Measure 4   Details Mild restrictions of sphincters.   Manual Therapy   Manual Therapy: Mobilization, MFR, MLD, friction massage minutes (44911) 55   Skilled Intervention MFR, counterstrain   Patient Response good,   Treatment Detail SBF-N, (L) LC1-MS, (L) CER-LV, (L) VOMS(P),(R) ETHI(P), (L) MAXM(P), (L) MAXA(P), (R) ORBI(P), iliocecal valve,   Assessments Completed   Assessments Completed Patient is responding appropriately to PT interventions. HEP compliance is good.    Education   Learner Patient   Readiness Acceptance   Method Booklet/handout;Explanation;Demonstration   Response Verbalizes Understanding;Demonstrates Understanding   Plan   Homework Has been doing HEP every other day.  Stretch every night before bed.  Every other day is performing 1/2 exercises.  Moving forward pt will continue to see Lashay Lozano PT for manual therapy.  Exercsie pt will continue to walk and perform HEP - pt is pleased that she has a program that does not cause pain or injury.     Home program Stretching: hip flexor stretch with leg off table, pigeon stretch, downward dog (not printed on PTRX), heel drops off step, foam roller pec stretch, foam roller thoracic mobs and upper back rolling, lateral stretch on bolster (on her own HS/Add/ITB with strap), Strengthing: S/L hip abd, bridge/S/L bridge, bicycles, plank, side plank, rows, scapular retractions, birddog, lat pull down L2, monster walks L3, side stepping L3, standing hip abd L2    Updates to plan of care 5/27/22: update POC 12 visits   Plan for next session Continue to modify HEP based on patient progress. Continue with counterstrain therapy.   Comments   Comments ref provider: Chelo Park DO. Patient  presents with chronic (8 year) constant right sided LBP and low level tingling in R calf that can become painful. Patient also presents with tightness and pain in right hip flexors that is aggrevated with acitivity such as walking.   Total Session Time   Timed Code Treatment Minutes 55   Total Treatment Time (sum of timed and untimed services) 55   AMBULATORY CLINICS ONLY-MEDICAL AND TREATMENT DIAGNOSIS   Medical Diagnosis Right hip flexor tightness, low back pain, chronic thoracic  back pain (new order added )   PT Diagnosis Right sided LBP w/o sciatica, right hip flexor pain, right calf pain

## 2022-05-31 ENCOUNTER — HOSPITAL ENCOUNTER (OUTPATIENT)
Dept: PHYSICAL THERAPY | Facility: REHABILITATION | Age: 32
Discharge: HOME | End: 2022-05-31
Payer: COMMERCIAL

## 2022-05-31 DIAGNOSIS — G89.29 CHRONIC RIGHT-SIDED LOW BACK PAIN WITHOUT SCIATICA: ICD-10-CM

## 2022-05-31 DIAGNOSIS — M54.50 CHRONIC RIGHT-SIDED LOW BACK PAIN WITHOUT SCIATICA: ICD-10-CM

## 2022-05-31 DIAGNOSIS — M79.661 RIGHT CALF PAIN: ICD-10-CM

## 2022-05-31 DIAGNOSIS — K59.00 CONSTIPATION, UNSPECIFIED CONSTIPATION TYPE: ICD-10-CM

## 2022-05-31 DIAGNOSIS — M24.551 RIGHT HIP FLEXOR TIGHTNESS: Primary | ICD-10-CM

## 2022-05-31 DIAGNOSIS — M54.50 LUMBAR BACK PAIN: ICD-10-CM

## 2022-05-31 PROCEDURE — 97140 MANUAL THERAPY 1/> REGIONS: CPT | Mod: GP | Performed by: PHYSICAL THERAPIST

## 2022-05-31 NOTE — PROGRESS NOTES
05/31/22 1100   Signing Clinician's Name / Credentials   Signing clinician's name / credentials Lashay Lozano PT   Session Number   Session Number 37/12+12+12+12 (48)   Additional Session Number 5/27/22: update POC 12 visits   Progress Note/Recertification   Progress Note Completed Date 05/06/22   Ortho Goal 1   Goal Identifier Exercise   Goal Description Patient will return to desired exercise regimen without pain or discomfort to allow for participation in active hobbies. 30 min such as skiing.   (Reassess goal based on findings of following treatment.)   Goal Progress Performing strengthening routine for 30 min 3-4x a week without increase in pain    Target Date 01/20/22   Date Met 02/04/22   Ortho Goal 2   Goal Identifier Sitting   Goal Description Patient will be able to sit for 2 hours without provocation of LBP to allow for comfort in class.  (Reassess goal based on findings of following treatment.)   Goal Progress Depends on the day. Tolerance 30-60 min w/out increased pain.    Target Date 01/20/22   Ortho Goal 3   Goal Identifier Walking   Goal Description Patient will be capable of walking 2-3 miles with pain < or = to 2/10 to facilitate her return to hiking.  (Reassess based on findings of following treatment)   Goal Progress able to walk 2 miles without exacerbation   Target Date 04/01/22   Date Met 03/28/22   Ortho Goal 4   Goal Identifier Strength   Goal Description Patient will be able to achieve a hip flexion MMT grade of 5/5 to demonstrate imrpovement in hip flexor strength needed for desired hobbies.  (Reassess based on findings of following treatment)   Goal Progress 4+/5 bilaterally on 2/4/22 - progressing    Target Date 04/01/22   Subjective Report   Subjective Report Has been able to have a bowel movement int the afternoon more frequently after the last couple treatments. Still not able to have a bowel movement before bed, so is still doing an enema in the evening. Has not tried abdominal  massage discussed at a previous visit.   Objective Measure 1   Details initial cranial scan (+) for cranial periosteals, sphincters.   Objective Measure 2   Details Motion restrictions and associated tender pts of sphenoid, orbit, styloid, frontal, parietal.   Objective Measure 3   Details motility restrictions of iliocecal valve, DJ jct.   Manual Therapy   Manual Therapy: Mobilization, MFR, MLD, friction massage minutes ([id number]) 55   Skilled Intervention MFR, counterstrain   Patient Response good,   Treatment Detail (L) FRPL(P), (R) SHILPA(P), (L) STYP(P), (R) STYL(P), (L) OMAS(P), (L) ORBI(P), iliocecal valve, DJ jct,   Assessments Completed   Assessments Completed Patient is responding appropriately to PT interventions. HEP compliance is good.    Education   Learner Patient   Readiness Acceptance   Method Booklet/handout;Explanation;Demonstration   Response Verbalizes Understanding;Demonstrates Understanding   Plan   Homework Has been doing HEP every other day.  Stretch every night before bed.  Every other day is performing 1/2 exercises.  Moving forward pt will continue to see Lashay Lozano PT for manual therapy.  Exercsie pt will continue to walk and perform HEP - pt is pleased that she has a program that does not cause pain or injury.     Home program Stretching: hip flexor stretch with leg off table, pigeon stretch, downward dog (not printed on PTRX), heel drops off step, foam roller pec stretch, foam roller thoracic mobs and upper back rolling, lateral stretch on bolster (on her own HS/Add/ITB with strap), Strengthing: S/L hip abd, bridge/S/L bridge, bicycles, plank, side plank, rows, scapular retractions, birddog, lat pull down L2, monster walks L3, side stepping L3, standing hip abd L2    Updates to plan of care 5/27/22: update POC 12 visits   Plan for next session Continue to modify HEP based on patient progress. Continue with counterstrain and manual therapy.   Comments   Comments ref provider: Vivian  Chelo POND DO. Patient presents with chronic (8 year) constant right sided LBP and low level tingling in R calf that can become painful. Patient also presents with tightness and pain in right hip flexors that is aggrevated with acitivity such as walking.   Total Session Time   Timed Code Treatment Minutes 55   Total Treatment Time (sum of timed and untimed services) 55   AMBULATORY CLINICS ONLY-MEDICAL AND TREATMENT DIAGNOSIS   Medical Diagnosis Right hip flexor tightness, low back pain, chronic thoracic  back pain (new order added )   PT Diagnosis Right sided LBP w/o sciatica, right hip flexor pain, right calf pain

## 2022-06-03 ENCOUNTER — HOSPITAL ENCOUNTER (OUTPATIENT)
Dept: PHYSICAL THERAPY | Facility: REHABILITATION | Age: 32
Discharge: HOME | End: 2022-06-03
Payer: COMMERCIAL

## 2022-06-03 DIAGNOSIS — G89.29 CHRONIC RIGHT-SIDED LOW BACK PAIN WITHOUT SCIATICA: ICD-10-CM

## 2022-06-03 DIAGNOSIS — M54.50 CHRONIC RIGHT-SIDED LOW BACK PAIN WITHOUT SCIATICA: ICD-10-CM

## 2022-06-03 DIAGNOSIS — M79.661 RIGHT CALF PAIN: ICD-10-CM

## 2022-06-03 DIAGNOSIS — M54.50 LUMBAR BACK PAIN: ICD-10-CM

## 2022-06-03 DIAGNOSIS — K59.00 CONSTIPATION, UNSPECIFIED CONSTIPATION TYPE: ICD-10-CM

## 2022-06-03 DIAGNOSIS — M24.551 RIGHT HIP FLEXOR TIGHTNESS: Primary | ICD-10-CM

## 2022-06-03 PROCEDURE — 97140 MANUAL THERAPY 1/> REGIONS: CPT | Mod: GP | Performed by: PHYSICAL THERAPIST

## 2022-06-03 NOTE — PROGRESS NOTES
06/03/22 1400   Signing Clinician's Name / Credentials   Signing clinician's name / credentials Lashay Lozano PT   Session Number   Session Number 38/12+12+12+12 (48)   Additional Session Number 5/27/22: update POC 12 visits   Progress Note/Recertification   Progress Note Completed Date 05/06/22   Ortho Goal 1   Goal Identifier Exercise   Goal Description Patient will return to desired exercise regimen without pain or discomfort to allow for participation in active hobbies. 30 min such as skiing.   (Reassess goal based on findings of following treatment.)   Goal Progress Performing strengthening routine for 30 min 3-4x a week without increase in pain    Target Date 01/20/22   Date Met 02/04/22   Ortho Goal 2   Goal Identifier Sitting   Goal Description Patient will be able to sit for 2 hours without provocation of LBP to allow for comfort in class.  (Reassess goal based on findings of following treatment.)   Goal Progress Depends on the day. Tolerance 30-60 min w/out increased pain.    Target Date 01/20/22   Ortho Goal 3   Goal Identifier Walking   Goal Description Patient will be capable of walking 2-3 miles with pain < or = to 2/10 to facilitate her return to hiking.  (Reassess based on findings of following treatment)   Goal Progress able to walk 2 miles without exacerbation   Target Date 04/01/22   Date Met 03/28/22   Ortho Goal 4   Goal Identifier Strength   Goal Description Patient will be able to achieve a hip flexion MMT grade of 5/5 to demonstrate imrpovement in hip flexor strength needed for desired hobbies.  (Reassess based on findings of following treatment)   Goal Progress 4+/5 bilaterally on 2/4/22 - progressing    Target Date 04/01/22   Subjective Report   Subjective Report No major changes, but has had 3 BMs so far today. Still feeling backed up in the evening. LB and chest/shoulder pain seems better. Also recalls she had a concussion when she was about 12.   Objective Measure 1   Details initial  cranial scan (+) for cranial periosteals and scar.   Objective Measure 2   Details Fascial restrictions and associated tender pts of temporals, ethmoid, occiput, small intestine, mesenteric root   Objective Measure 3   Details motion testing restrictions of ethmoid, temporals.   Manual Therapy   Manual Therapy: Mobilization, MFR, MLD, friction massage minutes ([id number]) 55   Skilled Intervention MFR, counterstrain   Patient Response good,   Treatment Detail (R) ETHI(P), (R) TEMPI(P)(scar), (L) TEMPS(P), (L) ZYGS(P), (R) ZYGI(P), (R) OMPS(P)(scar), small intestine, mesenteric root   Assessments Completed   Assessments Completed Patient is responding appropriately to PT interventions. HEP compliance is good.    Education   Learner Patient   Readiness Acceptance   Method Booklet/handout;Explanation;Demonstration   Response Verbalizes Understanding;Demonstrates Understanding   Plan   Homework Has been doing HEP every other day.  Stretch every night before bed.  Every other day is performing 1/2 exercises.  Moving forward pt will continue to see Lashay Lozano PT for manual therapy.  Exercsie pt will continue to walk and perform HEP - pt is pleased that she has a program that does not cause pain or injury.     Home program Stretching: hip flexor stretch with leg off table, pigeon stretch, downward dog (not printed on PTRX), heel drops off step, foam roller pec stretch, foam roller thoracic mobs and upper back rolling, lateral stretch on bolster (on her own HS/Add/ITB with strap), Strengthing: S/L hip abd, bridge/S/L bridge, bicycles, plank, side plank, rows, scapular retractions, birddog, lat pull down L2, monster walks L3, side stepping L3, standing hip abd L2    Updates to plan of care 5/27/22: update POC 12 visits   Plan for next session Continue to modify HEP based on patient progress. Continue with counterstrain and manual therapy.   Comments   Comments ref provider: Chelo Park DO. Patient presents with chronic  (8 year) constant right sided LBP and low level tingling in R calf that can become painful. Patient also presents with tightness and pain in right hip flexors that is aggrevated with acitivity such as walking.   Total Session Time   Timed Code Treatment Minutes 55   Total Treatment Time (sum of timed and untimed services) 55   AMBULATORY CLINICS ONLY-MEDICAL AND TREATMENT DIAGNOSIS   Medical Diagnosis Right hip flexor tightness, low back pain, chronic thoracic  back pain (new order added )   PT Diagnosis Right sided LBP w/o sciatica, right hip flexor pain, right calf pain

## 2022-06-06 ENCOUNTER — HOSPITAL ENCOUNTER (OUTPATIENT)
Dept: PHYSICAL THERAPY | Facility: REHABILITATION | Age: 32
Discharge: HOME | End: 2022-06-06
Payer: COMMERCIAL

## 2022-06-06 DIAGNOSIS — K59.00 CONSTIPATION, UNSPECIFIED CONSTIPATION TYPE: ICD-10-CM

## 2022-06-06 DIAGNOSIS — M54.50 CHRONIC RIGHT-SIDED LOW BACK PAIN WITHOUT SCIATICA: ICD-10-CM

## 2022-06-06 DIAGNOSIS — M54.50 LUMBAR BACK PAIN: ICD-10-CM

## 2022-06-06 DIAGNOSIS — M79.661 RIGHT CALF PAIN: ICD-10-CM

## 2022-06-06 DIAGNOSIS — G89.29 CHRONIC RIGHT-SIDED LOW BACK PAIN WITHOUT SCIATICA: ICD-10-CM

## 2022-06-06 DIAGNOSIS — M24.551 RIGHT HIP FLEXOR TIGHTNESS: Primary | ICD-10-CM

## 2022-06-06 PROCEDURE — 97140 MANUAL THERAPY 1/> REGIONS: CPT | Mod: GP | Performed by: PHYSICAL THERAPIST

## 2022-06-06 NOTE — PROGRESS NOTES
06/06/22 1500   Signing Clinician's Name / Credentials   Signing clinician's name / credentials Lashay Lozano PT   Session Number   Session Number 39/12+12+12+12 (48)   Additional Session Number 5/27/22: update POC 12 visits   Progress Note/Recertification   Progress Note Completed Date 05/06/22   Ortho Goal 1   Goal Identifier Exercise   Goal Description Patient will return to desired exercise regimen without pain or discomfort to allow for participation in active hobbies. 30 min such as skiing.   (Reassess goal based on findings of following treatment.)   Goal Progress Performing strengthening routine for 30 min 3-4x a week without increase in pain    Target Date 01/20/22   Date Met 02/04/22   Ortho Goal 2   Goal Identifier Sitting   Goal Description Patient will be able to sit for 2 hours without provocation of LBP to allow for comfort in class.  (Reassess goal based on findings of following treatment.)   Goal Progress Depends on the day. Tolerance 30-60 min w/out increased pain.    Target Date 01/20/22   Ortho Goal 3   Goal Identifier Walking   Goal Description Patient will be capable of walking 2-3 miles with pain < or = to 2/10 to facilitate her return to hiking.  (Reassess based on findings of following treatment)   Goal Progress able to walk 2 miles without exacerbation   Target Date 04/01/22   Date Met 03/28/22   Ortho Goal 4   Goal Identifier Strength   Goal Description Patient will be able to achieve a hip flexion MMT grade of 5/5 to demonstrate imrpovement in hip flexor strength needed for desired hobbies.  (Reassess based on findings of following treatment)   Goal Progress 4+/5 bilaterally on 2/4/22 - progressing    Target Date 04/01/22   Subjective Report   Subjective Report Has been feeling woozy, a little dizzy and faint, since the last session.  A little better today. Reports a history of vertigo, BPPV. Constipation has also been a little worse. Increased constipation symptoms immediately after  eating.   Objective Measure 1   Details fascial restrictions of (L) temporal noted. Asymmetrical motion between R/L temporals.   Objective Measure 2   Details Fascial restrictions of sphincters, particularly pylorus and oddi.   Manual Therapy   Manual Therapy: Mobilization, MFR, MLD, friction massage minutes ([id number]) 55   Skilled Intervention MFR, counterstrain   Patient Response good,   Treatment Detail cranial base release, temporal wobble, finger in ear, ear pull, cardiac sphincter, (R) EUSI(C)-MS, DJ jct, pylorus, oddi, iliocecal valve   Assessments Completed   Assessments Completed Patient is responding appropriately to PT interventions. HEP compliance is good.    Education   Learner Patient   Readiness Acceptance   Method Booklet/handout;Explanation;Demonstration   Response Verbalizes Understanding;Demonstrates Understanding   Plan   Homework Has been doing HEP every other day.  Stretch every night before bed.  Every other day is performing 1/2 exercises.  Moving forward pt will continue to see Lashay Lozano, PT for manual therapy.  Exercsie pt will continue to walk and perform HEP - pt is pleased that she has a program that does not cause pain or injury.     Home program Stretching: hip flexor stretch with leg off table, pigeon stretch, downward dog (not printed on PTRX), heel drops off step, foam roller pec stretch, foam roller thoracic mobs and upper back rolling, lateral stretch on bolster (on her own HS/Add/ITB with strap), Strengthing: S/L hip abd, bridge/S/L bridge, bicycles, plank, side plank, rows, scapular retractions, birddog, lat pull down L2, monster walks L3, side stepping L3, standing hip abd L2    Updates to plan of care 5/27/22: update POC 12 visits   Plan for next session Continue to modify HEP based on patient progress. Continue with counterstrain and manual therapy.   Comments   Comments ref provider: Chelo Park DO. Patient presents with chronic (8 year) constant right sided LBP and  low level tingling in R calf that can become painful. Patient also presents with tightness and pain in right hip flexors that is aggrevated with acitivity such as walking.   Total Session Time   Timed Code Treatment Minutes 55   Total Treatment Time (sum of timed and untimed services) 55   AMBULATORY CLINICS ONLY-MEDICAL AND TREATMENT DIAGNOSIS   Medical Diagnosis Right hip flexor tightness, low back pain, chronic thoracic  back pain (new order added )   PT Diagnosis Right sided LBP w/o sciatica, right hip flexor pain, right calf pain

## 2022-06-08 ENCOUNTER — MYC MEDICAL ADVICE (OUTPATIENT)
Dept: FAMILY MEDICINE | Facility: CLINIC | Age: 32
End: 2022-06-08
Payer: COMMERCIAL

## 2022-06-08 DIAGNOSIS — R42 VERTIGO: Primary | ICD-10-CM

## 2022-06-10 ENCOUNTER — TELEPHONE (OUTPATIENT)
Dept: AUDIOLOGY | Facility: CLINIC | Age: 32
End: 2022-06-10
Payer: COMMERCIAL

## 2022-06-10 ENCOUNTER — OFFICE VISIT (OUTPATIENT)
Dept: INTERNAL MEDICINE | Facility: CLINIC | Age: 32
End: 2022-06-10
Payer: COMMERCIAL

## 2022-06-10 VITALS
SYSTOLIC BLOOD PRESSURE: 112 MMHG | HEART RATE: 78 BPM | OXYGEN SATURATION: 99 % | WEIGHT: 131 LBS | BODY MASS INDEX: 19.85 KG/M2 | DIASTOLIC BLOOD PRESSURE: 67 MMHG | HEIGHT: 68 IN

## 2022-06-10 DIAGNOSIS — E06.3 HASHIMOTO'S DISEASE: ICD-10-CM

## 2022-06-10 DIAGNOSIS — H93.13 TINNITUS, BILATERAL: ICD-10-CM

## 2022-06-10 DIAGNOSIS — R42 DIZZINESS: ICD-10-CM

## 2022-06-10 DIAGNOSIS — R42 VERTIGO: Primary | ICD-10-CM

## 2022-06-10 LAB
ALBUMIN SERPL-MCNC: 3.9 G/DL (ref 3.5–5)
ALP SERPL-CCNC: 46 U/L (ref 45–120)
ALT SERPL W P-5'-P-CCNC: 12 U/L (ref 0–45)
ANION GAP SERPL CALCULATED.3IONS-SCNC: 9 MMOL/L (ref 5–18)
AST SERPL W P-5'-P-CCNC: 16 U/L (ref 0–40)
BASOPHILS # BLD AUTO: 0.1 10E3/UL (ref 0–0.2)
BASOPHILS NFR BLD AUTO: 1 %
BILIRUB SERPL-MCNC: 0.3 MG/DL (ref 0–1)
BUN SERPL-MCNC: 9 MG/DL (ref 8–22)
CALCIUM SERPL-MCNC: 9.4 MG/DL (ref 8.5–10.5)
CHLORIDE BLD-SCNC: 105 MMOL/L (ref 98–107)
CO2 SERPL-SCNC: 26 MMOL/L (ref 22–31)
CREAT SERPL-MCNC: 0.78 MG/DL (ref 0.6–1.3)
EOSINOPHIL # BLD AUTO: 0 10E3/UL (ref 0–0.7)
EOSINOPHIL NFR BLD AUTO: 1 %
ERYTHROCYTE [DISTWIDTH] IN BLOOD BY AUTOMATED COUNT: 12.4 % (ref 10–15)
GFR SERPL CREATININE-BSD FRML MDRD: >90 ML/MIN/1.73M2
GLUCOSE BLD-MCNC: 92 MG/DL (ref 70–125)
HCT VFR BLD AUTO: 39.1 % (ref 35–53)
HGB BLD-MCNC: 12.9 G/DL (ref 11.7–17.7)
IMM GRANULOCYTES # BLD: 0 10E3/UL
IMM GRANULOCYTES NFR BLD: 0 %
LYMPHOCYTES # BLD AUTO: 1.8 10E3/UL (ref 0.8–5.3)
LYMPHOCYTES NFR BLD AUTO: 25 %
MCH RBC QN AUTO: 31.5 PG (ref 26.5–33)
MCHC RBC AUTO-ENTMCNC: 33 G/DL (ref 31.5–36.5)
MCV RBC AUTO: 96 FL (ref 78–100)
MONOCYTES # BLD AUTO: 0.7 10E3/UL (ref 0–1.3)
MONOCYTES NFR BLD AUTO: 9 %
NEUTROPHILS # BLD AUTO: 4.7 10E3/UL (ref 1.6–8.3)
NEUTROPHILS NFR BLD AUTO: 65 %
PLATELET # BLD AUTO: 333 10E3/UL (ref 150–450)
POTASSIUM BLD-SCNC: 4.2 MMOL/L (ref 3.5–5)
PROT SERPL-MCNC: 6.7 G/DL (ref 6–8)
RBC # BLD AUTO: 4.09 10E6/UL (ref 3.8–5.9)
SODIUM SERPL-SCNC: 140 MMOL/L (ref 136–145)
T3FREE SERPL-MCNC: 2.5 PG/ML (ref 1.6–3.9)
T4 FREE SERPL-MCNC: 1.04 NG/DL (ref 0.7–1.8)
WBC # BLD AUTO: 7.3 10E3/UL (ref 4–11)

## 2022-06-10 PROCEDURE — 84439 ASSAY OF FREE THYROXINE: CPT | Performed by: INTERNAL MEDICINE

## 2022-06-10 PROCEDURE — 36415 COLL VENOUS BLD VENIPUNCTURE: CPT | Performed by: INTERNAL MEDICINE

## 2022-06-10 PROCEDURE — 85025 COMPLETE CBC W/AUTO DIFF WBC: CPT | Performed by: INTERNAL MEDICINE

## 2022-06-10 PROCEDURE — 99203 OFFICE O/P NEW LOW 30 MIN: CPT | Performed by: INTERNAL MEDICINE

## 2022-06-10 PROCEDURE — 84481 FREE ASSAY (FT-3): CPT | Performed by: INTERNAL MEDICINE

## 2022-06-10 PROCEDURE — 80053 COMPREHEN METABOLIC PANEL: CPT | Performed by: INTERNAL MEDICINE

## 2022-06-10 NOTE — PROGRESS NOTES
Office Visit - New Patient   Agustina Nam   31 year old  adult    Date of visit: 6/10/2022  Physician: KAMRAN LONG MD, MD     Assessment and Plan    Initial visit [facility] general internal medicine, for evaluation of symptoms of    Dizziness with vertiginous quality, ringing in the ears, and visual disturbances, which started about 1 week ago on Friday, Tiana 3, worse over the past 3 days, 31-year-old (assigned female at birth, uses they/them pronouns), who comes to clinic today accompanied by partner    History is that Rustam experienced similar feeling vertigo about 6 years ago, while living in [facility], and that lasted approximately 1 week.  Since then, there have been intermittent much milder symptoms, but things really took off about a week ago, more intense dizziness, nausea, with a sensation of the room spinning . After undergoing craniosacral treatment for neck and back pain, which involved head being in a supine position with some lateral side to side rotation.  Has also been working with a chiropractor, and it also involves some head movements in the supine to side prone position.  When the symptoms are more severe, Rustam has noticed feeling somewhat lightheaded, but has not had any fainting spells    Ray reports some ringing that may occur in both ears.  Also some intermittent visual blurriness.  Rustam's PCP suggested the need for neuroimaging to rule out vascular causes.  For that reason, I have ordered an MRI and MRA.    Rustam does not have cardiovascular is factors, no smoking, no blood pressure problems, no cholesterol problems    On physical examination, I do not find any focal neurological deficit.  Facial movements are symmetrical, pupils equal, eye movements normal, both ear canals are clear, tympanic membranes normal, lungs clear, heart regular rate and rhythm, no edema in extremities.  Normal finger-to-nose testing, able to stand on 1 foot alternately.    Rustam was worried about potential  vascular causes, but because there have not been symptoms other symptoms referable to the brainstem, I am less concerned.  Those sort of symptoms would be difficulty swallowing, speaking, weakness on one side of the body, profound visual disturbance, paralysis, sudden loss of sensation    History of Hashimoto's thyroid disease, for which rate is on thyroid hormone replacement, will also check TSH, free T4, free T3, and also CBC and comprehensive metabolic panel  Takes a compounded T3-T4 thyroid preparation that is dispensed from [facility]    History of breakthrough case of COVID-19 April 2022 after getting third getting third shot of Pfizer in December    Immunization History   Administered Date(s) Administered     COVID-19,PF,Pfizer (12+ Yrs) 04/29/2021, 05/20/2021, 12/10/2021     DT (PEDS <7y) 04/28/1998, 06/15/2005     DTaP, Unspecified 04/28/1998     HPV Quadrivalent 08/15/2012, 07/19/2013     HepA, Unspecified 08/15/2012     Influenza Vaccine IM > 6 months Valent IIV4 (Alfuria,Fluzone) 02/04/2022     MMR 02/04/2022     Meningococcal (Menactra ) 08/15/2012     Poliovirus, inactivated (IPV) 04/28/1998     Td,adult,historic,unspecified 06/15/2005     Tdap (Adacel,Boostrix) 06/16/2021       ---------------------------------------------------------------------------------------------------------------------------  Chief Complaint   Chief Complaint   Patient presents with     Dizziness        ---------------------------------------------------------------------------------------------------------------------------  History of Present Illness  This 31 year old old     Initial visit [facility] general internal medicine, for evaluation of symptoms of    Dizziness with vertiginous quality, ringing in the ears, and visual disturbances, which started about 1 week ago on Friday, Tiana 3, worse over the past 3 days, 31-year-old (assigned female at birth, uses they/them pronouns), who comes to clinic today  accompanied by partner    History is that Rustam experienced similar feeling vertigo about 6 years ago, while living in [facility], and that lasted approximately 1 week.  Since then, there have been intermittent much milder symptoms, but things really took off about a week ago, more intense dizziness, nausea, with a sensation of the room spinning . After undergoing craniosacral treatment for neck and back pain, which involved head being in a supine position with some lateral side to side rotation.  Has also been working with a chiropractor, and it also involves some head movements in the supine to side prone position.  When the symptoms are more severe, Rustam has noticed feeling somewhat lightheaded, but has not had any fainting spells    Wt Readings from Last 3 Encounters:   06/10/22 59.4 kg (131 lb)   02/04/22 62.3 kg (137 lb 6.4 oz)   09/29/21 63.5 kg (140 lb)     BP Readings from Last 3 Encounters:   06/10/22 112/67   02/04/22 124/64   08/17/21 122/60       Lab Results   Component Value Date    WBC 7.9 08/17/2021    HGB 12.3 08/17/2021    HCT 37.4 08/17/2021     08/17/2021    CHOL 272 (H) 07/12/2017    TRIG 60 07/12/2017    HDL 78 07/12/2017    ALT <9 03/12/2019    AST 15 03/12/2019     03/12/2019    BUN 7 (L) 03/12/2019    CO2 26 03/12/2019    TSH 0.45 01/10/2022         Review of Systems: A comprehensive review of systems was negative except as noted.  ---------------------------------------------------------------------------------------------------------------------------    Medications and Allergies   Current Outpatient Medications   Medication Sig Dispense Refill     COMPOUNDED NON-CONTROLLED SUBSTANCE (CMPD RX) - PHARMACY TO MIX COMPOUNDED MEDICATION T3 - 5 MCG and T4 - 75 MCG capsule. Take one capsule by mouth daily. 90 capsule 3     medical cannabis (Patient's own supply) [MEDICAL CANNABIS (PATIENT'S OWN SUPPLY)] by Other route see administration instructions. (The purpose of this order  is to document that the patient reports taking medical cannabis. This is not a prescription, and is not used to certify that the patient has a  qualifying medical condition.)       No Known Allergies     Active Ambulatory Problems     Diagnosis Date Noted     Metrorrhagia      Chronic Sinusitis      Autoimmune disease (H) 04/09/2017     Irritable bowel syndrome 05/07/2017     Hashimoto's disease 05/07/2017     Postural dizziness with presyncope 05/16/2017     Adrenal disorder (H) 05/16/2017     Fatigue 10/11/2017     Low back pain of over 3 months duration 07/10/2020     Tension headache 07/10/2020     Right calf pain 07/10/2020     Anxiety 03/31/2017     Insomnia 03/31/2017     Iodine deficiency 03/31/2017     Pernicious anemia 03/31/2017     Small intestinal bacterial overgrowth 03/31/2017     Resolved Ambulatory Problems     Diagnosis Date Noted     No Resolved Ambulatory Problems     Past Medical History:   Diagnosis Date     Hashimoto's thyroiditis      Past Surgical History:   Procedure Laterality Date     WISDOM TOOTH EXTRACTION        Past Medical History:   Diagnosis Date     Hashimoto's thyroiditis         Family and Social History   Family History   Problem Relation Age of Onset     Depression Mother      Arthritis Mother      Anxiety Disorder Mother      Plantar fasciitis Mother      Chronic Infections Mother         Chronic Yeast Infection     Arthritis Father      Depression Father      Anxiety Disorder Father      Sinusitis Father      Irritable Bowel Syndrome Father      Sinusitis Maternal Grandmother         Chronic Sinus Congestion     Arthritis Maternal Grandmother      Uterine Cancer Paternal Grandmother      Irritable Bowel Syndrome Paternal Grandmother      Cerebrovascular Disease Paternal Grandmother      Dementia Paternal Grandmother      Hyperthyroidism Paternal Grandfather      Skin Cancer Paternal Grandfather      Prostate Cancer Paternal Grandfather      Breast Cancer No family hx of       "Colon Cancer No family hx of      Myocardial Infarction No family hx of         Social History     Tobacco Use     Smoking status: Never Smoker     Smokeless tobacco: Never Used   Substance Use Topics     Alcohol use: No     Drug use: Yes     Types: Marijuana     Comment: Drug use: medicinal Marijuana        Physical Exam   General Appearance:       /67 (BP Location: Right arm, Patient Position: Sitting, Cuff Size: Adult Regular)   Pulse 78   Ht 1.727 m (5' 8\")   Wt 59.4 kg (131 lb)   SpO2 99%   BMI 19.92 kg/m      On physical examination, I do not find any focal neurological deficit.  Facial movements are symmetrical, pupils equal, eye movements normal, both ear canals are clear, tympanic membranes normal, lungs clear, heart regular rate and rhythm, no edema in extremities.  Normal finger-to-nose testing, able to stand on 1 foot alternately.       Additional Information   I spent 30 minutes on this encounter, including reviewing interval history since last visit, examining the patient, explaining and counseling the issues enumerated in the Assessment and Plan (patient given a copy), ordering indicated tests     KAMRAN LONG MD, MD  Internal Medicine    "

## 2022-06-10 NOTE — PATIENT INSTRUCTIONS
Initial visit [facility] general internal medicine, for evaluation of symptoms of    Dizziness with vertiginous quality, ringing in the ears, and visual disturbances, which started about 1 week ago on Friday, Tiana 3, worse over the past 3 days, 31-year-old (assigned female at birth, uses they/them pronouns), who comes to clinic today accompanied by partner    History is that Rustam experienced similar feeling vertigo about 6 years ago, while living in [facility], and that lasted approximately 1 week.  Since then, there have been intermittent much milder symptoms, but things really took off about a week ago, more intense dizziness, nausea, with a sensation of the room spinning . After undergoing craniosacral treatment for neck and back pain, which involved head being in a supine position with some lateral side to side rotation.  Has also been working with a chiropractor, and it also involves some head movements in the supine to side prone position.  When the symptoms are more severe, Rustam has noticed feeling somewhat lightheaded, but has not had any fainting spells    Ray reports some ringing that may occur in both ears.  Also some intermittent visual blurriness.  Rustam's PCP suggested the need for neuroimaging to rule out vascular causes.  For that reason, I have ordered an MRI and MRA.    Rustam does not have cardiovascular is factors, no smoking, no blood pressure problems, no cholesterol problems    On physical examination, I do not find any focal neurological deficit.  Facial movements are symmetrical, pupils equal, eye movements normal, both ear canals are clear, tympanic membranes normal, lungs clear, heart regular rate and rhythm, no edema in extremities.  Normal finger-to-nose testing, able to stand on 1 foot alternately.    Rustam was worried about potential vascular causes, but because there have not been symptoms other symptoms referable to the brainstem, I am less concerned.  Those sort of symptoms would be  difficulty swallowing, speaking, weakness on one side of the body, profound visual disturbance, paralysis, sudden loss of sensation    History of Hashimoto's thyroid disease, for which rate is on thyroid hormone replacement, will also check TSH, free T4, free T3, and also CBC and comprehensive metabolic panel  Takes a compounded T3-T4 thyroid preparation that is dispensed from [facility]    History of breakthrough case of COVID-19 April 2022 after getting third getting third shot of Pfizer in December    Immunization History   Administered Date(s) Administered    COVID-19,PF,Pfizer (12+ Yrs) 04/29/2021, 05/20/2021, 12/10/2021    DT (PEDS <7y) 04/28/1998, 06/15/2005    DTaP, Unspecified 04/28/1998    HPV Quadrivalent 08/15/2012, 07/19/2013    HepA, Unspecified 08/15/2012    Influenza Vaccine IM > 6 months Valent IIV4 (Alfuria,Fluzone) 02/04/2022    MMR 02/04/2022    Meningococcal (Menactra ) 08/15/2012    Poliovirus, inactivated (IPV) 04/28/1998    Td,adult,historic,unspecified 06/15/2005    Tdap (Adacel,Boostrix) 06/16/2021

## 2022-06-10 NOTE — TELEPHONE ENCOUNTER
[facility] Call Center    Phone Message    May a detailed message be left on voicemail: yes     Reason for Call: Appointment Intake    Referring Provider Name: Chandrika Somers MD  Diagnosis and/or Symptoms: Vertigo    Sending TE per protocols.    Action Taken: Message routed to:  [facility] ([facility] Audiology    Travel Screening: Not Applicable

## 2022-06-15 ENCOUNTER — HOSPITAL ENCOUNTER (OUTPATIENT)
Dept: MRI IMAGING | Facility: CLINIC | Age: 32
Discharge: HOME | End: 2022-06-15
Attending: INTERNAL MEDICINE
Payer: COMMERCIAL

## 2022-06-15 DIAGNOSIS — R42 VERTIGO: ICD-10-CM

## 2022-06-15 DIAGNOSIS — H93.13 TINNITUS, BILATERAL: ICD-10-CM

## 2022-06-15 PROCEDURE — 70553 MRI BRAIN STEM W/O & W/DYE: CPT

## 2022-06-15 PROCEDURE — A9585 GADOBUTROL INJECTION: HCPCS | Performed by: INTERNAL MEDICINE

## 2022-06-15 PROCEDURE — 70544 MR ANGIOGRAPHY HEAD W/O DYE: CPT

## 2022-06-15 PROCEDURE — 255N000002 HC RX 255 OP 636: Performed by: INTERNAL MEDICINE

## 2022-06-15 RX ORDER — GADOBUTROL 604.72 MG/ML
6 INJECTION INTRAVENOUS ONCE
Status: COMPLETED | OUTPATIENT
Start: 2022-06-15 | End: 2022-06-15

## 2022-06-15 RX ADMIN — GADOBUTROL 6 ML: 604.72 INJECTION INTRAVENOUS at 16:13

## 2022-06-15 NOTE — PROGRESS NOTES
"AUDIOLOGY REPORT-BALANCE ASSESSMENT    SUBJECTIVE: Agustina Nam, 31 year old, was seen in Audiology at the [facility] on 6/22/2022, for videonystagmography (VNG) referred by Chandrika Somers M.D.    Patient reports with chief complaints of dizziness, nausea and unsteadiness. Patient reports symptoms initially onset on Tiana 3, 2022 around the time they were experiencing viral symptoms (reportedly not COVID). They report sudden onset of lightheadedness and low blood pressure following undergoing craniosacral work. They also report feeling \"woozy\" a few days after and feeling as though they \"may faint\". The following Tuesday, patient reports experiencing spinning vertigo. Patient reports experiencing dizziness and nausea in 2016 and underwent audiological vestibular diagnostics in [place]. Per review of that report dated 2/19/2016, patient was reportedly positive for right posterior\left anterior and right horizontal canal BPPV. Patient's calorics were within normal limits. Patient reports undergoing Epley maneuvers which did not significantly help their symptoms. After 2019, patient reports small episodes of symptoms every so often over the years.      Since Tiana 3, 2022, patient reports persistent symptoms. Approximately one month prior to current symptoms' onset, patient reports experiencing intermittent tinnitus that would subside after approximately one minute. Patient also reports that their ears feel constantly plugged (greater left); this has reportedly increased in severity lately. Patient also describes feeling as though their left ear is \"tight\" and the sound quality is \"fuzzy/not clear\". Per patient's outside audiological report dated 2/19/2016; patient reportedly has a high frequency sensorineural hearing loss bilaterally (no audiogram was available to review). Patient denies fluctuations in their hearing, otalgia and otorrhea.    Patient reports pressure " and a sensation of heaviness or tightness localized like a band across their forehead and eyes as well as at the back/base of their skull. Patient reports that their symptoms are constant but vary in severity. Patient reports times of increased symptoms last minutes up to hours in duration. Symptoms can be exacerbated by quick movements in any direction, lying down, and being in low-lit/dark environments.     Patient also reports episodes of hot flashes, vomiting and sometimes passing out over the years for which they have sought evaluation at the ED many times before. Patient clarifies this did not occur in conjunction with onset of symptoms on Tiana 3, 2022. Patient reports that using an enema has helped reduce the number of vomiting episodes. Patient reports history of one significant head injury sustained at the age of 12 while at [facility]. Patient reports loss of consciousness with the incident and notes that this is the first time they recall experiencing vertigo and vomiting. Patient reports lightheadedness and possible dizziness with pressure changes such as sneezing, blowing of the nose, lifting heavy items and bearing down. Patient denies autophony (eye blinks). Patient reports blurred vision which onset in June 2022 but has been progressively improving. Patient denies history of eye surgeries. Patient denies history of cancer or chemotherapy. Additional medical history includes hashimoto thyroid disease and breakthrough case of Covid April 2022. Patient denies consumption of caffeinated beverages, nicotine, alcoholic substances, or use of medications with known vestibular interactions within the past 48 hours.    OBJECTIVE:  Abuse Screening:  Do you feel unsafe at home or work/school? No  Do you feel threatened by someone? No  Does anyone try to keep you from having contact with others, or doing things outside of your home? No  Physical signs of abuse present? No    Videonystagmography (VNG)  testing:  Prescreening:  Tympanograms: Normal eardrum mobility bilaterally. Note: this test is completed to determine the status of the middle ear before irrigations are completed.   Ocular range of motion and ocular counter roll: Normal  Cross/cover: Normal  Head Thrust: Negative     Nystagmus Tests:  Gaze-Horizontal with Fixation:   Center: Normal   Right: Normal   Left: Normal  Gaze-Vertical with Fixation:   Up: Normal   Down: Normal  Gaze with Fixation Denied   Center: Slight intermittent 1-2 deg/s up beating nystagmus.    Right: 1 deg/s right beating nystagmus, likely endpoint/nonsignificant.    Left: 2 deg/s left beating nystagmus, likely endpoint/nonsignificant.    Up: No consistent nystagmus.   High Frequency Headshake:   Horizontal: Negative. 2 deg/s left beating nystagmus, patient reports increased dizziness post headshake.    Vertical: Negative. 1 deg/s right beating nystagmus, patient reports mild dizziness post headshake.     Fistula test Right ear: Negative. No nystagmus in 3 of 3 trials. Patient reports increased dizziness in 2 of 3 trials.   Fistula test Left ear: Possible Positive. Few left and up beats with corresponding increased symptoms in at least 2 of 3 trials.   Valsalva against Closed Glottis: Possible Positive. Few left and up beats with corresponding increased symptoms in at least 2 of 3 trials.   Valsalva against Closed Nostrils: Negative. No nystagmus in 3 of 3 trials. Patient reports increased dizziness in at least 2 of 3 trials.     Clyde-Hallpike Head Right: Negative for PC BPPV. No nystagmus, patient reports increased head and right ear pressure in supine. Seated: no nystagmus, patient reports mild dizziness.   Clyde-Hallpike Head Left: Negative for PC BPPV. Slight slow left rotary nystagmus mixed with few up beats (does not reverse with sitting up), patient reports increased head and left ear pressure in supine. Patient reports mild dizziness upon rising to seated position; inconsistent  with BPPV.  Roll Test Head Right: Negative for HC BPPV. No nystagmus or symptoms.  Roll Test Head Left: Negative for HC BPPV. Few slight 1 degree/s left rotary beats, no symptoms; inconsistent with BPPV.    Positional Testing:  Positionals: Supine: Normal  Positionals: Body Right: Slight right rotary nystagmus, unable to suppress with fixation, no symptoms.  Positionals: Body Left: Slight left rotary nystagmus unable to fully suppress with fixation, no symptoms.  Positionals: Pre-Caloric: Normal    Oculomotor Tests:  Saccades: Normal  Anti-saccades: Normal, patient can complete task.  Pursuit: Normal    Calorics:  (Tested at 44 degrees and 30 degrees Celsius for 30 seconds for warm and cool water, respectively):  Right Warm Eye Speed: 15 degrees per second right beating  Left Warm Eye Speed: 16 degrees per second left beating  Right Cool Eye Speed: 23 degrees per second left beating  Left Cool Eye Speed: 31 degrees per second right beating  Difference between ear: 11% right hypofunction. (Greater than 25% considered clinically significant.)  Fixation Index: 0.04 Normal  Overall caloric test: Normal    Post Irrigations Otoscopy: Normal    ASSESSMENT:    1. Indications of central vestibular system involvement noted on today's exam were as follows:     -Left rotary and/or left and up rotary nystagmus evident in the following assessments:   -Body Left Positional   -Roll Test: Head Left; inconsistent with BPPV.   -Left Sancho-Hallpike; inconsistent with BPPV.    -Right rotary nystagmus evident in Body Right Positional.      2. Indications of possible peripheral vestibular system involvement noted on today's exam were as follows:     -Possible Positive Left Fistula and Valsalva Against Closed Glottis.      PLAN:  Patient would likely benefit from additional audiologic vestibular evaluation including hearing test, electrocochleography (ECOG) and vestibular evoked myogenic potential (VEMP) testing pending further review by the  referring provider. Patient would also likely benefit from a referral to ENT pending further review by the referring provider. Follow-up with Dr. Chandrika Somers regarding today's results and for medical management. Please call this clinic at [phone] with questions regarding these results or recommendations.       Mariola Carson. CCC-A  Vestibular Audiologist   [place] #[id number]

## 2022-06-17 ENCOUNTER — TELEPHONE (OUTPATIENT)
Dept: AUDIOLOGY | Facility: CLINIC | Age: 32
End: 2022-06-17
Payer: COMMERCIAL

## 2022-06-17 NOTE — TELEPHONE ENCOUNTER
"\"I m calling from the Audiology and Balance Testing department at the . This is just a call to remind you of your upcoming Balance Testing appointment on [Date], and to see if you have any questions or concerns regarding the balance testing you'll be doing. You should have received an itinerary via mail or via [employer], if you are active, that goes over what to expect and explains the dos and don ts both 48 hours before, and the day of. There is a list of medications for you to review on the itinerary that we would like you to stop taking beforehand. If you didn t receive the itinerary or you still have questions, please give our clinic a call at [phone]. Otherwise, we will see you on [Date] starting at [Time].\"    Please send encounter if patient would like to reschedule.  "

## 2022-06-20 ENCOUNTER — HOSPITAL ENCOUNTER (OUTPATIENT)
Dept: PHYSICAL THERAPY | Facility: REHABILITATION | Age: 32
Discharge: HOME | End: 2022-06-20
Payer: COMMERCIAL

## 2022-06-20 DIAGNOSIS — K59.00 CONSTIPATION, UNSPECIFIED CONSTIPATION TYPE: ICD-10-CM

## 2022-06-20 DIAGNOSIS — M54.50 CHRONIC RIGHT-SIDED LOW BACK PAIN WITHOUT SCIATICA: ICD-10-CM

## 2022-06-20 DIAGNOSIS — M24.551 RIGHT HIP FLEXOR TIGHTNESS: Primary | ICD-10-CM

## 2022-06-20 DIAGNOSIS — G89.29 CHRONIC RIGHT-SIDED LOW BACK PAIN WITHOUT SCIATICA: ICD-10-CM

## 2022-06-20 DIAGNOSIS — M79.661 RIGHT CALF PAIN: ICD-10-CM

## 2022-06-20 DIAGNOSIS — M54.50 LUMBAR BACK PAIN: ICD-10-CM

## 2022-06-20 PROCEDURE — 97140 MANUAL THERAPY 1/> REGIONS: CPT | Mod: GP | Performed by: PHYSICAL THERAPIST

## 2022-06-20 NOTE — PROGRESS NOTES
06/20/22 1500   Signing Clinician's Name / Credentials   Signing clinician's name / credentials Lashay Lozano PT   Session Number   Session Number 40/12+12+12+12 (48)   Additional Session Number 5/27/22: update POC 12 visits   Progress Note/Recertification   Progress Note Completed Date 05/06/22   Ortho Goal 1   Goal Identifier Exercise   Goal Description Patient will return to desired exercise regimen without pain or discomfort to allow for participation in active hobbies. 30 min such as skiing.   (Reassess goal based on findings of following treatment.)   Goal Progress Performing strengthening routine for 30 min 3-4x a week without increase in pain    Target Date 01/20/22   Date Met 02/04/22   Ortho Goal 2   Goal Identifier Sitting   Goal Description Patient will be able to sit for 2 hours without provocation of LBP to allow for comfort in class.  (Reassess goal based on findings of following treatment.)   Goal Progress Depends on the day. Tolerance 30-60 min w/out increased pain.    Target Date 01/20/22   Ortho Goal 3   Goal Identifier Walking   Goal Description Patient will be capable of walking 2-3 miles with pain < or = to 2/10 to facilitate her return to hiking.  (Reassess based on findings of following treatment)   Goal Progress able to walk 2 miles without exacerbation   Target Date 04/01/22   Date Met 03/28/22   Ortho Goal 4   Goal Identifier Strength   Goal Description Patient will be able to achieve a hip flexion MMT grade of 5/5 to demonstrate imrpovement in hip flexor strength needed for desired hobbies.  (Reassess based on findings of following treatment)   Goal Progress 4+/5 bilaterally on 2/4/22 - progressing    Target Date 04/01/22   Subjective Report   Subjective Report Returns after a couple weeks. Dizziness is better during the day now, was constant for over a week. Head still feels heavy and tired. Increased when lying down. Scheduled with an audiologist this week. They brought in some clinic  notes from 2016 to share and these were reviewed. Has been seeing a chiropractor. Continues to be constipated, but has been able to have some bowel movements without an enema.   Objective Measure 1   Details Mod fascial restrictions of cranial base.   Objective Measure 2   Details Cecum, sigmoid, colon and iliocecal valve are moderately restricted.   Manual Therapy   Manual Therapy: Mobilization, MFR, MLD, friction massage minutes (73790) 55   Skilled Intervention MFR, counterstrain   Patient Response good,   Treatment Detail Time spent on patient education re:vertigo and BPPV. Answered questions to the best of my ability. Recommended considering vestibular rehab depending on audiology eval results. MFR: Cranial base release, thoracic inlet, mediastinum, cecum, iliocecal valve, desc colon, sigmoid.   Assessments Completed   Assessments Completed Patient is responding appropriately to PT interventions. HEP compliance is good.    Education   Learner Patient   Readiness Acceptance   Method Booklet/handout;Explanation;Demonstration   Response Verbalizes Understanding;Demonstrates Understanding   Plan   Homework Has been doing HEP every other day.  Stretch every night before bed.  Every other day is performing 1/2 exercises.  Moving forward pt will continue to see Lashay Lozano PT for manual therapy.  Exercsie pt will continue to walk and perform HEP - pt is pleased that she has a program that does not cause pain or injury.     Home program Stretching: hip flexor stretch with leg off table, pigeon stretch, downward dog (not printed on PTRX), heel drops off step, foam roller pec stretch, foam roller thoracic mobs and upper back rolling, lateral stretch on bolster (on her own HS/Add/ITB with strap), Strengthing: S/L hip abd, bridge/S/L bridge, bicycles, plank, side plank, rows, scapular retractions, birddog, lat pull down L2, monster walks L3, side stepping L3, standing hip abd L2    Updates to plan of care 5/27/22: update  POC 12 visits   Plan for next session Continue to modify HEP based on patient progress. Continue with counterstrain and manual therapy.   Comments   Comments ref provider: Chelo Park DO. Patient presents with chronic (8 year) constant right sided LBP and low level tingling in R calf that can become painful. Patient also presents with tightness and pain in right hip flexors that is aggrevated with acitivity such as walking.   Total Session Time   Timed Code Treatment Minutes 55   Total Treatment Time (sum of timed and untimed services) 55   AMBULATORY CLINICS ONLY-MEDICAL AND TREATMENT DIAGNOSIS   Medical Diagnosis Right hip flexor tightness, low back pain, chronic thoracic  back pain (new order added )   PT Diagnosis Right sided LBP w/o sciatica, right hip flexor pain, right calf pain

## 2022-06-22 ENCOUNTER — OFFICE VISIT (OUTPATIENT)
Dept: AUDIOLOGY | Facility: CLINIC | Age: 32
End: 2022-06-22

## 2022-06-22 DIAGNOSIS — R26.81 UNSTEADINESS: ICD-10-CM

## 2022-06-22 DIAGNOSIS — R11.0 NAUSEA: ICD-10-CM

## 2022-06-22 DIAGNOSIS — R42 DIZZINESS AND GIDDINESS: Primary | ICD-10-CM

## 2022-06-22 PROCEDURE — 92541 SPONTANEOUS NYSTAGMUS TEST: CPT | Performed by: AUDIOLOGIST

## 2022-06-22 PROCEDURE — 92537 CALORIC VSTBLR TEST W/REC: CPT | Performed by: AUDIOLOGIST

## 2022-06-22 PROCEDURE — 92542 POSITIONAL NYSTAGMUS TEST: CPT | Mod: 59 | Performed by: AUDIOLOGIST

## 2022-06-22 PROCEDURE — 92567 TYMPANOMETRY: CPT | Performed by: AUDIOLOGIST

## 2022-06-22 PROCEDURE — 92545 OSCILLATING TRACKING TEST: CPT | Mod: 59 | Performed by: AUDIOLOGIST

## 2022-06-22 NOTE — Clinical Note
VNG complete: Pending your review. 1. Patient would likely benefit from additional audiologic vestibular testing including hearing test, electrocochleography (ECOG) and vestibular evoked myogenic potential (VEMP) testing. 2. Patient would likely also benefit from referral to ENT.  Please let me know if you have any questions or concerns. Thanks, Mariola Shanks. CCC-A Vestibular Audiologist  [place] #[id number]

## 2022-06-24 ENCOUNTER — HOSPITAL ENCOUNTER (OUTPATIENT)
Dept: PHYSICAL THERAPY | Facility: REHABILITATION | Age: 32
Discharge: HOME | End: 2022-06-24
Payer: COMMERCIAL

## 2022-06-24 ENCOUNTER — MYC MEDICAL ADVICE (OUTPATIENT)
Dept: INTERNAL MEDICINE | Facility: CLINIC | Age: 32
End: 2022-06-24

## 2022-06-24 DIAGNOSIS — M54.50 CHRONIC RIGHT-SIDED LOW BACK PAIN WITHOUT SCIATICA: ICD-10-CM

## 2022-06-24 DIAGNOSIS — R42 VERTIGO: ICD-10-CM

## 2022-06-24 DIAGNOSIS — M24.551 RIGHT HIP FLEXOR TIGHTNESS: Primary | ICD-10-CM

## 2022-06-24 DIAGNOSIS — H93.13 TINNITUS, BILATERAL: Primary | ICD-10-CM

## 2022-06-24 DIAGNOSIS — M54.50 LUMBAR BACK PAIN: ICD-10-CM

## 2022-06-24 DIAGNOSIS — R42 DIZZINESS: Primary | ICD-10-CM

## 2022-06-24 DIAGNOSIS — G89.29 CHRONIC RIGHT-SIDED LOW BACK PAIN WITHOUT SCIATICA: ICD-10-CM

## 2022-06-24 DIAGNOSIS — K59.00 CONSTIPATION, UNSPECIFIED CONSTIPATION TYPE: ICD-10-CM

## 2022-06-24 DIAGNOSIS — M79.661 RIGHT CALF PAIN: ICD-10-CM

## 2022-06-24 PROCEDURE — 97140 MANUAL THERAPY 1/> REGIONS: CPT | Mod: GP | Performed by: PHYSICAL THERAPIST

## 2022-06-24 NOTE — PROGRESS NOTES
06/24/22 1500   Signing Clinician's Name / Credentials   Signing clinician's name / credentials Lashay Lozano PT   Session Number   Session Number 41/12+12+12+12 (48)   Additional Session Number 5/27/22: update POC 12 visits   Progress Note/Recertification   Progress Note Completed Date 05/06/22   Ortho Goal 1   Goal Identifier Exercise   Goal Description Patient will return to desired exercise regimen without pain or discomfort to allow for participation in active hobbies. 30 min such as skiing.   (Reassess goal based on findings of following treatment.)   Goal Progress Performing strengthening routine for 30 min 3-4x a week without increase in pain    Target Date 01/20/22   Date Met 02/04/22   Ortho Goal 2   Goal Identifier Sitting   Goal Description Patient will be able to sit for 2 hours without provocation of LBP to allow for comfort in class.  (Reassess goal based on findings of following treatment.)   Goal Progress Depends on the day. Tolerance 30-60 min w/out increased pain.    Target Date 01/20/22   Ortho Goal 3   Goal Identifier Walking   Goal Description Patient will be capable of walking 2-3 miles with pain < or = to 2/10 to facilitate her return to hiking.  (Reassess based on findings of following treatment)   Goal Progress able to walk 2 miles without exacerbation   Target Date 04/01/22   Date Met 03/28/22   Ortho Goal 4   Goal Identifier Strength   Goal Description Patient will be able to achieve a hip flexion MMT grade of 5/5 to demonstrate imrpovement in hip flexor strength needed for desired hobbies.  (Reassess based on findings of following treatment)   Goal Progress 4+/5 bilaterally on 2/4/22 - progressing    Target Date 04/01/22   Subjective Report   Subjective Report Saw audiologist for evaluation. BPPV testing (-), had other (+) test findings. Has been referred to ENT, scheduled in August. Felt dizzy after the appt. Still having constipation and sinus congestion, (R) LB/(R) ant LQ discomfort,  neck tension.   Objective Measure 1   Details inital cranial scan (+) for sphincters, sm intestine, pelvic fascia,   Objective Measure 2   Details Tenderness of (L) ant 6th rib, lower thoracic paraspinals, mid/lower rib tubercles. (B) sacrotub lig   Manual Therapy   Manual Therapy: Mobilization, MFR, MLD, friction massage minutes (88770) 55   Skilled Intervention MFR, counterstrain   Patient Response good,   Treatment Detail GES-V, PYL-V, DJ jct, LOT-V, sm intestine, (B) EPF-V, sacral release, (L) LPL5   Assessments Completed   Assessments Completed Patient is responding appropriately to PT interventions. HEP compliance is good.    Education   Learner Patient   Readiness Acceptance   Method Booklet/handout;Explanation;Demonstration   Response Verbalizes Understanding;Demonstrates Understanding   Plan   Homework Has been doing HEP every other day.  Stretch every night before bed.  Every other day is performing 1/2 exercises.  Moving forward pt will continue to see Lashay Lozano, PT for manual therapy.  Exercsie pt will continue to walk and perform HEP - pt is pleased that she has a program that does not cause pain or injury.     Home program Stretching: hip flexor stretch with leg off table, pigeon stretch, downward dog (not printed on PTRX), heel drops off step, foam roller pec stretch, foam roller thoracic mobs and upper back rolling, lateral stretch on bolster (on her own HS/Add/ITB with strap), Strengthing: S/L hip abd, bridge/S/L bridge, bicycles, plank, side plank, rows, scapular retractions, birddog, lat pull down L2, monster walks L3, side stepping L3, standing hip abd L2    Updates to plan of care 5/27/22: update POC 12 visits   Plan for next session Continue to modify HEP based on patient progress. Continue with counterstrain and manual therapy.   Comments   Comments ref provider: Chelo Park DO. Patient presents with chronic (8 year) constant right sided LBP and low level tingling in R calf that can  become painful. Patient also presents with tightness and pain in right hip flexors that is aggrevated with acitivity such as walking.   Total Session Time   Timed Code Treatment Minutes 55   Total Treatment Time (sum of timed and untimed services) 55   AMBULATORY CLINICS ONLY-MEDICAL AND TREATMENT DIAGNOSIS   Medical Diagnosis Right hip flexor tightness, low back pain, chronic thoracic  back pain (new order added )   PT Diagnosis Right sided LBP w/o sciatica, right hip flexor pain, right calf pain

## 2022-06-24 NOTE — TELEPHONE ENCOUNTER
See MyChart from patient needing provider direction.    Please respond directly to patient if appropriate.    Aleyda Tobin, COLEMAN  [facility]  RN Triage Team

## 2022-06-27 ENCOUNTER — HOSPITAL ENCOUNTER (OUTPATIENT)
Dept: PHYSICAL THERAPY | Facility: REHABILITATION | Age: 32
Discharge: HOME | End: 2022-06-27
Payer: COMMERCIAL

## 2022-06-27 DIAGNOSIS — M79.661 RIGHT CALF PAIN: ICD-10-CM

## 2022-06-27 DIAGNOSIS — M54.50 LUMBAR BACK PAIN: ICD-10-CM

## 2022-06-27 DIAGNOSIS — K59.00 CONSTIPATION, UNSPECIFIED CONSTIPATION TYPE: ICD-10-CM

## 2022-06-27 DIAGNOSIS — M54.50 CHRONIC RIGHT-SIDED LOW BACK PAIN WITHOUT SCIATICA: ICD-10-CM

## 2022-06-27 DIAGNOSIS — G89.29 CHRONIC RIGHT-SIDED LOW BACK PAIN WITHOUT SCIATICA: ICD-10-CM

## 2022-06-27 DIAGNOSIS — M24.551 RIGHT HIP FLEXOR TIGHTNESS: Primary | ICD-10-CM

## 2022-06-27 PROCEDURE — 97140 MANUAL THERAPY 1/> REGIONS: CPT | Mod: GP | Performed by: PHYSICAL THERAPIST

## 2022-06-27 NOTE — PROGRESS NOTES
06/27/22 1500   Signing Clinician's Name / Credentials   Signing clinician's name / credentials Lashay Lozano PT   Session Number   Session Number 42/12+12+12+12 (48)   Additional Session Number 5/27/22: update POC 12 visits   Progress Note/Recertification   Progress Note Completed Date 05/06/22   Ortho Goal 1   Goal Identifier Exercise   Goal Description Patient will return to desired exercise regimen without pain or discomfort to allow for participation in active hobbies. 30 min such as skiing.   (Reassess goal based on findings of following treatment.)   Goal Progress Performing strengthening routine for 30 min 3-4x a week without increase in pain    Target Date 01/20/22   Date Met 02/04/22   Ortho Goal 2   Goal Identifier Sitting   Goal Description Patient will be able to sit for 2 hours without provocation of LBP to allow for comfort in class.  (Reassess goal based on findings of following treatment.)   Goal Progress Depends on the day. Tolerance 30-60 min w/out increased pain.    Target Date 01/20/22   Ortho Goal 3   Goal Identifier Walking   Goal Description Patient will be capable of walking 2-3 miles with pain < or = to 2/10 to facilitate her return to hiking.  (Reassess based on findings of following treatment)   Goal Progress able to walk 2 miles without exacerbation   Target Date 04/01/22   Date Met 03/28/22   Ortho Goal 4   Goal Identifier Strength   Goal Description Patient will be able to achieve a hip flexion MMT grade of 5/5 to demonstrate imrpovement in hip flexor strength needed for desired hobbies.  (Reassess based on findings of following treatment)   Goal Progress 4+/5 bilaterally on 2/4/22 - progressing    Target Date 04/01/22   Subjective Report   Subjective Report Very fatigued over the weekend. Thinks some of it is recovering from the last couple weeks. Having head pressure/HA today. Skull feels tight. Still feeling dizzy when sleeping. Will be doing more testing for vestibular in lat  July/August. Still uncomfortable after eating, doing multiple enemas/day.   Objective Measure 1   Details initial cranial scan (+) for visceral lymphatics, lymphatics, mesentery, viscera.   Objective Measure 2   Details Mulitple tender pts (B) post/lat mid/lower rib cage, mid thoracic paravertebrals.   Objective Measure 3   Details Tenderness of (R) lower rib interspaces, (R) R11.   Manual Therapy   Manual Therapy: Mobilization, MFR, MLD, friction massage minutes (60411) 55   Skilled Intervention MFR, counterstrain   Patient Response good,   Treatment Detail (B) LGI-LV, (B) MGI-LV, EMILY-LV, ST-LV, (B) UGI-LV, (B) PORT-LV, (R) MEST8, 10-11-V,   Assessments Completed   Assessments Completed Patient is responding appropriately to PT interventions. HEP compliance is good.    Education   Learner Patient   Readiness Acceptance   Method Booklet/handout;Explanation;Demonstration   Response Verbalizes Understanding;Demonstrates Understanding   Plan   Homework Has been doing HEP every other day.  Stretch every night before bed.  Every other day is performing 1/2 exercises.  Moving forward pt will continue to see Lashay Lozano, PT for manual therapy.  Exercsie pt will continue to walk and perform HEP - pt is pleased that she has a program that does not cause pain or injury.     Home program Stretching: hip flexor stretch with leg off table, pigeon stretch, downward dog (not printed on PTRX), heel drops off step, foam roller pec stretch, foam roller thoracic mobs and upper back rolling, lateral stretch on bolster (on her own HS/Add/ITB with strap), Strengthing: S/L hip abd, bridge/S/L bridge, bicycles, plank, side plank, rows, scapular retractions, birddog, lat pull down L2, monster walks L3, side stepping L3, standing hip abd L2    Updates to plan of care 5/27/22: update POC 12 visits   Plan for next session Continue to modify HEP based on patient progress. Continue with counterstrain and manual therapy.   Comments   Comments ref  provider: Chelo Park DO. Patient presents with chronic (8 year) constant right sided LBP and low level tingling in R calf that can become painful. Patient also presents with tightness and pain in right hip flexors that is aggrevated with acitivity such as walking.   Total Session Time   Timed Code Treatment Minutes 55   Total Treatment Time (sum of timed and untimed services) 55   AMBULATORY CLINICS ONLY-MEDICAL AND TREATMENT DIAGNOSIS   Medical Diagnosis Right hip flexor tightness, low back pain, chronic thoracic  back pain (new order added )   PT Diagnosis Right sided LBP w/o sciatica, right hip flexor pain, right calf pain

## 2022-06-27 NOTE — TELEPHONE ENCOUNTER
FUTURE VISIT INFORMATION      FUTURE VISIT INFORMATION:    Date: 8/4/22    Time: 8AM    Location: [facility] [place]  REFERRAL INFORMATION:    Referring provider:  Donny Schwartz MD    Referring providers clinic:  [facility]     Reason for visit/diagnosis  Referred by Donny Schwartz MD in [facility]. Dx: Vertigo, Tinnitus, bilateral. Pt had balance testing with Milady Breaux 06/22 who recommended pt to get a referral to be seen with ENT for further testing. Audio prior. Appt per pt.    RECORDS REQUESTED FROM:       Clinic name Comments Records Status Imaging Status   [facility]  6/24/22 referral   6/10/22 note from Donny Schwartz MD [facility] [place]  6/22/22 VNG and note from Milady rBeaux [facility]    Imaging 6/15/22 Mr brain and MRA [facility]  Epic PACS

## 2022-07-01 ENCOUNTER — HOSPITAL ENCOUNTER (OUTPATIENT)
Dept: PHYSICAL THERAPY | Facility: REHABILITATION | Age: 32
Discharge: HOME | End: 2022-07-01
Payer: COMMERCIAL

## 2022-07-01 DIAGNOSIS — K59.00 CONSTIPATION, UNSPECIFIED CONSTIPATION TYPE: ICD-10-CM

## 2022-07-01 DIAGNOSIS — M24.551 RIGHT HIP FLEXOR TIGHTNESS: Primary | ICD-10-CM

## 2022-07-01 DIAGNOSIS — M54.50 CHRONIC RIGHT-SIDED LOW BACK PAIN WITHOUT SCIATICA: ICD-10-CM

## 2022-07-01 DIAGNOSIS — G89.29 CHRONIC RIGHT-SIDED LOW BACK PAIN WITHOUT SCIATICA: ICD-10-CM

## 2022-07-01 DIAGNOSIS — M79.661 RIGHT CALF PAIN: ICD-10-CM

## 2022-07-01 DIAGNOSIS — M54.50 LUMBAR BACK PAIN: ICD-10-CM

## 2022-07-01 PROCEDURE — 97140 MANUAL THERAPY 1/> REGIONS: CPT | Mod: GP | Performed by: PHYSICAL THERAPIST

## 2022-07-01 NOTE — PROGRESS NOTES
"   07/01/22 1500   Signing Clinician's Name / Credentials   Signing clinician's name / credentials Lashay Lzoano PT   Session Number   Session Number 43/12+12+12+12 (48)   Additional Session Number 5/27/22: update POC 12 visits   Progress Note/Recertification   Progress Note Completed Date 05/06/22   Ortho Goal 1   Goal Identifier Exercise   Goal Description Patient will return to desired exercise regimen without pain or discomfort to allow for participation in active hobbies. 30 min such as skiing.   (Reassess goal based on findings of following treatment.)   Goal Progress Performing strengthening routine for 30 min 3-4x a week without increase in pain    Target Date 01/20/22   Date Met 02/04/22   Ortho Goal 2   Goal Identifier Sitting   Goal Description Patient will be able to sit for 2 hours without provocation of LBP to allow for comfort in class.  (Reassess goal based on findings of following treatment.)   Goal Progress Depends on the day. Tolerance 30-60 min w/out increased pain.    Target Date 01/20/22   Ortho Goal 3   Goal Identifier Walking   Goal Description Patient will be capable of walking 2-3 miles with pain < or = to 2/10 to facilitate her return to hiking.  (Reassess based on findings of following treatment)   Goal Progress able to walk 2 miles without exacerbation   Target Date 04/01/22   Date Met 03/28/22   Ortho Goal 4   Goal Identifier Strength   Goal Description Patient will be able to achieve a hip flexion MMT grade of 5/5 to demonstrate imrpovement in hip flexor strength needed for desired hobbies.  (Reassess based on findings of following treatment)   Goal Progress 4+/5 bilaterally on 2/4/22 - progressing    Target Date 04/01/22   Subjective Report   Subjective Report Still very fatigued. Ears feel \"consistent discomfort\". Still having dizziness at night. Headache during the day. Still doing enemas. Pain on (R) side and continued constipation, but her head is bothering her more. Did well after " the last session.   Objective Measure 1   Details Initial cranial scan (+) for L>R visceral.   Objective Measure 2   Details Fascial restrictions and reduced motility of colon, sigmoid, flexures, sphincters.   Manual Therapy   Manual Therapy: Mobilization, MFR, MLD, friction massage minutes ([id number]) 55   Skilled Intervention MFR, counterstrain   Patient Response good,   Treatment Detail pelvic diaphragm, rectum, sigmoid colon, cecum, colon motility, desc colon, splenic flexure, DJ jct, pylorus,   Assessments Completed   Assessments Completed Patient is responding appropriately to PT interventions. HEP compliance is good.    Education   Learner Patient   Readiness Acceptance   Method Booklet/handout;Explanation;Demonstration   Response Verbalizes Understanding;Demonstrates Understanding   Plan   Homework Has been doing HEP every other day.  Stretch every night before bed.  Every other day is performing 1/2 exercises.  Moving forward pt will continue to see Lashay Lozano PT for manual therapy.  Exercsie pt will continue to walk and perform HEP - pt is pleased that she has a program that does not cause pain or injury.     Home program Stretching: hip flexor stretch with leg off table, pigeon stretch, downward dog (not printed on PTRX), heel drops off step, foam roller pec stretch, foam roller thoracic mobs and upper back rolling, lateral stretch on bolster (on her own HS/Add/ITB with strap), Strengthing: S/L hip abd, bridge/S/L bridge, bicycles, plank, side plank, rows, scapular retractions, birddog, lat pull down L2, monster walks L3, side stepping L3, standing hip abd L2    Updates to plan of care 5/27/22: update POC 12 visits   Plan for next session Continue to modify HEP based on patient progress. Continue with counterstrain and manual therapy.   Comments   Comments ref provider: Chelo Park DO. Patient presents with chronic (8 year) constant right sided LBP and low level tingling in R calf that can become  painful. Patient also presents with tightness and pain in right hip flexors that is aggrevated with acitivity such as walking.   Total Session Time   Timed Code Treatment Minutes 55   Total Treatment Time (sum of timed and untimed services) 55   AMBULATORY CLINICS ONLY-MEDICAL AND TREATMENT DIAGNOSIS   Medical Diagnosis Right hip flexor tightness, low back pain, chronic thoracic  back pain (new order added )   PT Diagnosis Right sided LBP w/o sciatica, right hip flexor pain, right calf pain

## 2022-07-06 ENCOUNTER — ANCILLARY PROCEDURE (OUTPATIENT)
Dept: CT IMAGING | Facility: CLINIC | Age: 32
End: 2022-07-06
Attending: OTOLARYNGOLOGY
Payer: COMMERCIAL

## 2022-07-06 DIAGNOSIS — R42 DIZZINESS: ICD-10-CM

## 2022-07-06 DIAGNOSIS — R42 DIZZINESS: Primary | ICD-10-CM

## 2022-07-06 PROCEDURE — 70480 CT ORBIT/EAR/FOSSA W/O DYE: CPT | Performed by: RADIOLOGY

## 2022-07-07 ENCOUNTER — TELEPHONE (OUTPATIENT)
Dept: OTOLARYNGOLOGY | Facility: CLINIC | Age: 32
End: 2022-07-07

## 2022-07-07 NOTE — TELEPHONE ENCOUNTER
Reviewed Dr. Mancuso's recommendations, that CT is normal and no further testing is needed and we recommend an appointment with neurology. Pt states they already saw neurology at an outside clinic who stated that they believed pt had BPPV. Our audiology team stated BPPV wasn't the diagnosis, and that instead we should be more concerned about Meniere's. Pt states there is a little confusion, and they would like to continue with further balance testing in July and the August MD followup.     Writer to assist in getting neurology clinic records, as well as plan of care clarification from Dr. Mancuso, and then follow up with pt. Pt states agreement to the plan.

## 2022-07-10 ENCOUNTER — MYC MEDICAL ADVICE (OUTPATIENT)
Dept: FAMILY MEDICINE | Facility: CLINIC | Age: 32
End: 2022-07-10

## 2022-07-12 ENCOUNTER — HOSPITAL ENCOUNTER (OUTPATIENT)
Dept: PHYSICAL THERAPY | Facility: REHABILITATION | Age: 32
Discharge: HOME | End: 2022-07-12
Payer: COMMERCIAL

## 2022-07-12 ENCOUNTER — OFFICE VISIT (OUTPATIENT)
Dept: FAMILY MEDICINE | Facility: CLINIC | Age: 32
End: 2022-07-12
Payer: COMMERCIAL

## 2022-07-12 VITALS
BODY MASS INDEX: 19.6 KG/M2 | SYSTOLIC BLOOD PRESSURE: 106 MMHG | HEART RATE: 66 BPM | WEIGHT: 128.9 LBS | OXYGEN SATURATION: 99 % | DIASTOLIC BLOOD PRESSURE: 60 MMHG

## 2022-07-12 DIAGNOSIS — R42 DIZZINESS: Primary | ICD-10-CM

## 2022-07-12 DIAGNOSIS — F51.01 PRIMARY INSOMNIA: ICD-10-CM

## 2022-07-12 DIAGNOSIS — M24.551 RIGHT HIP FLEXOR TIGHTNESS: Primary | ICD-10-CM

## 2022-07-12 DIAGNOSIS — M54.50 CHRONIC RIGHT-SIDED LOW BACK PAIN WITHOUT SCIATICA: ICD-10-CM

## 2022-07-12 DIAGNOSIS — R10.84 ABDOMINAL PAIN, GENERALIZED: Primary | ICD-10-CM

## 2022-07-12 DIAGNOSIS — M79.661 RIGHT CALF PAIN: ICD-10-CM

## 2022-07-12 DIAGNOSIS — M54.50 LUMBAR BACK PAIN: ICD-10-CM

## 2022-07-12 DIAGNOSIS — K59.00 CONSTIPATION, UNSPECIFIED CONSTIPATION TYPE: ICD-10-CM

## 2022-07-12 DIAGNOSIS — R42 DIZZINESS: ICD-10-CM

## 2022-07-12 DIAGNOSIS — G89.29 CHRONIC RIGHT-SIDED LOW BACK PAIN WITHOUT SCIATICA: ICD-10-CM

## 2022-07-12 PROCEDURE — 97140 MANUAL THERAPY 1/> REGIONS: CPT | Mod: GP | Performed by: PHYSICAL THERAPIST

## 2022-07-12 PROCEDURE — 99214 OFFICE O/P EST MOD 30 MIN: CPT | Performed by: STUDENT IN AN ORGANIZED HEALTH CARE EDUCATION/TRAINING PROGRAM

## 2022-07-12 RX ORDER — MECLIZINE HYDROCHLORIDE 25 MG/1
25 TABLET ORAL 3 TIMES DAILY PRN
Qty: 60 TABLET | Refills: 0 | Status: SHIPPED | OUTPATIENT
Start: 2022-07-12 | End: 2022-09-26

## 2022-07-12 RX ORDER — OMEPRAZOLE 20 MG/1
20 TABLET, DELAYED RELEASE ORAL DAILY
Qty: 28 TABLET | Refills: 0 | Status: SHIPPED | OUTPATIENT
Start: 2022-07-12 | End: 2022-08-02 | Stop reason: ALTCHOICE

## 2022-07-12 RX ORDER — TRAZODONE HYDROCHLORIDE 50 MG/1
50-100 TABLET, FILM COATED ORAL AT BEDTIME
Qty: 60 TABLET | Refills: 0 | Status: SHIPPED | OUTPATIENT
Start: 2022-07-12 | End: 2022-08-02

## 2022-07-12 NOTE — PROGRESS NOTES
Assessment & Plan     Abdominal pain, generalized  Patient struggling with ongoing abdominal pain.  She describes as constipation but unsure that this is what it is due to.  Encouraged her to utilize MiraLAX daily.  Patient may use multiple caps until she receives a soft stool daily.  In addition we will have her scheduled for an abdominal ultrasound to evaluate the spleen and gallbladder given her early satiety and pain with eating.  We will also get an H. pylori antigen.  After stool study I would like her to try a 2-week trial of omeprazole to see if this is beneficial.  If all of this is negative would like to have her seen by GI  - US Abdomen Complete  - Helicobacter pylori Antigen Stool  - Adult GI  Referral - Consult Only  - omeprazole (PRILOSEC OTC) 20 MG EC tablet  Dispense: 28 tablet; Refill: 0    Dizziness  Patient with ongoing dizziness.  This is been evaluated by neurology and ENT with no findings.  Discussed referral to physical therapy for vestibular therapy.  In addition patient may trial some meclizine.  Patient in agreement this plan  - Physical Therapy Referral  - meclizine (ANTIVERT) 25 MG tablet  Dispense: 60 tablet; Refill: 0    Primary insomnia  Patient having difficulty sleeping at night mostly due to dizziness.  Will trial meclizine but if this is not helping we will also trial trazodone.  Patient has had trazodone in the past with good results  - traZODone (DESYREL) 50 MG tablet  Dispense: 60 tablet; Refill: 0    Return in about 3 months (around 10/12/2022).    Chelo Park [facility]    John   Ray is a 31 year old, presenting for the following health issues:  Follow Up  Patient following up for 2 main symptoms today.    Patient continues to struggle with constipation.  Due to she describes the symptoms as pain with eating and a feeling of bloating and fullness.  Patient also has a sharp pain in pressure in her abdomen.  Patient  notes feeling full early.  Patient does have some straining.  Patient will do an enema which will help to resolve this feeling.  Patient has currently been doing 1-3 enemas daily.  For the last 2 days she has not had to use 1.  As long as she has a soft stool she does not use 1.  Patient has not taking anything by mouth to help.  She has tried fiber in the past and MiraLAX.  Patient notes the MiraLAX wore off and no longer worked.      Patient has been struggling with dizziness.  She describes it as a lightheadedness.  Patient has been seen by both neurology and ENT.  She has had multiple images of her head and in her ear with no findings.  Patient does not have a diagnosis.  Patient open to trying vestibular therapy    History of Present Illness       Reason for visit:  Vestibular disequilibrium, ongoing constipation/digestive/weight loss/body pains, insomnia (wrote DM w more info)    Rustam Nam eats 4 or more servings of fruits and vegetables daily.Rustam Nam consumes 1 sweetened beverage(s) daily.Rustam Nam exercises with enough effort to increase Rustam Nam's heart rate 9 or less minutes per day.  Rustam Nam exercises with enough effort to increase Rustam Nam's heart rate 3 or less days per week.   Rustam Nam is taking medications regularly.       Review of Systems   Constitutional, HEENT, cardiovascular, pulmonary, gi and gu systems are negative, except as otherwise noted.      Objective    /60 (BP Location: Left arm, Patient Position: Sitting, Cuff Size: Adult Regular)   Pulse 66   Wt 58.5 kg (128 lb 14.4 oz)   SpO2 99%   BMI 19.60 kg/m    Body mass index is 19.6 kg/m .  Physical Exam   Constitutional: Awake, alert, cooperative, no apparent distress  Eyes: Lids and lashes normal, sclera clear, conjunctiva normal.  ENT: Normocephalic, without obvious abnormality, atramatic.  Lungs: No increased work of breathing, good air exchange, clear to auscultation bilaterally, no  crackles or wheezing.  Cardiovascular: Regular rate and rhythm, normal S1 and S2, no S3 or S4, and no murmur noted.  Abdomen: Normal bowel sounds, soft, non-distended, non-tender, no masses palpated, no hepatosplenomegally.  Musculoskeletal: No redness, warmth, or swelling of the joints.  Full range of motion noted.   Neurologic: Awake, alert, oriented to name, place and time.  Cranial nerves II-XII are grossly intact          .  ..

## 2022-07-12 NOTE — TELEPHONE ENCOUNTER
LM for pt to further review Dr. Mancuso's recs after balance testing and upcoming appts. Will also send [place] message, CB number provided so pt can followup by phone/message.

## 2022-07-12 NOTE — PATIENT INSTRUCTIONS
1st stool test   Then try omeprazole for 2 weeks, pay attention to pain  Get US  Schedule with GI  Miralax if needing to try something    Continue consuming salt

## 2022-07-12 NOTE — PROGRESS NOTES
07/12/22 0800   Signing Clinician's Name / Credentials   Signing clinician's name / credentials Lashay Lozano PT   Session Number   Session Number 44/12+12+12+12 (48)   Additional Session Number 5/27/22: update POC 12 visits   Progress Note/Recertification   Progress Note Completed Date 05/06/22   Ortho Goal 1   Goal Identifier Exercise   Goal Description Patient will return to desired exercise regimen without pain or discomfort to allow for participation in active hobbies. 30 min such as skiing.   (Reassess goal based on findings of following treatment.)   Goal Progress Performing strengthening routine for 30 min 3-4x a week without increase in pain    Target Date 01/20/22   Date Met 02/04/22   Ortho Goal 2   Goal Identifier Sitting   Goal Description Patient will be able to sit for 2 hours without provocation of LBP to allow for comfort in class.  (Reassess goal based on findings of following treatment.)   Goal Progress Depends on the day. Tolerance 30-60 min w/out increased pain.    Target Date 01/20/22   Ortho Goal 3   Goal Identifier Walking   Goal Description Patient will be capable of walking 2-3 miles with pain < or = to 2/10 to facilitate her return to hiking.  (Reassess based on findings of following treatment)   Goal Progress able to walk 2 miles without exacerbation   Target Date 04/01/22   Date Met 03/28/22   Ortho Goal 4   Goal Identifier Strength   Goal Description Patient will be able to achieve a hip flexion MMT grade of 5/5 to demonstrate imrpovement in hip flexor strength needed for desired hobbies.  (Reassess based on findings of following treatment)   Goal Progress 4+/5 bilaterally on 2/4/22 - progressing    Target Date 04/01/22   Subjective Report   Subjective Report Hasn't had to do an enema the last 2 days and has been able to have an evening BM and get to sleep. Naturopath suggested a gall bladder cleanse, which they will do next week. Seeing primary care today. Vestibular symptoms  continuing to improve. CT scan of inner ear was normal. Will be doing some more testing. They have lost 7-10 lbs in the last couple months. Not sure why, although they note they had covid in April.   Objective Measure 1   Details Initial cranial scan (+) for (B) visceral.   Objective Measure 2   Details mild/mod fascial restrictions of duodenum, stomach, desc colon, bile duct.   Manual Therapy   Manual Therapy: Mobilization, MFR, MLD, friction massage minutes (99980) 55   Skilled Intervention MFR, counterstrain   Patient Response good,   Treatment Detail duodenum/D1/D2/D3, peritoneum, stomach, desc colon, LOT-V, bile duct, gall bladder, transv colon,   Assessments Completed   Assessments Completed Patient is responding appropriately to PT interventions. HEP compliance is good.    Education   Learner Patient   Readiness Acceptance   Method Booklet/handout;Explanation;Demonstration   Response Verbalizes Understanding;Demonstrates Understanding   Plan   Homework Has been doing HEP every other day.  Stretch every night before bed.  Every other day is performing 1/2 exercises.  Moving forward pt will continue to see Lashay Lozano PT for manual therapy.  Exercsie pt will continue to walk and perform HEP - pt is pleased that she has a program that does not cause pain or injury.     Home program Stretching: hip flexor stretch with leg off table, pigeon stretch, downward dog (not printed on PTRX), heel drops off step, foam roller pec stretch, foam roller thoracic mobs and upper back rolling, lateral stretch on bolster (on her own HS/Add/ITB with strap), Strengthing: S/L hip abd, bridge/S/L bridge, bicycles, plank, side plank, rows, scapular retractions, birddog, lat pull down L2, monster walks L3, side stepping L3, standing hip abd L2    Updates to plan of care 5/27/22: update POC 12 visits   Plan for next session Continue to modify HEP based on patient progress. Continue with counterstrain and manual therapy.   Comments    Comments ref provider: Chelo Park, DO. Patient presents with chronic (8 year) constant right sided LBP and low level tingling in R calf that can become painful. Patient also presents with tightness and pain in right hip flexors that is aggrevated with acitivity such as walking.   Total Session Time   Timed Code Treatment Minutes 55   Total Treatment Time (sum of timed and untimed services) 55   AMBULATORY CLINICS ONLY-MEDICAL AND TREATMENT DIAGNOSIS   Medical Diagnosis Right hip flexor tightness, low back pain, chronic thoracic  back pain (new order added )   PT Diagnosis Right sided LBP w/o sciatica, right hip flexor pain, right calf pain

## 2022-07-15 ENCOUNTER — ANCILLARY PROCEDURE (OUTPATIENT)
Dept: ULTRASOUND IMAGING | Facility: CLINIC | Age: 32
End: 2022-07-15
Attending: STUDENT IN AN ORGANIZED HEALTH CARE EDUCATION/TRAINING PROGRAM
Payer: COMMERCIAL

## 2022-07-15 DIAGNOSIS — R10.84 ABDOMINAL PAIN, GENERALIZED: ICD-10-CM

## 2022-07-15 PROCEDURE — 76700 US EXAM ABDOM COMPLETE: CPT | Performed by: RADIOLOGY

## 2022-07-19 ENCOUNTER — HOSPITAL ENCOUNTER (OUTPATIENT)
Dept: PHYSICAL THERAPY | Facility: REHABILITATION | Age: 32
Discharge: HOME | End: 2022-07-19
Payer: COMMERCIAL

## 2022-07-19 DIAGNOSIS — K59.00 CONSTIPATION, UNSPECIFIED CONSTIPATION TYPE: ICD-10-CM

## 2022-07-19 DIAGNOSIS — M54.50 CHRONIC RIGHT-SIDED LOW BACK PAIN WITHOUT SCIATICA: ICD-10-CM

## 2022-07-19 DIAGNOSIS — M54.50 LUMBAR BACK PAIN: ICD-10-CM

## 2022-07-19 DIAGNOSIS — G89.29 CHRONIC RIGHT-SIDED LOW BACK PAIN WITHOUT SCIATICA: ICD-10-CM

## 2022-07-19 DIAGNOSIS — M24.551 RIGHT HIP FLEXOR TIGHTNESS: Primary | ICD-10-CM

## 2022-07-19 DIAGNOSIS — M79.661 RIGHT CALF PAIN: ICD-10-CM

## 2022-07-19 PROCEDURE — 97140 MANUAL THERAPY 1/> REGIONS: CPT | Mod: GP | Performed by: PHYSICAL THERAPIST

## 2022-07-19 NOTE — PROGRESS NOTES
07/19/22 1400   Signing Clinician's Name / Credentials   Signing clinician's name / credentials Lashay Lozano PT   Session Number   Session Number 45/12+12+12+12 (48)   Additional Session Number 5/27/22: update POC 12 visits   Progress Note/Recertification   Progress Note Completed Date 05/06/22   Ortho Goal 1   Goal Identifier Exercise   Goal Description Patient will return to desired exercise regimen without pain or discomfort to allow for participation in active hobbies. 30 min such as skiing.   (Reassess goal based on findings of following treatment.)   Goal Progress Performing strengthening routine for 30 min 3-4x a week without increase in pain    Target Date 01/20/22   Date Met 02/04/22   Ortho Goal 2   Goal Identifier Sitting   Goal Description Patient will be able to sit for 2 hours without provocation of LBP to allow for comfort in class.  (Reassess goal based on findings of following treatment.)   Goal Progress Depends on the day. Tolerance 30-60 min w/out increased pain.    Target Date 01/20/22   Ortho Goal 3   Goal Identifier Walking   Goal Description Patient will be capable of walking 2-3 miles with pain < or = to 2/10 to facilitate her return to hiking.  (Reassess based on findings of following treatment)   Goal Progress able to walk 2 miles without exacerbation   Target Date 04/01/22   Date Met 03/28/22   Ortho Goal 4   Goal Identifier Strength   Goal Description Patient will be able to achieve a hip flexion MMT grade of 5/5 to demonstrate imrpovement in hip flexor strength needed for desired hobbies.  (Reassess based on findings of following treatment)   Goal Progress 4+/5 bilaterally on 2/4/22 - progressing    Target Date 04/01/22   Subjective Report   Subjective Report Had follow with primary care for multiple issues. Abdominal US (-). Suggested trialing meds for abdominal issues and dizziness. Got referral for vestibular therapy. Will see GI specialist in August. Did a gall bladder flush per  "naturopath recommendation, feels \"absolutely awful\" today. No appetite, feels weak, having pain.   Objective Measure 1   Details  8-10 sec/cycle with poor quailty and amplitude.   Objective Measure 2   Details fascial restrictions of liver, gall bladder, bile duct   Manual Therapy   Manual Therapy: Mobilization, MFR, MLD, friction massage minutes ([id number]) 55   Skilled Intervention MFR, counterstrain   Patient Response good,  and visceral motility improved post treatment   Treatment Detail still pt/cv4, cranial base release, vertex listening, thoracic yoke, liver, GB, bile duct,   Assessments Completed   Assessments Completed Patient is responding appropriately to PT interventions. HEP compliance is good.    Education   Learner Patient   Readiness Acceptance   Method Booklet/handout;Explanation;Demonstration   Response Verbalizes Understanding;Demonstrates Understanding   Plan   Homework Has been doing HEP every other day.  Stretch every night before bed.  Every other day is performing 1/2 exercises.  Moving forward pt will continue to see Lashay Lozano PT for manual therapy.  Exercsie pt will continue to walk and perform HEP - pt is pleased that she has a program that does not cause pain or injury.     Home program Stretching: hip flexor stretch with leg off table, pigeon stretch, downward dog (not printed on PTRX), heel drops off step, foam roller pec stretch, foam roller thoracic mobs and upper back rolling, lateral stretch on bolster (on her own HS/Add/ITB with strap), Strengthing: S/L hip abd, bridge/S/L bridge, bicycles, plank, side plank, rows, scapular retractions, birddog, lat pull down L2, monster walks L3, side stepping L3, standing hip abd L2    Updates to plan of care 5/27/22: update POC 12 visits   Plan for next session Continue to modify HEP based on patient progress. Continue with counterstrain and manual therapy.   Comments   Comments ref provider: Chelo Park DO. Patient presents with " chronic (8 year) constant right sided LBP and low level tingling in R calf that can become painful. Patient also presents with tightness and pain in right hip flexors that is aggrevated with acitivity such as walking.   Total Session Time   Timed Code Treatment Minutes 55   Total Treatment Time (sum of timed and untimed services) 55   AMBULATORY CLINICS ONLY-MEDICAL AND TREATMENT DIAGNOSIS   Medical Diagnosis Right hip flexor tightness, low back pain, chronic thoracic  back pain (new order added )   PT Diagnosis Right sided LBP w/o sciatica, right hip flexor pain, right calf pain

## 2022-07-21 ENCOUNTER — LAB (OUTPATIENT)
Dept: LAB | Facility: CLINIC | Age: 32
End: 2022-07-21
Payer: COMMERCIAL

## 2022-07-21 DIAGNOSIS — R10.84 ABDOMINAL PAIN, GENERALIZED: ICD-10-CM

## 2022-07-21 DIAGNOSIS — Z11.59 NEED FOR HEPATITIS C SCREENING TEST: ICD-10-CM

## 2022-07-21 DIAGNOSIS — Z11.4 SCREENING FOR HIV (HUMAN IMMUNODEFICIENCY VIRUS): ICD-10-CM

## 2022-07-21 PROCEDURE — 87338 HPYLORI STOOL AG IA: CPT

## 2022-07-22 ENCOUNTER — OFFICE VISIT (OUTPATIENT)
Dept: FAMILY MEDICINE | Facility: CLINIC | Age: 32
End: 2022-07-22
Payer: COMMERCIAL

## 2022-07-22 ENCOUNTER — HOSPITAL ENCOUNTER (OUTPATIENT)
Dept: PHYSICAL THERAPY | Facility: REHABILITATION | Age: 32
Discharge: HOME | End: 2022-07-22

## 2022-07-22 VITALS
HEART RATE: 90 BPM | OXYGEN SATURATION: 98 % | SYSTOLIC BLOOD PRESSURE: 104 MMHG | DIASTOLIC BLOOD PRESSURE: 64 MMHG | BODY MASS INDEX: 19.4 KG/M2 | WEIGHT: 127.6 LBS

## 2022-07-22 DIAGNOSIS — M79.661 RIGHT CALF PAIN: ICD-10-CM

## 2022-07-22 DIAGNOSIS — M54.50 LUMBAR BACK PAIN: ICD-10-CM

## 2022-07-22 DIAGNOSIS — M24.551 RIGHT HIP FLEXOR TIGHTNESS: Primary | ICD-10-CM

## 2022-07-22 DIAGNOSIS — K59.00 CONSTIPATION, UNSPECIFIED CONSTIPATION TYPE: ICD-10-CM

## 2022-07-22 DIAGNOSIS — G89.29 CHRONIC RIGHT-SIDED LOW BACK PAIN WITHOUT SCIATICA: ICD-10-CM

## 2022-07-22 DIAGNOSIS — R10.84 ABDOMINAL PAIN, GENERALIZED: Primary | ICD-10-CM

## 2022-07-22 DIAGNOSIS — M54.50 CHRONIC RIGHT-SIDED LOW BACK PAIN WITHOUT SCIATICA: ICD-10-CM

## 2022-07-22 LAB — H PYLORI AG STL QL IA: NEGATIVE

## 2022-07-22 PROCEDURE — 97140 MANUAL THERAPY 1/> REGIONS: CPT | Mod: GP | Performed by: PHYSICAL THERAPIST

## 2022-07-22 PROCEDURE — 99213 OFFICE O/P EST LOW 20 MIN: CPT | Performed by: STUDENT IN AN ORGANIZED HEALTH CARE EDUCATION/TRAINING PROGRAM

## 2022-07-22 NOTE — PROGRESS NOTES
Assessment & Plan     Abdominal pain, generalized  Reviewed normal ultrasound with patient.  Patient has turned in her stool study and therefore can start her omeprazole.  Discussed that one of the things I am concerned about is excess acid in the stomach.  Things like lemon juice and magnesium will only exacerbate this.  In addition I do not think patient should be having normal bowel movements given the how little she is eating.  The lack of calorie and carb intake will also cause the brain fog she is experiencing.  Patient does have follow-up scheduled with GI in 2 weeks.  Encourage patient to start omeprazole for a diagnostic trial.  In addition advised her to start taking nutritional supplementation with either pea protein and oat milk or compleat meal replacement.  Advised her to continue this as she continues to trial to eat more food.  I am hopeful that the nutrition supplementation will make her feel better and make it easier for her to start consuming normal foods again.  Discussed to trial meal replacement 3 times daily and back off as she is able to eat.  Patient will follow up with GI regarding her omeprazole trial.  Awaiting stool H. pylori tests.  Encourage patient to limit enemas as best she can.    Return in about 2 weeks (around 8/5/2022) for Follow up.    Chelo Park, [facility]    John   Ray is a 31 year old, presenting for the following health issues:  Follow Up    Patient following up for ongoing abdominal issues.  Patient notes the pain with eating has gotten drastically worse.  Patient has been trialing a gallbladder flush with lemon juice and magnesium concoctions over the last couple days.  Patient notes that it did not help and if anything made it worse.  Patient also having some brain fog and weakness associated with this.  Please see my chart message for full description.    History of Present Illness       Reason for visit:  Vestibular  disequilibrium, ongoing constipation/digestive/weight loss/body pains, insomnia (wrote DM w more info)    Rustam Nam eats 4 or more servings of fruits and vegetables daily.Rustam LAMA Homkenneth consumes 1 sweetened beverage(s) daily.Rustam LAMA Hommeyer exercises with enough effort to increase Rustam Rocheyer's heart rate 9 or less minutes per day.  Rustam LAMA Hommeyer exercises with enough effort to increase Ray KELBY Hommeyer's heart rate 3 or less days per week.   Rustam Nam is taking medications regularly.       Review of Systems   Constitutional, HEENT, cardiovascular, pulmonary, gi and gu systems are negative, except as otherwise noted.      Objective    /64 (BP Location: Left arm, Patient Position: Sitting, Cuff Size: Adult Regular)   Pulse 90   Wt 57.9 kg (127 lb 9.6 oz)   LMP 07/18/2022   SpO2 98%   BMI 19.40 kg/m    Body mass index is 19.4 kg/m .  Physical Exam   Constitutional: Awake, alert, cooperative, no apparent distress  Eyes: Lids and lashes normal, sclera clear, conjunctiva normal.  ENT: Normocephalic, without obvious abnormality, atramatic.  Musculoskeletal: No redness, warmth, or swelling of the joints.  Full range of motion noted.   Neurologic: Awake, alert, oriented to name, place and time.  Cranial nerves II-XII are grossly intact      .  ..

## 2022-07-22 NOTE — PROGRESS NOTES
07/22/22 1400   Signing Clinician's Name / Credentials   Signing clinician's name / credentials Lashay Lozano PT   Session Number   Session Number 46/12+12+12+12 (48)   Additional Session Number 5/27/22: update POC 12 visits   Progress Note/Recertification   Progress Note Completed Date 05/06/22   Ortho Goal 1   Goal Identifier Exercise   Goal Description Patient will return to desired exercise regimen without pain or discomfort to allow for participation in active hobbies. 30 min such as skiing.   (Reassess goal based on findings of following treatment.)   Goal Progress Performing strengthening routine for 30 min 3-4x a week without increase in pain    Target Date 01/20/22   Date Met 02/04/22   Ortho Goal 2   Goal Identifier Sitting   Goal Description Patient will be able to sit for 2 hours without provocation of LBP to allow for comfort in class.  (Reassess goal based on findings of following treatment.)   Goal Progress Depends on the day. Tolerance 30-60 min w/out increased pain.    Target Date 01/20/22   Ortho Goal 3   Goal Identifier Walking   Goal Description Patient will be capable of walking 2-3 miles with pain < or = to 2/10 to facilitate her return to hiking.  (Reassess based on findings of following treatment)   Goal Progress able to walk 2 miles without exacerbation   Target Date 04/01/22   Date Met 03/28/22   Ortho Goal 4   Goal Identifier Strength   Goal Description Patient will be able to achieve a hip flexion MMT grade of 5/5 to demonstrate imrpovement in hip flexor strength needed for desired hobbies.  (Reassess based on findings of following treatment)   Goal Progress 4+/5 bilaterally on 2/4/22 - progressing    Target Date 04/01/22   Subjective Report   Subjective Report Trialing omeprazole for 2 weeks for stomach acid. H pylori test (-), US  normal. Recommended nutritional supplementation with either pea protein and oat milk or complete meal replacement. MD thinks decreased nutritional intake is  contributing to brain fog. She has been having to rest a lot and is going to take the weekend off of work. Sx from gall bladder cleanse are slowly improving.   Objective Measure 1   Details Fascial restrictions noted in (L) lower quadrant, sphincters, stomach, colon, small intestine.   Manual Therapy   Manual Therapy: Mobilization, MFR, MLD, friction massage minutes ([id number]) 55   Skilled Intervention MFR, counterstrain   Treatment Detail pelvic diaphragm, sigmoid colon, desc colon, stomach/greater and less curvatures, cecum, sm intestine, cardiac sphincter, (L) respiratory diaphragm,   Assessments Completed   Assessments Completed Patient is responding appropriately to PT interventions. HEP compliance is good.    Education   Learner Patient   Readiness Acceptance   Method Booklet/handout;Explanation;Demonstration   Response Verbalizes Understanding;Demonstrates Understanding   Plan   Homework Has been doing HEP every other day.  Stretch every night before bed.  Every other day is performing 1/2 exercises.  Moving forward pt will continue to see Lashay Lozano PT for manual therapy.  Exercsie pt will continue to walk and perform HEP - pt is pleased that she has a program that does not cause pain or injury.     Home program Stretching: hip flexor stretch with leg off table, pigeon stretch, downward dog (not printed on PTRX), heel drops off step, foam roller pec stretch, foam roller thoracic mobs and upper back rolling, lateral stretch on bolster (on her own HS/Add/ITB with strap), Strengthing: S/L hip abd, bridge/S/L bridge, bicycles, plank, side plank, rows, scapular retractions, birddog, lat pull down L2, monster walks L3, side stepping L3, standing hip abd L2    Updates to plan of care 5/27/22: update POC 12 visits   Plan for next session Continue to modify HEP based on patient progress. Continue with counterstrain and manual therapy.   Comments   Comments ref provider: Chelo Park DO. Patient presents with  chronic (8 year) constant right sided LBP and low level tingling in R calf that can become painful. Patient also presents with tightness and pain in right hip flexors that is aggrevated with acitivity such as walking.   Total Session Time   Timed Code Treatment Minutes 55   Total Treatment Time (sum of timed and untimed services) 55   AMBULATORY CLINICS ONLY-MEDICAL AND TREATMENT DIAGNOSIS   Medical Diagnosis Right hip flexor tightness, low back pain, chronic thoracic  back pain (new order added )   PT Diagnosis Right sided LBP w/o sciatica, right hip flexor pain, right calf pain

## 2022-07-22 NOTE — PATIENT INSTRUCTIONS
Orgain product  pea protien with oat milk  Compleat? allergen free meal replacement      Would start with 3 times daily and decrease as you are able eat     Start Omeprazole

## 2022-07-26 ENCOUNTER — VIRTUAL VISIT (OUTPATIENT)
Dept: INTERNAL MEDICINE | Facility: CLINIC | Age: 32
End: 2022-07-26
Payer: COMMERCIAL

## 2022-07-26 ENCOUNTER — HOSPITAL ENCOUNTER (OUTPATIENT)
Dept: PHYSICAL THERAPY | Facility: REHABILITATION | Age: 32
Discharge: HOME | End: 2022-07-26

## 2022-07-26 ENCOUNTER — TELEPHONE (OUTPATIENT)
Dept: FAMILY MEDICINE | Facility: CLINIC | Age: 32
End: 2022-07-26

## 2022-07-26 DIAGNOSIS — R42 VERTIGO: ICD-10-CM

## 2022-07-26 DIAGNOSIS — M24.551 RIGHT HIP FLEXOR TIGHTNESS: Primary | ICD-10-CM

## 2022-07-26 DIAGNOSIS — M79.661 RIGHT CALF PAIN: ICD-10-CM

## 2022-07-26 DIAGNOSIS — M54.50 CHRONIC RIGHT-SIDED LOW BACK PAIN WITHOUT SCIATICA: ICD-10-CM

## 2022-07-26 DIAGNOSIS — E06.3 HASHIMOTO'S DISEASE: ICD-10-CM

## 2022-07-26 DIAGNOSIS — K58.1 IRRITABLE BOWEL SYNDROME WITH CONSTIPATION: ICD-10-CM

## 2022-07-26 DIAGNOSIS — G89.29 CHRONIC RIGHT-SIDED LOW BACK PAIN WITHOUT SCIATICA: ICD-10-CM

## 2022-07-26 DIAGNOSIS — M35.9 AUTOIMMUNE DISEASE (H): Primary | ICD-10-CM

## 2022-07-26 DIAGNOSIS — K59.00 CONSTIPATION, UNSPECIFIED CONSTIPATION TYPE: ICD-10-CM

## 2022-07-26 DIAGNOSIS — M54.50 LUMBAR BACK PAIN: ICD-10-CM

## 2022-07-26 DIAGNOSIS — M54.50 LOW BACK PAIN OF OVER 3 MONTHS DURATION: ICD-10-CM

## 2022-07-26 DIAGNOSIS — E27.9 ADRENAL DISORDER (H): ICD-10-CM

## 2022-07-26 PROBLEM — F41.1 GENERALIZED ANXIETY DISORDER: Status: ACTIVE | Noted: 2017-03-31

## 2022-07-26 PROCEDURE — 99215 OFFICE O/P EST HI 40 MIN: CPT | Mod: GT | Performed by: INTERNAL MEDICINE

## 2022-07-26 PROCEDURE — 97140 MANUAL THERAPY 1/> REGIONS: CPT | Mod: GP | Performed by: PHYSICAL THERAPIST

## 2022-07-26 RX ORDER — CYANOCOBALAMIN 1000 UG/ML
1000 INJECTION, SOLUTION INTRAMUSCULAR; SUBCUTANEOUS
Status: ACTIVE | OUTPATIENT
Start: 2022-07-27

## 2022-07-26 RX ORDER — GABAPENTIN 300 MG/1
300 CAPSULE ORAL 2 TIMES DAILY
Qty: 60 CAPSULE | Refills: 11 | Status: SHIPPED | OUTPATIENT
Start: 2022-07-26 | End: 2022-08-02

## 2022-07-26 RX ORDER — DULOXETIN HYDROCHLORIDE 20 MG/1
20 CAPSULE, DELAYED RELEASE ORAL DAILY
Qty: 30 CAPSULE | Refills: 11 | Status: SHIPPED | OUTPATIENT
Start: 2022-07-26 | End: 2022-08-02

## 2022-07-26 RX ORDER — BACLOFEN 10 MG/1
10 TABLET ORAL 3 TIMES DAILY PRN
Qty: 20 TABLET | Refills: 1 | Status: SHIPPED | OUTPATIENT
Start: 2022-07-26 | End: 2022-09-26

## 2022-07-26 RX ORDER — AMITRIPTYLINE HYDROCHLORIDE 10 MG/1
10 TABLET ORAL AT BEDTIME
Qty: 30 TABLET | Refills: 11 | Status: SHIPPED | OUTPATIENT
Start: 2022-07-26 | End: 2022-08-02

## 2022-07-26 RX ORDER — PREDNISONE 10 MG/1
10 TABLET ORAL EVERY MORNING
Qty: 10 TABLET | Refills: 0 | Status: SHIPPED | OUTPATIENT
Start: 2022-07-26 | End: 2022-08-02

## 2022-07-26 NOTE — PROGRESS NOTES
Agustina is a 31 year old adult contacting the clinic today via video, who will use the platform: [employer] for the visit.  Phone # for [employer], or if Amwell drops:   Telephone Information:   Mobile [phone]          ASSESSMENT and PLAN:  1. Autoimmune disease (H)  Recertify for cannabis.  Recommend updated inflammatory labs and trial of different medications.  Consider fibromyalgia.  Consider neuropathy.  Empiric trial of medications  - medical cannabis (Patient's own supply); by Other route See Admin Instructions  Dispense: 0 Information only  - Vitamin B12; Future  - Uric acid; Future  - CRP inflammation; Future  - Erythrocyte sedimentation rate auto; Future  - Anti Nuclear Linsey IgG by IFA with Reflex; Future  - Rheumatoid factor; Future  - Tissue transglutaminase linsey IgA and IgG; Future    2. Hashimoto's disease  Recertify for cannabis.  Recommend trial of symptomatic thyroid adjustment including T3 and T4.  Compounding referral could be coordinated through [facility]  Recommend slightly higher dose of T3 and T4.  Consider breaking them apart separately for flexibility  - medical cannabis (Patient's own supply); by Other route See Admin Instructions  Dispense: 0 Information only  - Med Therapy Management Referral    3. Adrenal disorder (H)  Trial of low-dose prednisone    4. Vertigo  No structural abnormality with normal MRI.  Following up with her ENT    5. Irritable bowel syndrome with constipation  Planning to see GI.  Resume trial of vitamin B12  - cyanocobalamin injection 1,000 mcg    6. Low back pain of over 3 months duration  Trial of different medication for diagnostic computer system.  Consider muscle spasm with baclofen.  Consider fibromyalgia amitriptyline and gabapentin.  Consider inflammatory arthritis and prednisone.  Consider fibromyalgia again with duloxetine  - baclofen (LIORESAL) 10 MG tablet; Take 1 tablet (10 mg) by mouth 3 times daily as needed for muscle spasms  Dispense: 20 tablet; Refill:  1  - amitriptyline (ELAVIL) 10 MG tablet; Take 1 tablet (10 mg) by mouth At Bedtime  Dispense: 30 tablet; Refill: 11  - gabapentin (NEURONTIN) 300 MG capsule; Take 1 capsule (300 mg) by mouth 2 times daily  Dispense: 60 capsule; Refill: 11  - predniSONE (DELTASONE) 10 MG tablet; Take 1 tablet (10 mg) by mouth every morning for 10 days  Dispense: 10 tablet; Refill: 0  - DULoxetine (CYMBALTA) 20 MG capsule; Take 1 capsule (20 mg) by mouth daily  Dispense: 30 capsule; Refill: 11       Patient Instructions   Update labs for inflammatory arthritis    Trial of amitriptyline 10 mg at bed    Trial of baclofen    Trial of gabapentin    Repeat trial B12 shot    Trial of low-dose prednisone    Recertify for medical cannabis    [facility] pharmacy consult to adjust thyroid dosage    Follow-up based on results            Return in about 4 weeks (around 8/23/2022) for using a video visit.       CHIEF COMPLAINT:  Chief Complaint   Patient presents with     Recheck Medication     Recertify for cannabis       HISTORY OF PRESENT ILLNESS:  Agustina is a 31 year old adult contacting the clinic today via video for request for recertification for medical cannabis.  She has been certified in the past for chronic pain which is mostly myofascial.  She also has some joint pain.  She is undergoing evaluation for the etiology.  She has been certified for chronic pain but believes it helps most with sleep and mood.  She is recertified through the state website    She had inflammatory blood work last in 2017 which was normal.  She continues to complain of mostly muscle pain but also joint pain, leg pain, difficulty falling asleep, trouble staying asleep, anxiety, and fatigue    She takes compounded T3 and T4.  She has felt better on higher doses in the past.  We discussed the Bell curve of TSH and normal values and the fact that she could be adjusted higher or lower within the range.  We could break apart the T3 and the T4 to adjust separately or we  "could continue to compound with coordination through [facility] which I advised.  She would like to try higher doses of both and this is reasonable as long as we do not move outside the range    We discussed a trial of empiric medications to help further explore the etiology of her disease and she is interested and willing to try medicines for sleep, muscle spasm and fibromyalgia.  She has never tried them before    REVIEW OF SYSTEMS:   Constipation and pain with bowel movements.  Has not had endoscopy or colonoscopy    PFSH:  Social History     Social History Narrative     Not on file       TOBACCO USE:  History   Smoking Status     Never Smoker   Smokeless Tobacco     Never Used       VITALS:  There were no vitals filed for this visit.  LMP 07/18/2022  Estimated body mass index is 19.4 kg/m  as calculated from the following:    Height as of 6/10/22: 1.727 m (5' 8\").    Weight as of 7/22/22: 57.9 kg (127 lb 9.6 oz).    PHYSICAL EXAM:  (observations via Video)  Alert and oriented sitting in her office    MEDICATIONS:   Current Outpatient Medications   Medication Sig Dispense Refill     amitriptyline (ELAVIL) 10 MG tablet Take 1 tablet (10 mg) by mouth At Bedtime 30 tablet 11     baclofen (LIORESAL) 10 MG tablet Take 1 tablet (10 mg) by mouth 3 times daily as needed for muscle spasms 20 tablet 1     COMPOUNDED NON-CONTROLLED SUBSTANCE (CMPD RX) - PHARMACY TO MIX COMPOUNDED MEDICATION T3 - 5 MCG and T4 - 75 MCG capsule. Take one capsule by mouth daily. 90 capsule 3     DULoxetine (CYMBALTA) 20 MG capsule Take 1 capsule (20 mg) by mouth daily 30 capsule 11     gabapentin (NEURONTIN) 300 MG capsule Take 1 capsule (300 mg) by mouth 2 times daily 60 capsule 11     meclizine (ANTIVERT) 25 MG tablet Take 1 tablet (25 mg) by mouth 3 times daily as needed for dizziness 60 tablet 0     medical cannabis (Patient's own supply) by Other route See Admin Instructions 0 Information only      omeprazole (PRILOSEC " OTC) 20 MG EC tablet Take 1 tablet (20 mg) by mouth daily 28 tablet 0     predniSONE (DELTASONE) 10 MG tablet Take 1 tablet (10 mg) by mouth every morning for 10 days 10 tablet 0     traZODone (DESYREL) 50 MG tablet Take 1-2 tablets ( mg) by mouth At Bedtime 60 tablet 0       Outside Notes summarized:   Labs, x-rays, cardiology, GI tests reviewed: MRI head.  Labs.  Last inflammatory blood test 2017 through care everywhere.  Last TSH January within the normal range  Recent Labs   Lab Test 06/10/22  1435 01/10/22  1251 09/07/21  0958 08/17/21  1326   HGB 12.9  --   --  12.3     --   --   --    POTASSIUM 4.2  --   --   --    CR 0.78  --   --   --    TSH  --  0.45 0.24*  --    VITDT  --   --   --  44     No results found for: RPORC42CMY  Lab Results   Component Value Date    VITDT 44 08/17/2021     Lab Results   Component Value Date    CHOL 272 07/12/2017     New orders:   Orders Placed This Encounter   Procedures     Vitamin B12     Uric acid     CRP inflammation     Erythrocyte sedimentation rate auto     Anti Nuclear Linsey IgG by IFA with Reflex     Rheumatoid factor     Tissue transglutaminase linsey IgA and IgG     Med Therapy Management Referral       Independent review of:    Supplemental history by:      Patient has given verbal consent to a Video visit?  Yes  How would you like to obtain your AVS?  MyChart  Will anyone else be joining your video visit? No       Video-Visit Details  Type of service:  Video Visit  Originating Location (pt. Location): Home  Distant Location (provider location):   [facility]    Dalton Zhao MD  [facility]    Video Start Time: 11:35 AM  Video End time:  12:34 PM  Face to face plus orders: 59 minutes  Documentation time:  3 minutes     The visit lasted a total of 62 minutes

## 2022-07-26 NOTE — PROGRESS NOTES
"Rustam is a 31 year old who is being evaluated via a billable video visit.      How would you like to obtain your AVS? MyChart  If the video visit is dropped, the invitation should be resent by: Text to cell phone: [phone]  Will anyone else be joining your video visit? No  {If patient encounters technical issues they should call [phone] :422614}        {PROVIDER CHARTING PREFERENCE:371126}    Subjective   Rustam is a 31 year old{ACCOMPANIED BY STATEMENT (Optional):526141}, presenting for the following health issues:  No chief complaint on file.      HPI     {SUPERLIST (Optional):712581}  {additonal problems for provider to add (Optional):562580}    Review of Systems   {ROS COMP (Optional):740079}      Objective           Vitals:  No vitals were obtained today due to virtual visit.    Physical Exam   {video visit exam brief selected:685524::\"GENERAL: Healthy, alert and no distress\",\"EYES: Eyes grossly normal to inspection.  No discharge or erythema, or obvious scleral/conjunctival abnormalities.\",\"RESP: No audible wheeze, cough, or visible cyanosis.  No visible retractions or increased work of breathing.  \",\"SKIN: Visible skin clear. No significant rash, abnormal pigmentation or lesions.\",\"NEURO: Cranial nerves grossly intact.  Mentation and speech appropriate for age.\",\"PSYCH: Mentation appears normal, affect normal/bright, judgement and insight intact, normal speech and appearance well-groomed.\"}    {Diagnostic Test Results (Optional):656058}    {AMBULATORY ATTESTATION (Optional):306116}        Video-Visit Details    Video Start Time: {video visit start/end time for provider to select:552428}    Type of service:  Video Visit    Video End Time:{video visit start/end time for provider to select:722475}    Originating Location (pt. Location): {video visit patient location:130386::\"Home\"}    Distant Location (provider location):  [facility]     Platform used for Video Visit: " [facility]    .  ..

## 2022-07-26 NOTE — PATIENT INSTRUCTIONS
Update labs for inflammatory arthritis    Trial of amitriptyline 10 mg at bed    Trial of baclofen    Trial of gabapentin    Repeat trial B12 shot    Trial of low-dose prednisone    Recertify for medical cannabis    [facility] pharmacy consult to adjust thyroid dosage    Follow-up based on results

## 2022-07-26 NOTE — TELEPHONE ENCOUNTER
----- Message from Dalton Zhao MD sent at 7/26/2022 12:25 PM CDT -----  Please schedule lab appointment, and then B 12 shot after

## 2022-07-26 NOTE — PROGRESS NOTES
07/26/22 1400   Signing Clinician's Name / Credentials   Signing clinician's name / credentials Lashay Lozano PT   Session Number   Session Number 47/12+12+12+12 (48) (update POC next visit)   Additional Session Number 5/27/22: update POC 12 visits   Progress Note/Recertification   Progress Note Completed Date 05/06/22   Ortho Goal 1   Goal Identifier Exercise   Goal Description Patient will return to desired exercise regimen without pain or discomfort to allow for participation in active hobbies. 30 min such as skiing.   (Reassess goal based on findings of following treatment.)   Goal Progress Performing strengthening routine for 30 min 3-4x a week without increase in pain    Target Date 01/20/22   Date Met 02/04/22   Ortho Goal 2   Goal Identifier Sitting   Goal Description Patient will be able to sit for 2 hours without provocation of LBP to allow for comfort in class.  (Reassess goal based on findings of following treatment.)   Goal Progress Depends on the day. Tolerance 30-60 min w/out increased pain.    Target Date 01/20/22   Ortho Goal 3   Goal Identifier Walking   Goal Description Patient will be capable of walking 2-3 miles with pain < or = to 2/10 to facilitate her return to hiking.  (Reassess based on findings of following treatment)   Goal Progress able to walk 2 miles without exacerbation   Target Date 04/01/22   Date Met 03/28/22   Ortho Goal 4   Goal Identifier Strength   Goal Description Patient will be able to achieve a hip flexion MMT grade of 5/5 to demonstrate imrpovement in hip flexor strength needed for desired hobbies.  (Reassess based on findings of following treatment)   Goal Progress 4+/5 bilaterally on 2/4/22 - progressing    Target Date 04/01/22   Subjective Report   Subjective Report Feeling better today. Trialing new med with benefit and trialing miralax again. Tolerating food and improved appetite. Having more regular bowel movements with decreased enema use. Plans to take some time  off work next week. Vestibular testing this week.   Objective Measure 1   Details Initial cranial scan (+) for lower abdominal viscera, L>R LS sympathetics.   Objective Measure 2   Details Tenderness of (L) upper/lower abdomen.   Objective Measure 3   Details Mild/mod fascial restrictions of sigmoid, cecum, rectum, colon.   Manual Therapy   Manual Therapy: Mobilization, MFR, MLD, friction massage minutes (78302) 45   Skilled Intervention MFR, counterstrain   Patient Response good   Treatment Detail pelvic diaphragm, sigmoid, rectum, cecum, sm intestine, (R) JEJ-V, (L) KENDAL-N, (L) HYPO-N,   Assessments Completed   Assessments Completed Patient is responding appropriately to PT interventions. HEP compliance is good.    Education   Learner Patient   Readiness Acceptance   Method Booklet/handout;Explanation;Demonstration   Response Verbalizes Understanding;Demonstrates Understanding   Plan   Homework Has been doing HEP every other day.  Stretch every night before bed.  Every other day is performing 1/2 exercises.  Moving forward pt will continue to see Lashay Lozano PT for manual therapy.  Exercsie pt will continue to walk and perform HEP - pt is pleased that she has a program that does not cause pain or injury.     Home program Stretching: hip flexor stretch with leg off table, pigeon stretch, downward dog (not printed on PTRX), heel drops off step, foam roller pec stretch, foam roller thoracic mobs and upper back rolling, lateral stretch on bolster (on her own HS/Add/ITB with strap), Strengthing: S/L hip abd, bridge/S/L bridge, bicycles, plank, side plank, rows, scapular retractions, birddog, lat pull down L2, monster walks L3, side stepping L3, standing hip abd L2    Updates to plan of care 5/27/22: update POC 12 visits   Plan for next session Continue to modify HEP based on patient progress. Continue with counterstrain and manual therapy.   Comments   Comments ref provider: [place]o [place] DO. Patient presents  with chronic (8 year) constant right sided LBP and low level tingling in R calf that can become painful. Patient also presents with tightness and pain in right hip flexors that is aggrevated with acitivity such as walking.   Total Session Time   Timed Code Treatment Minutes late today 45   Total Treatment Time (sum of timed and untimed services) 45   AMBULATORY CLINICS ONLY-MEDICAL AND TREATMENT DIAGNOSIS   Medical Diagnosis Right hip flexor tightness, low back pain, chronic thoracic  back pain (new order added )   PT Diagnosis Right sided LBP w/o sciatica, right hip flexor pain, right calf pain

## 2022-07-27 ENCOUNTER — ALLIED HEALTH/NURSE VISIT (OUTPATIENT)
Dept: FAMILY MEDICINE | Facility: CLINIC | Age: 32
End: 2022-07-27

## 2022-07-27 ENCOUNTER — LAB (OUTPATIENT)
Dept: LAB | Facility: CLINIC | Age: 32
End: 2022-07-27
Payer: COMMERCIAL

## 2022-07-27 ENCOUNTER — TELEPHONE (OUTPATIENT)
Dept: FAMILY MEDICINE | Facility: CLINIC | Age: 32
End: 2022-07-27

## 2022-07-27 DIAGNOSIS — Z11.4 SCREENING FOR HIV (HUMAN IMMUNODEFICIENCY VIRUS): ICD-10-CM

## 2022-07-27 DIAGNOSIS — R10.84 ABDOMINAL PAIN, GENERALIZED: Primary | ICD-10-CM

## 2022-07-27 DIAGNOSIS — K58.9 IRRITABLE BOWEL SYNDROME: Primary | ICD-10-CM

## 2022-07-27 DIAGNOSIS — Z11.59 NEED FOR HEPATITIS C SCREENING TEST: ICD-10-CM

## 2022-07-27 DIAGNOSIS — M35.9 AUTOIMMUNE DISEASE (H): ICD-10-CM

## 2022-07-27 LAB
CRP SERPL-MCNC: <3 MG/L
ERYTHROCYTE [SEDIMENTATION RATE] IN BLOOD BY WESTERGREN METHOD: 8 MM/HR (ref 0–20)
URATE SERPL-MCNC: 2.6 MG/DL (ref 2.4–7)
VIT B12 SERPL-MCNC: 1268 PG/ML (ref 232–1245)

## 2022-07-27 PROCEDURE — 84550 ASSAY OF BLOOD/URIC ACID: CPT

## 2022-07-27 PROCEDURE — 86364 TISS TRNSGLTMNASE EA IG CLAS: CPT

## 2022-07-27 PROCEDURE — 87389 HIV-1 AG W/HIV-1&-2 AB AG IA: CPT

## 2022-07-27 PROCEDURE — 86803 HEPATITIS C AB TEST: CPT

## 2022-07-27 PROCEDURE — 82607 VITAMIN B-12: CPT

## 2022-07-27 PROCEDURE — 96372 THER/PROPH/DIAG INJ SC/IM: CPT | Performed by: INTERNAL MEDICINE

## 2022-07-27 PROCEDURE — 86431 RHEUMATOID FACTOR QUANT: CPT

## 2022-07-27 PROCEDURE — 99207 PR NO CHARGE NURSE ONLY: CPT

## 2022-07-27 PROCEDURE — 86140 C-REACTIVE PROTEIN: CPT

## 2022-07-27 PROCEDURE — 85652 RBC SED RATE AUTOMATED: CPT

## 2022-07-27 PROCEDURE — 36415 COLL VENOUS BLD VENIPUNCTURE: CPT

## 2022-07-27 PROCEDURE — 86038 ANTINUCLEAR ANTIBODIES: CPT

## 2022-07-27 RX ADMIN — CYANOCOBALAMIN 1000 MCG: 1000 INJECTION, SOLUTION INTRAMUSCULAR; SUBCUTANEOUS at 14:42

## 2022-07-27 NOTE — PROGRESS NOTES
Clinic Administered Medication Documentation    Administrations This Visit     cyanocobalamin injection 1,000 mcg     Admin Date  07/27/2022 Action  Given Dose  1,000 mcg Route  Intramuscular Site  Left Deltoid Administered By  Emily White    Ordering Provider: Dalton Zhao MD    Patient Supplied?: No                  Injectable Medication Documentation    Patient was given Cyanocobalamin (B-12). Prior to medication administration, verified patients identity using patient s name and date of birth. Please see MAR and medication order for additional information. Patient instructed to report any adverse reaction to staff immediately .      Was entire vial of medication used? Yes  Vial/Syringe: Single dose vial  Expiration Date:  00/24  Was this medication supplied by the patient? No

## 2022-07-28 ENCOUNTER — TELEPHONE (OUTPATIENT)
Dept: FAMILY MEDICINE | Facility: CLINIC | Age: 32
End: 2022-07-28

## 2022-07-28 LAB
ANA SER QL IF: NEGATIVE
HCV AB SERPL QL IA: NONREACTIVE
HIV 1+2 AB+HIV1 P24 AG SERPL QL IA: NONREACTIVE
RHEUMATOID FACT SER NEPH-ACNC: <6 IU/ML
TTG IGA SER-ACNC: <0.2 U/ML
TTG IGG SER-ACNC: 0.6 U/ML

## 2022-07-28 NOTE — TELEPHONE ENCOUNTER
[facility] referral from: [facility] visit (referral by provider)    [facility] referral outreach attempt #2 on July 28, 2022 at 9:50 AM      Outcome: Patient not reachable after several attempts, will route to [facility] Pharmacist/Provider as an FYI.  [facility] scheduling number is [phone].  Thank you for the referral.    Myrna Ghotra [facility]

## 2022-07-28 NOTE — TELEPHONE ENCOUNTER
Reason for Call:  Other call back    Detailed comments: Pharmacy asking if a new Rx for Omeprazole could be sent in for Capsules - as they are cheaper    Omeprazole 20mg Capsules    Phone Number Patient can be reached at: Other phone number:  [phone]    Best Time: anytime    Can we leave a detailed message on this number? YES    Call taken on 7/28/2022 at 3:28 PM by Rayne Jimenez     ata Curry (:  1984) is a 30 y.o. male,New patient, here for evaluation of the following chief complaint(s):  Established New Doctor         ASSESSMENT/PLAN:  1. Hepatitis  -     Hepatitis B Core Antibody, IgM; Future  -     Hepatitis B Surface Antibody; Future  -     Hepatitis B Surface Antigen; Future  -     Hepatitis C Antibody; Future  -     Hepatitis A Antibody, Total; Future  -     Comprehensive Metabolic Panel; Future  -     US LIVER; Future  2. Hyperglycemia  -     Hemoglobin A1C; Future  3. Chest pain, unspecified type  -     Stress test, myoview; Future  -     Lipid Panel; Future  4. Anxiety  -     busPIRone (BUSPAR) 5 MG tablet; Take 1 tablet by mouth 2 times daily, Disp-60 tablet, R-0Normal  At this point I feel patient has a combination of issues at the background I feel that his anxiety is causing a lot of his stress so again a start him on BuSpar discussed with patient the side effects and benefits of the medication    Nonetheless I think increased consumption of caffeine is causing some of his issues so we will discussed the reduction of caffeine use and not to use it for 3 days prior to stress test    The patient chest pain is pretty atypical but given that he is a male and has some hyperglycemia and the rest of his risk profile is not clear stress test is currently ordered    We will also get a check his cholesterol level as well as sugar level he does not have any significant family history of coronary artery disease and he is not a smoker he is overweight slightly        Return in about 2 weeks (around 2021).          Subjective   SUBJECTIVE/OBJECTIVE:    Lab Review   Lab Results   Component Value Date     10/12/2021     10/02/2021    K 4.3 10/12/2021    K 3.6 10/02/2021    CO2 23 10/12/2021    CO2 23 10/02/2021    BUN 15 10/12/2021    BUN 20 10/02/2021    CREATININE 0.7 10/12/2021    CREATININE 0.9 10/02/2021    GLUCOSE 104 10/12/2021    GLUCOSE 170 10/02/2021 CALCIUM 9.7 10/12/2021    CALCIUM 9.2 10/02/2021     Lab Results   Component Value Date    WBC 8.9 10/12/2021    WBC 10.2 10/02/2021    HGB 16.2 10/12/2021    HGB 16.3 10/02/2021    HCT 47.1 10/12/2021    HCT 46.1 10/02/2021    MCV 89.5 10/12/2021    MCV 87.7 10/02/2021     10/12/2021     10/02/2021     No results found for: CHOL, TRIG, HDL, LDLDIRECT    Vitals 10/26/2021 10/12/2021 00/54/4880   SYSTOLIC 746 - -   DIASTOLIC 80 - -   Pulse 68 - -   Temp - - -   Resp - - -   SpO2 - - -   Weight 188 lb 12.8 oz - -   Height 5' 7\" - -   Body mass index 29.57 kg/m2 - -   Pain Level - 7 7       Mental Health Problem  The primary symptoms do not include dysphoric mood, delusions, hallucinations, bizarre behavior, disorganized speech, negative symptoms or somatic symptoms. The current episode started more than 1 month ago. Additional symptoms of the illness include insomnia, agitation and attention impairment. Additional symptoms of the illness do not include anhedonia, hypersomnia, appetite change, unexpected weight change, fatigue, psychomotor retardation, feelings of worthlessness, euphoric mood, increased goal-directed activity, flight of ideas, inflated self-esteem, decreased need for sleep, distractible, poor judgment, abdominal pain or seizures. He does not admit to suicidal ideas. He does not have a plan to attempt suicide. He does not contemplate harming himself. He has not already injured self. He does not contemplate injuring another person. He has not already  injured another person. Chest Pain   This is a new problem. The current episode started 1 to 4 weeks ago. The onset quality is sudden. The problem occurs intermittently. The pain is mild. The quality of the pain is described as dull. The pain radiates to the left arm. Associated symptoms include shortness of breath.  Pertinent negatives include no abdominal pain, claudication, cough, diaphoresis, exertional chest pressure, fever, orthopnea, jaundiced. Findings: No bruising, erythema or lesion. Neurological:      General: No focal deficit present. Mental Status: He is alert and oriented to person, place, and time. Cranial Nerves: No cranial nerve deficit. Motor: No weakness. Gait: Gait normal.            This dictation was generated by voice recognition computer software. Although all attempts are made to edit the dictation for accuracy, there may be errors in the transcription that are not intended. An electronic signature was used to authenticate this note.     --Silviano Rock MD

## 2022-07-29 ENCOUNTER — OFFICE VISIT (OUTPATIENT)
Dept: AUDIOLOGY | Facility: CLINIC | Age: 32
End: 2022-07-29
Payer: COMMERCIAL

## 2022-07-29 ENCOUNTER — HOSPITAL ENCOUNTER (OUTPATIENT)
Dept: PHYSICAL THERAPY | Facility: REHABILITATION | Age: 32
Discharge: HOME | End: 2022-07-29

## 2022-07-29 DIAGNOSIS — K59.00 CONSTIPATION, UNSPECIFIED CONSTIPATION TYPE: ICD-10-CM

## 2022-07-29 DIAGNOSIS — M24.551 RIGHT HIP FLEXOR TIGHTNESS: Primary | ICD-10-CM

## 2022-07-29 DIAGNOSIS — M54.50 LUMBAR BACK PAIN: ICD-10-CM

## 2022-07-29 DIAGNOSIS — M79.661 RIGHT CALF PAIN: ICD-10-CM

## 2022-07-29 DIAGNOSIS — G89.29 CHRONIC RIGHT-SIDED LOW BACK PAIN WITHOUT SCIATICA: ICD-10-CM

## 2022-07-29 DIAGNOSIS — R42 DIZZINESS AND GIDDINESS: Primary | ICD-10-CM

## 2022-07-29 DIAGNOSIS — M54.50 CHRONIC RIGHT-SIDED LOW BACK PAIN WITHOUT SCIATICA: ICD-10-CM

## 2022-07-29 PROCEDURE — 92567 TYMPANOMETRY: CPT | Performed by: AUDIOLOGIST

## 2022-07-29 PROCEDURE — 92519 VEMP TST I&R CERVICAL&OCULAR: CPT | Performed by: AUDIOLOGIST

## 2022-07-29 PROCEDURE — 92584 ELECTROCOCHLEOGRAPHY: CPT | Performed by: AUDIOLOGIST

## 2022-07-29 PROCEDURE — 97140 MANUAL THERAPY 1/> REGIONS: CPT | Mod: GP | Performed by: PHYSICAL THERAPIST

## 2022-07-29 NOTE — PROGRESS NOTES
07/29/22 1400   Signing Clinician's Name / Credentials   Signing clinician's name / credentials Lashay Lozano PT   Session Number   Session Number 48/12+12+12+12+12 (60)   Additional Session Number 7/29/22: update POC 12 visits   Progress Note/Recertification   Progress Note Completed Date 05/06/22   Ortho Goal 1   Goal Identifier Exercise   Goal Description Patient will return to desired exercise regimen without pain or discomfort to allow for participation in active hobbies. 30 min such as skiing.   (Reassess goal based on findings of following treatment.)   Goal Progress Performing strengthening routine for 30 min 3-4x a week without increase in pain    Target Date 01/20/22   Date Met 02/04/22   Ortho Goal 2   Goal Identifier Sitting   Goal Description Patient will be able to sit for 2 hours without provocation of LBP to allow for comfort in class.  (Reassess goal based on findings of following treatment.)   Goal Progress Depends on the day. Tolerance 30-60 min w/out increased pain.    Target Date 01/20/22   Ortho Goal 3   Goal Identifier Walking   Goal Description Patient will be capable of walking 2-3 miles with pain < or = to 2/10 to facilitate her return to hiking.  (Reassess based on findings of following treatment)   Goal Progress able to walk 2 miles without exacerbation   Target Date 04/01/22   Date Met 03/28/22   Ortho Goal 4   Goal Identifier Strength   Goal Description Patient will be able to achieve a hip flexion MMT grade of 5/5 to demonstrate imrpovement in hip flexor strength needed for desired hobbies.  (Reassess based on findings of following treatment)   Goal Progress 4+/5 bilaterally on 2/4/22 - progressing    Target Date 04/01/22   Subjective Report   Subjective Report Had additional vestibular testing today. One test was neg, the other results are pending. Feeling fatigued. Bowel movements still okay with taking miralax. Appetite is better, but can't eat a lot at a time. New meds seem  beneficial.   Objective Measure 1   Details Initial cranial scan (+) L>R LS sympathetics.   Objective Measure 2   Details Tenderness of mid/lower abdomen.   Objective Measure 3   Details Mild/mod fascial restrictions of stomach, duodenum, sm intestine, sphincters.   Manual Therapy   Manual Therapy: Mobilization, MFR, MLD, friction massage minutes (61423) 45   Skilled Intervention MFR, counterstrain   Patient Response good   Treatment Detail stomach/lesser curvature, cardiac sphincter, (L) HYPO-N, IMES-N, Duodenum/D1/D2/D3, sm intestine.   Assessments Completed   Assessments Completed Patient is responding appropriately to PT interventions. HEP compliance is good.    Education   Learner Patient   Readiness Acceptance   Method Booklet/handout;Explanation;Demonstration   Response Verbalizes Understanding;Demonstrates Understanding   Plan   Homework Has been doing HEP every other day.  Stretch every night before bed.  Every other day is performing 1/2 exercises.  Moving forward pt will continue to see Lashay Lozano PT for manual therapy.  Exercsie pt will continue to walk and perform HEP - pt is pleased that she has a program that does not cause pain or injury.     Home program Stretching: hip flexor stretch with leg off table, pigeon stretch, downward dog (not printed on PTRX), heel drops off step, foam roller pec stretch, foam roller thoracic mobs and upper back rolling, lateral stretch on bolster (on her own HS/Add/ITB with strap), Strengthing: S/L hip abd, bridge/S/L bridge, bicycles, plank, side plank, rows, scapular retractions, birddog, lat pull down L2, monster walks L3, side stepping L3, standing hip abd L2    Updates to plan of care 7/29/22: update POC 12 visits   Plan for next session Continue to modify HEP based on patient progress. Continue with counterstrain and manual therapy.   Comments   Comments ref provider: Chelo Park DO. Patient presents with chronic (8 year) constant right sided LBP and low  level tingling in R calf that can become painful. Patient also presents with tightness and pain in right hip flexors that is aggrevated with acitivity such as walking.   Total Session Time   Timed Code Treatment Minutes (15 min late coming from another appt) 45   Total Treatment Time (sum of timed and untimed services) 45   AMBULATORY CLINICS ONLY-MEDICAL AND TREATMENT DIAGNOSIS   Medical Diagnosis Right hip flexor tightness, low back pain, chronic thoracic  back pain (new order added )   PT Diagnosis Right sided LBP w/o sciatica, right hip flexor pain, right calf pain

## 2022-07-29 NOTE — PROGRESS NOTES
"AUDIOLOGY REPORT    SUBJECTIVE: Agustina Nam was seen in the Audiology Clinic at the [facility] on 7/29/2022 for a vestibular evoked myogenic potential (VEMP) evaluation and electrocochleography (EcochG) evaluation, referred by Chandrika Somers M.D.. The following case history was obtained by Oralia Boyer during the patient's balance assessment at this facility on 6/22/2022:    Patient reports with chief complaints of dizziness, nausea and unsteadiness. Patient reports symptoms initially onset on Tiana 3, 2022 around the time they were experiencing viral symptoms (reportedly not COVID). They report sudden onset of lightheadedness and low blood pressure following undergoing craniosacral work. They also report feeling \"woozy\" a few days after and feeling as though they \"may faint\". The following Tuesday, patient reports experiencing spinning vertigo. Patient reports experiencing dizziness and nausea in 2016 and underwent audiological vestibular diagnostics in [place]. Per review of that report dated 2/19/2016, patient was reportedly positive for right posterior\left anterior and right horizontal canal BPPV. Patient's calorics were within normal limits. Patient reports undergoing Epley maneuvers which did not significantly help their symptoms. After 2019, patient reports small episodes of symptoms every so often over the years.       Since Tiana 3, 2022, patient reports persistent symptoms. Approximately one month prior to current symptoms' onset, patient reports experiencing intermittent tinnitus that would subside after approximately one minute. Patient also reports that their ears feel constantly plugged (greater left); this has reportedly increased in severity lately. Patient also describes feeling as though their left ear is \"tight\" and the sound quality is \"fuzzy/not clear\". Per patient's outside audiological report dated 2/19/2016; patient reportedly " has a high frequency sensorineural hearing loss bilaterally (no audiogram was available to review). Patient denies fluctuations in their hearing, otalgia and otorrhea.     Patient reports pressure and a sensation of heaviness or tightness localized like a band across their forehead and eyes as well as at the back/base of their skull. Patient reports that their symptoms are constant but vary in severity. Patient reports times of increased symptoms last minutes up to hours in duration. Symptoms can be exacerbated by quick movements in any direction, lying down, and being in low-lit/dark environments.      Patient also reports episodes of hot flashes, vomiting and sometimes passing out over the years for which they have sought evaluation at the ED many times before. Patient clarifies this did not occur in conjunction with onset of symptoms on Tiana 3, 2022. Patient reports that using an enema has helped reduce the number of vomiting episodes. Patient reports history of one significant head injury sustained at the age of 12 while at summer [place]. Patient reports loss of consciousness with the incident and notes that this is the first time they recall experiencing vertigo and vomiting. Patient reports lightheadedness and possible dizziness with pressure changes such as sneezing, blowing of the nose, lifting heavy items and bearing down. Patient denies autophony (eye blinks). Patient reports blurred vision which onset in June 2022 but has been progressively improving. Patient denies history of eye surgeries. Patient denies history of cancer or chemotherapy. Additional medical history includes hashimoto thyroid disease and breakthrough case of Covid April 2022.      OBJECTIVE:     TYMPANOGRAMS:      RIGHT: normal eardrum mobility      LEFT: normal eardrum mobility     VEMP:  VEMP testing is performed using an auditory evoked potentials system. Surface electrodes are placed in several locations on the patient's head and neck  in order to record muscle motoneuron activity in response to loud auditory stimuli. This testing assesses very specific vestibular pathways in the inner ear and central nervous system. cVEMP testing is performed with electrodes placed on the patient's sternocleidomastoid muscle, and is used to assess the saccular organ, afferent inferior vestibular nerve and vestibular nuclei within the brainstem. oVEMP testing is performed with electrodes placed under the patient's eyes, and is used to assess the utricle and afferent superior vestibular nerve and nuclei within the brainstem.  Patients that may benefit from this procedure are those with complaints of vertigo and imbalance or those suspected of superior canal dehiscence. A total of 90 minutes was spent completing the VEMP testing today.      cVEMP Thresholds via 500 Hz toneburst:     RIGHT: 85 dB nHL which  suggest normal saccular and inferior vestibular nerve function     LEFT: 90 dB nHL which  suggest normal saccular and inferior vestibular nerve function  AMPLITUDE ASYMMETRY: 9% with left response more robust (greater than 33% is considered abnormal)    oVEMP Thresholds via 500 Hz toneburst:      RIGHT: 85 dB nHL which suggests normal utricle and superior vestibular nerve function     LEFT: 90 dB nHL which suggests normal utricle and superior vestibular nerve function     AMPLITUDE ASYMMETRY: 17% with left response more robust (greater than 33% is considered abnormal)    ECOCHG:   Electrocochleography (ECochG) testing is performed using an auditory evoked potentials system.  Surface electrodes are placed behind the test ear with extratympanic tymptrode placed in the test ear in order to obtain a near-field recording that measures the response of the cochlear hair cells and auditory nerve in response to auditory stimuli. The measured response consists of the summating potential (SP) and the cochlear nerve action potential (AP). Abnormalities may take the form of an  increased summating potential/compound action potential (SP/AP) ratio (due to an increased summating potential) in patients suspected of Meniere's disease (endolymphatic hydrops) or in those patients who have symptoms of vestibular dysfunction or ear symptoms including asymmetric or fluctuating hearing loss, tinnitus and/or aural fullness. The following can be suggestive of an abnormal EcochG: SP/AP ratio exceeds 0.43.    Using a microscope tympanic membranes were visualized. A two-channel ECochG recording was performed for clicks bilaterally.  Clicks for the right ear showed normal SP/AP ratios.    Clicks for the left ear showed normal SP/AP ratios.        Click SP/AP ratio   Right ear  .221   Left ear  .221   Abrnormal SP/AP ratios must be greater than .43 for clicks.     ASSESSMENT:   Today's VEMP results suggest a normal cVEMP and oVEMP evaluation bilaterally. These results suggest normal saccular and inferior vestibular nerve function and normal utricle and superior vestibular nerve function bilaterally.  Today s ECochG evaluation revealed normal SP/AP ratios bilaterally.       PLAN:The patient will follow up with Dr Hillary Mancuso M.D. for medical management.       Please call this clinic with questions regarding these results or recommendations.      Mariola Jeong.  Licensed Audiologist  [place] #[id number]

## 2022-08-01 ENCOUNTER — HOSPITAL ENCOUNTER (OUTPATIENT)
Dept: PHYSICAL THERAPY | Facility: REHABILITATION | Age: 32
Discharge: HOME | End: 2022-08-01
Payer: COMMERCIAL

## 2022-08-01 DIAGNOSIS — K59.00 CONSTIPATION, UNSPECIFIED CONSTIPATION TYPE: ICD-10-CM

## 2022-08-01 DIAGNOSIS — M54.50 LUMBAR BACK PAIN: ICD-10-CM

## 2022-08-01 DIAGNOSIS — M79.661 RIGHT CALF PAIN: ICD-10-CM

## 2022-08-01 DIAGNOSIS — M24.551 RIGHT HIP FLEXOR TIGHTNESS: Primary | ICD-10-CM

## 2022-08-01 DIAGNOSIS — G89.29 CHRONIC RIGHT-SIDED LOW BACK PAIN WITHOUT SCIATICA: ICD-10-CM

## 2022-08-01 DIAGNOSIS — M54.50 CHRONIC RIGHT-SIDED LOW BACK PAIN WITHOUT SCIATICA: ICD-10-CM

## 2022-08-01 PROCEDURE — 97140 MANUAL THERAPY 1/> REGIONS: CPT | Mod: GP | Performed by: PHYSICAL THERAPIST

## 2022-08-01 NOTE — PROGRESS NOTES
08/01/22 1500   Signing Clinician's Name / Credentials   Signing clinician's name / credentials Lashay Lozano PT   Session Number   Session Number 49/60   Additional Session Number 7/29/22: update POC 12 visits   Progress Note/Recertification   Progress Note Completed Date 05/06/22   Ortho Goal 1   Goal Identifier Exercise   Goal Description Patient will return to desired exercise regimen without pain or discomfort to allow for participation in active hobbies. 30 min such as skiing.   (Reassess goal based on findings of following treatment.)   Goal Progress Performing strengthening routine for 30 min 3-4x a week without increase in pain    Target Date 01/20/22   Date Met 02/04/22   Ortho Goal 2   Goal Identifier Sitting   Goal Description Patient will be able to sit for 2 hours without provocation of LBP to allow for comfort in class.  (Reassess goal based on findings of following treatment.)   Goal Progress Depends on the day. Tolerance 30-60 min w/out increased pain.    Target Date 01/20/22   Ortho Goal 3   Goal Identifier Walking   Goal Description Patient will be capable of walking 2-3 miles with pain < or = to 2/10 to facilitate her return to hiking.  (Reassess based on findings of following treatment)   Goal Progress able to walk 2 miles without exacerbation   Target Date 04/01/22   Date Met 03/28/22   Ortho Goal 4   Goal Identifier Strength   Goal Description Patient will be able to achieve a hip flexion MMT grade of 5/5 to demonstrate imrpovement in hip flexor strength needed for desired hobbies.  (Reassess based on findings of following treatment)   Goal Progress 4+/5 bilaterally on 2/4/22 - progressing    Target Date 04/01/22   Subjective Report   Subjective Report Rested over the weekend. Feels like things are improving. Still has to nap a couple hours/day. Has continued with miralax, no enemas for about a week. Having multiple BMs/day. Vestiular testing last week was normal. Will f/u w/ENT for next  "steps. Meds have been helping. Seeing GI next week.   Objective Measure 1   Details From Epic notes: \"VEMP results suggest a normal cVEMP and oVEMP evaluation bilaterally. These results suggest normal saccular and inferior vestibular nerve function and normal utricle and superior vestibular nerve function bilaterally.  Today s ECochG evaluation revealed normal SP/AP ratios bilaterally. \"   Objective Measure 2   Details Mod fascial restrictions of stomach, duodenum, sm intestine, sphincters.   Manual Therapy   Manual Therapy: Mobilization, MFR, MLD, friction massage minutes (27205) 55   Skilled Intervention MFR, counterstrain   Patient Response good   Treatment Detail stomach/greater and lesser curve, duodenum/D1-3, sm intestine, desc colon, DJ jct, liver motility/mobility,   Assessments Completed   Assessments Completed Patient is responding appropriately to PT interventions. HEP compliance is good.    Education   Learner Patient   Readiness Acceptance   Method Booklet/handout;Explanation;Demonstration   Response Verbalizes Understanding;Demonstrates Understanding   Plan   Homework Has been doing HEP every other day.  Stretch every night before bed.  Every other day is performing 1/2 exercises.  Moving forward pt will continue to see Lashay Lozano PT for manual therapy.  Exercsie pt will continue to walk and perform HEP - pt is pleased that she has a program that does not cause pain or injury.     Home program Stretching: hip flexor stretch with leg off table, pigeon stretch, downward dog (not printed on PTRX), heel drops off step, foam roller pec stretch, foam roller thoracic mobs and upper back rolling, lateral stretch on bolster (on her own HS/Add/ITB with strap), Strengthing: S/L hip abd, bridge/S/L bridge, bicycles, plank, side plank, rows, scapular retractions, birddog, lat pull down L2, monster walks L3, side stepping L3, standing hip abd L2    Updates to plan of care 7/29/22: update POC 12 visits   Plan for " next session Continue to modify HEP based on patient progress. Continue with counterstrain and manual therapy.   Comments   Comments ref provider: Chelo Park DO. Patient presents with chronic (8 year) constant right sided LBP and low level tingling in R calf that can become painful. Patient also presents with tightness and pain in right hip flexors that is aggrevated with acitivity such as walking.   Total Session Time   Timed Code Treatment Minutes 55   Total Treatment Time (sum of timed and untimed services) 55   AMBULATORY CLINICS ONLY-MEDICAL AND TREATMENT DIAGNOSIS   Medical Diagnosis Right hip flexor tightness, low back pain, chronic thoracic  back pain (new order added )   PT Diagnosis Right sided LBP w/o sciatica, right hip flexor pain, right calf pain

## 2022-08-02 ENCOUNTER — OFFICE VISIT (OUTPATIENT)
Dept: PHARMACY | Facility: CLINIC | Age: 32
End: 2022-08-02
Payer: COMMERCIAL

## 2022-08-02 DIAGNOSIS — M35.9 AUTOIMMUNE DISEASE (H): Primary | ICD-10-CM

## 2022-08-02 DIAGNOSIS — K58.9 IRRITABLE BOWEL SYNDROME, UNSPECIFIED TYPE: ICD-10-CM

## 2022-08-02 DIAGNOSIS — M54.50 LOW BACK PAIN OF OVER 3 MONTHS DURATION: ICD-10-CM

## 2022-08-02 DIAGNOSIS — F51.01 PRIMARY INSOMNIA: ICD-10-CM

## 2022-08-02 DIAGNOSIS — R42 VERTIGO: ICD-10-CM

## 2022-08-02 DIAGNOSIS — E06.3 HASHIMOTO'S DISEASE: ICD-10-CM

## 2022-08-02 PROCEDURE — 99607 MTMS BY PHARM ADDL 15 MIN: CPT | Performed by: PHARMACIST

## 2022-08-02 PROCEDURE — 99605 MTMS BY PHARM NP 15 MIN: CPT | Performed by: PHARMACIST

## 2022-08-02 RX ORDER — PREDNISONE 10 MG/1
TABLET ORAL
Qty: 10 TABLET | Refills: 0 | COMMUNITY
Start: 2022-08-02 | End: 2022-09-26

## 2022-08-02 RX ORDER — AMITRIPTYLINE HYDROCHLORIDE 10 MG/1
TABLET ORAL
Qty: 30 TABLET | Refills: 11 | COMMUNITY
Start: 2022-08-02 | End: 2022-08-10 | Stop reason: ALTCHOICE

## 2022-08-02 RX ORDER — DULOXETIN HYDROCHLORIDE 20 MG/1
CAPSULE, DELAYED RELEASE ORAL
Qty: 30 CAPSULE | Refills: 11 | COMMUNITY
Start: 2022-08-02 | End: 2022-09-26

## 2022-08-02 RX ORDER — GABAPENTIN 300 MG/1
300 CAPSULE ORAL
Qty: 60 CAPSULE | Refills: 11 | COMMUNITY
Start: 2022-08-02 | End: 2022-08-06

## 2022-08-02 RX ORDER — TRAZODONE HYDROCHLORIDE 50 MG/1
50-100 TABLET, FILM COATED ORAL PRN
Qty: 60 TABLET | Refills: 0 | COMMUNITY
Start: 2022-08-02 | End: 2023-01-12

## 2022-08-02 NOTE — PATIENT INSTRUCTIONS
PLAN:                            1. Adjust thyroid compound   T3 1.25mcg 30-60 minutes before lunch   T3 2.5mcg + T4 75mcg QAM   Recheck labs in 4 weeks     2. Hold:   Full specturm digestive support (may increase stomach acid), DIM complex (contains citrus product - avoiding acidifying agents), Prolamine Iodine (may negatively impact thyroid supplement)     3. Omeprazole 30 minutes before dinner (continue until GI appointment)     4. Try gabapentin if you are feeling restless or can not relax at night (to replace trazodone)     5. Trazodone may contribute to constipation     Follow-up: Return in about 4 weeks (around 8/30/2022) for Follow up for updated labs .

## 2022-08-02 NOTE — PROGRESS NOTES
Medication Therapy Management (MTM) Encounter    ASSESSMENT:                            Medication Adherence/Access: No issues identified    1. Autoimmune disease (H)  Not discussed in depth today, however recertified for medical cannabis with ongoing benefit at bedtime for pain and sleep.    2. Hashimoto's disease  Regardless of lab values within normal limits she continues to be symptomatic.  Due to shorter half-life of T3, she may benefit from dosing this twice daily.  It when on higher doses of T3 she experienced adverse effects therefore recommend to continue on 5 mcg T3 split into twice daily dosing, and continue current dose of T4 based on current lab values.  Additionally, her current doses are following initial recommendations for T4-T3 ratio. Reviewed physiologic differences between T3 and T4. We will adjust compounds, per verbal from Dr. Park.  Recommend to update labs in 4 weeks.    3. Irritable bowel syndrome, unspecified type  Reviewed ongoing difficulties with constipation, bloating, stomach upset, however symptomatically improved with daily PPI.  I recommended continue PPI until evaluation with gastroenterology on Monday.  She has been taking a supplement which acidify is her stomach, as well as consuming some dietary items which may also contribute to exacerbations, provided education to avoid these.    4. Low back pain of over 3 months duration  Symptomatically stable, with ongoing exacerbations most closely related to the constipation.  However after recent evaluation with Dr. Zhao, reviewed potential muscular component and benefits of alternative medications.  Reviewed benefits versus risks these options.  Due to muscle spasms or cramps she may benefit from as needed use of baclofen, reviewed mechanism and potential adverse this medication.  Additionally if having difficulties with sleep and relaxation/anxiety may benefit from trial of gabapentin at bedtime as well to use prior to dosing  trazodone.  Based on most recent labs she does not have any positive inflammatory markers therefore recommend to hold prednisone at this time, this may worsen her stomach pains, particularly if related to acid.  If used on an as-needed basis or stopping and starting duloxetine would likely contribute to withdrawal type symptoms therefore recommend to hold until the discussion.  Amitriptyline is anticholinergic and would likely exacerbate constipation therefore recommend to hold at this time.  Encouraged ongoing work with physical therapy as this has shown positive benefit.    5. Vertigo  Symptomatically improved at this time without recent flare, following with otolaryngology/audiology, and will continue to use meclizine as needed.    6. Primary insomnia  Stable, with use of and trazodone as needed.  Reviewed potential adverse effects related to these medications including constipation.  Reviewed potential additional benefits of gabapentin as discussed above for pain, this may be an alternative agent for sleep.    PLAN:                            1. Adjust thyroid compound   T3 1.25mcg 30-60 minutes before lunch   T3 2.5mcg + T4 75mcg QAM   Recheck labs in 4 weeks     2. Hold:   Full specturm digestive support (may increase stomach acid), DIM complex (contains citrus product - avoiding acidifying agents), Prolamine Iodine (may negatively impact thyroid supplement)     3. Omeprazole 30 minutes before dinner (continue until GI appointment)     4. Try gabapentin if you are feeling restless or can not relax at night (to replace trazodone)     5. Trazodone may contribute to constipation     Follow-up: Return in about 4 weeks (around 8/30/2022) for Follow up for updated labs .    SUBJECTIVE/OBJECTIVE:                          Agustina Nam is a 31 year old adult coming in for an initial visit. Rustam LAMA Viv was referred to me from Dr. Park.      Reason for visit: Medication Management Initial.    Allergies/ADRs:  Reviewed in chart  Past Medical History: Reviewed in chart  Tobacco: Rustam Nam reports that Rustam Nam has never smoked. Rustam Nam has never used smokeless tobacco.  Alcohol: none at this time. Rare use due to concerns for worsening stomach symptoms.     Medication Adherence/Access: no issues reported.  Of note she has been working with a naturopathic provider for the last 3 years.  As a result she is taking several supplements per his recommendation, she has a follow-up with him on Thursday of this week.    Autoimmune disease: She was recently recertified for medical cannabis due to chronic myofascial pain.  She finds that medical cannabis is very effective when taken at night to help with sleep relax.  She does not typically use this during the day due to somnolence.    Hashimoto's disease: She previously been working with endocrinology, taking levothyroxine however she had never had sufficient symptomatic improvement.  As result her primary provider at the time and put her on compounded T4 and T3 with benefit.  However she continues to suffer from intense fatigue, difficulty getting out of bed and needing to nap during the day.  She finds that the symptoms are worse throughout the day into the afternoon.  She continues to struggle with constipation and mood as well.  Due to fatigue it when her T3 dose was previously increased to 2 7 mcg daily, she experienced palpitations and worsening anxiety.  She has not had this difficulty while on 5 mcg T3.    Irritable bowel syndrome with constipation: Not discussed in depth today however she continues to struggle with constipation, feeling bloated, and uncomfortable after eating.  She will be meeting with gastroenterology next Monday.  She was started on a 2-week trial of omeprazole and has noted that she is now able to eat again however continues to have some of the symptoms.  Reviewed that some of the supplements that she is taking from the naturopath, 1  is actually an acidifying agent.  She is also at times drinking kombucha or juice.    Low back pain: Notes that she has had ongoing low back pain for about 10 years.  This seems to affected the right side of her body.  This feels muscular in nature to her.  The pain seems to improve when she is not constipated.  She has been working with physical therapy and finds that this is effective however she continues to suffer from the symptoms.  After recent discussion with Dr. Zhao she was recommended at several different medications that she could try, considering that this may be related to fibromyalgia, including amitriptyline, gabapentin, duloxetine, baclofen, prednisone.  She would like to discuss these medications further to better understand how they work and what to expect.  Trial of B12 shots as well.    Vertigo: She has suffered from vertigo and dizziness however she is not experiencing this right now.  She found that meclizine when used as needed was effective.  She has been working with otolaryngology clinic.     Insomnia: She continues to utilize trazodone as needed for sleep, finds that this is beneficial and well-tolerated when taken.    Today's Vitals: LMP 07/18/2022   ----------------    I spent 60 minutes with this patient today. All changes were made via verbal approval with Dr. Park. A copy of the visit note was provided to the patient's provider(s).    The patient was given a summary of these recommendations.     Obie Ocasio, PharmD, BCACP  Medication Management (MTM) Pharmacist  [facility]     Medication Therapy Recommendations  Hashimoto's disease    Current Medication: COMPOUNDED NON-CONTROLLED SUBSTANCE (CMPD RX) - PHARMACY TO MIX COMPOUNDED MEDICATION   Rationale: Dose too low - Dosage too low - Effectiveness   Recommendation: Increase Frequency   Status: Accepted per Provider         Insomnia    Current Medication: gabapentin (NEURONTIN) 300 MG capsule   Rationale:  Does not understand instructions - Adherence - Adherence   Recommendation: Change Administration Time   Status: Patient Agreed - Adherence/Education         Irritable bowel syndrome    Current Medication: omeprazole (PRILOSEC) 20 MG DR capsule   Rationale: Does not understand instructions - Adherence - Adherence   Recommendation: Provide Education   Status: Patient Agreed - Adherence/Education          Rationale: Undesirable effect - Adverse medication event - Safety   Recommendation: Discontinue Medication - Stop full-spectrum digestive support and DIM complex   Status: Accepted - no CPA Needed

## 2022-08-04 ENCOUNTER — PRE VISIT (OUTPATIENT)
Dept: OTOLARYNGOLOGY | Facility: CLINIC | Age: 32
End: 2022-08-04

## 2022-08-04 ENCOUNTER — OFFICE VISIT (OUTPATIENT)
Dept: OTOLARYNGOLOGY | Facility: CLINIC | Age: 32
End: 2022-08-04
Payer: COMMERCIAL

## 2022-08-04 ENCOUNTER — OFFICE VISIT (OUTPATIENT)
Dept: AUDIOLOGY | Facility: CLINIC | Age: 32
End: 2022-08-04
Payer: COMMERCIAL

## 2022-08-04 VITALS
WEIGHT: 133.1 LBS | OXYGEN SATURATION: 99 % | HEIGHT: 68 IN | HEART RATE: 79 BPM | BODY MASS INDEX: 20.17 KG/M2 | DIASTOLIC BLOOD PRESSURE: 57 MMHG | TEMPERATURE: 97.5 F | SYSTOLIC BLOOD PRESSURE: 103 MMHG

## 2022-08-04 DIAGNOSIS — J34.89 NASAL DRAINAGE: ICD-10-CM

## 2022-08-04 DIAGNOSIS — R42 DIZZINESS: Primary | ICD-10-CM

## 2022-08-04 DIAGNOSIS — H93.13 TINNITUS, BILATERAL: ICD-10-CM

## 2022-08-04 DIAGNOSIS — R42 DIZZINESS AND GIDDINESS: Primary | ICD-10-CM

## 2022-08-04 PROCEDURE — 99203 OFFICE O/P NEW LOW 30 MIN: CPT | Mod: 25 | Performed by: OTOLARYNGOLOGY

## 2022-08-04 PROCEDURE — 92504 EAR MICROSCOPY EXAMINATION: CPT | Performed by: OTOLARYNGOLOGY

## 2022-08-04 PROCEDURE — 92557 COMPREHENSIVE HEARING TEST: CPT | Performed by: AUDIOLOGIST

## 2022-08-04 PROCEDURE — 92550 TYMPANOMETRY & REFLEX THRESH: CPT | Performed by: AUDIOLOGIST

## 2022-08-04 RX ORDER — FLUTICASONE PROPIONATE 50 MCG
1 SPRAY, SUSPENSION (ML) NASAL DAILY
Qty: 11.1 ML | Refills: 2 | Status: SHIPPED | OUTPATIENT
Start: 2022-08-04 | End: 2022-09-03

## 2022-08-04 ASSESSMENT — PAIN SCALES - GENERAL: PAINLEVEL: NO PAIN (0)

## 2022-08-04 NOTE — LETTER
8/4/2022       RE: Agustina Nam  [address]  [place] [address]     Dear Colleague,    Thank you for referring your patient, Agustina Nam, to the [facility] [place] [facility]. Please see a copy of my visit note below.      Otolaryngology Clinic  08/04/2022       Consulting provider: Donny Schwartz     Chief Complaint:  Consultation regarding vertigo and tinnitus    History of Present Illness:   Agustina Nam is a 31 year old adult who presents for consultation regarding vertigo and tinnitus. In early June of this year, they began enduring an intense dizziness accompanied by nausea, ear ringing, and intermittent blurry vision which started in June while they were sick (not Covid per the pt). While they have not had a syncopal episode with these symptoms, they have reported associated lightheadedness. Of note, at the time, they were seeing a chiropractor and receiving craniosacral treatment for neck and back pain. After a recent balance testing session on 6/22, she was referred here for further testing.      Today, the patient reports a head tilting sensation accompanied by what they describe to be nausea as well as a tightness in their skull. They also endorse lightheadedness with sudden positional changes. In regards to their hearing, however, they report stability. They also report buildup of mucus in their nose, for which they have been using a Neti pot.    Of note, patient had seen at [facility] neurology for these symptoms previously in November.    Active Medications:     Current Outpatient Medications:      fluticasone (FLONASE) 50 MCG/ACT nasal spray, Spray 1 spray into both nostrils daily for 30 days, Disp: 11.1 mL, Rfl: 2     amitriptyline (ELAVIL) 10 MG tablet, HOLD, Disp: 30 tablet, Rfl: 11     baclofen (LIORESAL) 10 MG tablet, Take 1 tablet (10 mg) by mouth 3 times daily as needed for muscle spasms,  Disp: 20 tablet, Rfl: 1     COMPOUNDED NON-CONTROLLED SUBSTANCE (CMPD RX) - PHARMACY TO MIX COMPOUNDED MEDICATION, Compound T3 - 2.5 MCG and T4 - 75 MCG capsule. Take one capsule by mouth daily in the morning, Disp: 90 capsule, Rfl: 3     COMPOUNDED NON-CONTROLLED SUBSTANCE (CMPD RX) - PHARMACY TO MIX COMPOUNDED MEDICATION, Compound T3 1.25mcg capsule. Take 2 capsules by mouth once daily 30 minutes before lunch., Disp: 180 capsule, Rfl: 3     DULoxetine (CYMBALTA) 20 MG capsule, HOLD, Disp: 30 capsule, Rfl: 11     gabapentin (NEURONTIN) 300 MG capsule, Take 1 capsule (300 mg) by mouth nightly as needed for other (restlessness and sleep), Disp: 60 capsule, Rfl: 11     meclizine (ANTIVERT) 25 MG tablet, Take 1 tablet (25 mg) by mouth 3 times daily as needed for dizziness, Disp: 60 tablet, Rfl: 0     medical cannabis (Patient's own supply), by Other route See Admin Instructions, Disp: 0 Information only, Rfl:      omeprazole (PRILOSEC) 20 MG DR capsule, Take 1 capsule (20 mg) by mouth daily, Disp: 28 capsule, Rfl: 0     predniSONE (DELTASONE) 10 MG tablet, HOLD, Disp: 10 tablet, Rfl: 0     traZODone (DESYREL) 50 MG tablet, Take 1-2 tablets ( mg) by mouth as needed for sleep, Disp: 60 tablet, Rfl: 0    Current Facility-Administered Medications:      cyanocobalamin injection 1,000 mcg, 1,000 mcg, Intramuscular, Q30 Days, Dalton Zhao MD, 1,000 mcg at 07/27/22 1442      Allergies:   Patient has no known allergies.      Past Medical History:  Past Medical History:   Diagnosis Date     Hashimoto's thyroiditis      Patient Active Problem List   Diagnosis     Metrorrhagia     Chronic Sinusitis     Autoimmune disease (H)     Irritable bowel syndrome     Hashimoto's disease     Postural dizziness with presyncope     Adrenal disorder (H)     Fatigue     Low back pain of over 3 months duration     Tension headache     Right calf pain     Generalized anxiety disorder     Insomnia     Iodine deficiency     Pernicious  "anemia     Small intestinal bacterial overgrowth        Past Surgical History:  Past Surgical History:   Procedure Laterality Date     WISDOM TOOTH EXTRACTION         Family History:   Family History   Problem Relation Age of Onset     Depression Mother      Arthritis Mother      Anxiety Disorder Mother      Plantar fasciitis Mother      Chronic Infections Mother         Chronic Yeast Infection     Arthritis Father      Depression Father      Anxiety Disorder Father      Sinusitis Father      Irritable Bowel Syndrome Father      Sinusitis Maternal Grandmother         Chronic Sinus Congestion     Arthritis Maternal Grandmother      Uterine Cancer Paternal Grandmother      Irritable Bowel Syndrome Paternal Grandmother      Cerebrovascular Disease Paternal Grandmother      Dementia Paternal Grandmother      Hyperthyroidism Paternal Grandfather      Skin Cancer Paternal Grandfather      Prostate Cancer Paternal Grandfather      Breast Cancer No family hx of      Colon Cancer No family hx of      Myocardial Infarction No family hx of          Social History:   Social History     Tobacco Use     Smoking status: Never Smoker     Smokeless tobacco: Never Used   Vaping Use     Vaping Use: Never used   Substance Use Topics     Alcohol use: No     Drug use: Yes     Types: Marijuana     Comment: Drug use: medicinal Marijuana        Physical Exam:   /57 (BP Location: Right arm, Patient Position: Sitting, Cuff Size: Adult Regular)   Pulse 79   Temp 97.5  F (36.4  C) (Temporal)   Ht 1.727 m (5' 8\")   Wt 60.4 kg (133 lb 1.6 oz)   LMP 07/18/2022   SpO2 99%   BMI 20.24 kg/m       Constitutional:  In no acute distress, appears stated age  Eyes:  Extraocular movements intact, no spontaneous nystagmus  Ears:  Both ears examined under the microscope. Very healthy ears with intact TMs and well aerated middle ears.   Respiratory:  No increased work of breathing, wheezing or stridor  Musculoskeletal:  Good upper extremity " strength  Skin:  No rashes on the head and neck  Neurologic:  House Brackman 1/6 bilaterally, ambulating normally  Psychiatric:  Alert, normal affect, answering questions appropriately    Audiogram:  AUDIOGRAM: Rustam Nam underwent an audiogram today. This demonstrated: excellent hearing bilaterally.      Right: Speech reception threshold is 5 dB with 100% word recognition. Tympanogram A type   Left: Speech reception threshold is 0 dB with 100% word recognition. Tympanogram A type     Audiogram was independently reviewed.    Balance testing:  VEMP testing was normal bilaterally.  ECoG was normal bilaterally.  VNG showed left beating nystagmus after horizontal headshake with subjective dizziness, right beating nystagmus after vertical headshake with subjective dizziness, negative fistula test right but with subjective dizziness, few left and up beating nystagmus with left fistula testing with subjective dizziness, also with Valsalva against closed glottis, [place] Hallpike negative to the right but with increased head and right ear pressure and mild dizziness, Sancho-Hallpike left showed slight slow left rotary with few upbeating nystagmus which does not reverse with sitting and increased head and left ear pressure with dizziness, roll test to the left with slight left rotational nystagmus without dizziness, roll to the right negative, positional testing to the right and left showed slight rotary nystagmus in the direction of the position that did not suppress with fixation, no caloric asymmetry.    Imaging: CT temporal bones and MRI brain was reviewed.   CT showed normal temporal bones bilaterally.  MRI showed no enhancing lesions, no abnormalities noted. MRA also normal.    Assessment and Plan:  Agustina Nam is a 31 year old adult who presents for consultation regarding dizziness and tinnitus. They primarily now have imbalance and fullness of their ears and tightness around the head. I explained to them that  their imaging results and balance testing was reassuring regarding normal functioning inner ears. The vestibular testing showed central findings. I suggested they try vestibular physical therapy, for which they already have an upcoming appointment, and advised that they follow up with neurology again.     In response to their questions regarding nasal congestion, I recommended the use of Flonase with continued use of the Neti pot and will put in a rhinology consult. They had their questions answered and were comfortable with the plan.    Scribe Disclosure:  I, Ruchi Falcon, am serving as a scribe to document services personally performed by Hillary Mancuso MD at this visit, based upon the provider's statements to me. All documentation has been reviewed by the aforementioned provider prior to being entered into the official medical record.     The documentation recorded by the scribe accurately reflects the services I personally performed and the decisions made by me.        Again, thank you for allowing me to participate in the care of your patient.      Sincerely,    Hillary Mancuso MD

## 2022-08-04 NOTE — PROGRESS NOTES
Otolaryngology Clinic  08/04/2022       Consulting provider: Donny Schwartz     Chief Complaint:  Consultation regarding vertigo and tinnitus    History of Present Illness:   Agustina Nam is a 31 year old adult who presents for consultation regarding vertigo and tinnitus. In early June of this year, they began enduring an intense dizziness accompanied by nausea, ear ringing, and intermittent blurry vision which started in June while they were sick (not Covid per the pt). While they have not had a syncopal episode with these symptoms, they have reported associated lightheadedness. Of note, at the time, they were seeing a chiropractor and receiving craniosacral treatment for neck and back pain. After a recent balance testing session on 6/22, she was referred here for further testing.      Today, the patient reports a head tilting sensation accompanied by what they describe to be nausea as well as a tightness in their skull. They also endorse lightheadedness with sudden positional changes. In regards to their hearing, however, they report stability. They also report buildup of mucus in their nose, for which they have been using a Neti pot.    Of note, patient had seen at [facility] neurology for these symptoms previously in November.    Active Medications:     Current Outpatient Medications:      fluticasone (FLONASE) 50 MCG/ACT nasal spray, Spray 1 spray into both nostrils daily for 30 days, Disp: 11.1 mL, Rfl: 2     amitriptyline (ELAVIL) 10 MG tablet, HOLD, Disp: 30 tablet, Rfl: 11     baclofen (LIORESAL) 10 MG tablet, Take 1 tablet (10 mg) by mouth 3 times daily as needed for muscle spasms, Disp: 20 tablet, Rfl: 1     COMPOUNDED NON-CONTROLLED SUBSTANCE (CMPD RX) - PHARMACY TO MIX COMPOUNDED MEDICATION, Compound T3 - 2.5 MCG and T4 - 75 MCG capsule. Take one capsule by mouth daily in the morning, Disp: 90 capsule, Rfl: 3     COMPOUNDED NON-CONTROLLED SUBSTANCE (CMPD RX) - PHARMACY TO MIX COMPOUNDED MEDICATION,  Compound T3 1.25mcg capsule. Take 2 capsules by mouth once daily 30 minutes before lunch., Disp: 180 capsule, Rfl: 3     DULoxetine (CYMBALTA) 20 MG capsule, HOLD, Disp: 30 capsule, Rfl: 11     gabapentin (NEURONTIN) 300 MG capsule, Take 1 capsule (300 mg) by mouth nightly as needed for other (restlessness and sleep), Disp: 60 capsule, Rfl: 11     meclizine (ANTIVERT) 25 MG tablet, Take 1 tablet (25 mg) by mouth 3 times daily as needed for dizziness, Disp: 60 tablet, Rfl: 0     medical cannabis (Patient's own supply), by Other route See Admin Instructions, Disp: 0 Information only, Rfl:      omeprazole (PRILOSEC) 20 MG DR capsule, Take 1 capsule (20 mg) by mouth daily, Disp: 28 capsule, Rfl: 0     predniSONE (DELTASONE) 10 MG tablet, HOLD, Disp: 10 tablet, Rfl: 0     traZODone (DESYREL) 50 MG tablet, Take 1-2 tablets ( mg) by mouth as needed for sleep, Disp: 60 tablet, Rfl: 0    Current Facility-Administered Medications:      cyanocobalamin injection 1,000 mcg, 1,000 mcg, Intramuscular, Q30 Days, Dalton Zhao MD, 1,000 mcg at 07/27/22 1442      Allergies:   Patient has no known allergies.      Past Medical History:  Past Medical History:   Diagnosis Date     Hashimoto's thyroiditis      Patient Active Problem List   Diagnosis     Metrorrhagia     Chronic Sinusitis     Autoimmune disease (H)     Irritable bowel syndrome     Hashimoto's disease     Postural dizziness with presyncope     Adrenal disorder (H)     Fatigue     Low back pain of over 3 months duration     Tension headache     Right calf pain     Generalized anxiety disorder     Insomnia     Iodine deficiency     Pernicious anemia     Small intestinal bacterial overgrowth        Past Surgical History:  Past Surgical History:   Procedure Laterality Date     WISDOM TOOTH EXTRACTION         Family History:   Family History   Problem Relation Age of Onset     Depression Mother      Arthritis Mother      Anxiety Disorder Mother      Plantar  "fasciitis Mother      Chronic Infections Mother         Chronic Yeast Infection     Arthritis Father      Depression Father      Anxiety Disorder Father      Sinusitis Father      Irritable Bowel Syndrome Father      Sinusitis Maternal Grandmother         Chronic Sinus Congestion     Arthritis Maternal Grandmother      Uterine Cancer Paternal Grandmother      Irritable Bowel Syndrome Paternal Grandmother      Cerebrovascular Disease Paternal Grandmother      Dementia Paternal Grandmother      Hyperthyroidism Paternal Grandfather      Skin Cancer Paternal Grandfather      Prostate Cancer Paternal Grandfather      Breast Cancer No family hx of      Colon Cancer No family hx of      Myocardial Infarction No family hx of          Social History:   Social History     Tobacco Use     Smoking status: Never Smoker     Smokeless tobacco: Never Used   Vaping Use     Vaping Use: Never used   Substance Use Topics     Alcohol use: No     Drug use: Yes     Types: Marijuana     Comment: Drug use: medicinal Marijuana        Physical Exam:   /57 (BP Location: Right arm, Patient Position: Sitting, Cuff Size: Adult Regular)   Pulse 79   Temp 97.5  F (36.4  C) (Temporal)   Ht 1.727 m (5' 8\")   Wt 60.4 kg (133 lb 1.6 oz)   LMP 07/18/2022   SpO2 99%   BMI 20.24 kg/m       Constitutional:  In no acute distress, appears stated age  Eyes:  Extraocular movements intact, no spontaneous nystagmus  Ears:  Both ears examined under the microscope. Very healthy ears with intact TMs and well aerated middle ears.   Respiratory:  No increased work of breathing, wheezing or stridor  Musculoskeletal:  Good upper extremity strength  Skin:  No rashes on the head and neck  Neurologic:  House Brackman 1/6 bilaterally, ambulating normally  Psychiatric:  Alert, normal affect, answering questions appropriately    Audiogram:  AUDIOGRAM: Rustam LAMA Shaniquemegiancarlo underwent an audiogram today. This demonstrated: excellent hearing bilaterally.      Right: " Speech reception threshold is 5 dB with 100% word recognition. Tympanogram A type   Left: Speech reception threshold is 0 dB with 100% word recognition. Tympanogram A type     Audiogram was independently reviewed.    Balance testing:  VEMP testing was normal bilaterally.  ECoG was normal bilaterally.  VNG showed left beating nystagmus after horizontal headshake with subjective dizziness, right beating nystagmus after vertical headshake with subjective dizziness, negative fistula test right but with subjective dizziness, few left and up beating nystagmus with left fistula testing with subjective dizziness, also with Valsalva against closed glottis, [place] negative to the right but with increased head and right ear pressure and mild dizziness, [place] left showed slight slow left rotary with few upbeating nystagmus which does not reverse with sitting and increased head and left ear pressure with dizziness, roll test to the left with slight left rotational nystagmus without dizziness, roll to the right negative, positional testing to the right and left showed slight rotary nystagmus in the direction of the position that did not suppress with fixation, no caloric asymmetry.    Imaging: CT temporal bones and MRI brain was reviewed.   CT showed normal temporal bones bilaterally.  MRI showed no enhancing lesions, no abnormalities noted. MRA also normal.    Assessment and Plan:  Agustina Nam is a 31 year old adult who presents for consultation regarding dizziness and tinnitus. They primarily now have imbalance and fullness of their ears and tightness around the head. I explained to them that their imaging results and balance testing was reassuring regarding normal functioning inner ears. The vestibular testing showed central findings. I suggested they try vestibular physical therapy, for which they already have an upcoming appointment, and advised that they follow up with neurology again.     In response to  their questions regarding nasal congestion, I recommended the use of Flonase with continued use of the Neti pot and will put in a rhinology consult. They had their questions answered and were comfortable with the plan.    Scribe Disclosure:  I, Ruchi Falcon, am serving as a scribe to document services personally performed by Hillary Mancuso MD at this visit, based upon the provider's statements to me. All documentation has been reviewed by the aforementioned provider prior to being entered into the official medical record.     The documentation recorded by the scribe accurately reflects the services I personally performed and the decisions made by me.

## 2022-08-04 NOTE — NURSING NOTE
"Blood pressure 103/57, pulse 79, temperature 97.5  F (36.4  C), temperature source Temporal, height 1.727 m (5' 8\"), weight 60.4 kg (133 lb 1.6 oz), last menstrual period 07/18/2022, SpO2 99 %, not currently breastfeeding.    Faye Velasco LPN    "

## 2022-08-04 NOTE — PROGRESS NOTES
Otolaryngology Clinic  08/04/2022       Consulting provider: Donny Schwartz     Chief Complaint:  Consultation regarding vertigo and tinnitus    History of Present Illness:   Agustina Nam is a 31 year old adult with a history of vertigo who presents for consultation regarding vertigo and tinnitus. In early June of this year, they began enduring an intense dizziness accompanied by nausea, ear ringing, and intermittent blurry vision. While they have not had a syncopal episode with these symptoms, they have reported associated lightheadedness. Of note, at the time, they were seeing a chiropractor and receiving craniosacral treatmetn for neck and back pain. After a recent balance testing session on 6/22, she was referred here for further testing. ***     Active Medications:     Current Outpatient Medications:      amitriptyline (ELAVIL) 10 MG tablet, HOLD, Disp: 30 tablet, Rfl: 11     baclofen (LIORESAL) 10 MG tablet, Take 1 tablet (10 mg) by mouth 3 times daily as needed for muscle spasms, Disp: 20 tablet, Rfl: 1     COMPOUNDED NON-CONTROLLED SUBSTANCE (CMPD RX) - PHARMACY TO MIX COMPOUNDED MEDICATION, Compound T3 - 2.5 MCG and T4 - 75 MCG capsule. Take one capsule by mouth daily in the morning, Disp: 90 capsule, Rfl: 3     COMPOUNDED NON-CONTROLLED SUBSTANCE (CMPD RX) - PHARMACY TO MIX COMPOUNDED MEDICATION, Compound T3 1.25mcg capsule. Take 2 capsules by mouth once daily 30 minutes before lunch., Disp: 180 capsule, Rfl: 3     DULoxetine (CYMBALTA) 20 MG capsule, HOLD, Disp: 30 capsule, Rfl: 11     gabapentin (NEURONTIN) 300 MG capsule, Take 1 capsule (300 mg) by mouth nightly as needed for other (restlessness and sleep), Disp: 60 capsule, Rfl: 11     meclizine (ANTIVERT) 25 MG tablet, Take 1 tablet (25 mg) by mouth 3 times daily as needed for dizziness, Disp: 60 tablet, Rfl: 0     medical cannabis (Patient's own supply), by Other route See Admin Instructions, Disp: 0 Information only, Rfl:      omeprazole  (PRILOSEC) 20 MG DR capsule, Take 1 capsule (20 mg) by mouth daily, Disp: 28 capsule, Rfl: 0     predniSONE (DELTASONE) 10 MG tablet, HOLD, Disp: 10 tablet, Rfl: 0     traZODone (DESYREL) 50 MG tablet, Take 1-2 tablets ( mg) by mouth as needed for sleep, Disp: 60 tablet, Rfl: 0    Current Facility-Administered Medications:      cyanocobalamin injection 1,000 mcg, 1,000 mcg, Intramuscular, Q30 Days, Dalton Zhao MD, 1,000 mcg at 07/27/22 1442      Allergies:   Patient has no known allergies.      Past Medical History:  Past Medical History:   Diagnosis Date     Hashimoto's thyroiditis      Patient Active Problem List   Diagnosis     Metrorrhagia     Chronic Sinusitis     Autoimmune disease (H)     Irritable bowel syndrome     Hashimoto's disease     Postural dizziness with presyncope     Adrenal disorder (H)     Fatigue     Low back pain of over 3 months duration     Tension headache     Right calf pain     Generalized anxiety disorder     Insomnia     Iodine deficiency     Pernicious anemia     Small intestinal bacterial overgrowth        Past Surgical History:  Past Surgical History:   Procedure Laterality Date     WISDOM TOOTH EXTRACTION         Family History:   Family History   Problem Relation Age of Onset     Depression Mother      Arthritis Mother      Anxiety Disorder Mother      Plantar fasciitis Mother      Chronic Infections Mother         Chronic Yeast Infection     Arthritis Father      Depression Father      Anxiety Disorder Father      Sinusitis Father      Irritable Bowel Syndrome Father      Sinusitis Maternal Grandmother         Chronic Sinus Congestion     Arthritis Maternal Grandmother      Uterine Cancer Paternal Grandmother      Irritable Bowel Syndrome Paternal Grandmother      Cerebrovascular Disease Paternal Grandmother      Dementia Paternal Grandmother      Hyperthyroidism Paternal Grandfather      Skin Cancer Paternal Grandfather      Prostate Cancer Paternal Grandfather       Breast Cancer No family hx of      Colon Cancer No family hx of      Myocardial Infarction No family hx of          Social History:   Social History     Tobacco Use     Smoking status: Never Smoker     Smokeless tobacco: Never Used   Vaping Use     Vaping Use: Never used   Substance Use Topics     Alcohol use: No     Drug use: Yes     Types: Marijuana     Comment: Drug use: medicinal Marijuana       Review of Systems:   Pertinent items are noted in HPI or as in patient entered ROS below, remainder of complete ROS is negative.   No flowsheet data found.      Physical Exam:   [facility] 07/18/2022      Physical examination:  Constitutional:  In no acute distress, appears stated age  Eyes:  Extraocular movements intact, no spontaneous nystagmus  Ears:  Both ears examined under the microscope.  ***  Respiratory:  No increased work of breathing, wheezing or stridor  Musculoskeletal:  Good upper extremity strength  Skin:  No rashes on the head and neck  Neurologic:  House Brackman 1/6 bilaterally, ambulating normally  Psychiatric:  Alert, normal affect, answering questions appropriately    Audiogram:  AUDIOGRAM: Rustam Nam underwent an audiogram today. This demonstrated:  ***    Right: Speech reception threshold is *** dB with ***% word recognition. Tympanogram *** type   Left: Speech reception threshold is *** dB with ***% word recognition. Tympanogram *** type     Audiogram was independently reviewed       Outside records from *** were independently reviewed.  ***    Assessment and Plan:  ***    Follow-up: No follow-ups on file.         Scribe Disclosure:  Ruchi GREENFIELD, am serving as a scribe to document services personally performed by Hillary Mancuso MD at this visit, based upon the provider's statements to me. All documentation has been reviewed by the aforementioned provider prior to being entered into the official medical record.

## 2022-08-04 NOTE — PATIENT INSTRUCTIONS
1. You were seen in the ENT Clinic today by Dr. Mancuso.  If you have any questions or concerns after your appointment, please call   - Option 1: ENT Clinic: [phone]   - Option 2: Mily (Dr. Mancuso's Nurse): [phone]                   Jana(Dr. Mancuso's Nurse): [phone]    2.   Plan to return to clinic as needed    3. Flonase- 1 puff in each nostril daily    4. Referral to Dr. Orourke or Dr. Oseas Byrnes LPN  [facility] - Otolaryngology

## 2022-08-04 NOTE — PROGRESS NOTES
AUDIOLOGY REPORT    SUMMARY: Audiology visit completed. See audiogram for results.      RECOMMENDATIONS: Follow-up with ENT.      Mariola Jeong.  Licensed Audiologist  [place] #[id number]

## 2022-08-06 DIAGNOSIS — M54.50 LOW BACK PAIN OF OVER 3 MONTHS DURATION: ICD-10-CM

## 2022-08-06 RX ORDER — GABAPENTIN 300 MG/1
CAPSULE ORAL
Qty: 60 CAPSULE | Refills: 11 | Status: SHIPPED | OUTPATIENT
Start: 2022-08-06 | End: 2022-09-26

## 2022-08-08 ENCOUNTER — VIRTUAL VISIT (OUTPATIENT)
Dept: GASTROENTEROLOGY | Facility: CLINIC | Age: 32
End: 2022-08-08
Attending: STUDENT IN AN ORGANIZED HEALTH CARE EDUCATION/TRAINING PROGRAM
Payer: COMMERCIAL

## 2022-08-08 ENCOUNTER — PRE VISIT (OUTPATIENT)
Dept: GASTROENTEROLOGY | Facility: CLINIC | Age: 32
End: 2022-08-08

## 2022-08-08 VITALS
HEIGHT: 68 IN | HEART RATE: 97 BPM | WEIGHT: 133.9 LBS | SYSTOLIC BLOOD PRESSURE: 103 MMHG | OXYGEN SATURATION: 97 % | BODY MASS INDEX: 20.29 KG/M2 | DIASTOLIC BLOOD PRESSURE: 61 MMHG

## 2022-08-08 DIAGNOSIS — R10.84 ABDOMINAL PAIN, GENERALIZED: Primary | ICD-10-CM

## 2022-08-08 DIAGNOSIS — K59.00 CONSTIPATION, UNSPECIFIED CONSTIPATION TYPE: ICD-10-CM

## 2022-08-08 DIAGNOSIS — R14.0 BLOATING: ICD-10-CM

## 2022-08-08 PROCEDURE — 99204 OFFICE O/P NEW MOD 45 MIN: CPT | Mod: GT | Performed by: INTERNAL MEDICINE

## 2022-08-08 ASSESSMENT — PAIN SCALES - GENERAL: PAINLEVEL: MODERATE PAIN (4)

## 2022-08-08 NOTE — TELEPHONE ENCOUNTER
Diagnosis: GENERALIZED ABDOMINAL PAIN  Referred by: Dr. Park   From: [facility]    Appt date: 08/08/2022   NOTES STATUS DETAILS   OFFICE NOTE from referring provider Internal  [facility] - Dr. Park   07/22/2022 07/12/2022 09/29/2021     OFFICE NOTE from other specialist Internal  08/02/2022: [facility] - Dr. Ocasio   07/26/2022: [facility] Dr. Zhao   06/22/2022: [facility] - Dr. Breaux  04/04/2018: [facility] Dr. Tracey   DISCHARGE SUMMARY from hospital N/A     DISCHARGE REPORT from the ER N/A    OPERATIVE REPORT N/A     MEDICATION LIST Internal  N/A   LABS     C. DIFFICILE  N/A    BIOPSIES/PATHOLOGY RELATED TO DIAGNOSIS N/A     DIAGNOSTIC PROCEDURES     PFC TESTING (from the Pelvic floor center includes Manometry, PDNL, EMG, etc.) N/A    COLONOSCOPY N/A     UPPER ENDOSCOPY (EGD) N/A    FLEX SIGMOIDOSCOPY N/A     ERCP N/A    IMAGING (DISC & REPORT)      CT N/A       MRI N/A    XRAY N/A    ULTRASOUND  (ENDOANAL/ENDORECTAL) Internal 07/15/2022: [facility] ([facility])

## 2022-08-08 NOTE — LETTER
8/8/2022         RE: Agustina Nam  [address]  [place] [address]        Dear Colleague,    Thank you for referring your patient, Agustina Nam, to the [facility]. Please see a copy of my visit note below.    GI CLINIC VISIT    CC/REFERRING MD:  Chelo Park  REASON FOR CONSULTATION:   Chelo Park for   Chief Complaint   Patient presents with     Consult     Lower right abdominal pain; general distress around eating; having some bloating and discomfort         HPI    Ray presents today for multiple longstanding GI issues. Abbreviated appointment today as patient was >15 minutes late.  Has longstanding constipation and bloating which before a few weeks ago was primarily managing with enemas PRN. Will wake up with an urge to go to the bathroom immediately.  Also has a lot of cramping and bloating. Over the last 2 weeks has been using miralax ~ 2x a day - has resulted in having multiple BMs a day.    Has also been having nausea, early satiety, etc.  Has been on HCL supplements for years - recently stopped them and put on PPI - unsure if it is helping    Has lost ~ 15lbs unintentionally since January    Dairy, gluten, corn free diet    Has a history of SIBO that was diagnosed in [place] a few years ago.  Manages with diet.  Has never been treated with antibiotics for it.  PROBLEM LIST  Patient Active Problem List    Diagnosis Date Noted     Low back pain of over 3 months duration 07/10/2020     Priority: Medium     Tension headache 07/10/2020     Priority: Medium     Right calf pain 07/10/2020     Priority: Medium     Fatigue 10/11/2017     Priority: Medium     Postural dizziness with presyncope 05/16/2017     Priority: Medium     Adrenal disorder (H) 05/16/2017     Priority: Medium     Irritable bowel syndrome 05/07/2017     Priority: Medium     Hashimoto's disease 05/07/2017     Priority: Medium     Autoimmune disease (H) 04/09/2017     Priority: Medium      Generalized anxiety disorder 03/31/2017     Priority: Medium     Insomnia 03/31/2017     Priority: Medium     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  Not certain about diagnosis.  Was diagnosed in [place] by Functional medicine provider.       Pernicious anemia 03/31/2017     Priority: Medium     Formatting of this note might be different from the original.  Does B12 injections 50545 microgram twice a week       Small intestinal bacterial overgrowth 03/31/2017     Priority: Medium     Formatting of this note might be different from the original.  Treating with oregano oil       Chronic Sinusitis      Priority: Medium     Created by Conversion  Replacement Utility updated for latest IMO load         Metrorrhagia      Priority: Medium     Created by Conversion           PERTINENT PAST MEDICAL HISTORY:  Past Medical History:   Diagnosis Date     Hashimoto's thyroiditis        PREVIOUS SURGERIES:  Past Surgical History:   Procedure Laterality Date     WISDOM TOOTH EXTRACTION           ALLERGIES:   No Known Allergies    PERTINENT MEDICATIONS:    Current Outpatient Medications:      amitriptyline (ELAVIL) 10 MG tablet, HOLD, Disp: 30 tablet, Rfl: 11     baclofen (LIORESAL) 10 MG tablet, Take 1 tablet (10 mg) by mouth 3 times daily as needed for muscle spasms, Disp: 20 tablet, Rfl: 1     COMPOUNDED NON-CONTROLLED SUBSTANCE (CMPD RX) - PHARMACY TO MIX COMPOUNDED MEDICATION, Compound T3 - 2.5 MCG and T4 - 75 MCG capsule. Take one capsule by mouth daily in the morning, Disp: 90 capsule, Rfl: 3     COMPOUNDED NON-CONTROLLED SUBSTANCE (CMPD RX) - PHARMACY TO MIX COMPOUNDED MEDICATION, Compound T3 1.25mcg capsule. Take 2 capsules by mouth once daily 30 minutes before lunch., Disp: 180 capsule, Rfl: 3     DULoxetine (CYMBALTA) 20 MG capsule, HOLD, Disp: 30 capsule, Rfl: 11     fluticasone (FLONASE) 50 MCG/ACT nasal spray, Spray 1 spray into both nostrils  daily for 30 days, Disp: 11.1 mL, Rfl: 2     gabapentin (NEURONTIN) 300 MG capsule, TAKE 1 CAPSULE (300 MG) BY MOUTH 2 TIMES DAILY, Disp: 60 capsule, Rfl: 11     meclizine (ANTIVERT) 25 MG tablet, Take 1 tablet (25 mg) by mouth 3 times daily as needed for dizziness, Disp: 60 tablet, Rfl: 0     medical cannabis (Patient's own supply), by Other route See Admin Instructions, Disp: 0 Information only, Rfl:      omeprazole (PRILOSEC) 20 MG DR capsule, Take 1 capsule (20 mg) by mouth daily, Disp: 28 capsule, Rfl: 0     predniSONE (DELTASONE) 10 MG tablet, HOLD, Disp: 10 tablet, Rfl: 0     traZODone (DESYREL) 50 MG tablet, Take 1-2 tablets ( mg) by mouth as needed for sleep, Disp: 60 tablet, Rfl: 0    Current Facility-Administered Medications:      cyanocobalamin injection 1,000 mcg, 1,000 mcg, Intramuscular, Q30 Days, Dalton Zhao MD, 1,000 mcg at 07/27/22 1442    SOCIAL HISTORY:  Social History     Socioeconomic History     Marital status: Single     Spouse name: Not on file     Number of children: Not on file     Years of education: Not on file     Highest education level: Not on file   Occupational History     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: masters of [employer]   Tobacco Use     Smoking status: Never Smoker     Smokeless tobacco: Never Used   Vaping Use     Vaping Use: Never used   Substance and Sexual Activity     Alcohol use: No     Drug use: Yes     Types: Marijuana     Comment: Drug use: medicinal Marijuana     Sexual activity: Not Currently     Partners: Female, Male   Other Topics Concern     Not on file   Social History Narrative     Not on file     Social Determinants of Health     Financial Resource Strain: Not on file   Food Insecurity: Not on file   Transportation Needs: Not on file   Physical Activity: Not on file   Stress: Not on file   Social Connections: Not on file   Intimate Partner Violence: Not on file   Housing Stability: Not on file       FAMILY HISTORY:  Family  "History   Problem Relation Age of Onset     Depression Mother      Arthritis Mother      Anxiety Disorder Mother      Plantar fasciitis Mother      Chronic Infections Mother         Chronic Yeast Infection     Arthritis Father      Depression Father      Anxiety Disorder Father      Sinusitis Father      Irritable Bowel Syndrome Father      Sinusitis Maternal Grandmother         Chronic Sinus Congestion     Arthritis Maternal Grandmother      Uterine Cancer Paternal Grandmother      Irritable Bowel Syndrome Paternal Grandmother      Cerebrovascular Disease Paternal Grandmother      Dementia Paternal Grandmother      Hyperthyroidism Paternal Grandfather      Skin Cancer Paternal Grandfather      Prostate Cancer Paternal Grandfather      Breast Cancer No family hx of      Colon Cancer No family hx of      Myocardial Infarction No family hx of        Past/family/social history reviewed and no changes    PHYSICAL EXAMINATION:  Constitutional: aaox3, cooperative, pleasant, not dyspneic/diaphoretic, no acute distress  Vitals reviewed: /61 (BP Location: Left arm, Patient Position: Sitting, Cuff Size: Adult Regular)   Pulse 97   Ht 1.727 m (5' 8\")   Wt 60.7 kg (133 lb 14.4 oz)   LMP 07/18/2022   SpO2 97%   BMI 20.36 kg/m    Wt:   Wt Readings from Last 2 Encounters:   08/08/22 60.7 kg (133 lb 14.4 oz)   08/04/22 60.4 kg (133 lb 1.6 oz)      Eyes: Sclera anicteric/injected  Ears/nose/mouth/throat: Normal oropharynx without ulcers or exudate, mucus membranes moist, hearing intact  Neck: supple, thyroid normal size  CV: No edema  Respiratory: Unlabored breathing  Lymph: No axillary, submandibular, supraclavicular or inguinal lymphadenopathy  Abd: soft, Nondistended, no hepatosplenomegaly, minimally tender to deep palpation, no peritoneal signs  Skin: warm, perfused, no jaundice  Psych: Normal affect  MSK: Normal gait      PERTINENT STUDIES:  Most recent CBC:  Recent Labs   Lab Test 06/10/22  1435 08/17/21  1326 "   WBC 7.3 7.9   HGB 12.9 12.3   HCT 39.1 37.4    264     Most recent hepatic panel:  Recent Labs   Lab Test 06/10/22  1435 03/12/19  0833   ALT 12 <9   AST 16 15     Most recent creatinine:  Recent Labs   Lab Test 06/10/22  1435 03/12/19  0833   CR 0.78 0.66       ASSESSMENT/PLAN:    # bloating, constipation, weight loss - some improvement over the last few weeks with miralax and omeprazole.  Will continue to hold HCL pills.  Will also continue omeprazole for 4 week course and then taper.  For now - will continue BID miralax - if no improvement in 2 weeks will switch to another agent.  Given weight loss, is reasonable to pursue cross sectional imaging (I.e. CT scan) for further evaluation - ordered today.  Will discuss other issues at a follow-up appointment given that today's visit was abbreviated as patient was late.    RTC next available    Laila Lester DO      Again, thank you for allowing me to participate in the care of your patient.        Sincerely,        Laila Lester DO

## 2022-08-08 NOTE — NURSING NOTE
"Agustina Nam's goals for this visit include:   Chief Complaint   Patient presents with     Consult     Lower right abdominal pain; general distress around eating; having some bloating and discomfort       Rustam Nam requests these members of Rustam Nam's care team be copied on today's visit information: PCP    PCP: Chelo Park    Referring Provider:  Chelo Park DO  [address] [place] [address]    /61 (BP Location: Left arm, Patient Position: Sitting, Cuff Size: Adult Regular)   Pulse 97   Ht 1.727 m (5' 8\")   Wt 60.7 kg (133 lb 14.4 oz)   LMP 07/18/2022   SpO2 97%   BMI 20.36 kg/m      Do you need any medication refills at today's visit? No    Marisol Doyle LPN      "

## 2022-08-08 NOTE — PROGRESS NOTES
GI CLINIC VISIT    CC/REFERRING MD:  Chelo Park  REASON FOR CONSULTATION:   Chelo Park for   Chief Complaint   Patient presents with     Consult     Lower right abdominal pain; general distress around eating; having some bloating and discomfort         HPI    Ray presents today for multiple longstanding GI issues. Abbreviated appointment today as patient was >15 minutes late.  Has longstanding constipation and bloating which before a few weeks ago was primarily managing with enemas PRN. Will wake up with an urge to go to the bathroom immediately.  Also has a lot of cramping and bloating. Over the last 2 weeks has been using miralax ~ 2x a day - has resulted in having multiple BMs a day.    Has also been having nausea, early satiety, etc.  Has been on HCL supplements for years - recently stopped them and put on PPI - unsure if it is helping    Has lost ~ 15lbs unintentionally since January    Dairy, gluten, corn free diet    Has a history of SIBO that was diagnosed in [place] a few years ago.  Manages with diet.  Has never been treated with antibiotics for it.  PROBLEM LIST  Patient Active Problem List    Diagnosis Date Noted     Low back pain of over 3 months duration 07/10/2020     Priority: Medium     Tension headache 07/10/2020     Priority: Medium     Right calf pain 07/10/2020     Priority: Medium     Fatigue 10/11/2017     Priority: Medium     Postural dizziness with presyncope 05/16/2017     Priority: Medium     Adrenal disorder (H) 05/16/2017     Priority: Medium     Irritable bowel syndrome 05/07/2017     Priority: Medium     Hashimoto's disease 05/07/2017     Priority: Medium     Autoimmune disease (H) 04/09/2017     Priority: Medium     Generalized anxiety disorder 03/31/2017     Priority: Medium     Insomnia 03/31/2017     Priority: Medium     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  Not certain about diagnosis.   Was diagnosed in [place] by Functional medicine provider.       Pernicious anemia 03/31/2017     Priority: Medium     Formatting of this note might be different from the original.  Does B12 injections 19080 microgram twice a week       Small intestinal bacterial overgrowth 03/31/2017     Priority: Medium     Formatting of this note might be different from the original.  Treating with oregano oil       Chronic Sinusitis      Priority: Medium     Created by Conversion  Replacement Utility updated for latest IMO load         Metrorrhagia      Priority: Medium     Created by Conversion           PERTINENT PAST MEDICAL HISTORY:  Past Medical History:   Diagnosis Date     Hashimoto's thyroiditis        PREVIOUS SURGERIES:  Past Surgical History:   Procedure Laterality Date     WISDOM TOOTH EXTRACTION           ALLERGIES:   No Known Allergies    PERTINENT MEDICATIONS:    Current Outpatient Medications:      amitriptyline (ELAVIL) 10 MG tablet, HOLD, Disp: 30 tablet, Rfl: 11     baclofen (LIORESAL) 10 MG tablet, Take 1 tablet (10 mg) by mouth 3 times daily as needed for muscle spasms, Disp: 20 tablet, Rfl: 1     COMPOUNDED NON-CONTROLLED SUBSTANCE (CMPD RX) - PHARMACY TO MIX COMPOUNDED MEDICATION, Compound T3 - 2.5 MCG and T4 - 75 MCG capsule. Take one capsule by mouth daily in the morning, Disp: 90 capsule, Rfl: 3     COMPOUNDED NON-CONTROLLED SUBSTANCE (CMPD RX) - PHARMACY TO MIX COMPOUNDED MEDICATION, Compound T3 1.25mcg capsule. Take 2 capsules by mouth once daily 30 minutes before lunch., Disp: 180 capsule, Rfl: 3     DULoxetine (CYMBALTA) 20 MG capsule, HOLD, Disp: 30 capsule, Rfl: 11     fluticasone (FLONASE) 50 MCG/ACT nasal spray, Spray 1 spray into both nostrils daily for 30 days, Disp: 11.1 mL, Rfl: 2     gabapentin (NEURONTIN) 300 MG capsule, TAKE 1 CAPSULE (300 MG) BY MOUTH 2 TIMES DAILY, Disp: 60 capsule, Rfl: 11     meclizine (ANTIVERT) 25 MG tablet, Take 1 tablet (25 mg) by mouth 3 times daily as needed for  dizziness, Disp: 60 tablet, Rfl: 0     medical cannabis (Patient's own supply), by Other route See Admin Instructions, Disp: 0 Information only, Rfl:      omeprazole (PRILOSEC) 20 MG DR capsule, Take 1 capsule (20 mg) by mouth daily, Disp: 28 capsule, Rfl: 0     predniSONE (DELTASONE) 10 MG tablet, HOLD, Disp: 10 tablet, Rfl: 0     traZODone (DESYREL) 50 MG tablet, Take 1-2 tablets ( mg) by mouth as needed for sleep, Disp: 60 tablet, Rfl: 0    Current Facility-Administered Medications:      cyanocobalamin injection 1,000 mcg, 1,000 mcg, Intramuscular, Q30 Days, Dalton Zhao MD, 1,000 mcg at 07/27/22 1442    SOCIAL HISTORY:  Social History     Socioeconomic History     Marital status: Single     Spouse name: Not on file     Number of children: Not on file     Years of education: Not on file     Highest education level: Not on file   Occupational History     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: masters of [employer]   Tobacco Use     Smoking status: Never Smoker     Smokeless tobacco: Never Used   Vaping Use     Vaping Use: Never used   Substance and Sexual Activity     Alcohol use: No     Drug use: Yes     Types: Marijuana     Comment: Drug use: medicinal Marijuana     Sexual activity: Not Currently     Partners: Female, Male   Other Topics Concern     Not on file   Social History Narrative     Not on file     Social Determinants of Health     Financial Resource Strain: Not on file   Food Insecurity: Not on file   Transportation Needs: Not on file   Physical Activity: Not on file   Stress: Not on file   Social Connections: Not on file   Intimate Partner Violence: Not on file   Housing Stability: Not on file       FAMILY HISTORY:  Family History   Problem Relation Age of Onset     Depression Mother      Arthritis Mother      Anxiety Disorder Mother      Plantar fasciitis Mother      Chronic Infections Mother         Chronic Yeast Infection     Arthritis Father      Depression Father       "Anxiety Disorder Father      Sinusitis Father      Irritable Bowel Syndrome Father      Sinusitis Maternal Grandmother         Chronic Sinus Congestion     Arthritis Maternal Grandmother      Uterine Cancer Paternal Grandmother      Irritable Bowel Syndrome Paternal Grandmother      Cerebrovascular Disease Paternal Grandmother      Dementia Paternal Grandmother      Hyperthyroidism Paternal Grandfather      Skin Cancer Paternal Grandfather      Prostate Cancer Paternal Grandfather      Breast Cancer No family hx of      Colon Cancer No family hx of      Myocardial Infarction No family hx of        Past/family/social history reviewed and no changes    PHYSICAL EXAMINATION:  Constitutional: aaox3, cooperative, pleasant, not dyspneic/diaphoretic, no acute distress  Vitals reviewed: /61 (BP Location: Left arm, Patient Position: Sitting, Cuff Size: Adult Regular)   Pulse 97   Ht 1.727 m (5' 8\")   Wt 60.7 kg (133 lb 14.4 oz)   LMP 07/18/2022   SpO2 97%   BMI 20.36 kg/m    Wt:   Wt Readings from Last 2 Encounters:   08/08/22 60.7 kg (133 lb 14.4 oz)   08/04/22 60.4 kg (133 lb 1.6 oz)      Eyes: Sclera anicteric/injected  Ears/nose/mouth/throat: Normal oropharynx without ulcers or exudate, mucus membranes moist, hearing intact  Neck: supple, thyroid normal size  CV: No edema  Respiratory: Unlabored breathing  Lymph: No axillary, submandibular, supraclavicular or inguinal lymphadenopathy  Abd: soft, Nondistended, no hepatosplenomegaly, minimally tender to deep palpation, no peritoneal signs  Skin: warm, perfused, no jaundice  Psych: Normal affect  MSK: Normal gait      PERTINENT STUDIES:  Most recent CBC:  Recent Labs   Lab Test 06/10/22  1435 08/17/21  1326   WBC 7.3 7.9   HGB 12.9 12.3   HCT 39.1 37.4    264     Most recent hepatic panel:  Recent Labs   Lab Test 06/10/22  1435 03/12/19  0833   ALT 12 <9   AST 16 15     Most recent creatinine:  Recent Labs   Lab Test 06/10/22  1435 03/12/19  0833   CR " 0.78 0.66       ASSESSMENT/PLAN:    # bloating, constipation, weight loss - some improvement over the last few weeks with miralax and omeprazole.  Will continue to hold HCL pills.  Will also continue omeprazole for 4 week course and then taper.  For now - will continue BID miralax - if no improvement in 2 weeks will switch to another agent.  Given weight loss, is reasonable to pursue cross sectional imaging (I.e. CT scan) for further evaluation - ordered today.  Will discuss other issues at a follow-up appointment given that today's visit was abbreviated as patient was late.    RTC next available    Laila Lester DO

## 2022-08-08 NOTE — PATIENT INSTRUCTIONS
Continue on omeprazole 20mg daily for a total of 4 weeks - the go to every other day for a week and then stop.  You can use things like pepcid (famotidine) 20 mg up to twice daily as needed in addition to antacids like tums, maalox, mylanta, gaviscon    Please have a CT scan done. Please call [phone] to schedule

## 2022-08-09 ENCOUNTER — HOSPITAL ENCOUNTER (OUTPATIENT)
Dept: PHYSICAL THERAPY | Facility: REHABILITATION | Age: 32
Discharge: HOME | End: 2022-08-09
Payer: COMMERCIAL

## 2022-08-09 ENCOUNTER — HOSPITAL ENCOUNTER (OUTPATIENT)
Dept: CT IMAGING | Facility: HOSPITAL | Age: 32
Discharge: HOME | End: 2022-08-09
Attending: INTERNAL MEDICINE | Admitting: INTERNAL MEDICINE
Payer: COMMERCIAL

## 2022-08-09 DIAGNOSIS — M24.551 RIGHT HIP FLEXOR TIGHTNESS: Primary | ICD-10-CM

## 2022-08-09 DIAGNOSIS — M54.50 LUMBAR BACK PAIN: ICD-10-CM

## 2022-08-09 DIAGNOSIS — M79.661 RIGHT CALF PAIN: ICD-10-CM

## 2022-08-09 DIAGNOSIS — G89.29 CHRONIC RIGHT-SIDED LOW BACK PAIN WITHOUT SCIATICA: ICD-10-CM

## 2022-08-09 DIAGNOSIS — M54.50 CHRONIC RIGHT-SIDED LOW BACK PAIN WITHOUT SCIATICA: ICD-10-CM

## 2022-08-09 DIAGNOSIS — K59.00 CONSTIPATION, UNSPECIFIED CONSTIPATION TYPE: ICD-10-CM

## 2022-08-09 DIAGNOSIS — R10.84 ABDOMINAL PAIN, GENERALIZED: ICD-10-CM

## 2022-08-09 PROCEDURE — 97140 MANUAL THERAPY 1/> REGIONS: CPT | Mod: GP | Performed by: PHYSICAL THERAPIST

## 2022-08-09 PROCEDURE — 255N000002 HC RX 255 OP 636: Performed by: INTERNAL MEDICINE

## 2022-08-09 PROCEDURE — 74177 CT ABD & PELVIS W/CONTRAST: CPT

## 2022-08-09 RX ORDER — IOPAMIDOL 755 MG/ML
100 INJECTION, SOLUTION INTRAVASCULAR ONCE
Status: DISCONTINUED | OUTPATIENT
Start: 2022-08-09 | End: 2022-08-09

## 2022-08-09 RX ADMIN — IOHEXOL 100 ML: 350 INJECTION, SOLUTION INTRAVENOUS at 14:26

## 2022-08-09 NOTE — PROGRESS NOTES
08/09/22 1100   Session Number   Session Number 50/60   Additional Session Number 7/29/22: update POC 12 visits   Progress Note/Recertification   Progress Note Completed Date 05/06/22   Ortho Goal 1   Goal Identifier Exercise   Goal Description Patient will return to desired exercise regimen without pain or discomfort to allow for participation in active hobbies. 30 min such as skiing.   (Reassess goal based on findings of following treatment.)   Goal Progress Performing strengthening routine for 30 min 3-4x a week without increase in pain    Target Date 01/20/22   Date Met 02/04/22   Ortho Goal 2   Goal Identifier Sitting   Goal Description Patient will be able to sit for 2 hours without provocation of LBP to allow for comfort in class.  (Reassess goal based on findings of following treatment.)   Goal Progress Depends on the day. Tolerance 30-60 min w/out increased pain.    Target Date 01/20/22   Ortho Goal 3   Goal Identifier Walking   Goal Description Patient will be capable of walking 2-3 miles with pain < or = to 2/10 to facilitate her return to hiking.  (Reassess based on findings of following treatment)   Goal Progress able to walk 2 miles without exacerbation   Target Date 04/01/22   Date Met 03/28/22   Ortho Goal 4   Goal Identifier Strength   Goal Description Patient will be able to achieve a hip flexion MMT grade of 5/5 to demonstrate imrpovement in hip flexor strength needed for desired hobbies.  (Reassess based on findings of following treatment)   Goal Progress 4+/5 bilaterally on 2/4/22 - progressing    Target Date 04/01/22   Subjective Report   Subjective Report GI provider wants them to continue with meds and miralax for 2 more weeks. Will have CT scan to rule out other issues. Feeling better overall. Taking miralax throughout the day to keep bowels moving. Will do vestibular therapy starting in September. May also try a migraine med. Abdomen feels okay this morning; having the usual (R)  LB/pelvis pain.   Objective Measure 1   Details Mod fascial restrictions of upper abdomen, rib angle, liver and bile duct.   Manual Therapy   Manual Therapy: Mobilization, MFR, MLD, friction massage minutes ([id number]) 55   Skilled Intervention MFR, counterstrain   Patient Response good   Treatment Detail respiratory diaphragm , liver, bile duct, inf rib angle, pelvic diaphragm, small intestine, liver lift   Assessments Completed   Assessments Completed Patient is responding appropriately to PT interventions. HEP compliance is good.    Education   Learner Patient   Readiness Acceptance   Method Booklet/handout;Explanation;Demonstration   Response Verbalizes Understanding;Demonstrates Understanding   Plan   Homework Has been doing HEP every other day.  Stretch every night before bed.  Every other day is performing 1/2 exercises.  Moving forward pt will continue to see Lashay Lozano PT for manual therapy.  Exercsie pt will continue to walk and perform HEP - pt is pleased that she has a program that does not cause pain or injury.     Home program Stretching: hip flexor stretch with leg off table, pigeon stretch, downward dog (not printed on PTRX), heel drops off step, foam roller pec stretch, foam roller thoracic mobs and upper back rolling, lateral stretch on bolster (on her own HS/Add/ITB with strap), Strengthing: S/L hip abd, bridge/S/L bridge, bicycles, plank, side plank, rows, scapular retractions, birddog, lat pull down L2, monster walks L3, side stepping L3, standing hip abd L2    Updates to plan of care 7/29/22: update POC 12 visits   Plan for next session Continue to modify HEP based on patient progress. Continue with counterstrain and manual therapy.   Comments   Comments ref provider: Chelo Park DO. Patient presents with chronic (8 year) constant right sided LBP and low level tingling in R calf that can become painful. Patient also presents with tightness and pain in right hip flexors that is aggrevated  with acitivity such as walking.   Total Session Time   Timed Code Treatment Minutes 55   Total Treatment Time (sum of timed and untimed services) 55   AMBULATORY CLINICS ONLY-MEDICAL AND TREATMENT DIAGNOSIS   Medical Diagnosis Right hip flexor tightness, low back pain, chronic thoracic  back pain (new order added )   PT Diagnosis Right sided LBP w/o sciatica, right hip flexor pain, right calf pain

## 2022-08-10 ENCOUNTER — MYC MEDICAL ADVICE (OUTPATIENT)
Dept: GASTROENTEROLOGY | Facility: CLINIC | Age: 32
End: 2022-08-10

## 2022-08-10 NOTE — TELEPHONE ENCOUNTER
mEgo message sent to patient that inquiries will be forwarded to provider, reminded to schedule CT scan prior to next appt.     Laila Tafoya RN

## 2022-08-10 NOTE — TELEPHONE ENCOUNTER
Laila Lester, DO  You 2 minutes ago (10:30 AM)       They can use electrolyte supplements if they like.  Can try magnesium but may cause more bloating/cramping as opposed to the miralax.  I don't typically recommend probiotics as they have never shown any benefit in randomized control trials.  We can discuss further at our follow-up visit where we will hopefully have the whole half an hour as opposed to the 7 minutes we had on Monday.  Have them keep a list of questions so we can address everything at that time      Sent AWAKt message to patient regarding provider response above.     Laila Tafoya RN

## 2022-08-12 ENCOUNTER — HOSPITAL ENCOUNTER (OUTPATIENT)
Dept: PHYSICAL THERAPY | Facility: REHABILITATION | Age: 32
Discharge: HOME | End: 2022-08-12
Payer: COMMERCIAL

## 2022-08-12 DIAGNOSIS — M24.551 RIGHT HIP FLEXOR TIGHTNESS: Primary | ICD-10-CM

## 2022-08-12 DIAGNOSIS — K59.00 CONSTIPATION, UNSPECIFIED CONSTIPATION TYPE: ICD-10-CM

## 2022-08-12 DIAGNOSIS — M54.50 CHRONIC RIGHT-SIDED LOW BACK PAIN WITHOUT SCIATICA: ICD-10-CM

## 2022-08-12 DIAGNOSIS — M79.661 RIGHT CALF PAIN: ICD-10-CM

## 2022-08-12 DIAGNOSIS — G89.29 CHRONIC RIGHT-SIDED LOW BACK PAIN WITHOUT SCIATICA: ICD-10-CM

## 2022-08-12 DIAGNOSIS — M54.50 LUMBAR BACK PAIN: ICD-10-CM

## 2022-08-12 PROCEDURE — 97140 MANUAL THERAPY 1/> REGIONS: CPT | Mod: GP | Performed by: PHYSICAL THERAPIST

## 2022-08-12 NOTE — TELEPHONE ENCOUNTER
FUTURE VISIT INFORMATION      FUTURE VISIT INFORMATION:    Date: 10/14/22    Time: 2PM    Location: [facility]  REFERRAL INFORMATION:    Referring provider:  Dr Hillary Mancuso    Referring providers clinic:  [facility]    Reason for visit/diagnosis      RECORDS REQUESTED FROM:       Clinic name Comments Records Status Imaging Status   [facility]  8/4/22 note from Dr Bartolome Medina    Imaging 7/6/22 CT Temporal Bone   6/15/22 MR BRain and MRA Brain  [facility] PACS   [facility]  8/28/17 note from Dr Tanya MEDINA

## 2022-08-12 NOTE — PROGRESS NOTES
"   08/12/22 1500   Signing Clinician's Name / Credentials   Signing clinician's name / credentials Lashay Lozano PT   Session Number   Session Number 51/60   Additional Session Number 7/29/22: update POC 12 visits   Progress Note/Recertification   Progress Note Completed Date 05/06/22   Ortho Goal 1   Goal Identifier Exercise   Goal Description Patient will return to desired exercise regimen without pain or discomfort to allow for participation in active hobbies. 30 min such as skiing.   (Reassess goal based on findings of following treatment.)   Goal Progress Performing strengthening routine for 30 min 3-4x a week without increase in pain    Target Date 01/20/22   Date Met 02/04/22   Ortho Goal 2   Goal Identifier Sitting   Goal Description Patient will be able to sit for 2 hours without provocation of LBP to allow for comfort in class.  (Reassess goal based on findings of following treatment.)   Goal Progress Depends on the day. Tolerance 30-60 min w/out increased pain.    Target Date 01/20/22   Ortho Goal 3   Goal Identifier Walking   Goal Description Patient will be capable of walking 2-3 miles with pain < or = to 2/10 to facilitate her return to hiking.  (Reassess based on findings of following treatment)   Goal Progress able to walk 2 miles without exacerbation   Target Date 04/01/22   Date Met 03/28/22   Ortho Goal 4   Goal Identifier Strength   Goal Description Patient will be able to achieve a hip flexion MMT grade of 5/5 to demonstrate imrpovement in hip flexor strength needed for desired hobbies.  (Reassess based on findings of following treatment)   Goal Progress 4+/5 bilaterally on 2/4/22 - progressing    Target Date 04/01/22   Subjective Report   Subjective Report Has been having migraines/headaches. Menstrual cycle usually exacerbates them. Vestibular eval next week. \"Mostly good\" this week. Had 1 night with sleep disruption due to constipation. Better if they take miralax at lunch and in the evening. " Had abdominal CT scan, normal results. Worked 3 days this week.   Objective Measure 1   Details Moderately decreased motility/fascial mobility of colon, stomach, sigmoid.   Objective Measure 2   Details  8-10 sec/cycle with restrictions noted (L) cranium.   Objective Measure 3   Details Mod fascial restrictions of cranial base. Mod tenderness of (B) post upper cervical spine. Cranial scan (+) for cervical arterials.   Manual Therapy   Manual Therapy: Mobilization, MFR, MLD, friction massage minutes (95226) 55   Skilled Intervention MFR, counterstrain   Patient Response good   Treatment Detail stomach motility, colon motility, sigmoid, hepatic flexure, still pt/cv4, (L) UVERT-A, (L) STH-A, (R) OCC-A, (R) ITH-A, cranial base release   Assessments Completed   Assessments Completed Patient is responding appropriately to PT interventions. HEP compliance is good.    Education   Learner Patient   Readiness Acceptance   Method Booklet/handout;Explanation;Demonstration   Response Verbalizes Understanding;Demonstrates Understanding   Plan   Homework Has been doing HEP every other day.  Stretch every night before bed.  Every other day is performing 1/2 exercises.  Moving forward pt will continue to see Lashay Lozano, PT for manual therapy.  Exercsie pt will continue to walk and perform HEP - pt is pleased that she has a program that does not cause pain or injury.     Home program Stretching: hip flexor stretch with leg off table, pigeon stretch, downward dog (not printed on PTRX), heel drops off step, foam roller pec stretch, foam roller thoracic mobs and upper back rolling, lateral stretch on bolster (on her own HS/Add/ITB with strap), Strengthing: S/L hip abd, bridge/S/L bridge, bicycles, plank, side plank, rows, scapular retractions, birddog, lat pull down L2, monster walks L3, side stepping L3, standing hip abd L2    Updates to plan of care 7/29/22: update POC 12 visits   Plan for next session Continue to modify HEP based  on patient progress. Continue with counterstrain and manual therapy.   Comments   Comments ref provider: Chelo Park DO. Patient presents with chronic (8 year) constant right sided LBP and low level tingling in R calf that can become painful. Patient also presents with tightness and pain in right hip flexors that is aggrevated with acitivity such as walking.   Total Session Time   Timed Code Treatment Minutes 55   Total Treatment Time (sum of timed and untimed services) 55   AMBULATORY CLINICS ONLY-MEDICAL AND TREATMENT DIAGNOSIS   Medical Diagnosis Right hip flexor tightness, low back pain, chronic thoracic  back pain (new order added )   PT Diagnosis Right sided LBP w/o sciatica, right hip flexor pain, right calf pain

## 2022-08-14 ENCOUNTER — MYC REFILL (OUTPATIENT)
Dept: FAMILY MEDICINE | Facility: CLINIC | Age: 32
End: 2022-08-14

## 2022-08-14 DIAGNOSIS — R10.84 ABDOMINAL PAIN, GENERALIZED: ICD-10-CM

## 2022-08-16 ENCOUNTER — HOSPITAL ENCOUNTER (OUTPATIENT)
Dept: PHYSICAL THERAPY | Facility: REHABILITATION | Age: 32
Discharge: HOME | End: 2022-08-16
Payer: COMMERCIAL

## 2022-08-16 DIAGNOSIS — R42 DIZZINESS: ICD-10-CM

## 2022-08-16 PROCEDURE — 97112 NEUROMUSCULAR REEDUCATION: CPT | Mod: GP | Performed by: PHYSICAL THERAPIST

## 2022-08-16 PROCEDURE — 97161 PT EVAL LOW COMPLEX 20 MIN: CPT | Mod: GP | Performed by: PHYSICAL THERAPIST

## 2022-08-18 NOTE — PROGRESS NOTES
[facility]                                                                                   OUTPATIENT PHYSICAL THERAPY FUNCTIONAL EVALUATION  PLAN OF TREATMENT FOR OUTPATIENT REHABILITATION  (COMPLETE FOR INITIAL CLAIMS ONLY)  Patient's Last Name, First Name, M.I.  YOB: 1990  Jorge Namrey  KELBY     Provider's Name   [facility]   Medical Record No.  [id number]     Start of Care Date:  08/16/22   Onset Date:  06/03/22   Type:     _X__PT   ____OT  ____SLP Medical Diagnosis:  (P) R42 (ICD-10-CM) - Dizziness     PT Diagnosis:  (P) postural dysfunction, cervicogenic dizziness Visits from SOC:  1                              __________________________________________________________________________________  Plan of Treatment/Functional Goals:  (P) neuromuscular re-education, motor coordination training, strengthening, stretching, ROM           GOALS  (P) DHI  (P) Patient will experience a reduction in DHI by 8 points indicating clinically significant change.  (P) 10/15/22    (P) Balance  (P) Patient will be able to safely demonstrate 2 exercises to work on balance for progression to self management.  (P) 10/15/22    (P) HEP  (P) Patient will be able to demonstrate without assist completion of 4-6 exercises for progression to self management.  (P) 10/15/22                                                           Therapy Frequency:  (P) 1 time/week   Predicted Duration of Therapy Intervention:  (P) 3-6 visits    Radha Castanon, PT                                    I CERTIFY THE NEED FOR THESE SERVICES FURNISHED UNDER        THIS PLAN OF TREATMENT AND WHILE UNDER MY CARE     (Physician co-signature of this document indicates review and certification of the therapy plan).              Certification Date From:  (P) 08/16/22   Certification Date To:  (P)  10/17/22    Referring Provider:  Chelo Park DO    Initial Assessment  See Epic Evaluation- Start of Care Date: 08/16/22 08/16/22 0900   Quick Adds   Quick Adds Vestibular Eval;Certification   Type of Visit Initial OP PT Evaluation   General Information   Start of Care Date 08/16/22   Referring Physician Chelo Park DO   Orders Evaluate and Treat as Indicated   Order Date 08/17/22   Medical Diagnosis R42 (ICD-10-CM) - Dizziness   Onset of illness/injury or Date of Surgery 06/03/22   Surgical/Medical history reviewed Yes   Pertinent history of current vestibular problem (include personal factors and/or comorbidities that impact the POC)  Anxiety;Migraines   Pertinent history of current problem (include personal factors and/or comorbidities that impact the POC) Patient presents with symptoms that started 6/3/2022 with wooziness and had an attack of vertigo Tuesday which lasted on/off 1 week pretty intense until July, has had a couple days where she feels good moving quickly makes her feel dizzy woozy, read/concentration, migraines daily basis.  Laying down in the dark feeling dizzy no movement needed to provoke. Occasionally changing position helps. Light and sound sensititivity has increased.   Pertinent Visual History  no recent visual changes   Prior level of functional mobility   (independent without compensation)   MRI Results Results   MRI results IMPRESSION:  HEAD MRI:   1.  Unremarkable brain MRI with no finding for acute infarct, intracranial hemorrhage, or abnormally enhancing mass.     2.  Unremarkable appearance to the bilateral internal auditory canals with no findings to suggest vestibular schwannoma.     HEAD MRA:   1.  No significant intracranial stenosis. No aneurysm and no high flow vascular malformation.   VNG Results   VNG results Audiology ASSESSMENT:     1. Indications of central vestibular system involvement noted on today's exam were as follows:      -Left rotary and/or left and  up rotary nystagmus evident in the following assessments:              -Body Left Positional              -Roll Test: Head Left; inconsistent with BPPV.              -Left [place]-Hallpike; inconsistent with BPPV.     -Right rotary nystagmus evident in Body Right Positional.        2. Indications of possible peripheral vestibular system involvement noted on today's exam were as follows:      -Possible Positive Left Fistula and Valsalva Against Closed Glottis.   Fall Risk Screen   Fall screen completed by PT   Have you fallen 2 or more times in the past year? No   Have you fallen and had an injury in the past year? No   Is patient a fall risk? No   Abuse Screen (yes response referral indicated)   Feels Unsafe at Home or Work/School no   Feels Threatened by Someone no   Does Anyone Try to Keep You From Having Contact with Others or Doing Things Outside Your Home? no   Physical Signs of Abuse Present no   System Outcome Measures   Dizziness Handicap Inventory (score out of 100) A decrease in score by 17.18 or greater indicates a clinically significant change in symptoms. 78   Cognitive Status Examination   Orientation orientation to person, place and time   Posture   Posture Forward head position;Protracted shoulders  (slouch)   Cervicogenic Screen   Neck ROM flexion decreased sx, head turning increased sx R <> L worsened sx 6/10, extension worsened 2/10,   Vertebral Artery Test Other   Vertebral Artery Test Comments normal considering hx of sx at neck, L feeling woozy disequalibrium, shifting when comes back to center,   Alar Ligament Test Normal   Transverse Ligament Test Normal   Oculomotor Exam   Smooth Pursuit Other   Smooth Pursuit Comment diagonal L increased difficulty tracking   Saccades Normal   VOR Normal   VOR Cancellation Other   VOR Cancellation Comments patient has difficulty to L   Rapid Head Thrust Normal   Convergence Testing Normal   Infrared Goggle Exam or Frenzel Lense Exam   Vestibular Suppressant in  Last 24 Hours? Yes  (last night amytriptoline)   Exam completed with Room Light   Spontaneous Nystagmus Negative   Gaze Evoked Nystagmus Negative   Head Shake Horizontal Nystagmus Negative   Sancho-Hallpike (right) Negative   [place]-Hallpike (Left) Negative   Dynamic Visual Acuity (DVA)   Horizontal Head Movement at 1 Hz (LogMar) negative 1 line difference   Horizontal Head Movement at 2 Hz (LogMar) negative 1 line difference   Planned Therapy Interventions   Planned Therapy Interventions neuromuscular re-education;motor coordination training;strengthening;stretching;ROM   Clinical Impression   Criteria for Skilled Therapeutic Interventions Met yes, treatment indicated   PT Diagnosis postural dysfunction, cervicogenic dizziness   Influenced by the following impairments decreased flexibility, pain, decreased VOR   Functional limitations due to impairments impaired IADLs   Clinical Presentation Stable/Uncomplicated   Clinical Presentation Rationale Presents as expected based on chart review   Clinical Decision Making (Complexity) Low complexity   Therapy Frequency 1 time/week   Predicted Duration of Therapy Intervention (days/wks) 3-6 visits   Risk & Benefits of therapy have been explained Yes   Patient, Family & other staff in agreement with plan of care Yes   Clinical Impression Comments Patient presents with sx that are inconclusive will require further testing possible cervicogenic dizziness with potential L sided hypofunction.  Will benefit from skilled PT to focus on postural stability and muscle balance at neck along with gaze stability to improved prefomance of VOR in dynamic environments.   Goal 1   Goal Identifier DHI   Goal Description Patient will experience a reduction in DHI by 8 points indicating clinically significant change.   Goal Progress Initiated   Target Date 10/15/22   Goal 2   Goal Identifier Balance   Goal Description Patient will be able to safely demonstrate 2 exercises to work on balance for  progression to self management.   Goal Progress initiated   Target Date 10/15/22   Goal 3   Goal Identifier HEP   Goal Description Patient will be able to demonstrate without assist completion of 4-6 exercises for progression to self management.   Goal Progress initiated   Target Date 10/15/22   Total Evaluation Time   PT Eval, Low Complexity Minutes (32552) 20   Therapy Certification   Certification date from 08/16/22   Certification date to 10/17/22   Medical Diagnosis R42 (ICD-10-CM) - Dizziness   Certification I certify the need for these services furnished under this plan of treatment and while under my care.  (Physician co-signature of this document indicates review and certification of the therapy plan).   Radha Castanon, PT

## 2022-08-26 ENCOUNTER — HOSPITAL ENCOUNTER (OUTPATIENT)
Dept: PHYSICAL THERAPY | Facility: REHABILITATION | Age: 32
Discharge: HOME | End: 2022-08-26
Payer: COMMERCIAL

## 2022-08-26 DIAGNOSIS — M54.50 LUMBAR BACK PAIN: ICD-10-CM

## 2022-08-26 DIAGNOSIS — M24.551 RIGHT HIP FLEXOR TIGHTNESS: Primary | ICD-10-CM

## 2022-08-26 DIAGNOSIS — M79.661 RIGHT CALF PAIN: ICD-10-CM

## 2022-08-26 DIAGNOSIS — G89.29 CHRONIC RIGHT-SIDED LOW BACK PAIN WITHOUT SCIATICA: ICD-10-CM

## 2022-08-26 DIAGNOSIS — M54.50 CHRONIC RIGHT-SIDED LOW BACK PAIN WITHOUT SCIATICA: ICD-10-CM

## 2022-08-26 DIAGNOSIS — K59.00 CONSTIPATION, UNSPECIFIED CONSTIPATION TYPE: ICD-10-CM

## 2022-08-26 PROCEDURE — 97140 MANUAL THERAPY 1/> REGIONS: CPT | Mod: GP | Performed by: PHYSICAL THERAPIST

## 2022-08-26 NOTE — PROGRESS NOTES
08/26/22 1500   Signing Clinician's Name / Credentials   Signing clinician's name / credentials Lashay Lozano PT   Session Number   Session Number 52/60   Additional Session Number 7/29/22: update POC 12 visits   Progress Note/Recertification   Progress Note Completed Date 05/06/22   Ortho Goal 1   Goal Identifier Exercise   Goal Description Patient will return to desired exercise regimen without pain or discomfort to allow for participation in active hobbies. 30 min such as skiing.   (Reassess goal based on findings of following treatment.)   Goal Progress Performing strengthening routine for 30 min 3-4x a week without increase in pain    Target Date 01/20/22   Date Met 02/04/22   Ortho Goal 2   Goal Identifier Sitting   Goal Description Patient will be able to sit for 2 hours without provocation of LBP to allow for comfort in class.  (Reassess goal based on findings of following treatment.)   Goal Progress Depends on the day. Tolerance 30-60 min w/out increased pain.    Target Date 01/20/22   Ortho Goal 3   Goal Identifier Walking   Goal Description Patient will be capable of walking 2-3 miles with pain < or = to 2/10 to facilitate her return to hiking.  (Reassess based on findings of following treatment)   Goal Progress able to walk 2 miles without exacerbation   Target Date 04/01/22   Date Met 03/28/22   Ortho Goal 4   Goal Identifier Strength   Goal Description Patient will be able to achieve a hip flexion MMT grade of 5/5 to demonstrate imrpovement in hip flexor strength needed for desired hobbies.  (Reassess based on findings of following treatment)   Goal Progress 4+/5 bilaterally on 2/4/22 - progressing    Target Date 04/01/22   Subjective Report   Subjective Report Has had a vestibular eval since the last session. Doing better with dizziness overall. Trying to taper off miralax and medications slowly. Eating/food tolerance continues to be better. Constipation has remained better. No enemas.   Objective  Measure 1   Details Initial cranial scan (+) for cervical ligaments.   Objective Measure 2   Details Tenderness of lat upper cervical spine and medial paraspinals.   Objective Measure 3   Details Mod fascial restrictions of colon, sigmoid, small intestine, iliocecal valve.   Manual Therapy   Manual Therapy: Mobilization, MFR, MLD, friction massage minutes (95323) 55   Skilled Intervention MFR, counterstrain   Patient Response good   Treatment Detail (B) LFC2-MS, (L) LFC3-MS, (B) ALLC3-MS, desc colon, LOT-V, sigmoid, small intestine, iliocecal valve   Assessments Completed   Assessments Completed Patient is responding appropriately to PT interventions. HEP compliance is good.    Education   Learner Patient   Readiness Acceptance   Method Booklet/handout;Explanation;Demonstration   Response Verbalizes Understanding;Demonstrates Understanding   Plan   Homework Has been doing HEP every other day.  Stretch every night before bed.  Every other day is performing 1/2 exercises.  Moving forward pt will continue to see Lashay Lozano, PT for manual therapy.  Exercsie pt will continue to walk and perform HEP - pt is pleased that she has a program that does not cause pain or injury.     Home program Stretching: hip flexor stretch with leg off table, pigeon stretch, downward dog (not printed on PTRX), heel drops off step, foam roller pec stretch, foam roller thoracic mobs and upper back rolling, lateral stretch on bolster (on her own HS/Add/ITB with strap), Strengthing: S/L hip abd, bridge/S/L bridge, bicycles, plank, side plank, rows, scapular retractions, birddog, lat pull down L2, monster walks L3, side stepping L3, standing hip abd L2    Updates to plan of care 7/29/22: update POC 12 visits   Plan for next session Continue to modify HEP based on patient progress. Continue with counterstrain and manual therapy.   Comments   Comments ref provider: Chelo Park DO. Patient presents with chronic (8 year) constant right sided  LBP and low level tingling in R calf that can become painful. Patient also presents with tightness and pain in right hip flexors that is aggrevated with acitivity such as walking.   Total Session Time   Timed Code Treatment Minutes 55   Total Treatment Time (sum of timed and untimed services) 55   AMBULATORY CLINICS ONLY-MEDICAL AND TREATMENT DIAGNOSIS   Medical Diagnosis Right hip flexor tightness, low back pain, chronic thoracic  back pain (new order added )   PT Diagnosis Right sided LBP w/o sciatica, right hip flexor pain, right calf pain

## 2022-08-29 ENCOUNTER — HOSPITAL ENCOUNTER (OUTPATIENT)
Dept: PHYSICAL THERAPY | Facility: REHABILITATION | Age: 32
Discharge: HOME | End: 2022-08-29
Payer: COMMERCIAL

## 2022-08-29 DIAGNOSIS — M54.50 CHRONIC RIGHT-SIDED LOW BACK PAIN WITHOUT SCIATICA: ICD-10-CM

## 2022-08-29 DIAGNOSIS — M24.551 RIGHT HIP FLEXOR TIGHTNESS: Primary | ICD-10-CM

## 2022-08-29 DIAGNOSIS — G89.29 CHRONIC RIGHT-SIDED LOW BACK PAIN WITHOUT SCIATICA: ICD-10-CM

## 2022-08-29 DIAGNOSIS — M79.661 RIGHT CALF PAIN: ICD-10-CM

## 2022-08-29 DIAGNOSIS — K59.00 CONSTIPATION, UNSPECIFIED CONSTIPATION TYPE: ICD-10-CM

## 2022-08-29 DIAGNOSIS — M54.50 LUMBAR BACK PAIN: ICD-10-CM

## 2022-08-29 PROCEDURE — 97140 MANUAL THERAPY 1/> REGIONS: CPT | Mod: GP | Performed by: PHYSICAL THERAPIST

## 2022-08-29 NOTE — PROGRESS NOTES
"   08/29/22 1500   Signing Clinician's Name / Credentials   Signing clinician's name / credentials Lashay Lozano PT   Session Number   Session Number 53/60   Additional Session Number 7/29/22: update POC 12 visits   Progress Note/Recertification   Progress Note Completed Date 05/06/22   Ortho Goal 1   Goal Identifier Exercise   Goal Description Patient will return to desired exercise regimen without pain or discomfort to allow for participation in active hobbies. 30 min such as skiing.   (Reassess goal based on findings of following treatment.)   Goal Progress Performing strengthening routine for 30 min 3-4x a week without increase in pain    Target Date 01/20/22   Date Met 02/04/22   Ortho Goal 2   Goal Identifier Sitting   Goal Description Patient will be able to sit for 2 hours without provocation of LBP to allow for comfort in class.  (Reassess goal based on findings of following treatment.)   Goal Progress Depends on the day. Tolerance 30-60 min w/out increased pain.    Target Date 01/20/22   Ortho Goal 3   Goal Identifier Walking   Goal Description Patient will be capable of walking 2-3 miles with pain < or = to 2/10 to facilitate her return to hiking.  (Reassess based on findings of following treatment)   Goal Progress able to walk 2 miles without exacerbation   Target Date 04/01/22   Date Met 03/28/22   Ortho Goal 4   Goal Identifier Strength   Goal Description Patient will be able to achieve a hip flexion MMT grade of 5/5 to demonstrate imrpovement in hip flexor strength needed for desired hobbies.  (Reassess based on findings of following treatment)   Goal Progress 4+/5 bilaterally on 2/4/22 - progressing    Target Date 04/01/22   Subjective Report   Subjective Report Pretty good. Still having trouble sleeping due to some dizziness and other issues. Continues to wean off miralax and meds. Neck was sore over the weekend, but not in \"a bad way\". Thinks they had a BM post treatment last time.   Objective " Measure 1   Details Initial cranial scan (+) for (L) abdominal viscera.   Objective Measure 2   Details Moderately decreased fascial mobility and motility of colon, splenic flexure, sigmoid and (L) kidney.   Objective Measure 3   Details Mod restrictions of (R) ant sacrum.   Manual Therapy   Manual Therapy: Mobilization, MFR, MLD, friction massage minutes (39773) 55   Skilled Intervention MFR, counterstrain   Patient Response good   Treatment Detail splenic flexure, desc colon, colon motility, sm intestine, sigmoid, (R) PS3-N, (L) KS-V, desc colon,   Assessments Completed   Assessments Completed Patient is responding appropriately to PT interventions. HEP compliance is good.    Education   Learner Patient   Readiness Acceptance   Method Booklet/handout;Explanation;Demonstration   Response Verbalizes Understanding;Demonstrates Understanding   Plan   Homework Has been doing HEP every other day.  Stretch every night before bed.  Every other day is performing 1/2 exercises.  Moving forward pt will continue to see Lashay Lozano, PT for manual therapy.  Exercsie pt will continue to walk and perform HEP - pt is pleased that she has a program that does not cause pain or injury.     Home program Stretching: hip flexor stretch with leg off table, pigeon stretch, downward dog (not printed on PTRX), heel drops off step, foam roller pec stretch, foam roller thoracic mobs and upper back rolling, lateral stretch on bolster (on her own HS/Add/ITB with strap), Strengthing: S/L hip abd, bridge/S/L bridge, bicycles, plank, side plank, rows, scapular retractions, birddog, lat pull down L2, monster walks L3, side stepping L3, standing hip abd L2    Updates to plan of care 7/29/22: update POC 12 visits   Plan for next session Continue to modify HEP based on patient progress. Continue with counterstrain and manual therapy.   Comments   Comments ref provider: Chelo Prak DO. Patient presents with chronic (8 year) constant right sided  LBP and low level tingling in R calf that can become painful. Patient also presents with tightness and pain in right hip flexors that is aggrevated with acitivity such as walking.   Total Session Time   Timed Code Treatment Minutes 55   Total Treatment Time (sum of timed and untimed services) 55   AMBULATORY CLINICS ONLY-MEDICAL AND TREATMENT DIAGNOSIS   Medical Diagnosis Right hip flexor tightness, low back pain, chronic thoracic  back pain (new order added )   PT Diagnosis Right sided LBP w/o sciatica, right hip flexor pain, right calf pain

## 2022-09-13 ENCOUNTER — HOSPITAL ENCOUNTER (OUTPATIENT)
Dept: PHYSICAL THERAPY | Facility: REHABILITATION | Age: 32
Discharge: HOME | End: 2022-09-13
Payer: COMMERCIAL

## 2022-09-13 DIAGNOSIS — R42 DIZZINESS: Primary | ICD-10-CM

## 2022-09-13 PROCEDURE — 97112 NEUROMUSCULAR REEDUCATION: CPT | Mod: GP | Performed by: PHYSICAL THERAPIST

## 2022-09-14 ENCOUNTER — OFFICE VISIT (OUTPATIENT)
Dept: GASTROENTEROLOGY | Facility: CLINIC | Age: 32
End: 2022-09-14
Payer: COMMERCIAL

## 2022-09-14 VITALS
OXYGEN SATURATION: 99 % | DIASTOLIC BLOOD PRESSURE: 55 MMHG | SYSTOLIC BLOOD PRESSURE: 95 MMHG | WEIGHT: 141.6 LBS | BODY MASS INDEX: 21.53 KG/M2 | HEART RATE: 90 BPM

## 2022-09-14 DIAGNOSIS — K58.1 IRRITABLE BOWEL SYNDROME WITH CONSTIPATION: Primary | ICD-10-CM

## 2022-09-14 PROCEDURE — 99214 OFFICE O/P EST MOD 30 MIN: CPT | Performed by: INTERNAL MEDICINE

## 2022-09-14 ASSESSMENT — PAIN SCALES - GENERAL: PAINLEVEL: NO PAIN (0)

## 2022-09-14 NOTE — PATIENT INSTRUCTIONS
Please start linzess (linaclotide) daily - stop miralax when you start this.  If after 2 weeks you're still having constipation or if you're having diarrhea let me know and we will adjust the dose.    Please talk to your primary care provider about your concerns re:thyroid medication    You can use famotidine (pepcid) as needed for acid reflux/belching in addition to tums, gaviscon, etc.

## 2022-09-14 NOTE — NURSING NOTE
Agustina Nam's goals for this visit include: constipation/sleep  Rustam Nam requests these members of Rustam Nam's care team be copied on today's visit information: YES    PCP: Chelo Park    Referring Provider:  No referring provider defined for this encounter.    BP 95/55 (BP Location: Left arm, Patient Position: Sitting, Cuff Size: Adult Regular)   Pulse 90   Wt 64.2 kg (141 lb 9.6 oz)   LMP 07/18/2022   SpO2 99%   BMI 21.53 kg/m      Do you need any medication refills at today's visit? NONE    Sabino Norton, EMT

## 2022-09-14 NOTE — PROGRESS NOTES
GI CLINIC VISIT    CC/REFERRING MD:  No ref. provider found  REASON FOR CONSULTATION:   No ref. provider found for   Chief Complaint   Patient presents with     Follow Up     1 mo follow up         HPI    Ray presents today for follow-up.  Has been able to taper off omeprazole - has been doing okay with this although sometimes gets an acidic taste in the mouth.  Also notices frequent belching.    Weight is stable - has gained it back.  Has been able to eat more regularly.    Sometimes is not hungry and other times has insatiable hunger.     Constipation remains an issue despite using miralax twice daily.  Feels like they need to have a bowel movement before going to bed.  Often times after having a bowel movement will be hungry and feel the need to eat before going to sleep.  Does notice that they will often have a bowel movement after taking their activated T3 - wondering if this can be split up into taking it 3 times a day.  Currently thyroid medications are being managed by PCP    Continues to avoid dairy, gluten, corn and soy - has been since being diagnosed with Hashimotos - wondering about being able to reintroduce these things.    ROS:    No fevers or chills  No weight loss  No blurry vision, double vision or change in vision  No sore throat  No lymphadenopathy  No headache, paraesthesias, or weakness in a limb  No shortness of breath or wheezing  No chest pain or pressure  No arthralgias or myalgias  No rashes or skin changes  No odynophagia or dysphagia  No BRBPR, hematochezia, melena  No dysuria, frequency or urgency  No hot/cold intolerance or polyria  No anxiety or depression    PROBLEM LIST  Patient Active Problem List    Diagnosis Date Noted     Low back pain of over 3 months duration 07/10/2020     Priority: Medium     Tension headache 07/10/2020     Priority: Medium     Right calf pain 07/10/2020     Priority: Medium     Fatigue 10/11/2017     Priority: Medium     Postural dizziness with presyncope  05/16/2017     Priority: Medium     Adrenal disorder (H) 05/16/2017     Priority: Medium     Irritable bowel syndrome 05/07/2017     Priority: Medium     Hashimoto's disease 05/07/2017     Priority: Medium     Autoimmune disease (H) 04/09/2017     Priority: Medium     Generalized anxiety disorder 03/31/2017     Priority: Medium     Insomnia 03/31/2017     Priority: Medium     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  Not certain about diagnosis.  Was diagnosed in [place] by Functional medicine provider.       Pernicious anemia 03/31/2017     Priority: Medium     Formatting of this note might be different from the original.  Does B12 injections 88110 microgram twice a week       Small intestinal bacterial overgrowth 03/31/2017     Priority: Medium     Formatting of this note might be different from the original.  Treating with oregano oil       Chronic Sinusitis      Priority: Medium     Created by Conversion  Replacement Utility updated for latest IMO load         Metrorrhagia      Priority: Medium     Created by Conversion           PERTINENT PAST MEDICAL HISTORY:  Past Medical History:   Diagnosis Date     Hashimoto's thyroiditis        PREVIOUS SURGERIES:  Past Surgical History:   Procedure Laterality Date     WISDOM TOOTH EXTRACTION         PREVIOUS ENDOSCOPY:  none    ALLERGIES:   No Known Allergies    PERTINENT MEDICATIONS:    Current Outpatient Medications:      amitriptyline (ELAVIL) 25 MG tablet, Take 12.5 mg by mouth At Bedtime, Disp: , Rfl:      COMPOUNDED NON-CONTROLLED SUBSTANCE (CMPD RX) - PHARMACY TO MIX COMPOUNDED MEDICATION, Compound T3 - 2.5 MCG and T4 - 75 MCG capsule. Take one capsule by mouth daily in the morning, Disp: 90 capsule, Rfl: 3     COMPOUNDED NON-CONTROLLED SUBSTANCE (CMPD RX) - PHARMACY TO MIX COMPOUNDED MEDICATION, Compound T3 1.25mcg capsule. Take 2 capsules by mouth once daily 30 minutes before lunch.,  Disp: 180 capsule, Rfl: 3     medical cannabis (Patient's own supply), by Other route See Admin Instructions, Disp: 0 Information only, Rfl:      baclofen (LIORESAL) 10 MG tablet, Take 1 tablet (10 mg) by mouth 3 times daily as needed for muscle spasms (Patient not taking: Reported on 9/14/2022), Disp: 20 tablet, Rfl: 1     DULoxetine (CYMBALTA) 20 MG capsule, HOLD (Patient not taking: Reported on 9/14/2022), Disp: 30 capsule, Rfl: 11     gabapentin (NEURONTIN) 300 MG capsule, TAKE 1 CAPSULE (300 MG) BY MOUTH 2 TIMES DAILY (Patient not taking: Reported on 9/14/2022), Disp: 60 capsule, Rfl: 11     meclizine (ANTIVERT) 25 MG tablet, Take 1 tablet (25 mg) by mouth 3 times daily as needed for dizziness (Patient not taking: Reported on 9/14/2022), Disp: 60 tablet, Rfl: 0     omeprazole (PRILOSEC) 20 MG DR capsule, Take 1 capsule (20 mg) by mouth daily (Patient not taking: Reported on 9/14/2022), Disp: 28 capsule, Rfl: 0     predniSONE (DELTASONE) 10 MG tablet, HOLD (Patient not taking: Reported on 9/14/2022), Disp: 10 tablet, Rfl: 0     traZODone (DESYREL) 50 MG tablet, Take 1-2 tablets ( mg) by mouth as needed for sleep (Patient not taking: Reported on 9/14/2022), Disp: 60 tablet, Rfl: 0    Current Facility-Administered Medications:      cyanocobalamin injection 1,000 mcg, 1,000 mcg, Intramuscular, Q30 Days, Dalton Zhao MD, 1,000 mcg at 07/27/22 1442    SOCIAL HISTORY:  Social History     Socioeconomic History     Marital status: Single     Spouse name: Not on file     Number of children: Not on file     Years of education: Not on file     Highest education level: Not on file   Occupational History     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: [employer]   Tobacco Use     Smoking status: Never Smoker     Smokeless tobacco: Never Used   Vaping Use     Vaping Use: Never used   Substance and Sexual Activity     Alcohol use: No     Drug use: Yes     Types: Marijuana     Comment: Drug use:  medicinal Marijuana     Sexual activity: Not Currently     Partners: Female, Male   Other Topics Concern     Not on file   Social History Narrative     Not on file     Social Determinants of Health     Financial Resource Strain: Not on file   Food Insecurity: Not on file   Transportation Needs: Not on file   Physical Activity: Not on file   Stress: Not on file   Social Connections: Not on file   Intimate Partner Violence: Not on file   Housing Stability: Not on file       FAMILY HISTORY:  Family History   Problem Relation Age of Onset     Depression Mother      Arthritis Mother      Anxiety Disorder Mother      Plantar fasciitis Mother      Chronic Infections Mother         Chronic Yeast Infection     Arthritis Father      Depression Father      Anxiety Disorder Father      Sinusitis Father      Irritable Bowel Syndrome Father      Sinusitis Maternal Grandmother         Chronic Sinus Congestion     Arthritis Maternal Grandmother      Uterine Cancer Paternal Grandmother      Irritable Bowel Syndrome Paternal Grandmother      Cerebrovascular Disease Paternal Grandmother      Dementia Paternal Grandmother      Hyperthyroidism Paternal Grandfather      Skin Cancer Paternal Grandfather      Prostate Cancer Paternal Grandfather      Breast Cancer No family hx of      Colon Cancer No family hx of      Myocardial Infarction No family hx of        Past/family/social history reviewed and no changes    PHYSICAL EXAMINATION:  Constitutional: aaox3, cooperative, pleasant, not dyspneic/diaphoretic, no acute distress  Vitals reviewed: BP 95/55 (BP Location: Left arm, Patient Position: Sitting, Cuff Size: Adult Regular)   Pulse 90   Wt 64.2 kg (141 lb 9.6 oz)   LMP 07/18/2022   SpO2 99%   BMI 21.53 kg/m    Wt:   Wt Readings from Last 2 Encounters:   09/14/22 64.2 kg (141 lb 9.6 oz)   08/08/22 60.7 kg (133 lb 14.4 oz)      Eyes: Sclera anicteric/injected  Ears/nose/mouth/throat: Normal oropharynx without ulcers or exudate,  mucus membranes moist, hearing intact  Neck: supple, thyroid normal size  CV: No edema  Respiratory: Unlabored breathing  Lymph: No axillary, submandibular, supraclavicular or inguinal lymphadenopathy  Abd: soft, Nondistended, some tenderness to deep palpation in RLQ. no hepatosplenomegaly, nontender, no peritoneal signs  Skin: warm, perfused, no jaundice  Psych: Normal affect  MSK: Normal gait      PERTINENT STUDIES:  Most recent CBC:  Recent Labs   Lab Test 06/10/22  1435 08/17/21  1326   WBC 7.3 7.9   HGB 12.9 12.3   HCT 39.1 37.4    264     Most recent hepatic panel:  Recent Labs   Lab Test 06/10/22  1435 03/12/19  0833   ALT 12 <9   AST 16 15     Most recent creatinine:  Recent Labs   Lab Test 06/10/22  1435 03/12/19  0833   CR 0.78 0.66     FINDINGS:   LOWER CHEST: Normal.     HEPATOBILIARY: No gallstones. No intrahepatic biliary ductal dilatation.     PANCREAS: Normal.     SPLEEN: Normal.     ADRENAL GLANDS: Normal.     KIDNEYS/BLADDER: Normal.     BOWEL: The small and large bowel are normal in caliber. Moderate amount of stool is identified within the colon. The appendix is normal. No free fluid or free air.     LYMPH NODES: Normal.     VASCULATURE: Unremarkable.     PELVIC ORGANS: Uterus is grossly normal. Right ovary also appears to be grossly normal. The left ovary is also grossly normal.     MUSCULOSKELETAL: Normal.                                                                      IMPRESSION:   1.  No definite abnormality identified within the abdomen or pelvis. Clinical follow-up is recommended.    ASSESSMENT/PLAN:    # IBS-C - some improvement but not complete resolution with BID miralax - will try switching to linaclotide and monitor response.  Advised to reach out if no improvement as may need to titrate the dose. Can slowly reintroduce foods they have been avoiding and monitor response.  If no improvement may benefit from seeing nutritionist in the future.    # belching, occasional acid  taste in the mouth - likely related to reflux - can use PRN famotidine and OTC antacids - if worsens - maybe reasonable to try another course of omeprazole    # altered appetite with days with no appetite and other days with insatiable appetite - could be related to irregular bowel habits - will monitor this as treat constipation    # hashimotos - advised to discuss changes in medication administration with PCP    RTC 6 months or sooner if needed.    Laila Lester, DO

## 2022-09-14 NOTE — LETTER
9/14/2022         RE: Agustina Nam  [address]  [place] [address]        Dear Colleague,    Thank you for referring your patient, Agustina Nam, to the [facility]. Please see a copy of my visit note below.    GI CLINIC VISIT    CC/REFERRING MD:  No ref. provider found  REASON FOR CONSULTATION:   No ref. provider found for   Chief Complaint   Patient presents with     Follow Up     1 mo follow up         HPI    Ray presents today for follow-up.  Has been able to taper off omeprazole - has been doing okay with this although sometimes gets an acidic taste in the mouth.  Also notices frequent belching.    Weight is stable - has gained it back.  Has been able to eat more regularly.    Sometimes is not hungry and other times has insatiable hunger.     Constipation remains an issue despite using miralax twice daily.  Feels like they need to have a bowel movement before going to bed.  Often times after having a bowel movement will be hungry and feel the need to eat before going to sleep.  Does notice that they will often have a bowel movement after taking their activated T3 - wondering if this can be split up into taking it 3 times a day.  Currently thyroid medications are being managed by PCP    Continues to avoid dairy, gluten, corn and soy - has been since being diagnosed with Hashimotos - wondering about being able to reintroduce these things.    ROS:    No fevers or chills  No weight loss  No blurry vision, double vision or change in vision  No sore throat  No lymphadenopathy  No headache, paraesthesias, or weakness in a limb  No shortness of breath or wheezing  No chest pain or pressure  No arthralgias or myalgias  No rashes or skin changes  No odynophagia or dysphagia  No BRBPR, hematochezia, melena  No dysuria, frequency or urgency  No hot/cold intolerance or polyria  No anxiety or depression    PROBLEM LIST  Patient Active Problem List    Diagnosis Date Noted     Low  back pain of over 3 months duration 07/10/2020     Priority: Medium     Tension headache 07/10/2020     Priority: Medium     Right calf pain 07/10/2020     Priority: Medium     Fatigue 10/11/2017     Priority: Medium     Postural dizziness with presyncope 05/16/2017     Priority: Medium     Adrenal disorder (H) 05/16/2017     Priority: Medium     Irritable bowel syndrome 05/07/2017     Priority: Medium     Hashimoto's disease 05/07/2017     Priority: Medium     Autoimmune disease (H) 04/09/2017     Priority: Medium     Generalized anxiety disorder 03/31/2017     Priority: Medium     Insomnia 03/31/2017     Priority: Medium     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  Not certain about diagnosis.  Was diagnosed in [place] by Functional medicine provider.       Pernicious anemia 03/31/2017     Priority: Medium     Formatting of this note might be different from the original.  Does B12 injections 09374 microgram twice a week       Small intestinal bacterial overgrowth 03/31/2017     Priority: Medium     Formatting of this note might be different from the original.  Treating with oregano oil       Chronic Sinusitis      Priority: Medium     Created by Conversion  Replacement Utility updated for latest IMO load         Metrorrhagia      Priority: Medium     Created by Conversion           PERTINENT PAST MEDICAL HISTORY:  Past Medical History:   Diagnosis Date     Hashimoto's thyroiditis        PREVIOUS SURGERIES:  Past Surgical History:   Procedure Laterality Date     WISDOM TOOTH EXTRACTION         PREVIOUS ENDOSCOPY:  none    ALLERGIES:   No Known Allergies    PERTINENT MEDICATIONS:    Current Outpatient Medications:      amitriptyline (ELAVIL) 25 MG tablet, Take 12.5 mg by mouth At Bedtime, Disp: , Rfl:      COMPOUNDED NON-CONTROLLED SUBSTANCE (CMPD RX) - PHARMACY TO MIX COMPOUNDED MEDICATION, Compound T3 - 2.5 MCG and T4 - 75 MCG capsule.  Take one capsule by mouth daily in the morning, Disp: 90 capsule, Rfl: 3     COMPOUNDED NON-CONTROLLED SUBSTANCE (CMPD RX) - PHARMACY TO MIX COMPOUNDED MEDICATION, Compound T3 1.25mcg capsule. Take 2 capsules by mouth once daily 30 minutes before lunch., Disp: 180 capsule, Rfl: 3     medical cannabis (Patient's own supply), by Other route See Admin Instructions, Disp: 0 Information only, Rfl:      baclofen (LIORESAL) 10 MG tablet, Take 1 tablet (10 mg) by mouth 3 times daily as needed for muscle spasms (Patient not taking: Reported on 9/14/2022), Disp: 20 tablet, Rfl: 1     DULoxetine (CYMBALTA) 20 MG capsule, HOLD (Patient not taking: Reported on 9/14/2022), Disp: 30 capsule, Rfl: 11     gabapentin (NEURONTIN) 300 MG capsule, TAKE 1 CAPSULE (300 MG) BY MOUTH 2 TIMES DAILY (Patient not taking: Reported on 9/14/2022), Disp: 60 capsule, Rfl: 11     meclizine (ANTIVERT) 25 MG tablet, Take 1 tablet (25 mg) by mouth 3 times daily as needed for dizziness (Patient not taking: Reported on 9/14/2022), Disp: 60 tablet, Rfl: 0     omeprazole (PRILOSEC) 20 MG DR capsule, Take 1 capsule (20 mg) by mouth daily (Patient not taking: Reported on 9/14/2022), Disp: 28 capsule, Rfl: 0     predniSONE (DELTASONE) 10 MG tablet, HOLD (Patient not taking: Reported on 9/14/2022), Disp: 10 tablet, Rfl: 0     traZODone (DESYREL) 50 MG tablet, Take 1-2 tablets ( mg) by mouth as needed for sleep (Patient not taking: Reported on 9/14/2022), Disp: 60 tablet, Rfl: 0    Current Facility-Administered Medications:      cyanocobalamin injection 1,000 mcg, 1,000 mcg, Intramuscular, Q30 Days, Dalton Zhao MD, 1,000 mcg at 07/27/22 1442    SOCIAL HISTORY:  Social History     Socioeconomic History     Marital status: Single     Spouse name: Not on file     Number of children: Not on file     Years of education: Not on file     Highest education level: Not on file   Occupational History     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:  [employer]   Tobacco Use     Smoking status: Never Smoker     Smokeless tobacco: Never Used   Vaping Use     Vaping Use: Never used   Substance and Sexual Activity     Alcohol use: No     Drug use: Yes     Types: Marijuana     Comment: Drug use: medicinal Marijuana     Sexual activity: Not Currently     Partners: Female, Male   Other Topics Concern     Not on file   Social History Narrative     Not on file     Social Determinants of Health     Financial Resource Strain: Not on file   Food Insecurity: Not on file   Transportation Needs: Not on file   Physical Activity: Not on file   Stress: Not on file   Social Connections: Not on file   Intimate Partner Violence: Not on file   Housing Stability: Not on file       FAMILY HISTORY:  Family History   Problem Relation Age of Onset     Depression Mother      Arthritis Mother      Anxiety Disorder Mother      Plantar fasciitis Mother      Chronic Infections Mother         Chronic Yeast Infection     Arthritis Father      Depression Father      Anxiety Disorder Father      Sinusitis Father      Irritable Bowel Syndrome Father      Sinusitis Maternal Grandmother         Chronic Sinus Congestion     Arthritis Maternal Grandmother      Uterine Cancer Paternal Grandmother      Irritable Bowel Syndrome Paternal Grandmother      Cerebrovascular Disease Paternal Grandmother      Dementia Paternal Grandmother      Hyperthyroidism Paternal Grandfather      Skin Cancer Paternal Grandfather      Prostate Cancer Paternal Grandfather      Breast Cancer No family hx of      Colon Cancer No family hx of      Myocardial Infarction No family hx of        Past/family/social history reviewed and no changes    PHYSICAL EXAMINATION:  Constitutional: aaox3, cooperative, pleasant, not dyspneic/diaphoretic, no acute distress  Vitals reviewed: BP 95/55 (BP Location: Left arm, Patient Position: Sitting, Cuff Size: Adult Regular)   Pulse 90   Wt 64.2 kg (141 lb 9.6 oz)   LMP 07/18/2022    SpO2 99%   BMI 21.53 kg/m    Wt:   Wt Readings from Last 2 Encounters:   09/14/22 64.2 kg (141 lb 9.6 oz)   08/08/22 60.7 kg (133 lb 14.4 oz)      Eyes: Sclera anicteric/injected  Ears/nose/mouth/throat: Normal oropharynx without ulcers or exudate, mucus membranes moist, hearing intact  Neck: supple, thyroid normal size  CV: No edema  Respiratory: Unlabored breathing  Lymph: No axillary, submandibular, supraclavicular or inguinal lymphadenopathy  Abd: soft, Nondistended, some tenderness to deep palpation in RLQ. no hepatosplenomegaly, nontender, no peritoneal signs  Skin: warm, perfused, no jaundice  Psych: Normal affect  MSK: Normal gait      PERTINENT STUDIES:  Most recent CBC:  Recent Labs   Lab Test 06/10/22  1435 08/17/21  1326   WBC 7.3 7.9   HGB 12.9 12.3   HCT 39.1 37.4    264     Most recent hepatic panel:  Recent Labs   Lab Test 06/10/22  1435 03/12/19  0833   ALT 12 <9   AST 16 15     Most recent creatinine:  Recent Labs   Lab Test 06/10/22  1435 03/12/19  0833   CR 0.78 0.66     FINDINGS:   LOWER CHEST: Normal.     HEPATOBILIARY: No gallstones. No intrahepatic biliary ductal dilatation.     PANCREAS: Normal.     SPLEEN: Normal.     ADRENAL GLANDS: Normal.     KIDNEYS/BLADDER: Normal.     BOWEL: The small and large bowel are normal in caliber. Moderate amount of stool is identified within the colon. The appendix is normal. No free fluid or free air.     LYMPH NODES: Normal.     VASCULATURE: Unremarkable.     PELVIC ORGANS: Uterus is grossly normal. Right ovary also appears to be grossly normal. The left ovary is also grossly normal.     MUSCULOSKELETAL: Normal.                                                                      IMPRESSION:   1.  No definite abnormality identified within the abdomen or pelvis. Clinical follow-up is recommended.    ASSESSMENT/PLAN:    # IBS-C - some improvement but not complete resolution with BID miralax - will try switching to linaclotide and monitor response.   Advised to reach out if no improvement as may need to titrate the dose. Can slowly reintroduce foods they have been avoiding and monitor response.  If no improvement may benefit from seeing nutritionist in the future.    # belching, occasional acid taste in the mouth - likely related to reflux - can use PRN famotidine and OTC antacids - if worsens - maybe reasonable to try another course of omeprazole    # altered appetite with days with no appetite and other days with insatiable appetite - could be related to irregular bowel habits - will monitor this as treat constipation    # hashimotos - advised to discuss changes in medication administration with PCP    RTC 6 months or sooner if needed.    Laila Lester, DO        Again, thank you for allowing me to participate in the care of your patient.        Sincerely,        Laila Lester, DO

## 2022-09-16 ENCOUNTER — HOSPITAL ENCOUNTER (OUTPATIENT)
Dept: PHYSICAL THERAPY | Facility: REHABILITATION | Age: 32
Discharge: HOME | End: 2022-09-16
Payer: COMMERCIAL

## 2022-09-16 DIAGNOSIS — G89.29 CHRONIC RIGHT-SIDED LOW BACK PAIN WITHOUT SCIATICA: ICD-10-CM

## 2022-09-16 DIAGNOSIS — M24.551 RIGHT HIP FLEXOR TIGHTNESS: Primary | ICD-10-CM

## 2022-09-16 DIAGNOSIS — M54.50 LUMBAR BACK PAIN: ICD-10-CM

## 2022-09-16 DIAGNOSIS — M79.661 RIGHT CALF PAIN: ICD-10-CM

## 2022-09-16 DIAGNOSIS — K59.00 CONSTIPATION, UNSPECIFIED CONSTIPATION TYPE: ICD-10-CM

## 2022-09-16 DIAGNOSIS — M54.50 CHRONIC RIGHT-SIDED LOW BACK PAIN WITHOUT SCIATICA: ICD-10-CM

## 2022-09-16 PROCEDURE — 97140 MANUAL THERAPY 1/> REGIONS: CPT | Mod: GP | Performed by: PHYSICAL THERAPIST

## 2022-09-16 NOTE — PROGRESS NOTES
"   09/16/22 1500   Signing Clinician's Name / Credentials   Signing clinician's name / credentials Lashay Lozano PT   Session Number   Session Number 54/60   Additional Session Number 7/29/22: update POC 12 visits   Progress Note/Recertification   Progress Note Completed Date 05/06/22   Ortho Goal 1   Goal Identifier Exercise   Goal Description Patient will return to desired exercise regimen without pain or discomfort to allow for participation in active hobbies. 30 min such as skiing.   (Reassess goal based on findings of following treatment.)   Goal Progress Performing strengthening routine for 30 min 3-4x a week without increase in pain    Target Date 01/20/22   Date Met 02/04/22   Ortho Goal 2   Goal Identifier Sitting   Goal Description Patient will be able to sit for 2 hours without provocation of LBP to allow for comfort in class.  (Reassess goal based on findings of following treatment.)   Goal Progress Depends on the day. Tolerance 30-60 min w/out increased pain.    Target Date 01/20/22   Ortho Goal 3   Goal Identifier Walking   Goal Description Patient will be capable of walking 2-3 miles with pain < or = to 2/10 to facilitate her return to hiking.  (Reassess based on findings of following treatment)   Goal Progress able to walk 2 miles without exacerbation   Target Date 04/01/22   Date Met 03/28/22   Ortho Goal 4   Goal Identifier Strength   Goal Description Patient will be able to achieve a hip flexion MMT grade of 5/5 to demonstrate imrpovement in hip flexor strength needed for desired hobbies.  (Reassess based on findings of following treatment)   Goal Progress 4+/5 bilaterally on 2/4/22 - progressing    Target Date 04/01/22   Subjective Report   Subjective Report Cancelled last week due to illness (neg for covid). Vestibular therapy seems helpful. Otherwise, \"okay\". Dced omeprezol and meal replacement. Has regained some weight, up to #140 now. Still taking miralax. Didn't do well last weekend when they " didn't take it. GI symptoms have been good the last few days. Low back and hip have been better. Also started using flonase.   Objective Measure 1   Details Initial cranial scan (+) for (R) lower abdominal viscera, (L) LS sympathetics.   Objective Measure 2   Details Mod fascial restrictions of cecum, colon, duodenum, small intestine   Objective Measure 3   Details Mod tenderness of (L) medial lower abdomen.   Manual Therapy   Manual Therapy: Mobilization, MFR, MLD, friction massage minutes (99783) 55   Skilled Intervention MFR, counterstrain   Patient Response good   Treatment Detail cecum, asc colon, (L) AREN-N, (L) PELV-N, duodenum/D1-3, small intestine. colon motility   Assessments Completed   Assessments Completed Patient is responding appropriately to PT interventions. HEP compliance is good.    Education   Learner Patient   Readiness Acceptance   Method Booklet/handout;Explanation;Demonstration   Response Verbalizes Understanding;Demonstrates Understanding   Plan   Homework Has been doing HEP every other day.  Stretch every night before bed.  Every other day is performing 1/2 exercises.  Moving forward pt will continue to see Lashay Lozano PT for manual therapy.  Exercsie pt will continue to walk and perform HEP - pt is pleased that she has a program that does not cause pain or injury.     Home program Stretching: hip flexor stretch with leg off table, pigeon stretch, downward dog (not printed on PTRX), heel drops off step, foam roller pec stretch, foam roller thoracic mobs and upper back rolling, lateral stretch on bolster (on her own HS/Add/ITB with strap), Strengthing: S/L hip abd, bridge/S/L bridge, bicycles, plank, side plank, rows, scapular retractions, birddog, lat pull down L2, monster walks L3, side stepping L3, standing hip abd L2    Updates to plan of care 7/29/22: update POC 12 visits   Plan for next session Continue to modify HEP based on patient progress. Continue with counterstrain and manual  therapy.   Comments   Comments ref provider: Chelo Park DO. Patient presents with chronic (8 year) constant right sided LBP and low level tingling in R calf that can become painful. Patient also presents with tightness and pain in right hip flexors that is aggrevated with acitivity such as walking.   Total Session Time   Timed Code Treatment Minutes 55   Total Treatment Time (sum of timed and untimed services) 55   AMBULATORY CLINICS ONLY-MEDICAL AND TREATMENT DIAGNOSIS   Medical Diagnosis Right hip flexor tightness, low back pain, chronic thoracic  back pain (new order added )   PT Diagnosis Right sided LBP w/o sciatica, right hip flexor pain, right calf pain

## 2022-09-19 ENCOUNTER — MYC MEDICAL ADVICE (OUTPATIENT)
Dept: GASTROENTEROLOGY | Facility: CLINIC | Age: 32
End: 2022-09-19

## 2022-09-22 ENCOUNTER — TELEPHONE (OUTPATIENT)
Dept: GASTROENTEROLOGY | Facility: CLINIC | Age: 32
End: 2022-09-22

## 2022-09-22 NOTE — TELEPHONE ENCOUNTER
PA for Linzess has been approved. Patient notified via [employer] message and pharmacy has been notified.     Marisol Doyle LPN

## 2022-09-22 NOTE — TELEPHONE ENCOUNTER
Prior Authorization Retail Medication Request    Medication/Dose: Linzess  ICD code (if different than what is on RX):    Previously Tried and Failed:    Rationale:      Insurance Name:    Insurance ID:        Pharmacy Information (if different than what is on RX)  Name:    Phone:

## 2022-09-22 NOTE — TELEPHONE ENCOUNTER
[employer] message sent to patient that medication would likely need a PA.   Request for PA to be sent to PA team.     Laila Tafoya RN

## 2022-09-22 NOTE — TELEPHONE ENCOUNTER
Central Prior Authorization Team   Phone: [phone]      PA Initiation    Medication: Linzess  Insurance Company: [employer] - Phone [phone] Fax [phone]  Pharmacy Filling the Rx: [facility] - [place], [place] - [address] [place] [address]  Filling Pharmacy Phone: [phone]  Filling Pharmacy Fax:    Start Date: 9/22/2022

## 2022-09-22 NOTE — TELEPHONE ENCOUNTER
Prior Authorization Approval    Authorization Effective Date: 8/23/2022  Authorization Expiration Date: 9/22/2023  Medication: Linzess-APPROVED  Approved Dose/Quantity:   Reference #:     Insurance Company: [employer] - Phone [phone] Fax [phone]  Expected CoPay:       CoPay Card Available:      Foundation Assistance Needed:    Which Pharmacy is filling the prescription (Not needed for infusion/clinic administered): [facility] - [place], [place] - [address]  Pharmacy Notified: Yes  Patient Notified: No

## 2022-09-23 ENCOUNTER — HOSPITAL ENCOUNTER (OUTPATIENT)
Dept: PHYSICAL THERAPY | Facility: REHABILITATION | Age: 32
Discharge: HOME | End: 2022-09-23

## 2022-09-23 ENCOUNTER — LAB (OUTPATIENT)
Dept: LAB | Facility: CLINIC | Age: 32
End: 2022-09-23
Payer: COMMERCIAL

## 2022-09-23 ENCOUNTER — HOSPITAL ENCOUNTER (OUTPATIENT)
Dept: PHYSICAL THERAPY | Facility: REHABILITATION | Age: 32
Discharge: HOME | End: 2022-09-23
Payer: COMMERCIAL

## 2022-09-23 DIAGNOSIS — G89.29 CHRONIC RIGHT-SIDED LOW BACK PAIN WITHOUT SCIATICA: ICD-10-CM

## 2022-09-23 DIAGNOSIS — K59.00 CONSTIPATION, UNSPECIFIED CONSTIPATION TYPE: ICD-10-CM

## 2022-09-23 DIAGNOSIS — M54.50 LUMBAR BACK PAIN: ICD-10-CM

## 2022-09-23 DIAGNOSIS — M24.551 RIGHT HIP FLEXOR TIGHTNESS: Primary | ICD-10-CM

## 2022-09-23 DIAGNOSIS — M79.661 RIGHT CALF PAIN: ICD-10-CM

## 2022-09-23 DIAGNOSIS — M54.50 CHRONIC RIGHT-SIDED LOW BACK PAIN WITHOUT SCIATICA: ICD-10-CM

## 2022-09-23 DIAGNOSIS — R42 DIZZINESS: Primary | ICD-10-CM

## 2022-09-23 DIAGNOSIS — E06.3 HASHIMOTO'S DISEASE: ICD-10-CM

## 2022-09-23 LAB
T3FREE SERPL-MCNC: 2.5 PG/ML (ref 2–4.4)
TSH SERPL DL<=0.005 MIU/L-ACNC: 1.05 UIU/ML (ref 0.3–4.2)

## 2022-09-23 PROCEDURE — 84443 ASSAY THYROID STIM HORMONE: CPT

## 2022-09-23 PROCEDURE — 97140 MANUAL THERAPY 1/> REGIONS: CPT | Mod: GP | Performed by: PHYSICAL THERAPIST

## 2022-09-23 PROCEDURE — 97110 THERAPEUTIC EXERCISES: CPT | Mod: GP | Performed by: PHYSICAL THERAPIST

## 2022-09-23 PROCEDURE — 84481 FREE ASSAY (FT-3): CPT

## 2022-09-23 PROCEDURE — 97112 NEUROMUSCULAR REEDUCATION: CPT | Mod: GP | Performed by: PHYSICAL THERAPIST

## 2022-09-23 PROCEDURE — 84439 ASSAY OF FREE THYROXINE: CPT

## 2022-09-23 PROCEDURE — 86376 MICROSOMAL ANTIBODY EACH: CPT

## 2022-09-23 PROCEDURE — 36415 COLL VENOUS BLD VENIPUNCTURE: CPT

## 2022-09-23 NOTE — PROGRESS NOTES
09/23/22 1500   Signing Clinician's Name / Credentials   Signing clinician's name / credentials Lashay Lozano PT   Session Number   Session Number 55/60   Additional Session Number 7/29/22: update POC 12 visits   Progress Note/Recertification   Progress Note Completed Date 05/06/22   Ortho Goal 1   Goal Identifier Exercise   Goal Description Patient will return to desired exercise regimen without pain or discomfort to allow for participation in active hobbies. 30 min such as skiing.   (Reassess goal based on findings of following treatment.)   Goal Progress Performing strengthening routine for 30 min 3-4x a week without increase in pain    Target Date 01/20/22   Date Met 02/04/22   Ortho Goal 2   Goal Identifier Sitting   Goal Description Patient will be able to sit for 2 hours without provocation of LBP to allow for comfort in class.  (Reassess goal based on findings of following treatment.)   Goal Progress Depends on the day. Tolerance 30-60 min w/out increased pain.    Target Date 01/20/22   Ortho Goal 3   Goal Identifier Walking   Goal Description Patient will be capable of walking 2-3 miles with pain < or = to 2/10 to facilitate her return to hiking.  (Reassess based on findings of following treatment)   Goal Progress able to walk 2 miles without exacerbation   Target Date 04/01/22   Date Met 03/28/22   Ortho Goal 4   Goal Identifier Strength   Goal Description Patient will be able to achieve a hip flexion MMT grade of 5/5 to demonstrate imrpovement in hip flexor strength needed for desired hobbies.  (Reassess based on findings of following treatment)   Goal Progress 4+/5 bilaterally on 2/4/22 - progressing    Target Date 04/01/22   Subjective Report   Subjective Report Unable to wean off miralax without increased constipation. Will be trialing Linzess. Would like to take a PT break for a couple weeks and see how things go. Things are feeling pretty good- constipation has been manageable with miralax.  Amytrypiline helping with headaches. Hip/back pain not too bad.   Objective Measure 1   Details cranial scan (+) for (L) LQ visceral.   Objective Measure 2   Details decreased fascial mobility/motility of sigmoid, colon, DJ jct, duodenum,   Manual Therapy   Manual Therapy: Mobilization, MFR, MLD, friction massage minutes (16772) 55   Skilled Intervention MFR, counterstrain   Patient Response good   Treatment Detail sigmoid, colon motility, desc colon, duodenum/D1-3, DJ jct, sm intestine,   Assessments Completed   Assessments Completed Patient is responding appropriately to PT interventions. HEP compliance is good.    Education   Learner Patient   Readiness Acceptance   Method Booklet/handout;Explanation;Demonstration   Response Verbalizes Understanding;Demonstrates Understanding   Plan   Homework Has been doing HEP every other day.  Stretch every night before bed.  Every other day is performing 1/2 exercises.  Moving forward pt will continue to see Lashay Lozano, PT for manual therapy.  Exercsie pt will continue to walk and perform HEP - pt is pleased that she has a program that does not cause pain or injury.     Home program Stretching: hip flexor stretch with leg off table, pigeon stretch, downward dog (not printed on PTRX), heel drops off step, foam roller pec stretch, foam roller thoracic mobs and upper back rolling, lateral stretch on bolster (on her own HS/Add/ITB with strap), Strengthing: S/L hip abd, bridge/S/L bridge, bicycles, plank, side plank, rows, scapular retractions, birddog, lat pull down L2, monster walks L3, side stepping L3, standing hip abd L2    Updates to plan of care 7/29/22: update POC 12 visits   Plan for next session Pt to hold PT for nowwhile trialing medication. Will continue PT as appropriate. Continue with counterstrain and manual therapy.   Comments   Comments ref provider: Chelo Park DO. Patient presents with chronic (8 year) constant right sided LBP and low level tingling in  R calf that can become painful. Patient also presents with tightness and pain in right hip flexors that is aggrevated with acitivity such as walking.   Total Session Time   Timed Code Treatment Minutes 55   Total Treatment Time (sum of timed and untimed services) 55   AMBULATORY CLINICS ONLY-MEDICAL AND TREATMENT DIAGNOSIS   Medical Diagnosis Right hip flexor tightness, low back pain, chronic thoracic  back pain (new order added )   PT Diagnosis Right sided LBP w/o sciatica, right hip flexor pain, right calf pain                                                                                  [facility] Service    Outpatient Physical Therapy Discharge Note  Patient: Agustina Nam  : 1990    Beginning/End Dates of Reporting Period:  10/28/21 to 22    Referring Provider: Chelo Park, DO    Therapy Diagnosis: Right sided LBP w/o sciatica, right hip flexor pain, right calf pain, abdominal pain, chronic constipation     Client Self Report: Unable to wean off miralax without increased constipation. Will be trialing Linzess. Would like to take a PT break for a couple weeks and see how things go. Things are feeling pretty good- constipation has been manageable with miralax. Amytrypiline helping with headaches. Hip/back pain not too bad.    Objective Measurements:     Details: cranial scan (+) for (L) LQ visceral.     Details: decreased fascial mobility/motility of sigmoid, colon, DJ jct, duodenum,        Goals:  Goal Identifier Exercise   Goal Description Patient will return to desired exercise regimen without pain or discomfort to allow for participation in active hobbies. 30 min such as skiing.  (Reassess goal based on findings of following treatment.)   Target Date 22   Date Met  22   Progress (detail required for progress note): Performing strengthening routine for 30 min 3-4x a week without increase in pain      Goal Identifier Sitting   Goal Description  Patient will be able to sit for 2 hours without provocation of LBP to allow for comfort in class. (Reassess goal based on findings of following treatment.)   Target Date 01/20/22   Date Met      Progress (detail required for progress note): Depends on the day. Tolerance 30-60 min w/out increased pain.      Goal Identifier Walking   Goal Description Patient will be capable of walking 2-3 miles with pain < or = to 2/10 to facilitate her return to hiking. (Reassess based on findings of following treatment)   Target Date 04/01/22   Date Met  03/28/22   Progress (detail required for progress note): able to walk 2 miles without exacerbation     Goal Identifier Strength   Goal Description Patient will be able to achieve a hip flexion MMT grade of 5/5 to demonstrate imrpovement in hip flexor strength needed for desired hobbies. (Reassess based on findings of following treatment)   Target Date 04/01/22   Date Met      Progress (detail required for progress note): 4+/5 bilaterally on 2/4/22 - progressing      Plan:  Discharge from therapy.    Discharge:    Reason for Discharge: Patient chooses to discontinue therapy.  Patient is doing better with medication and is working with a GI specialist now to manage symptoms.     Equipment Issued:     Discharge Plan: Patient to continue home program.

## 2022-09-23 NOTE — PROGRESS NOTES
"[facility] Service    Outpatient Physical Therapy Discharge Note  Patient: Agustina Nam  : 1990    Beginning/End Dates of Reporting Period:  2022 to 2022 (3 visits)    Referring Provider: Chelo Park DO    Therapy Diagnosis: vestibular hypofunction     Client Self Report: Improvement noted going to sleep. [facility] and has follow ups scheduled not able to get in for the next couple weeks they reported migraine related vestibular effects. Exercises getting easier.  Symptoms seem to be lessening somewhat.    Objective Measurements:  Objective Measure: CTSIB  Details: Floor EO 1' no abnormal sway, floor EC 1' no abnormal sway, airex EO 1' no abnormal sway, airex EC 25\" abnormal degrees of sway 15 degrees or greater near LOB      Goals:  MET      Plan:  Continue therapy per current plan of care.  Discharge from therapy for vestibular rehab for transition to [facility] [place].    Discharge:    Reason for Discharge: Patient is transitioning to [facility] [place] for treatment of vestibular issues.        Discharge Plan: Patient to continue home program and attend appointments at the [facility] [place].  "

## 2022-09-24 LAB — T4 FREE SERPL-MCNC: 1.25 NG/DL (ref 0.9–1.7)

## 2022-09-26 LAB — THYROPEROXIDASE AB SERPL-ACNC: 19 IU/ML

## 2022-10-07 ENCOUNTER — OFFICE VISIT (OUTPATIENT)
Dept: FAMILY MEDICINE | Facility: CLINIC | Age: 32
End: 2022-10-07
Payer: COMMERCIAL

## 2022-10-07 DIAGNOSIS — R42 DIZZINESS: Primary | ICD-10-CM

## 2022-10-07 DIAGNOSIS — R10.84 ABDOMINAL PAIN, GENERALIZED: ICD-10-CM

## 2022-10-07 PROCEDURE — 0124A COVID-19,PF,PFIZER BOOSTER BIVALENT: CPT | Performed by: STUDENT IN AN ORGANIZED HEALTH CARE EDUCATION/TRAINING PROGRAM

## 2022-10-07 PROCEDURE — 99213 OFFICE O/P EST LOW 20 MIN: CPT | Mod: 25 | Performed by: STUDENT IN AN ORGANIZED HEALTH CARE EDUCATION/TRAINING PROGRAM

## 2022-10-07 PROCEDURE — 91312 COVID-19,PF,PFIZER BOOSTER BIVALENT: CPT | Performed by: STUDENT IN AN ORGANIZED HEALTH CARE EDUCATION/TRAINING PROGRAM

## 2022-10-07 PROCEDURE — 90471 IMMUNIZATION ADMIN: CPT | Performed by: STUDENT IN AN ORGANIZED HEALTH CARE EDUCATION/TRAINING PROGRAM

## 2022-10-07 PROCEDURE — 90686 IIV4 VACC NO PRSV 0.5 ML IM: CPT | Performed by: STUDENT IN AN ORGANIZED HEALTH CARE EDUCATION/TRAINING PROGRAM

## 2022-10-07 NOTE — PROGRESS NOTES
Assessment & Plan     Dizziness  Patient currently working on a diagnosis of vestibular migraine.  Seeing PT.  Seeing neurology.  Going well.    Abdominal pain, generalized  Abdominal pain has improved since her last visit.  Patient continues to have a pain in her right lower quadrant that improves with enemas.  Patient working with GI.    No follow-ups on file.    Chelo Park [facility]    Subjective   Ray is a 31 year old, presenting for the following health issues:  No chief complaint on file.    Patient presenting for follow-up of abdominal issues and dizziness.    Patient has been seeing GI.  They are currently working on getting constipation under control.  Patient is currently trialing Linzess.  Patient notes that she still needs to use a enema for pain a couple times a week.  Patient has been able to come off the omeprazole and is using Tums as needed.  Patient has stopped meal replacement and is only using them as needed.  Overall patient's stomach is feeling much better but is still working on her enema use.    Overall patient's dizziness is improving.  She is seeing both neurology and PT.  Patient was diagnosed with a vestibular migraine.  She is currently on amitriptyline for sleep and headaches.  Patient notes this is going well.    Patient has been doing well with a change in her thyroid medications.  It patient had been seeing the pharmacist to help with this.    Patient has no additional questions or concerns today.  Patient would like to get her flu and COVID vaccinations  HPI       Review of Systems   Constitutional, HEENT, cardiovascular, pulmonary, gi and gu systems are negative, except as otherwise noted.      Objective    There were no vitals taken for this visit.  There is no height or weight on file to calculate BMI.  Physical Exam   Constitutional: Awake, alert, cooperative, no apparent distress  Eyes: Lids and lashes normal, sclera clear,  conjunctiva normal.  ENT: Normocephalic, without obvious abnormality, atramatic.  Musculoskeletal: No redness, warmth, or swelling of the joints.  Full range of motion noted.   Neurologic: Awake, alert, oriented to name, place and time.  Cranial nerves II-XII are grossly intact\

## 2022-10-12 NOTE — PROGRESS NOTES
[facility]  New Patient Visit      Encounter date:   October 14, 2022    Referring Provider:   Hillary Mancuso MD  [address]  [place] [address]    Chief Complaint: nasal congestion    History of Present Illness:   Agustina Nam is a 31 year old adult with a history of hashimoto's thyroiditis who presents for consultation regarding nasal congestion. The patient reported mucus accumulation and has tried Neti pot at recent follow-up with Dr. Mancuso and was advised to start Flonase at that time. The patient tells me today that these interventions have seemed to help slightly but symptoms are not fully resolved. There is an accumulation of mucus that requires frequent nose blowing which has been present for most of their life. Breathing through the nose is limited and sometimes triggers coughing. Denies history of allergies.     Sino-Nasal Outcome Test (SNOT - 22)  [facility] Operative History:  No history of sinonasal surgery.     Review of systems: A 14-point review of systems has been conducted and was negative for any notable symptoms, except as dictated in the history of present illness.     Medical History:  Past Medical History:   Diagnosis Date     Hashimoto's thyroiditis         Surgical History:   Past Surgical History:   Procedure Laterality Date     WISDOM TOOTH EXTRACTION          Family History:  Family History   Problem Relation Age of Onset     Depression Mother      Arthritis Mother      Anxiety Disorder Mother      Plantar fasciitis Mother      Chronic Infections Mother         Chronic Yeast Infection     Arthritis Father      Depression Father      Anxiety Disorder Father      Sinusitis Father      Irritable Bowel Syndrome Father      Sinusitis Maternal Grandmother         Chronic Sinus Congestion     Arthritis Maternal Grandmother      Uterine Cancer Paternal Grandmother      Irritable Bowel Syndrome Paternal Grandmother      Cerebrovascular Disease Paternal  "Grandmother      Dementia Paternal Grandmother      Hyperthyroidism Paternal Grandfather      Skin Cancer Paternal Grandfather      Prostate Cancer Paternal Grandfather      Breast Cancer No family hx of      Colon Cancer No family hx of      Myocardial Infarction No family hx of         Social History:   Social History     Socioeconomic History     Marital status: Single   Occupational History     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: masters of [employer]   Tobacco Use     Smoking status: Never     Smokeless tobacco: Never   Vaping Use     Vaping Use: Never used   Substance and Sexual Activity     Alcohol use: No     Drug use: Yes     Types: Marijuana     Comment: Drug use: medicinal Marijuana     Sexual activity: Not Currently     Partners: Female, Male        Physical Exam:  Vital signs: /68 (BP Location: Left arm, Patient Position: Sitting, Cuff Size: Adult Regular)   Pulse 96   Temp 98.3  F (36.8  C) (Temporal)   Ht 1.727 m (5' 8\")   Wt 65.2 kg (143 lb 12.8 oz)   BMI 21.86 kg/m     General Appearance: No acute distress, appropriate demeanor, conversant  Eyes: moist conjunctivae; EOMI; pupils symmetric; visual acuity grossly intact; no proptosis  Head: normocephalic; overall symmetric appearance without deformity  Face: overall symmetric without deformity; HB I/VI  Ears: Normal appearance of external ear; external meatus normal in appearance; TMs intact without perforation bilaterally    Nose: No external deformity; see endoscopy exam below   Oral Cavity/oropharynx: Normal appearance of mucosa; tongue midline; no mass or lesions; oropharynx without obvious mucosal abnormality  Neck: no palpable lymphadenopathy; thyroid without palpable nodules  Lungs: symmetric chest rise; no wheezing  CV: Good distal perfusion; normal hear rate  Extremities: No deformity  Neurologic Exam: Cranial nerves II-XII are grossly intact; no focal deficit      Procedure Note  Procedure performed: Rigid nasal " endoscopy  Indication: To evaluate for sinonasal pathology not visualized on routine anterior rhinoscopy  Anesthesia: 4% topical lidocaine with 0.05% oxymetazoline  Description of procedure: A 30 degree, 3 mm rigid endoscope was inserted into bilateral nasal cavities and the nasal valves, nasal cavity, middle meatus, sphenoethmoid recess, and nasopharynx were thoroughly evaluated for evidence of obstruction, edema, purulence, polyps and/or mass/lesion.     Zaira-Anthony Endoscopic Scoring System  Endoscopic observation Right Left   Polyps in middle meatus (0 = absent, 1 = restricted to middle meatus, 2 = Beyond middle meatus) 0 0   Discharge (0 = absent, 1 = thin and clear, 2 = thick, purulent) 0 0   Edema (0 = absent, 1 = mild-moderate, 2 = moderate-severe) 0 0   Crusting (0 = absent, 1 = mild-moderate, 2 = moderate-severe) 0 0   Scarring (0= absent, 1 = mild-moderate, 2 = moderate-severe) 0 0   Total 0 0     Findings  RT: MM and SER are clear   LT: clear mucus; MM and SER are clear   Nasopharynx is clear     The patient tolerated the procedure well without complication.     Laboratory Review:  N/A     Imaging Review:  MRI/MRA B 6/15/22  IMPRESSION:  HEAD MRI:   1.  Unremarkable brain MRI with no finding for acute infarct, intracranial hemorrhage, or abnormally enhancing mass.  2.  Unremarkable appearance to the bilateral internal auditory canals with no findings to suggest vestibular schwannoma.    HEAD MRA:  1.  No significant intracranial stenosis. No aneurysm and no high flow vascular malformation.    Pathology Review:  N/A     Assessment/Medical Decision Making:  Nasal congestion secondary to inferior turbinate hypertrophy  Chronic rhinitis       Bilateral endoscopy today - no signs of sinusitis bilaterally     I discussed that symptoms are likely due to chronic rhinitis and will likely improve with nasal sprays which they are comfortable proceeding with at this time. I briefly discussed other procedural  interventions but recommend starting with nasal sprays     Plan:  Increase Flonase use to twice a day   Start Atrovent if increased frequency Flonase does not help   RTC if no improvement with above      Sabino Farooq MD    [facility]  Rhinology  Endoscopic Skull Base Surgery  [facility]  Department of Otolaryngology - Head & Neck Surgery    Scribe Disclosure:  I, Hansa Rex, am serving as a scribe to document services personally performed by Sabino Farooq MD at this visit, based upon the provider's statements to me. All documentation has been reviewed by the aforementioned provider prior to being entered into the official medical record.

## 2022-10-14 ENCOUNTER — OFFICE VISIT (OUTPATIENT)
Dept: OTOLARYNGOLOGY | Facility: CLINIC | Age: 32
End: 2022-10-14
Payer: COMMERCIAL

## 2022-10-14 ENCOUNTER — PRE VISIT (OUTPATIENT)
Dept: OTOLARYNGOLOGY | Facility: CLINIC | Age: 32
End: 2022-10-14

## 2022-10-14 VITALS
SYSTOLIC BLOOD PRESSURE: 111 MMHG | BODY MASS INDEX: 21.79 KG/M2 | HEIGHT: 68 IN | WEIGHT: 143.8 LBS | TEMPERATURE: 98.3 F | HEART RATE: 96 BPM | DIASTOLIC BLOOD PRESSURE: 68 MMHG

## 2022-10-14 DIAGNOSIS — J34.3 HYPERTROPHY OF INFERIOR NASAL TURBINATE: ICD-10-CM

## 2022-10-14 DIAGNOSIS — J34.89 NASAL DRAINAGE: Primary | ICD-10-CM

## 2022-10-14 DIAGNOSIS — R09.81 NASAL CONGESTION: ICD-10-CM

## 2022-10-14 PROCEDURE — 99213 OFFICE O/P EST LOW 20 MIN: CPT | Mod: 25 | Performed by: OTOLARYNGOLOGY

## 2022-10-14 PROCEDURE — 31231 NASAL ENDOSCOPY DX: CPT | Performed by: OTOLARYNGOLOGY

## 2022-10-14 RX ORDER — FLUTICASONE PROPIONATE 50 MCG
2 SPRAY, SUSPENSION (ML) NASAL 2 TIMES DAILY
Qty: 16 G | Refills: 11 | Status: SHIPPED | OUTPATIENT
Start: 2022-10-14 | End: 2023-03-17

## 2022-10-14 RX ORDER — IPRATROPIUM BROMIDE 21 UG/1
2 SPRAY, METERED NASAL 4 TIMES DAILY PRN
Qty: 30 ML | Refills: 11 | Status: SHIPPED | OUTPATIENT
Start: 2022-10-14 | End: 2023-03-17

## 2022-10-14 ASSESSMENT — PAIN SCALES - GENERAL: PAINLEVEL: MODERATE PAIN (5)

## 2022-10-14 NOTE — PATIENT INSTRUCTIONS
"You were seen in the clinic today by Dr. Farooq. If you have any questions or concerns after your appointment, please call the clinic at [phone]. Press \"1\" for scheduling, press \"3\" for nurse advice.    2.   The following has been recommended for you based upon your appointment today:   -Flonase (fluticasone) nasal spray: Spray 2 sprays per nostril twice daily.   -Atrovent (ipratropium bromide) nasal spray: 2 sprays per nostril up to four times daily as needed.   -The prescription has been sent to your preferred pharmacy.   -Continue using your Neti Pot.    3.   If symptoms worsen or fail to improve, please return to the ENT clinic.        Sophie BRAVO LPN  [phone]    Linda LAMA RNCC  [phone]        [facility]  Department of Otolaryngology   "

## 2022-10-14 NOTE — LETTER
10/14/2022       RE: Agustina Nam  [address]  [place] [address]     Dear Colleague,    Thank you for referring your patient, Agustina Nam, to the [facility] [place] [facility]. Please see a copy of my visit note below.      [facility]  New Patient Visit      Encounter date:   October 14, 2022    Referring Provider:   Hillary Mancuso MD  [address]  [place] [address]    Chief Complaint: nasal congestion    History of Present Illness:   Agutsina Nam is a 31 year old adult with a history of hashimoto's thyroiditis who presents for consultation regarding nasal congestion. The patient reported mucus accumulation and has tried Neti pot at recent follow-up with Dr. Mancuso and was advised to start Flonase at that time. The patient tells me today that these interventions have seemed to help slightly but symptoms are not fully resolved. There is an accumulation of mucus that requires frequent nose blowing which has been present for most of their life. Breathing through the nose is limited and sometimes triggers coughing. Denies history of allergies.     Sino-Nasal Outcome Test (SNOT - 22)  [facility] Operative History:  No history of sinonasal surgery.     Review of systems: A 14-point review of systems has been conducted and was negative for any notable symptoms, except as dictated in the history of present illness.     Medical History:  Past Medical History:   Diagnosis Date     Hashimoto's thyroiditis         Surgical History:   Past Surgical History:   Procedure Laterality Date     WISDOM TOOTH EXTRACTION          Family History:  Family History   Problem Relation Age of Onset     Depression Mother      Arthritis Mother      Anxiety Disorder Mother      Plantar fasciitis Mother      Chronic Infections Mother         Chronic Yeast Infection     Arthritis Father      Depression  "Father      Anxiety Disorder Father      Sinusitis Father      Irritable Bowel Syndrome Father      Sinusitis Maternal Grandmother         Chronic Sinus Congestion     Arthritis Maternal Grandmother      Uterine Cancer Paternal Grandmother      Irritable Bowel Syndrome Paternal Grandmother      Cerebrovascular Disease Paternal Grandmother      Dementia Paternal Grandmother      Hyperthyroidism Paternal Grandfather      Skin Cancer Paternal Grandfather      Prostate Cancer Paternal Grandfather      Breast Cancer No family hx of      Colon Cancer No family hx of      Myocardial Infarction No family hx of         Social History:   Social History     Socioeconomic History     Marital status: Single   Occupational History     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: masters of [employer]   Tobacco Use     Smoking status: Never     Smokeless tobacco: Never   Vaping Use     Vaping Use: Never used   Substance and Sexual Activity     Alcohol use: No     Drug use: Yes     Types: Marijuana     Comment: Drug use: medicinal Marijuana     Sexual activity: Not Currently     Partners: Female, Male        Physical Exam:  Vital signs: /68 (BP Location: Left arm, Patient Position: Sitting, Cuff Size: Adult Regular)   Pulse 96   Temp 98.3  F (36.8  C) (Temporal)   Ht 1.727 m (5' 8\")   Wt 65.2 kg (143 lb 12.8 oz)   BMI 21.86 kg/m     General Appearance: No acute distress, appropriate demeanor, conversant  Eyes: moist conjunctivae; EOMI; pupils symmetric; visual acuity grossly intact; no proptosis  Head: normocephalic; overall symmetric appearance without deformity  Face: overall symmetric without deformity; HB I/VI  Ears: Normal appearance of external ear; external meatus normal in appearance; TMs intact without perforation bilaterally    Nose: No external deformity; see endoscopy exam below   Oral Cavity/oropharynx: Normal appearance of mucosa; tongue midline; no mass or lesions; oropharynx without obvious mucosal " abnormality  Neck: no palpable lymphadenopathy; thyroid without palpable nodules  Lungs: symmetric chest rise; no wheezing  CV: Good distal perfusion; normal hear rate  Extremities: No deformity  Neurologic Exam: Cranial nerves II-XII are grossly intact; no focal deficit      Procedure Note  Procedure performed: Rigid nasal endoscopy  Indication: To evaluate for sinonasal pathology not visualized on routine anterior rhinoscopy  Anesthesia: 4% topical lidocaine with 0.05% oxymetazoline  Description of procedure: A 30 degree, 3 mm rigid endoscope was inserted into bilateral nasal cavities and the nasal valves, nasal cavity, middle meatus, sphenoethmoid recess, and nasopharynx were thoroughly evaluated for evidence of obstruction, edema, purulence, polyps and/or mass/lesion.     Royal-Anthony Endoscopic Scoring System  Endoscopic observation Right Left   Polyps in middle meatus (0 = absent, 1 = restricted to middle meatus, 2 = Beyond middle meatus) 0 0   Discharge (0 = absent, 1 = thin and clear, 2 = thick, purulent) 0 0   Edema (0 = absent, 1 = mild-moderate, 2 = moderate-severe) 0 0   Crusting (0 = absent, 1 = mild-moderate, 2 = moderate-severe) 0 0   Scarring (0= absent, 1 = mild-moderate, 2 = moderate-severe) 0 0   Total 0 0     Findings  RT: MM and SER are clear   LT: clear mucus; MM and SER are clear   Nasopharynx is clear     The patient tolerated the procedure well without complication.     Laboratory Review:  N/A     Imaging Review:  MRI/MRA B 6/15/22  IMPRESSION:  HEAD MRI:   1.  Unremarkable brain MRI with no finding for acute infarct, intracranial hemorrhage, or abnormally enhancing mass.  2.  Unremarkable appearance to the bilateral internal auditory canals with no findings to suggest vestibular schwannoma.    HEAD MRA:  1.  No significant intracranial stenosis. No aneurysm and no high flow vascular malformation.    Pathology Review:  N/A     Assessment/Medical Decision Making:  Nasal congestion secondary  to inferior turbinate hypertrophy  Chronic rhinitis       Bilateral endoscopy today - no signs of sinusitis bilaterally     I discussed that symptoms are likely due to chronic rhinitis and will likely improve with nasal sprays which they are comfortable proceeding with at this time. I briefly discussed other procedural interventions but recommend starting with nasal sprays     Plan:  Increase Flonase use to twice a day   Start Atrovent if increased frequency Flonase does not help   RTC if no improvement with above      Sabino Farooq MD    [facility]  Rhinology  Endoscopic Skull Base Surgery  [facility]  Department of Otolaryngology - Head & Neck Surgery    Scribe Disclosure:  I, Hansa Goodman, am serving as a scribe to document services personally performed by Sabino Farooq MD at this visit, based upon the provider's statements to me. All documentation has been reviewed by the aforementioned provider prior to being entered into the official medical record.

## 2022-10-14 NOTE — NURSING NOTE
"Blood pressure 111/68, pulse 96, temperature 98.3  F (36.8  C), temperature source Temporal, height 1.727 m (5' 8\"), weight 65.2 kg (143 lb 12.8 oz), not currently breastfeeding.    Faye Velasco LPN    "

## 2022-10-24 NOTE — ADDENDUM NOTE
Encounter addended by: Lashay Lozano, PT on: 10/24/2022 9:40 AM   Actions taken: Episode resolved, Clinical Note Signed

## 2022-10-30 ENCOUNTER — HEALTH MAINTENANCE LETTER (OUTPATIENT)
Age: 32
End: 2022-10-30

## 2022-10-31 ENCOUNTER — MYC MEDICAL ADVICE (OUTPATIENT)
Dept: GASTROENTEROLOGY | Facility: CLINIC | Age: 32
End: 2022-10-31

## 2022-10-31 DIAGNOSIS — K58.1 IRRITABLE BOWEL SYNDROME WITH CONSTIPATION: Primary | ICD-10-CM

## 2022-10-31 DIAGNOSIS — R10.84 ABDOMINAL PAIN, GENERALIZED: ICD-10-CM

## 2022-10-31 NOTE — TELEPHONE ENCOUNTER
Mobile Backstage message sent to patient that update will be forwarded to provider for review.     Laila Tafoya RN

## 2022-11-01 ENCOUNTER — OFFICE VISIT (OUTPATIENT)
Dept: FAMILY MEDICINE | Facility: CLINIC | Age: 32
End: 2022-11-01
Payer: COMMERCIAL

## 2022-11-01 VITALS
SYSTOLIC BLOOD PRESSURE: 85 MMHG | DIASTOLIC BLOOD PRESSURE: 59 MMHG | RESPIRATION RATE: 14 BRPM | OXYGEN SATURATION: 98 % | BODY MASS INDEX: 22.05 KG/M2 | HEART RATE: 103 BPM | WEIGHT: 145 LBS

## 2022-11-01 DIAGNOSIS — E06.3 HASHIMOTO'S DISEASE: Primary | ICD-10-CM

## 2022-11-01 DIAGNOSIS — R19.7 DIARRHEA, UNSPECIFIED TYPE: ICD-10-CM

## 2022-11-01 DIAGNOSIS — R55 VASO-VAGAL REACTION: ICD-10-CM

## 2022-11-01 DIAGNOSIS — R42 LIGHTHEADEDNESS: ICD-10-CM

## 2022-11-01 PROCEDURE — 99214 OFFICE O/P EST MOD 30 MIN: CPT | Performed by: STUDENT IN AN ORGANIZED HEALTH CARE EDUCATION/TRAINING PROGRAM

## 2022-11-01 NOTE — PROGRESS NOTES
Assessment & Plan     Hashimoto's disease  - Adult Endocrinology  Referral    Lightheadedness  Diarrhea, unspecified type  Vaso-vagal reaction  Advised that this is a normal response that has gone extreme. To start to treat it I would encourage them to see a functional medicine nutritionist and GI psychiatrist. I would avoid things that cause diarrhea but we have to weigh this against the pain she gets when she stools. Patient in agreement with this assessment and will work on trying these steps. Also discussed the possibility that she may have an adrenal deficiency.  Given her blood salts are normal this is unlikely.  Given her history of Hashimoto's do think it would be okay if she touches base again with an endocrinologist.  Referral placed  - Adult Endocrinology  Referral    No follow-ups on file.    Chelo Park DO  [facility]    John   Ray is a 31 year old presenting for the following health issues:    Other (On going symptoms from being sick)    Patient continues to struggle with lightheadedness, dizziness, and diarrhea.  Patient has a diagnosis of vestibular migraine.  They are currently seeing vestibular PT.  Patient had a profound headache after their last visit so they took it a little easy today.  Patient will see how they is doing later today.  Patient continues to struggle with loose stools and pain associated with stooling.  Patient is on Linzess and seeing GI.  GIs workup has been normal at this point. Had advised nutrition or GI psychology. Reviewed symptoms with patient. Currently patient is having increased light headed ness overall feeling ill and weak after having diarrhea. Patient has the body habitus for having low blood pressure.  In addition patient is likely having stimulation of her vagus nerve associated with stooling causing the symptoms. Advised that this is a normal response that has gone extreme. To start to treat it I  "would encourage them to see a functional medicine nutritionist and GI psychiatrist. I would avoid things that cause diarrhea but we have to weigh this against the pain she gets when she stools. Patient in agreement with this assessment and will work on trying these steps. Also discussed the possibility that she may have an adrenal deficiency.  Given her blood salts are normal this is unlikely.  Given her history of Hashimoto's do think it would be okay if she touches base again with an endocrinologist.  Referral placed    Patient would like a letter for bluelight blocking and tinted glasses for prevention of headaches and a night splint.  Letter is provided    History of Present Illness       Hypothyroidism:     Since last visit, patient describes the following symptoms::  Anxiety, Constipation, Fatigue, Loose stools and Tremors    Headaches:   Since the patient's last clinic visit, headaches are: worsened  The patient is getting headaches:  Daily  Rustam Nam is not able to do normal daily activities when Rustam Nam has a migraine.  The patient is taking the following rescue/relief medications:  No rescue/relief medications and sumatriptan (Imitrex)   Patient states \"The relief is inconsistent\" from the rescue/relief medications.   The patient is taking the following medications to prevent migraines:  Amitriptyline  In the past 4 weeks, the patient has gone to an Urgent Care or Emergency Room 0 times times due to headaches.    Reason for visit:  Advice regarding ongoing and increased symptoms, questions about referrals and medical leave    Rustam Nam eats 4 or more servings of fruits and vegetables daily.Rustam Nam consumes 0 sweetened beverage(s) daily.Rustam LAMA Hommeyer exercises with enough effort to increase Rustam Rocheyer's heart rate 10 to 19 minutes per day.  Rustam LAMA Hommeyer exercises with enough effort to increase Rustam LAMA Hommeyer's heart rate 5 days per week.   Rustam Nam is taking medications " regularly.         Review of Systems   Constitutional, HEENT, cardiovascular, pulmonary, gi and gu systems are negative, except as otherwise noted.      Objective    BP (!) 85/59 (BP Location: Left arm, Patient Position: Sitting, Cuff Size: Adult Regular)   Pulse 103   Resp 14   Wt 65.8 kg (145 lb)   SpO2 98%   BMI 22.05 kg/m    Body mass index is 22.05 kg/m .  Physical Exam   Constitutional: Awake, alert, cooperative, no apparent distress  Eyes: Lids and lashes normal, sclera clear, conjunctiva normal.  ENT: Normocephalic, without obvious abnormality, atramatic.  Lungs: No increased work of breathing, good air exchange, clear to auscultation bilaterally, no crackles or wheezing.  Cardiovascular: Regular rate and rhythm, normal S1 and S2, no S3 or S4, and no murmur noted.  Abdomen: Normal bowel sounds, soft, non-distended, non-tender, no masses palpated, no hepatosplenomegally.  Musculoskeletal: No redness, warmth, or swelling of the joints.  Full range of motion noted.   Neurologic: Awake, alert, oriented to name, place and time.  Cranial nerves II-XII are grossly intact

## 2022-11-01 NOTE — LETTER
November 1, 2022    RE:  Agustina KELBY Nam                              [address]  [place] [address]            To whom it may concern:    Agustina Nam is under my professional care. Patient is currently struggling with migraines. They are doing multidisciplinary treatment. Blue light blocking and tinted prescription lenses may help with eliminating these headaches. Please provide these as part of our overall treatment approach.     Sincerely,        Chelo Park DO

## 2022-11-01 NOTE — LETTER
[facility]  [address] [place] [address]  [phone]          November 1, 2022    RE:  Agustina Rochegiancarlo                                                                                                                                                       [address] [place] [address]          To whom it may concern:    Agustina Nam is under my professional care. Patient is currently struggling with migraines. They are doing multidisciplinary treatment. A night splint for their TMJ  may help with eliminating these headaches. Please provide this as part of our overall treatment approach.     Sincerely,        Chelo Park DO

## 2022-11-01 NOTE — TELEPHONE ENCOUNTER
Laila Lester, DO  You 21 hours ago (11:27 AM)     I put in for a nutrition referral.  Perhaps they would also like to see Jana Bloom given the negative impact their irritable bowel syndrome symptoms are having on their quality of life? Labs have been very reassuring so they are absorbing nutrients without issue.      [employer] message sent with above provider information.     Laila Tafoya RN

## 2022-11-03 DIAGNOSIS — K58.1 IRRITABLE BOWEL SYNDROME WITH CONSTIPATION: Primary | ICD-10-CM

## 2022-11-07 ENCOUNTER — TELEPHONE (OUTPATIENT)
Dept: BEHAVIORAL HEALTH | Facility: CLINIC | Age: 32
End: 2022-11-07

## 2022-11-07 NOTE — TELEPHONE ENCOUNTER
Writer gave patient a call back as patient left a voicemail for TC. No referral for TC on patients chart. Writer provided tc contact in voicemail and encouraged a call back.    Holly Kelly  11/07/22  122

## 2022-11-09 ENCOUNTER — VIRTUAL VISIT (OUTPATIENT)
Dept: INTERNAL MEDICINE | Facility: CLINIC | Age: 32
End: 2022-11-09
Payer: COMMERCIAL

## 2022-11-09 DIAGNOSIS — E27.9 ADRENAL DISORDER (H): ICD-10-CM

## 2022-11-09 DIAGNOSIS — R53.81 CHRONIC FATIGUE AND MALAISE: ICD-10-CM

## 2022-11-09 DIAGNOSIS — R53.82 CHRONIC FATIGUE AND MALAISE: ICD-10-CM

## 2022-11-09 DIAGNOSIS — M35.9 AUTOIMMUNE DISEASE (H): Primary | ICD-10-CM

## 2022-11-09 DIAGNOSIS — G43.809 VESTIBULAR MIGRAINE: ICD-10-CM

## 2022-11-09 PROCEDURE — 99215 OFFICE O/P EST HI 40 MIN: CPT | Mod: GT | Performed by: INTERNAL MEDICINE

## 2022-11-09 RX ORDER — TOPIRAMATE 25 MG/1
25 TABLET, FILM COATED ORAL 2 TIMES DAILY
Qty: 60 TABLET | Refills: 11 | Status: SHIPPED | OUTPATIENT
Start: 2022-11-09 | End: 2023-03-17

## 2022-11-09 RX ORDER — CYANOCOBALAMIN 1000 UG/ML
1 INJECTION, SOLUTION INTRAMUSCULAR; SUBCUTANEOUS
Qty: 10 ML | Refills: 1 | Status: SHIPPED | OUTPATIENT
Start: 2022-11-09 | End: 2022-11-11

## 2022-11-09 RX ORDER — DEXTROAMPHETAMINE SACCHARATE, AMPHETAMINE ASPARTATE, DEXTROAMPHETAMINE SULFATE AND AMPHETAMINE SULFATE 2.5; 2.5; 2.5; 2.5 MG/1; MG/1; MG/1; MG/1
10 TABLET ORAL DAILY
Qty: 10 TABLET | Refills: 0 | Status: SHIPPED | OUTPATIENT
Start: 2022-11-09 | End: 2023-03-17

## 2022-11-09 RX ORDER — LEVOTHYROXINE SODIUM 300 UG/1
TABLET ORAL
COMMUNITY
Start: 2022-11-01 | End: 2023-01-12

## 2022-11-09 RX ORDER — LIOTHYRONINE SODIUM 50 UG/1
TABLET ORAL
COMMUNITY
Start: 2022-11-01 | End: 2023-01-12

## 2022-11-09 NOTE — PROGRESS NOTES
"Rustam is a 31 year old who is being evaluated via a billable video visit.      How would you like to obtain your AVS? MyChart  If the video visit is dropped, the invitation should be resent by: Text to cell phone: [phone]  Will anyone else be joining your video visit? No  {If patient encounters technical issues they should call [phone] :909888}        {PROVIDER CHARTING PREFERENCE:019271}    Subjective   Rustam is a 31 year old accompanied by Rustam Nam's ALONE, presenting for the following health issues:  No chief complaint on file.      History of Present Illness       Hypothyroidism:     Since last visit, patient describes the following symptoms::  Anxiety, Constipation, Fatigue, Loose stools and Tremors    Headaches:   Since the patient's last clinic visit, headaches are: worsened  The patient is getting headaches:  Daily  Rustam Nam is not able to do normal daily activities when Rustam Nam has a migraine.  The patient is taking the following rescue/relief medications:  No rescue/relief medications and sumatriptan (Imitrex)   Patient states \"The relief is inconsistent\" from the rescue/relief medications.   The patient is taking the following medications to prevent migraines:  Amitriptyline  In the past 4 weeks, the patient has gone to an Urgent Care or Emergency Room 0 times times due to headaches.    Reason for visit:  Advice regarding ongoing and increased symptoms, questions about referrals and medical leave    Rustam Nam eats 4 or more servings of fruits and vegetables daily.Rustam Nam consumes 0 sweetened beverage(s) daily.Rustam Nam exercises with enough effort to increase Rustam Nam's heart rate 10 to 19 minutes per day.  Rustam Nam exercises with enough effort to increase Rustam Nam's heart rate 5 days per week.   Rustam Nam is taking medications regularly.       {SUPERLIST (Optional):951976}  {additonal problems for provider to add " "(Optional):800961}    Review of Systems   {ROS COMP (Optional):211719}      Objective           Vitals:  No vitals were obtained today due to virtual visit.    Physical Exam   {video visit exam brief selected:445497::\"GENERAL: Healthy, alert and no distress\",\"EYES: Eyes grossly normal to inspection.  No discharge or erythema, or obvious scleral/conjunctival abnormalities.\",\"RESP: No audible wheeze, cough, or visible cyanosis.  No visible retractions or increased work of breathing.  \",\"SKIN: Visible skin clear. No significant rash, abnormal pigmentation or lesions.\",\"NEURO: Cranial nerves grossly intact.  Mentation and speech appropriate for age.\",\"PSYCH: Mentation appears normal, affect normal/bright, judgement and insight intact, normal speech and appearance well-groomed.\"}    {Diagnostic Test Results (Optional):092261}    {AMBULATORY ATTESTATION (Optional):815259}        Video-Visit Details    Video Start Time: {video visit start/end time for provider to select:289324}    Type of service:  Video Visit    Video End Time:{video visit start/end time for provider to select:604795}    Originating Location (pt. Location): Home    {PROVIDER LOCATION On-site should be selected for visits conducted from your clinic location or adjoining [facility] hospital, academic office, or other nearby [facility] building. Off-site should be selected for all other provider locations, including home:238038}    Distant Location (provider location):  Off-site    Platform used for Video Visit: Doximity    "

## 2022-11-09 NOTE — PROGRESS NOTES
"Agustina is a 31 year old adult contacting the clinic today via video, who will use the platform: [employer] for the visit.  Phone # for [employer], or if Amwell drops:   Telephone Information:   Mobile [phone]          ASSESSMENT and PLAN:  1. Autoimmune disease (H)  Okay for home B12 shots.  Consider empiric Plaquenil.  Did not try prednisone.  - syringe/needle, disp, 25G X 1\" 1 ML MISC; Use as directed to administer intramuscular B 12 injections monthly  Dispense: 20 each; Refill: 1  - cyanocobalamin (CYANOCOBALAMIN) 1000 MCG/ML injection; Inject 1 mL (1,000 mcg) into the muscle every 30 days  Dispense: 10 mL; Refill: 1    2. Adrenal disorder (H)  Arrange cosyntropin stimulation test.  Trial of Adderall  - syringe/needle, disp, 25G X 1\" 1 ML MISC; Use as directed to administer intramuscular B 12 injections monthly  Dispense: 20 each; Refill: 1  - cyanocobalamin (CYANOCOBALAMIN) 1000 MCG/ML injection; Inject 1 mL (1,000 mcg) into the muscle every 30 days  Dispense: 10 mL; Refill: 1  - Cortisol; Future  - Cosyntropin stimulation study post 60; Future    3. Vestibular migraine  Consider occult seizures.  Trial of topiramate  - topiramate (TOPAMAX) 25 MG tablet; Take 1 tablet (25 mg) by mouth 2 times daily  Dispense: 60 tablet; Refill: 11    4. Chronic fatigue and malaise  Push thyroid.  Unclear dosing.  Pharm.D. assistance.  Trial of Adderall  - amphetamine-dextroamphetamine (ADDERALL) 10 MG tablet; Take 1 tablet (10 mg) by mouth daily  Dispense: 10 tablet; Refill: 0       Patient Instructions   Push thyroid to maximum    Cosyntropin stimulation test    Trial of Adderall 10 mg daily    Trial of topiramate 25 mg twice daily    Change B12 shots to home            Return in about 4 months (around 3/9/2023) for using a video visit.       CHIEF COMPLAINT:  Chief Complaint   Patient presents with     RECHECK     PT REPORTS ONGOING SYMPTOMS STILL EXPERIENCING ISSUES       History of Present Illness       Hypothyroidism:     " "Since last visit, patient describes the following symptoms::  Anxiety, Constipation, Fatigue, Loose stools and Tremors    Headaches:   Since the patient's last clinic visit, headaches are: worsened  The patient is getting headaches:  Daily  Rustam Nam is not able to do normal daily activities when Rustam Nam has a migraine.  The patient is taking the following rescue/relief medications:  No rescue/relief medications and sumatriptan (Imitrex)   Patient states \"The relief is inconsistent\" from the rescue/relief medications.   The patient is taking the following medications to prevent migraines:  Amitriptyline  In the past 4 weeks, the patient has gone to an Urgent Care or Emergency Room 0 times times due to headaches.    Reason for visit:  Advice regarding ongoing and increased symptoms, questions about referrals and medical leave    Rustam Nam eats 4 or more servings of fruits and vegetables daily.Rustam Nam consumes 0 sweetened beverage(s) daily.Rustam Nam exercises with enough effort to increase Rustam Nam's heart rate 10 to 19 minutes per day.  Rustam Nam exercises with enough effort to increase Rustam Nam's heart rate 5 days per week.   Rustam Nam is taking medications regularly.      HISTORY OF PRESENT ILLNESS:  Agustina is a 31 year old adult contacting the clinic today via video for review.  We talked in July.  Since then she has initiated amitriptyline with good control of migraines.  Therapy evaluation showed that inner ear is normal and she has been provisionally diagnosed with vertiginous migraines.  We discussed the possibility of vertiginous seizure equivalents    She would like to try home B12 shots    Blood pressure and energy is low.  We discussed pushing thyroid.  We discussed trial of Adderall    For autoimmune disease consider cortisone insufficiency.  Discussed cosyntropin stimulation test.  Discussed pushing thyroid.  Consider increase in Plaquenil    REVIEW OF " "SYSTEMS:   Improved migraines on amitriptyline    PFSH:  Social History     Social History Narrative     Not on file         TOBACCO USE:  History   Smoking Status     Never   Smokeless Tobacco     Never       VITALS:  There were no vitals filed for this visit.  There were no vitals taken for this visit. Estimated body mass index is 22.05 kg/m  as calculated from the following:    Height as of 10/14/22: 1.727 m (5' 8\").    Weight as of 11/1/22: 65.8 kg (145 lb).    PHYSICAL EXAM:  (observations via Video)  Alert and oriented    MEDICATIONS:   Current Outpatient Medications   Medication Sig Dispense Refill     amitriptyline (ELAVIL) 25 MG tablet Take 37.5 mg by mouth At Bedtime       amphetamine-dextroamphetamine (ADDERALL) 10 MG tablet Take 1 tablet (10 mg) by mouth daily 10 tablet 0     COMPOUNDED NON-CONTROLLED SUBSTANCE (CMPD RX) - PHARMACY TO MIX COMPOUNDED MEDICATION Compound T3 - 2.5 MCG and T4 - 75 MCG capsule. Take one capsule by mouth daily in the morning 90 capsule 3     COMPOUNDED NON-CONTROLLED SUBSTANCE (CMPD RX) - PHARMACY TO MIX COMPOUNDED MEDICATION Compound T3 1.25mcg capsule. Take 2 capsules by mouth once daily 30 minutes before lunch. 180 capsule 3     cyanocobalamin (CYANOCOBALAMIN) 1000 MCG/ML injection Inject 1 mL (1,000 mcg) into the muscle every 30 days 10 mL 1     fluticasone (FLONASE) 50 MCG/ACT nasal spray Spray 2 sprays into both nostrils 2 times daily 16 g 11     ipratropium (ATROVENT) 0.03 % nasal spray Spray 2 sprays into both nostrils 4 times daily as needed for rhinitis 30 mL 11     linaclotide (LINZESS) 145 MCG capsule Take 1 capsule (145 mcg) by mouth every morning (before breakfast) 30 capsule 3     medical cannabis (Patient's own supply) by Other route See Admin Instructions 0 Information only      SUMAtriptan (IMITREX) 100 MG tablet Take 100 mg by mouth at onset of headache       syringe/needle, disp, 25G X 1\" 1 ML MISC Use as directed to administer intramuscular B 12 injections " monthly 20 each 1     topiramate (TOPAMAX) 25 MG tablet Take 1 tablet (25 mg) by mouth 2 times daily 60 tablet 11     traZODone (DESYREL) 50 MG tablet Take 1-2 tablets ( mg) by mouth as needed for sleep 60 tablet 0     levothyroxine (SYNTHROID/LEVOTHROID) 300 MCG tablet        liothyronine (CYTOMEL) 50 MCG tablet          Outside Notes summarized: Pharm.D. note x2  Labs, x-rays, cardiology, GI tests reviewed:   Recent Labs   Lab Test 09/23/22  1607 07/27/22  1434 06/10/22  1435 01/10/22  1251 09/07/21  0958 08/17/21  1326   HGB  --   --  12.9  --   --  12.3   WBC  --   --  7.3  --   --  7.9   NA  --   --  140  --   --   --    POTASSIUM  --   --  4.2  --   --   --    CR  --   --  0.78  --   --   --    URIC  --  2.6  --   --   --   --    B12  --  1,268*  --   --   --   --    TSH 1.05  --   --  0.45   < >  --    VITDT  --   --   --   --   --  44   SED  --  8  --   --   --  5   CRP  --  <3.00  --   --   --  <0.1    < > = values in this interval not displayed.     No results found for: [id number]  Lab Results   Component Value Date    CHOL 272 07/12/2017     New orders:   Orders Placed This Encounter   Procedures     Cortisol     Cosyntropin stimulation study post 60       Independent review of:    Supplemental history by:      Patient has given verbal consent to a Video visit?  Yes  How would you like to obtain your AVS?  MyChart  Will anyone else be joining your video visit? No       Video-Visit Details  Type of service:  Video Visit  Originating Location (pt. Location): Home    Distant Location (provider location):  Off-site    Dalton Zhao MD  [facility]    Video Start Time: 3:24 PM  Video End time:  3:59 PM  Face to face plus orders: 35 minutes  Documentation time:  3 minutes     The visit lasted a total of 40 minutes

## 2022-11-09 NOTE — PATIENT INSTRUCTIONS
Push thyroid to maximum    Cosyntropin stimulation test    Trial of Adderall 10 mg daily    Trial of topiramate 25 mg twice daily    Change B12 shots to home

## 2022-11-10 ENCOUNTER — TELEPHONE (OUTPATIENT)
Dept: NURSING | Facility: CLINIC | Age: 32
End: 2022-11-10

## 2022-11-10 DIAGNOSIS — M35.9 AUTOIMMUNE DISEASE (H): ICD-10-CM

## 2022-11-10 DIAGNOSIS — E27.9 ADRENAL DISORDER (H): ICD-10-CM

## 2022-11-10 NOTE — TELEPHONE ENCOUNTER
"Leila calling from [place] pharmacy to clarify instructions on B12 and syringe orders. The B12 states to give one every 30 days and the syringe say to give once a week.  Review of appointment notes state:  ASSESSMENT and PLAN:  1. Autoimmune disease (H)  Okay for home B12 shots.  Consider empiric Plaquenil.  Did not try prednisone.  - syringe/needle, disp, 25G X 1\" 1 ML MISC; Use as directed to administer intramuscular B 12 injections monthly  Dispense: 20 each; Refill: 1  - cyanocobalamin (CYANOCOBALAMIN) 1000 MCG/ML injection; Inject 1 mL (1,000 mcg) into the muscle every 30 days  Dispense: 10 mL; Refill: 1    Pharmacy received two prescriptions for syringes, one is 1 ml syringe and the other is a 3 ml syringe. The most recent is the 3 ml syringe. The pharmacist thought the 3 ml syringe may be a quicker and easier injection and is preferring this script and will fill the 3 ml syringe/needle.   No further needs.   Jackelyn Javier RN   11/10/22 10:11 AM  [facility] Nurse Advisor  "

## 2022-11-11 ENCOUNTER — NURSE TRIAGE (OUTPATIENT)
Dept: NURSING | Facility: CLINIC | Age: 32
End: 2022-11-11

## 2022-11-11 RX ORDER — CYANOCOBALAMIN 1000 UG/ML
1 INJECTION, SOLUTION INTRAMUSCULAR; SUBCUTANEOUS
Qty: 12 ML | Refills: 3 | Status: SHIPPED | OUTPATIENT
Start: 2022-11-11 | End: 2023-05-01

## 2022-11-11 NOTE — TELEPHONE ENCOUNTER
Pharmacy calling to request verbal for RX for syringes for B12 injx for weekly dosing d/t yesterday when they got the script they talked with someone and then changed it to monthly dosing but then today now they received an RX for the B12 with weekly dosing. Per Obie Ocasio, Pharm D note from today:  I have updated cyanocobalamin prescription to reflect weekly dosing at home.  Within that prescription I have notified that we would prefer the 3 mL syringes for ease of administration.    Gave vernal ok to change syringe RX instructions back to reflect the weekly dosing.     Courtney Cohen, RN, BSN  [facility] Nurse Advisor 3:20 PM 11/11/2022      Reason for Disposition    Information only question and nurse able to answer    Additional Information    Negative: Nursing judgment    Negative: Nursing judgment    Negative: Nursing judgment    Negative: Nursing judgment    Protocols used: INFORMATION ONLY CALL - NO TRIAGE-A-OH

## 2022-11-11 NOTE — TELEPHONE ENCOUNTER
I have updated cyanocobalamin prescription to reflect weekly dosing at home.  Within that prescription I have notified that we would prefer the 3 mL syringes for ease of administration.

## 2022-11-16 ENCOUNTER — TELEPHONE (OUTPATIENT)
Dept: INTERNAL MEDICINE | Facility: CLINIC | Age: 32
End: 2022-11-16

## 2022-11-17 NOTE — TELEPHONE ENCOUNTER
Central Prior Authorization Team - Phone: [phone]     PA Initiation    Medication: Compound T4 88mcg capsule-PA INITIATED  Insurance Company:    Pharmacy Filling the Rx: [facility] - [place], [place] - [facility] [address]  Filling Pharmacy Phone: [phone]  Filling Pharmacy Fax:    Start Date: 11/17/2022

## 2022-11-18 NOTE — TELEPHONE ENCOUNTER
Central Prior Authorization Team - Phone: [phone]     Prior Authorization Approval    Authorization Effective Date: 10/18/2022  Authorization Expiration Date: 11/18/2023  Medication: Compound T4 88mcg capsule-PA approved  Approved Dose/Quantity: 30  Reference #:     Insurance Company:    Expected CoPay:       CoPay Card Available:      Foundation Assistance Needed:    Which Pharmacy is filling the prescription (Not needed for infusion/clinic administered): [facility] - [place], [place] - [facility] [address]  Pharmacy Notified: Yes  Patient Notified: YesComment:  pharmacy will notify when ready

## 2022-11-30 NOTE — TELEPHONE ENCOUNTER
New RX sent.    [facility] Park  
Recived a fax saying the capaules may be cheaper alternative for patient.  If appropriate send in a new RX    Emily White CMA ([facility])    
Sorry, this was for the omeprazole    Emily White CMA ([facility])    
What med? Pavel recently prescribed some things    [facility] Park  
[see HPI] : see HPI

## 2022-12-09 DIAGNOSIS — E06.3 HASHIMOTO'S DISEASE: Primary | ICD-10-CM

## 2022-12-09 DIAGNOSIS — K58.1 IRRITABLE BOWEL SYNDROME WITH CONSTIPATION: ICD-10-CM

## 2022-12-12 ENCOUNTER — MYC MEDICAL ADVICE (OUTPATIENT)
Dept: FAMILY MEDICINE | Facility: CLINIC | Age: 32
End: 2022-12-12

## 2022-12-12 DIAGNOSIS — U07.1 INFECTION DUE TO 2019 NOVEL CORONAVIRUS: Primary | ICD-10-CM

## 2022-12-14 ENCOUNTER — NURSE TRIAGE (OUTPATIENT)
Dept: NURSING | Facility: CLINIC | Age: 32
End: 2022-12-14

## 2022-12-14 NOTE — TELEPHONE ENCOUNTER
Patient got covid on Friday and was started on paxlovid.  Patient states her other symptoms seem to be getting better but her sore throat has gotten much worse.    Painful states it is painful to talk, difficult to swallow, and in severe pain.    Patient denies any breathing issues, can swallow, drank orange juice and felt tingling in her throat and face which has gotten better, no fever, is able to open her mouth.    Care advise: gargle with warm salt water, push fluids, pain medication, avoid citrus and spicy foods, cold foods, soft diet.  Call back is symptoms become worse.    Patient is wondering if provider can prescribe anything for her sore throat.  If so send to pharmacy:    [facility] [facility] [id number]    Will route to clinic    Ruma Case RN   12/14/22 5:57 PM  [facility] Nurse Advisor    Reason for Disposition    SEVERE (e.g., excruciating) throat pain    Additional Information    Negative: SEVERE difficulty breathing (e.g., struggling for each breath, speaks in single words, stridor)    Negative: Sounds like a life-threatening emergency to the triager    Negative: [1] Diagnosed strep throat AND [2] taking antibiotic AND [3] symptoms continue    Negative: Throat culture results, call about    Negative: Productive cough is main symptom    Negative: Non-productive cough is main symptom    Negative: Hoarseness is main symptom    Negative: Runny nose is main symptom    Negative: Uvula swelling is main symptom    Negative: [1] Drooling or spitting out saliva (because can't swallow) AND [2] normal breathing    Negative: Unable to open mouth completely    Negative: [1] Difficulty breathing AND [2] not severe    Negative: Fever > 104 F (40 C)    Negative: [1] Refuses to drink anything AND [2] for > 12 hours    Negative: [1] Drinking very little AND [2] dehydration suspected (e.g., no urine > 12 hours, very dry mouth, very lightheaded)    Negative: Patient sounds very sick or  weak to the triager    Protocols used: SORE THROAT-A-AH

## 2022-12-15 NOTE — TELEPHONE ENCOUNTER
RECORDS RECEIVED FROM: Hashimoto's Disease, lightheadedness; referred by Dr. Park   DATE RECEIVED: 3/14/2023   NOTES (FOR ALL VISITS) STATUS DETAILS   OFFICE NOTES from referring provider [facility] [place]:  11/1/22 - PCC OV with Dr. Park   OFFICE NOTES from other specialist [facility] [place]:  11/9/22 - PCC OV with Dr. Zhao  6/16/21, 1/29/20 - PCC OV with Dr. Somers    [facility]:  10/14/22 - ENT OV with Dr. Farooq   ED NOTES N/A    OPERATIVE REPORT  (thyroid, pituitary, adrenal, parathyroid) N/A    MEDICATION LIST Internal    IMAGING      MRI (BRAIN) Internal [facility]:  6/15/22 - MRI Brain   CT (HEAD/NECK/CHEST/ABDOMEN) Internal [facility]:  8/9/22 - CT Abd/Pelvis  7/6/22 - CT Temporal   ULTRASOUND (HEAD/NECK) [facility]:  8/27/21 - US Thyroid   LABS     DIABETES: HBGA1C, CREATININE, FASTING LIPIDS, MICROALBUMIN URINE, POTASSIUM, TSH, T4    THYROID: TSH, T4, CBC, THYRODLONULIN, TOTAL T3, FREE T4, CALCITONIN, CEA Internal   [facility]:  9/23/22 - Thyroid Peroxidase  9/23/22 - TSH, T4, T3  6/10/22 - CMP  8/17/21 - CBC  8/17/21 - Routine UA  7/12/17 - HBGA1C  7/12/17 - Lipid  5/12/17 - Creatinine

## 2022-12-24 ENCOUNTER — NURSE TRIAGE (OUTPATIENT)
Dept: NURSING | Facility: CLINIC | Age: 32
End: 2022-12-24

## 2022-12-25 NOTE — TELEPHONE ENCOUNTER
"C/o sore throat, cough. Sx bothering her most tonight is sore throat. Had covid in early Dec. States she was seen at  12/16 for sore throat and was prescribed steroid (must have been non-[facility] , no note) [place] better until 12/22 sore throat returned. Denies breathing or swallowing difficulty. No fever. No ear pain. No strep exposure. Advised home care.     Reason for Disposition    [1] Sore throat with cough/cold symptoms AND [2] present < 5 days    Additional Information    Negative: SEVERE difficulty breathing (e.g., struggling for each breath, speaks in single words, stridor)    Negative: Sounds like a life-threatening emergency to the triager    Negative: [1] Diagnosed strep throat AND [2] taking antibiotic AND [3] symptoms continue    Negative: Throat culture results, call about    Negative: Productive cough is main symptom    Negative: Non-productive cough is main symptom    Negative: Hoarseness is main symptom    Negative: Runny nose is main symptom    Negative: Uvula swelling is main symptom    Negative: [1] Drooling or spitting out saliva (because can't swallow) AND [2] normal breathing    Negative: Unable to open mouth completely    Negative: [1] Difficulty breathing AND [2] not severe    Negative: Fever > 104 F (40 C)    Negative: [1] Refuses to drink anything AND [2] for > 12 hours    Negative: [1] Drinking very little AND [2] dehydration suspected (e.g., no urine > 12 hours, very dry mouth, very lightheaded)    Negative: Patient sounds very sick or weak to the triager    Negative: SEVERE (e.g., excruciating) throat pain    Negative: [1] Pus on tonsils (back of throat) AND [2]  fever AND [3] swollen neck lymph nodes (\"glands\")    Negative: [1] Rash AND [2] widespread (especially chest and abdomen)    Negative: Earache also present    Negative: Fever present > 3 days (72 hours)    Negative: Diabetes mellitus or weak immune system (e.g., HIV positive, cancer chemo, splenectomy, organ transplant, chronic " steroids)    Negative: History of rheumatic fever    Negative: [1] Adult is leaving on a trip AND [2] requests an antibiotic NOW    Negative: [1] Positive throat culture or rapid strep test (according to lab, PCP, caller, etc.) AND [2] NO  standing order to call in prescription for antibiotic    Negative: [1] Exposure to family member (or spouse or boyfriend/girlfriend) with test-proven strep AND [2] within last 10 days    Negative: [1] Sore throat is the only symptom AND [2] present > 48 hours    Negative: [1] Sore throat with cough/cold symptoms AND [2] present > 5 days    Negative: [1] Sore throat is the only symptom AND [2] sore throat present < 48 hours    Protocols used: SORE THROAT-A-

## 2023-01-12 ENCOUNTER — TELEPHONE (OUTPATIENT)
Dept: ENDOCRINOLOGY | Facility: CLINIC | Age: 33
End: 2023-01-12

## 2023-01-12 ENCOUNTER — VIRTUAL VISIT (OUTPATIENT)
Dept: INTERNAL MEDICINE | Facility: CLINIC | Age: 33
End: 2023-01-12
Payer: COMMERCIAL

## 2023-01-12 ENCOUNTER — TELEPHONE (OUTPATIENT)
Dept: INTERNAL MEDICINE | Facility: CLINIC | Age: 33
End: 2023-01-12

## 2023-01-12 DIAGNOSIS — Z00.00 PREVENTATIVE HEALTH CARE: ICD-10-CM

## 2023-01-12 DIAGNOSIS — J01.00 ACUTE NON-RECURRENT MAXILLARY SINUSITIS: Primary | ICD-10-CM

## 2023-01-12 DIAGNOSIS — E06.3 HASHIMOTO'S DISEASE: ICD-10-CM

## 2023-01-12 DIAGNOSIS — M35.9 AUTOIMMUNE DISEASE (H): ICD-10-CM

## 2023-01-12 DIAGNOSIS — E27.9 ADRENAL DISORDER (H): ICD-10-CM

## 2023-01-12 DIAGNOSIS — R55 POSTURAL DIZZINESS WITH PRESYNCOPE: ICD-10-CM

## 2023-01-12 DIAGNOSIS — R53.83 FATIGUE, UNSPECIFIED TYPE: Primary | ICD-10-CM

## 2023-01-12 DIAGNOSIS — B37.0 THRUSH: ICD-10-CM

## 2023-01-12 DIAGNOSIS — U07.1 COVID-19 VIRUS INFECTION: ICD-10-CM

## 2023-01-12 DIAGNOSIS — R42 POSTURAL DIZZINESS WITH PRESYNCOPE: ICD-10-CM

## 2023-01-12 PROCEDURE — 99215 OFFICE O/P EST HI 40 MIN: CPT | Mod: GT | Performed by: INTERNAL MEDICINE

## 2023-01-12 RX ORDER — AMOXICILLIN AND CLAVULANATE POTASSIUM 500; 125 MG/1; MG/1
1 TABLET, FILM COATED ORAL 2 TIMES DAILY
Qty: 14 TABLET | Refills: 1 | Status: SHIPPED | OUTPATIENT
Start: 2023-01-12 | End: 2023-01-19

## 2023-01-12 RX ORDER — GUAIFENESIN 600 MG/1
600 TABLET, EXTENDED RELEASE ORAL 2 TIMES DAILY
Qty: 60 TABLET | Refills: 2 | Status: SHIPPED | OUTPATIENT
Start: 2023-01-12 | End: 2023-06-15

## 2023-01-12 RX ORDER — COSYNTROPIN 0.25 MG/ML
250 INJECTION, POWDER, FOR SOLUTION INTRAMUSCULAR; INTRAVENOUS ONCE
Status: CANCELLED
Start: 2023-01-12 | End: 2023-01-12

## 2023-01-12 RX ORDER — NYSTATIN 100000/ML
500000 SUSPENSION, ORAL (FINAL DOSE FORM) ORAL 4 TIMES DAILY
Qty: 400 ML | Refills: 1 | Status: SHIPPED | OUTPATIENT
Start: 2023-01-12 | End: 2023-02-16

## 2023-01-12 RX ORDER — DIPHENHYDRAMINE HYDROCHLORIDE 50 MG/ML
50 INJECTION INTRAMUSCULAR; INTRAVENOUS
Status: CANCELLED
Start: 2023-01-12

## 2023-01-12 RX ORDER — EPINEPHRINE 1 MG/ML
0.3 INJECTION, SOLUTION, CONCENTRATE INTRAVENOUS EVERY 5 MIN PRN
Status: CANCELLED | OUTPATIENT
Start: 2023-01-12

## 2023-01-12 RX ORDER — METHYLPREDNISOLONE SODIUM SUCCINATE 125 MG/2ML
125 INJECTION, POWDER, LYOPHILIZED, FOR SOLUTION INTRAMUSCULAR; INTRAVENOUS
Status: CANCELLED
Start: 2023-01-12

## 2023-01-12 RX ORDER — ALBUTEROL SULFATE 0.83 MG/ML
2.5 SOLUTION RESPIRATORY (INHALATION)
Status: CANCELLED | OUTPATIENT
Start: 2023-01-12

## 2023-01-12 RX ORDER — ALBUTEROL SULFATE 90 UG/1
1-2 AEROSOL, METERED RESPIRATORY (INHALATION)
Status: CANCELLED
Start: 2023-01-12

## 2023-01-12 RX ORDER — MEPERIDINE HYDROCHLORIDE 25 MG/ML
25 INJECTION INTRAMUSCULAR; INTRAVENOUS; SUBCUTANEOUS EVERY 30 MIN PRN
Status: CANCELLED | OUTPATIENT
Start: 2023-01-12

## 2023-01-12 NOTE — PATIENT INSTRUCTIONS
Nystatin swish and swallow    If no improvement or only partial improvement after 48 hours add Augmentin 500 mg twice daily    Guaifenesin 600 mg twice daily    Did not fill topiramate    Did not fill Adderall    Cosyntropin stimulation test-ask nurses to help coordinate    TSH, T3 and T4    Confirm current compounded thyroid    Increase thyroid if possible

## 2023-01-12 NOTE — TELEPHONE ENCOUNTER
Spoke with patient and patient will call scheduling to schedule lab appointment with [facility], if there are any difficulties with this scheduling/order, she will alert [facility] for more assistance.             Spoke with staff member at [facility]. She reports that endocrinology providers typically order the medication as part of a therapy plan for AcTH stim testing. It was advised to have patient call central scheduling and schedule test at [facility] ([facility]).    ----- Message from Iona Ramírez RN sent at 1/12/2023  1:31 PM CST -----  Spoke with [facility]:   This lab is only done by [facility] for in-patient patients due to the need injection. Advised to ask [facility] lab if it is done there due to presence of pharmacy.     Spoke with [facility]:   Coordination may be needed with pharmacy to find out how to get injection/give injection during test.     Spoke with [facility], phone # [phone]:   Message is being sent to endrocrin clinic staff to find out where this lab is completed for their patients/how to coordinate in outpatient setting. Will await phone call from endocrine staff for advisement. Pt has upcoming endocrine appointment.           ----- Message -----  From: Dalton Zhao MD  Sent: 1/12/2023   1:04 PM CST  To: [facility]    Please call and help coordinate cosyntropin stimulation test.  Scheduled to have cortisol test on January 13, but normal test is cortisol, then iv cosyntropin, then cortisol level 30 and 60 mins later.  Not sure where she can get that done

## 2023-01-12 NOTE — PROGRESS NOTES
Agustina is a 32 year old adult contacting the clinic today via video, who will use the platform: [employer] for the visit.  Phone # for D[employer], or if Amwell drops:   Telephone Information:   Mobile [phone]          ASSESSMENT and PLAN:  1. Acute non-recurrent maxillary sinusitis  Probable bacterial infection after COVID last month.  Begin antifungal treatment and if not complete resolution add amoxicillin/Clavulin  - amoxicillin-clavulanate (AUGMENTIN) 500-125 MG tablet; Take 1 tablet by mouth 2 times daily for 7 days  Dispense: 14 tablet; Refill: 1  - guaiFENesin (MUCINEX) 600 MG 12 hr tablet; Take 1 tablet (600 mg) by mouth 2 times daily  Dispense: 60 tablet; Refill: 2  - nystatin (MYCOSTATIN) 263956 UNIT/ML suspension; Take 5 mLs (500,000 Units) by mouth 4 times daily  Dispense: 400 mL; Refill: 1    2. Thrush  Possibly worsened by steroid last month.  Begin with antifungal.  Consider fungal esophagitis    3. COVID-19 virus infection  Onset of symptoms December 7 and treated with Paxlovid    4. Autoimmune disease (H)  Discussed but no trial of Plaquenil yet    5. Adrenal disorder (H)  Coordinate cosyntropin stimulation test.  Decadron given 9 December 15 should not be relevant to any further    6. Preventative health care  - REVIEW OF HEALTH MAINTENANCE PROTOCOL ORDERS    7. Hashimoto's disease  Currently on compounded thyroid.  Push dose if possible  - T3, Free; Future  - T3, total; Future       Patient Instructions   Nystatin swish and swallow    If no improvement or only partial improvement after 48 hours add Augmentin 500 mg twice daily    Guaifenesin 600 mg twice daily    Did not fill topiramate    Did not fill Adderall    Cosyntropin stimulation test-ask nurses to help coordinate    TSH, T3 and T4    Confirm current compounded thyroid    Increase thyroid if possible            Return in about 4 months (around 5/12/2023) for using a video visit.       CHIEF COMPLAINT:  Chief Complaint   Patient presents  "with     URI       HPI    HISTORY OF PRESENT ILLNESS:  Agustina is a 32 year old adult contacting the clinic today via video for persisting malaise.  She developed symptoms of COVID on December 7.  She was tested on December 9 and positive.  She was treated with Paxlovid.  She improved, then worsened.  She went to urgent care on December 16.  She was given Decadron by IV or oral formulation.  Tests for strep and mono were negative.  Symptoms improved for approximately 1 week and have been slowly recurrent.  She complains of cough, posterior nasal drainage, occasional green phlegm, eye pressure, and worsening TMJ.  Sore throat.  Tongue is more white.  Occasional burning down into her chest    Pharm.D. has created compounded thyroid which was increased in November.  She has felt better with an increased dose in hopes that she can increase again    We have been suspicious of adrenal insufficiency.  Cosyntropin stimulation test has been ordered and she is planning to have this done tomorrow but I am not certain if the proper test is ordered and prepared.  I will ask nursing to help coordinate    REVIEW OF SYSTEMS:   Persisting sore throat     PFSH:  Social History     Social History Narrative     Not on file     Back at work    TOBACCO USE:  History   Smoking Status     Never   Smokeless Tobacco     Never       VITALS:  There were no vitals filed for this visit.  There were no vitals taken for this visit. Estimated body mass index is 22.05 kg/m  as calculated from the following:    Height as of 10/14/22: 1.727 m (5' 8\").    Weight as of 11/1/22: 65.8 kg (145 lb).    PHYSICAL EXAM:  (observations via Video)  Alert and oriented.  In bedroom.  Wearing stocking cap.  Tongue slightly white    MEDICATIONS:   Current Outpatient Medications   Medication Sig Dispense Refill     amitriptyline (ELAVIL) 25 MG tablet Take 37.5 mg by mouth At Bedtime       amoxicillin-clavulanate (AUGMENTIN) 500-125 MG tablet Take 1 tablet by mouth 2 " "times daily for 7 days 14 tablet 1     amphetamine-dextroamphetamine (ADDERALL) 10 MG tablet Take 1 tablet (10 mg) by mouth daily 10 tablet 0     COMPOUNDED NON-CONTROLLED SUBSTANCE (CMPD RX) - PHARMACY TO MIX COMPOUNDED MEDICATION Compound T3 - 2.5 MCG capsules. Take one capsule by mouth three times daily 90 capsule 3     COMPOUNDED NON-CONTROLLED SUBSTANCE (CMPD RX) - PHARMACY TO MIX COMPOUNDED MEDICATION Compound T4 88mcg capsule. Take 1 capsule by mouth every morning 180 capsule 3     cyanocobalamin (CYANOCOBALAMIN) 1000 MCG/ML injection Inject 1 mL (1,000 mcg) into the muscle every 7 days 12 mL 3     fluticasone (FLONASE) 50 MCG/ACT nasal spray Spray 2 sprays into both nostrils 2 times daily 16 g 11     guaiFENesin (MUCINEX) 600 MG 12 hr tablet Take 1 tablet (600 mg) by mouth 2 times daily 60 tablet 2     ipratropium (ATROVENT) 0.03 % nasal spray Spray 2 sprays into both nostrils 4 times daily as needed for rhinitis 30 mL 11     linaclotide (LINZESS) 72 MCG capsule Take 1 capsule (72 mcg) by mouth every morning (before breakfast) 30 capsule 11     medical cannabis (Patient's own supply) by Other route See Admin Instructions 0 Information only      nystatin (MYCOSTATIN) 358368 UNIT/ML suspension Take 5 mLs (500,000 Units) by mouth 4 times daily 400 mL 1     SUMAtriptan (IMITREX) 100 MG tablet Take 100 mg by mouth at onset of headache       syringe/needle, disp, 25G X 1\" 3 ML MISC Inject 1 each into the muscle once a week With B12 12 each 3     topiramate (TOPAMAX) 25 MG tablet Take 1 tablet (25 mg) by mouth 2 times daily 60 tablet 11     traZODone (DESYREL) 50 MG tablet Take 1-2 tablets ( mg) by mouth as needed for sleep 60 tablet 0       Outside Notes summarized: Pharm.D. note.  Urgent care December 16  Labs, x-rays, cardiology, GI tests reviewed: Strep negative.  TSH 1.05 in September  Recent Labs   Lab Test 09/23/22  1607 07/27/22  1434 06/10/22  1435 01/10/22  1251 09/07/21  0958 08/17/21  1326   HGB  " --   --  12.9  --   --  12.3   WBC  --   --  7.3  --   --  7.9   NA  --   --  140  --   --   --    POTASSIUM  --   --  4.2  --   --   --    CR  --   --  0.78  --   --   --    URIC  --  2.6  --   --   --   --    B12  --  1,268*  --   --   --   --    TSH 1.05  --   --  0.45   < >  --    VITDT  --   --   --   --   --  44   SED  --  8  --   --   --  5   CRP  --  <3.00  --   --   --  <0.1    < > = values in this interval not displayed.     No results found for: [id number]  Lab Results   Component Value Date    CHOL 272 07/12/2017     New orders:   Orders Placed This Encounter   Procedures     REVIEW OF HEALTH MAINTENANCE PROTOCOL ORDERS     T3, Free     T3, total       Independent review of:     Dalton Bell MD     [facility]    Video-Visit Details  Type of service:  Video Visit  Patient has given verbal consent to a Video visit?  Yes  How would you like to obtain your AVS?  MyChart  Will anyone else be joining your video visit, giving supplemental history? No    Originating location (pt location): Home    Distant Location (provider location):  Off-site    Video Start Time: 12:30 PM  Video End time:  1:27 PM  Face to face plus orders: 57 minutes  Documentation time:  3 minutes     The visit lasted a total of 58 minutes

## 2023-01-12 NOTE — TELEPHONE ENCOUNTER
Attempted to reach Ali at [facility]. Left direct number to return call.   Charissa Parada, RN on 1/12/2023 at 2:12 PM             [facility] Call Center    Phone Message    May a detailed message be left on voicemail: yes     Reason for Call: Other: Ali with [facility] calling in to get information on a Cosyntropin Stimulation Test.  It is an order in the chart.  [facility] doesn't do this test at their lab, do we know where they could have this done?     Action Taken: Other: Endo    Travel Screening: Not Applicable     Cosyntropin stimulation study post 60

## 2023-01-13 ENCOUNTER — LAB (OUTPATIENT)
Dept: LAB | Facility: CLINIC | Age: 33
End: 2023-01-13
Payer: COMMERCIAL

## 2023-01-13 DIAGNOSIS — E06.3 HASHIMOTO'S DISEASE: ICD-10-CM

## 2023-01-13 LAB
T3 SERPL-MCNC: 106 NG/DL (ref 85–202)
T3FREE SERPL-MCNC: 3.1 PG/ML (ref 2–4.4)
TSH SERPL DL<=0.005 MIU/L-ACNC: 1.05 UIU/ML (ref 0.3–4.2)

## 2023-01-13 PROCEDURE — 84480 ASSAY TRIIODOTHYRONINE (T3): CPT

## 2023-01-13 PROCEDURE — 36415 COLL VENOUS BLD VENIPUNCTURE: CPT

## 2023-01-13 PROCEDURE — 84443 ASSAY THYROID STIM HORMONE: CPT

## 2023-01-13 PROCEDURE — 84481 FREE ASSAY (FT-3): CPT | Mod: 59

## 2023-01-13 NOTE — TELEPHONE ENCOUNTER
Message received from Endocrinology RN advising:     If they need to know if the order they currently have in place is done here - then a call the Infusion Center (, option 7, option 2) to confirm is the best option.     Called the infusion center, staff reported that the order is in place and the patient will be called to be scheduled as soon as infusion center RN confirms appointment time.     Endocrinology RN Charissa Parada RN recommended the following in terms of order placement:     If you are wanting an ACTH Stim Test for adrenal insufficiency we typically consider the following:     If the patient is taking steroids, the test should be done around 0800AM   The provider should instruct the pt on how long to hold the steroids prior to the test   Please document the last dose of steroids(oral, injectable, inhalers, topical creams, anything, ...)  Usual steroid dosing can resume after the stim test is completed.       The order should include the Diagnosis     Labs: baseline: _______ Cortisol: ________ ACTH     Cortrosyn: ___ (1mcg IV)         _____ (250mcg IV)     Lab orders: Cortisol at 30 minutes and 60 minutes post Cortrosyn administration

## 2023-01-16 ENCOUNTER — MYC MEDICAL ADVICE (OUTPATIENT)
Dept: INTERNAL MEDICINE | Facility: CLINIC | Age: 33
End: 2023-01-16

## 2023-01-16 DIAGNOSIS — M35.9 AUTOIMMUNE DISEASE (H): ICD-10-CM

## 2023-01-16 DIAGNOSIS — R53.82 CHRONIC FATIGUE AND MALAISE: ICD-10-CM

## 2023-01-16 DIAGNOSIS — E06.3 HASHIMOTO'S DISEASE: Primary | ICD-10-CM

## 2023-01-16 DIAGNOSIS — R53.81 CHRONIC FATIGUE AND MALAISE: ICD-10-CM

## 2023-01-23 ENCOUNTER — INFUSION THERAPY VISIT (OUTPATIENT)
Dept: INFUSION THERAPY | Facility: CLINIC | Age: 33
End: 2023-01-23
Attending: INTERNAL MEDICINE
Payer: COMMERCIAL

## 2023-01-23 VITALS
HEART RATE: 107 BPM | RESPIRATION RATE: 16 BRPM | SYSTOLIC BLOOD PRESSURE: 103 MMHG | TEMPERATURE: 97.8 F | OXYGEN SATURATION: 99 % | DIASTOLIC BLOOD PRESSURE: 65 MMHG

## 2023-01-23 DIAGNOSIS — M35.9 AUTOIMMUNE DISEASE (H): Primary | ICD-10-CM

## 2023-01-23 DIAGNOSIS — R55 POSTURAL DIZZINESS WITH PRESYNCOPE: ICD-10-CM

## 2023-01-23 DIAGNOSIS — E06.3 HASHIMOTO'S DISEASE: ICD-10-CM

## 2023-01-23 DIAGNOSIS — R42 POSTURAL DIZZINESS WITH PRESYNCOPE: ICD-10-CM

## 2023-01-23 DIAGNOSIS — R53.83 FATIGUE, UNSPECIFIED TYPE: ICD-10-CM

## 2023-01-23 LAB
ANION GAP SERPL CALCULATED.3IONS-SCNC: 9 MMOL/L (ref 7–15)
CHLORIDE SERPL-SCNC: 108 MMOL/L (ref 98–107)
CORTICOSTER 1H P 250 UG ACTH SERPL-SCNC: 24.7 UG/DL (ref ?–150)
CORTICOSTER 30M P 250 UG ACTH SERPL-SCNC: 22.1 UG/DL (ref ?–150)
CORTICOSTER SERPL-MCNC: 11.9 UG/DL (ref 4–22)
DEPRECATED HCO3 PLAS-SCNC: 23 MMOL/L (ref 22–29)
HOLD SPECIMEN: NORMAL
POTASSIUM SERPL-SCNC: 3.8 MMOL/L (ref 3.4–5.3)
SODIUM SERPL-SCNC: 140 MMOL/L (ref 136–145)
TIME OF COSYNTROPIN INJECTION: NORMAL

## 2023-01-23 PROCEDURE — 82533 TOTAL CORTISOL: CPT | Performed by: INTERNAL MEDICINE

## 2023-01-23 PROCEDURE — 82024 ASSAY OF ACTH: CPT | Performed by: INTERNAL MEDICINE

## 2023-01-23 PROCEDURE — 36415 COLL VENOUS BLD VENIPUNCTURE: CPT | Performed by: INTERNAL MEDICINE

## 2023-01-23 PROCEDURE — 84244 ASSAY OF RENIN: CPT | Performed by: INTERNAL MEDICINE

## 2023-01-23 PROCEDURE — 82374 ASSAY BLOOD CARBON DIOXIDE: CPT | Performed by: INTERNAL MEDICINE

## 2023-01-23 PROCEDURE — 96374 THER/PROPH/DIAG INJ IV PUSH: CPT

## 2023-01-23 PROCEDURE — 250N000011 HC RX IP 250 OP 636: Performed by: INTERNAL MEDICINE

## 2023-01-23 RX ORDER — EPINEPHRINE 1 MG/ML
0.3 INJECTION, SOLUTION INTRAMUSCULAR; SUBCUTANEOUS EVERY 5 MIN PRN
OUTPATIENT
Start: 2023-01-23

## 2023-01-23 RX ORDER — DIPHENHYDRAMINE HYDROCHLORIDE 50 MG/ML
50 INJECTION INTRAMUSCULAR; INTRAVENOUS
Start: 2023-01-23

## 2023-01-23 RX ORDER — COSYNTROPIN 0.25 MG/ML
250 INJECTION, POWDER, FOR SOLUTION INTRAMUSCULAR; INTRAVENOUS ONCE
Status: CANCELLED
Start: 2023-01-23 | End: 2023-01-23

## 2023-01-23 RX ORDER — MEPERIDINE HYDROCHLORIDE 25 MG/ML
25 INJECTION INTRAMUSCULAR; INTRAVENOUS; SUBCUTANEOUS EVERY 30 MIN PRN
OUTPATIENT
Start: 2023-01-23

## 2023-01-23 RX ORDER — METHYLPREDNISOLONE SODIUM SUCCINATE 125 MG/2ML
125 INJECTION, POWDER, LYOPHILIZED, FOR SOLUTION INTRAMUSCULAR; INTRAVENOUS
Start: 2023-01-23

## 2023-01-23 RX ORDER — ALBUTEROL SULFATE 0.83 MG/ML
2.5 SOLUTION RESPIRATORY (INHALATION)
OUTPATIENT
Start: 2023-01-23

## 2023-01-23 RX ORDER — ALBUTEROL SULFATE 90 UG/1
1-2 AEROSOL, METERED RESPIRATORY (INHALATION)
Start: 2023-01-23

## 2023-01-23 RX ORDER — COSYNTROPIN 0.25 MG/ML
250 INJECTION, POWDER, FOR SOLUTION INTRAMUSCULAR; INTRAVENOUS ONCE
Status: COMPLETED | OUTPATIENT
Start: 2023-01-23 | End: 2023-01-23

## 2023-01-23 RX ADMIN — COSYNTROPIN 250 MCG: 0.25 INJECTION, POWDER, LYOPHILIZED, FOR SOLUTION INTRAMUSCULAR; INTRAVENOUS at 08:22

## 2023-01-23 NOTE — PROGRESS NOTES
Nursing Note  Agustina Nam [facility] for:   Chief Complaint   Patient presents with     Infusion     ACTH       During today's [facility] appointment, orders from Dalton Zhao were completed.  Frequency: once    Progress note:  Patient identification verified by name and date of birth.  Assessment completed.  Vitals recorded in Doc Flowsheets.  Patient was provided with education regarding medication/procedure and possible side effects.  Patient verbalized understanding.     present during visit today: Not Applicable.    Treatment Conditions: Non-applicable.        Infusion length and rate:  infusion given over approximately 2 minutes    Labs: Baseline labs drawn  Cosyntropin given   Cortisol levels drawn at 30 and 60 minutes    Vascular access: peripheral IV placed today.    Is the next appt scheduled? n/a    Post Infusion Assessment:  Patient tolerated infusion without incident.     Discharge Plan:   Follow up plan of care with: ordering provider  Discharge instructions were reviewed with patient.  Patient/representative verbalized understanding of discharge instructions and all questions answered.  [facility] in stable condition.    Sissy Patel RN    Administrations This Visit     cosyntropin (CORTROSYN) injection 250 mcg     Admin Date  01/23/2023 Action  Given Dose  250 mcg Route  Intravenous Administered By  Sissy Patel RN                /65 (BP Location: Left arm)   Pulse 107   Temp 97.8  F (36.6  C) (Oral)   Resp 16   SpO2 99%

## 2023-01-23 NOTE — LETTER
1/23/2023         RE: Agustina Nam  [address]  [place] [address]        Dear Colleague,    Thank you for referring your patient, Agustina Nam, to the [facility]. Please see a copy of my visit note below.    Nursing Note  Agustina Nam [facility] for:   Chief Complaint   Patient presents with     Infusion     ACTH       During [facility] appointment, orders from Dalton Zhao were completed.  Frequency: once    Progress note:  Patient identification verified by name and date of birth.  Assessment completed.  Vitals recorded in Doc Flowsheets.  Patient was provided with education regarding medication/procedure and possible side effects.  Patient verbalized understanding.     present during visit today: Not Applicable.    Treatment Conditions: Non-applicable.        Infusion length and rate:  infusion given over approximately 2 minutes    Labs: Baseline labs drawn  Cosyntropin given   Cortisol levels drawn at 30 and 60 minutes    Vascular access: peripheral IV placed today.    Is the next appt scheduled? n/a    Post Infusion Assessment:  Patient tolerated infusion without incident.     Discharge Plan:   Follow up plan of care with: ordering provider  Discharge instructions were reviewed with patient.  Patient/representative verbalized understanding of discharge instructions and all questions answered.  [facility] in stable condition.    Sissy Patel RN    Administrations This Visit     cosyntropin (CORTROSYN) injection 250 mcg     Admin Date  01/23/2023 Action  Given Dose  250 mcg Route  Intravenous Administered By  Sissy Patel RN                /65 (BP Location: Left arm)   Pulse 107   Temp 97.8  F (36.6  C) (Oral)   Resp 16   SpO2 99%         Again, thank you for allowing me  to participate in the care of your patient.        Sincerely,        [facility] Nurse

## 2023-01-24 LAB — ACTH PLAS-MCNC: 13 PG/ML

## 2023-01-26 ENCOUNTER — MYC MEDICAL ADVICE (OUTPATIENT)
Dept: GASTROENTEROLOGY | Facility: CLINIC | Age: 33
End: 2023-01-26
Payer: COMMERCIAL

## 2023-01-26 LAB — RENIN PLAS-CCNC: 2.4 NG/ML/HR

## 2023-01-27 NOTE — TELEPHONE ENCOUNTER
FW: lower dosage again?  Received: Today  Laila Lester DO P [facility]  Phone Number: [phone]     They are already on lowest dose - can try using every other day or just as needed      KitOrdert message sent with the above suggestions from provider.    Kalie José RN

## 2023-02-14 ENCOUNTER — LAB (OUTPATIENT)
Dept: LAB | Facility: CLINIC | Age: 33
End: 2023-02-14
Payer: COMMERCIAL

## 2023-02-14 DIAGNOSIS — E06.3 HASHIMOTO'S DISEASE: ICD-10-CM

## 2023-02-14 LAB — TSH SERPL DL<=0.005 MIU/L-ACNC: 0.71 UIU/ML (ref 0.3–4.2)

## 2023-02-14 PROCEDURE — 36415 COLL VENOUS BLD VENIPUNCTURE: CPT

## 2023-02-14 PROCEDURE — 84443 ASSAY THYROID STIM HORMONE: CPT

## 2023-02-16 ENCOUNTER — VIRTUAL VISIT (OUTPATIENT)
Dept: INTERNAL MEDICINE | Facility: CLINIC | Age: 33
End: 2023-02-16
Payer: COMMERCIAL

## 2023-02-16 ENCOUNTER — TELEPHONE (OUTPATIENT)
Dept: INTERNAL MEDICINE | Facility: CLINIC | Age: 33
End: 2023-02-16

## 2023-02-16 DIAGNOSIS — K21.00 GASTROESOPHAGEAL REFLUX DISEASE WITH ESOPHAGITIS WITHOUT HEMORRHAGE: ICD-10-CM

## 2023-02-16 DIAGNOSIS — J32.0 CHRONIC MAXILLARY SINUSITIS: Primary | ICD-10-CM

## 2023-02-16 DIAGNOSIS — R00.0 TACHYCARDIA: ICD-10-CM

## 2023-02-16 DIAGNOSIS — B37.0 THRUSH: ICD-10-CM

## 2023-02-16 DIAGNOSIS — R42 VERTIGO: ICD-10-CM

## 2023-02-16 PROCEDURE — 99215 OFFICE O/P EST HI 40 MIN: CPT | Mod: VID | Performed by: INTERNAL MEDICINE

## 2023-02-16 RX ORDER — NYSTATIN 100000/ML
500000 SUSPENSION, ORAL (FINAL DOSE FORM) ORAL 4 TIMES DAILY
Qty: 400 ML | Refills: 1 | Status: SHIPPED | OUTPATIENT
Start: 2023-02-16 | End: 2023-05-01

## 2023-02-16 RX ORDER — MECLIZINE HYDROCHLORIDE 50 MG/1
50 TABLET ORAL 3 TIMES DAILY PRN
Qty: 30 TABLET | Refills: 3 | Status: SHIPPED | OUTPATIENT
Start: 2023-02-16 | End: 2024-06-03

## 2023-02-16 RX ORDER — FLUCONAZOLE 100 MG/1
100 TABLET ORAL DAILY
Qty: 30 TABLET | Refills: 3 | Status: SHIPPED | OUTPATIENT
Start: 2023-02-16 | End: 2023-05-01

## 2023-02-16 ASSESSMENT — PATIENT HEALTH QUESTIONNAIRE - PHQ9
10. IF YOU CHECKED OFF ANY PROBLEMS, HOW DIFFICULT HAVE THESE PROBLEMS MADE IT FOR YOU TO DO YOUR WORK, TAKE CARE OF THINGS AT HOME, OR GET ALONG WITH OTHER PEOPLE: EXTREMELY DIFFICULT
SUM OF ALL RESPONSES TO PHQ QUESTIONS 1-9: 8
SUM OF ALL RESPONSES TO PHQ QUESTIONS 1-9: 8

## 2023-02-16 NOTE — PROGRESS NOTES
Agustina is a 32 year old adult contacting the clinic today via video, who will use the platform: [employer] for the visit.  Phone # for [employer]ity, or if Amwell drops:   Telephone Information:   Mobile [phone]          ASSESSMENT and PLAN:  1. Chronic maxillary sinusitis  Slightly worse after COVID in December treated with nystatin and then Augmentin.  Chronic disease underlying.  Full delineation with CT scan and empiric treatment with antifungals for several weeks.  Consider Singulair.  Consider antihistamine decongestant combination  - meclizine 50 MG TABS; Take 50 mg by mouth 3 times daily as needed for dizziness  Dispense: 30 tablet; Refill: 3  - fluconazole (DIFLUCAN) 100 MG tablet; Take 1 tablet (100 mg) by mouth daily for 30 days  Dispense: 30 tablet; Refill: 3  - CT Sinus w/o Contrast; Future    2. Gastroesophageal reflux disease with esophagitis without hemorrhage  Consider contribution of acid reflux with history suggestive of esophagitis.  Resume omeprazole  - omeprazole (PRILOSEC) 20 MG DR capsule; Take 1 capsule (20 mg) by mouth daily  Dispense: 90 capsule; Refill: 3    3. Thrush  Treat orally and topically  - nystatin (MYCOSTATIN) 967365 UNIT/ML suspension; Take 5 mLs (500,000 Units) by mouth 4 times daily  Dispense: 400 mL; Refill: 1    4. Vertigo  Okay to refill meclizine.  Question whether sinuses contribute    5. Tachycardia  Monitor in context especially with regard to amitriptyline and blood pressure    6.  Hypothyroid.  TSH improved.  Could increase further but would hold for now to minimize variables     Patient Instructions   CT sinuses    Fluconazole 100 mg daily for 30 to 90 days    Omeprazole 20 mg daily    Repeat nystatin    Meclizine 50 mg daily as needed    Monitor heart rate and blood pressure especially in relation amitriptyline    Consider Singulair or Claritin-D    Course of Augmentin done    MyChart follow-up            Return in about 3 months (around 5/16/2023) for using a video  visit.       CHIEF COMPLAINT:  Chief Complaint   Patient presents with     Office Visit     Talk about lab results and medications.     Recheck Medication     Would like to discuss meclizine medication       History of Present Illness       Reason for visit:  Med check and labs    Rustam Nam eats 2-3 servings of fruits and vegetables daily.Rustam Nam consumes 0 sweetened beverage(s) daily.Rustam Nam exercises with enough effort to increase Rustam Nam's heart rate 20 to 29 minutes per day.  Rustam Nam exercises with enough effort to increase Rustam Nam's heart rate 7 days per week.   Rustam Nam is taking medications regularly.    Today's PHQ-9         PHQ-9 Total Score: 8    PHQ-9 Q9 Thoughts of better off dead/self-harm past 2 weeks :   Not at all    How difficult have these problems made it for you to do your work, take care of things at home, or get along with other people: Extremely difficult      HISTORY OF PRESENT ILLNESS:  Agustina is a 32 year old adult contacting the clinic today via video for review of sinusitis.  She became ill with COVID in December early.  She then developed worsening of chronic sinus infection.  She was treated with topical nystatin with some marked improvement, then lack of further progression.  She was then treated with Augmentin which seemed to help.  She is left with nasal congestion, posterior nasal drainage, phlegm, ear pressure, and intermittent vertigo.  She reports a long history of sinus congestion.  Allergy testing years ago showed the usual to trees and grasses.  She has never had a CT of the sinus but MRI of her brain showed no obvious structural issues    She occasionally will have a heart rate greater than 100.  She is not taking the Adderall.  She does take amitriptyline at bed.  She has had intermittent hypotension also    Increase in thyroid dose helped somewhat and she wonders whether she should increase further.  She reports feeling  "chronically fatigued    REVIEW OF SYSTEMS:   Intermittent vertigo improved with high-dose meclizine    PFSH:  Social History     Social History Narrative     Not on file       TOBACCO USE:  History   Smoking Status     Never   Smokeless Tobacco     Never       VITALS:  There were no vitals filed for this visit.  There were no vitals taken for this visit. Estimated body mass index is 22.05 kg/m  as calculated from the following:    Height as of 10/14/22: 1.727 m (5' 8\").    Weight as of 11/1/22: 65.8 kg (145 lb).    PHYSICAL EXAM:  (observations via Video)  Alert and oriented.  Quiet.    MEDICATIONS:   Current Outpatient Medications   Medication Sig Dispense Refill     amitriptyline (ELAVIL) 25 MG tablet Take 37.5 mg by mouth At Bedtime       amphetamine-dextroamphetamine (ADDERALL) 10 MG tablet Take 1 tablet (10 mg) by mouth daily 10 tablet 0     COMPOUNDED NON-CONTROLLED SUBSTANCE (CMPD RX) - PHARMACY TO MIX COMPOUNDED MEDICATION Compound T4 100mcg capsule. Take 1 capsule by mouth every morning 90 capsule 1     COMPOUNDED NON-CONTROLLED SUBSTANCE (CMPD RX) - PHARMACY TO MIX COMPOUNDED MEDICATION Compound T3 - 2.5 MCG capsules. Take one capsule by mouth three times daily 90 capsule 3     COMPOUNDED NON-CONTROLLED SUBSTANCE (CMPD RX) - PHARMACY TO MIX COMPOUNDED MEDICATION Compound T4 88mcg capsule. Take 1 capsule by mouth every morning 180 capsule 3     cyanocobalamin (CYANOCOBALAMIN) 1000 MCG/ML injection Inject 1 mL (1,000 mcg) into the muscle every 7 days 12 mL 3     fluconazole (DIFLUCAN) 100 MG tablet Take 1 tablet (100 mg) by mouth daily for 30 days 30 tablet 3     fluticasone (FLONASE) 50 MCG/ACT nasal spray Spray 2 sprays into both nostrils 2 times daily 16 g 11     guaiFENesin (MUCINEX) 600 MG 12 hr tablet Take 1 tablet (600 mg) by mouth 2 times daily 60 tablet 2     ipratropium (ATROVENT) 0.03 % nasal spray Spray 2 sprays into both nostrils 4 times daily as needed for rhinitis 30 mL 11     linaclotide " "(LINZESS) 72 MCG capsule Take 1 capsule (72 mcg) by mouth every morning (before breakfast) 30 capsule 11     meclizine 50 MG TABS Take 50 mg by mouth 3 times daily as needed for dizziness 30 tablet 3     medical cannabis (Patient's own supply) by Other route See Admin Instructions 0 Information only      nystatin (MYCOSTATIN) 798810 UNIT/ML suspension Take 5 mLs (500,000 Units) by mouth 4 times daily 400 mL 1     omeprazole (PRILOSEC) 20 MG DR capsule Take 1 capsule (20 mg) by mouth daily 90 capsule 3     SUMAtriptan (IMITREX) 100 MG tablet Take 100 mg by mouth at onset of headache       syringe/needle, disp, 25G X 1\" 3 ML MISC Inject 1 each into the muscle once a week With B12 12 each 3     topiramate (TOPAMAX) 25 MG tablet Take 1 tablet (25 mg) by mouth 2 times daily 60 tablet 11       Outside Notes summarized: [email or web address] communication x2  Labs, x-rays, cardiology, GI tests reviewed: MRI of brain.  TSH 0.71.  Cosyntropin stimulation test normal  Recent Labs   Lab Test 02/14/23  0856 01/23/23  0819 01/13/23  1037 09/23/22  1607 07/27/22  1434 06/10/22  1435 09/07/21  0958 08/17/21  1326   HGB  --   --   --   --   --  12.9  --  12.3   WBC  --   --   --   --   --  7.3  --  7.9   NA  --  140  --   --   --  140  --   --    POTASSIUM  --  3.8  --   --   --  4.2  --   --    CR  --   --   --   --   --  0.78  --   --    URIC  --   --   --   --  2.6  --   --   --    B12  --   --   --   --  1,268*  --   --   --    TSH 0.71  --  1.05   < >  --   --    < >  --    VITDT  --   --   --   --   --   --   --  44   SED  --   --   --   --  8  --   --  5   CRP  --   --   --   --   --   --   --  <0.1    < > = values in this interval not displayed.     No results found for: [id number]  Lab Results   Component Value Date    CHOL 272 07/12/2017     New orders:   Orders Placed This Encounter   Procedures     CT Sinus w/o Contrast       Independent review of:     Dalton Zhao MD  [facility]    Video-Visit " Details  Type of service:  Video Visit  Patient has given verbal consent to a Video visit?  Yes  How would you like to obtain your AVS?  MyChart  Will anyone else be joining your video visit, giving supplemental history? No    Originating location (pt location): Home    Distant Location (provider location):  Off-site    Video Start Time: 11:33 AM  Video End time:  12:36 PM  Face to face plus orders:  63 minutes  Documentation time:  3 minutes     The visit lasted a total of 66 minutes

## 2023-02-16 NOTE — PATIENT INSTRUCTIONS
CT sinuses    Fluconazole 100 mg daily for 30 to 90 days    Omeprazole 20 mg daily    Repeat nystatin    Meclizine 50 mg daily as needed    Monitor heart rate and blood pressure especially in relation amitriptyline    Consider Singulair or Claritin-D    Course of Augmentin done    MyChart follow-up

## 2023-02-16 NOTE — PROGRESS NOTES
"Rustam is a 32 year old who is being evaluated via a billable video visit.      How would you like to obtain your AVS? MyChart  If the video visit is dropped, the invitation should be resent by: Text to cell phone: [phone]  Will anyone else be joining your video visit? No  {If patient encounters technical issues they should call [phone] :213160}        {PROVIDER CHARTING PREFERENCE:180177}    Subjective   Rustam is a 32 year old accompanied by Rustam Nam's self, presenting for the following health issues:  Office Visit (Talk about lab results and medications.) and Recheck Medication      History of Present Illness       Reason for visit:  Med check and labs    Rustam Nam eats 2-3 servings of fruits and vegetables daily.Rustam Nam consumes 0 sweetened beverage(s) daily.Rustam Nam exercises with enough effort to increase Rustam Nam's heart rate 20 to 29 minutes per day.  Rustam Nam exercises with enough effort to increase Rustam Nam's heart rate 7 days per week.   Rustam Nam is taking medications regularly.    Today's PHQ-9         PHQ-9 Total Score: 8    PHQ-9 Q9 Thoughts of better off dead/self-harm past 2 weeks :   Not at all    How difficult have these problems made it for you to do your work, take care of things at home, or get along with other people: Extremely difficult       {SUPERLIST (Optional):531202}  {additonal problems for provider to add (Optional):308542}    Review of Systems   {ROS COMP (Optional):843254}      Objective           Vitals:  No vitals were obtained today due to virtual visit.    Physical Exam   {video visit exam brief selected:769474::\"GENERAL: Healthy, alert and no distress\",\"EYES: Eyes grossly normal to inspection.  No discharge or erythema, or obvious scleral/conjunctival abnormalities.\",\"RESP: No audible wheeze, cough, or visible cyanosis.  No visible retractions or increased work of breathing.  \",\"SKIN: Visible skin clear. No significant rash, " "abnormal pigmentation or lesions.\",\"NEURO: Cranial nerves grossly intact.  Mentation and speech appropriate for age.\",\"PSYCH: Mentation appears normal, affect normal/bright, judgement and insight intact, normal speech and appearance well-groomed.\"}    {Diagnostic Test Results (Optional):007337}    {AMBULATORY ATTESTATION (Optional):098931}        Video-Visit Details    Type of service:  Video Visit   Video Start Time: {video visit start/end time for provider to select:516642}  Video End Time:{video visit start/end time for provider to select:146376}    Originating Location (pt. Location): Home  {PROVIDER LOCATION On-site should be selected for visits conducted from your clinic location or adjoining [facility] hospital, academic office, or other nearby [facility] building. Off-site should be selected for all other provider locations, including home:734296}  Distant Location (provider location):  {virtual location provider:712988}  Platform used for Video Visit: [employer]    "

## 2023-02-24 DIAGNOSIS — R53.83 FATIGUE, UNSPECIFIED TYPE: Primary | ICD-10-CM

## 2023-02-27 ENCOUNTER — HOSPITAL ENCOUNTER (OUTPATIENT)
Dept: CT IMAGING | Facility: HOSPITAL | Age: 33
Discharge: HOME | End: 2023-02-27
Attending: INTERNAL MEDICINE | Admitting: INTERNAL MEDICINE
Payer: COMMERCIAL

## 2023-02-27 DIAGNOSIS — J32.0 CHRONIC MAXILLARY SINUSITIS: ICD-10-CM

## 2023-02-27 PROCEDURE — 70486 CT MAXILLOFACIAL W/O DYE: CPT

## 2023-03-10 DIAGNOSIS — K58.1 IRRITABLE BOWEL SYNDROME WITH CONSTIPATION: ICD-10-CM

## 2023-03-14 ENCOUNTER — PRE VISIT (OUTPATIENT)
Dept: ENDOCRINOLOGY | Facility: CLINIC | Age: 33
End: 2023-03-14

## 2023-03-14 ENCOUNTER — VIRTUAL VISIT (OUTPATIENT)
Dept: ENDOCRINOLOGY | Facility: CLINIC | Age: 33
End: 2023-03-14
Attending: STUDENT IN AN ORGANIZED HEALTH CARE EDUCATION/TRAINING PROGRAM
Payer: COMMERCIAL

## 2023-03-14 DIAGNOSIS — E06.3 HASHIMOTO'S DISEASE: ICD-10-CM

## 2023-03-14 DIAGNOSIS — R42 LIGHTHEADEDNESS: ICD-10-CM

## 2023-03-14 PROCEDURE — 99205 OFFICE O/P NEW HI 60 MIN: CPT | Mod: VID | Performed by: STUDENT IN AN ORGANIZED HEALTH CARE EDUCATION/TRAINING PROGRAM

## 2023-03-14 ASSESSMENT — ENCOUNTER SYMPTOMS
SINUS CONGESTION: 1
POLYPHAGIA: 0
ABDOMINAL PAIN: 1
HYPOTENSION: 1
HALLUCINATIONS: 0
NAUSEA: 1
ORTHOPNEA: 0
PANIC: 0
EYE WATERING: 0
FEVER: 0
EYE IRRITATION: 1
INCREASED ENERGY: 1
DECREASED APPETITE: 1
SYNCOPE: 0
SLEEP DISTURBANCES DUE TO BREATHING: 0
TROUBLE SWALLOWING: 0
SMELL DISTURBANCE: 0
ALTERED TEMPERATURE REGULATION: 1
FATIGUE: 1
DIARRHEA: 1
TASTE DISTURBANCE: 0
WEIGHT LOSS: 0
VOMITING: 0
CHILLS: 0
SORE THROAT: 1
CONSTIPATION: 1
BOWEL INCONTINENCE: 0
HOARSE VOICE: 0
DOUBLE VISION: 0
LIGHT-HEADEDNESS: 1
EYE PAIN: 0
BLOATING: 1
DECREASED CONCENTRATION: 1
EXERCISE INTOLERANCE: 1
INSOMNIA: 1
DECREASED LIBIDO: 0
NIGHT SWEATS: 0
HYPERTENSION: 0
NECK MASS: 0
SINUS PAIN: 1
RECTAL PAIN: 0
HOT FLASHES: 0
LEG PAIN: 0
JAUNDICE: 0
DEPRESSION: 1
WEIGHT GAIN: 0
EYE REDNESS: 0
BLOOD IN STOOL: 0
NERVOUS/ANXIOUS: 1
HEARTBURN: 1
POLYDIPSIA: 1

## 2023-03-14 NOTE — LETTER
3/14/2023       RE: Agustina Nam  [address]  [place] [address]     Dear Colleague,    Thank you for referring your patient, Agustina Nam, to the [facility] [place] at [facility]. Please see a copy of my visit note below.    Endocrinology Clinic Visit 3/14/2023      Video-Visit Details    Type of service:  Video Visit    Joined the call at 3/14/2023, 9:11:30 am.  Left the call at 3/14/2023, 10:00:20 am.  Originating Location (pt. Location): Home        Distant Location (provider location):  Off-site    Mode of Communication:  Video Conference via [employer]    Physician has received verbal consent for a Video Visit from the patient? Yes    I spent a total of 60 minutes on the date of encounter reviewing medical records, evaluating the patient, coordinating care and documenting in the EHR, as detailed above.      NAME:  Agustina Nam  PCP:  Dalton Zhao  MRN:  [id number]  Reason for Consult:  Hashimoto   Requesting Provider:  Chelo Park    Chief Complaint     Chief Complaint   Patient presents with     Video Visit     New Patient       History of Present Illness     Agustina Nam is a 32 year old adult who is seen in video visit for hashimotos    She was diagnosed back in 2014 at [facility].  She tried to stop T3 but her symptoms got worse and was put back on it.  She is currently on 100 mcg LT4 and 2.5 mcg TID. Dose was increased in February from 88 to 100 mcg based on her sx, TSH was 0.71. T 3 was increased from 2 per day to 3 times per day 11/2022. She thinks increasing the dose improved with energy and dizziness.     Today she reported worsening symptoms:  Severe fatigue, unable to complete her work. She wakes up everyday not well rested. She has dizziness/vertigo and working with neurology. Dizziness is mainly when lying flat before going to sleep. It is getting better now. She said  there is no structural vestibular problem but maybe functional. She has a lot of GI symptoms, following with GI, diagnosed with IBS-C. She said she was diagnosed with SIBO in the past.    She was told she has pernicious anemia when she was diagnosed with hashimoto back in [facility]. She also thinks she has heavy periods and loose a lot of blood. She has regular monthly periods. Right before her periods she has mood changes with sadness and lethargy. In college she tried OCP and she did not like it.   She reported low BP at home around 80/50 sometimes 100/60.   Stable weight.    Pertinent labs: 1/23/23 acth stim test: baseline cortisol 11.9, 60 min 24.7  Most recent TSH 2/14/23 0.71.    Family hx: depression and anxiety, ADHD brother, alcoholism father. Paternal grandfather with thyroid disease.        Problem List     Patient Active Problem List   Diagnosis     Metrorrhagia     Chronic Sinusitis     Autoimmune disease (H)     Irritable bowel syndrome     Hashimoto's disease     Postural dizziness with presyncope     Adrenal disorder (H)     Fatigue     Low back pain of over 3 months duration     Tension headache     Right calf pain     Generalized anxiety disorder     Insomnia     Iodine deficiency     Pernicious anemia     Small intestinal bacterial overgrowth     Gastroesophageal reflux disease with esophagitis without hemorrhage        Medications     Current Outpatient Medications   Medication     amitriptyline (ELAVIL) 25 MG tablet     COMPOUNDED NON-CONTROLLED SUBSTANCE (CMPD RX) - PHARMACY TO MIX COMPOUNDED MEDICATION     COMPOUNDED NON-CONTROLLED SUBSTANCE (CMPD RX) - PHARMACY TO MIX COMPOUNDED MEDICATION     COMPOUNDED NON-CONTROLLED SUBSTANCE (CMPD RX) - PHARMACY TO MIX COMPOUNDED MEDICATION     cyanocobalamin (CYANOCOBALAMIN) 1000 MCG/ML injection     fluconazole (DIFLUCAN) 100 MG tablet     guaiFENesin (MUCINEX) 600 MG 12 hr tablet     linaclotide (LINZESS) 72 MCG capsule     meclizine 50 MG TABS  "    medical cannabis (Patient's own supply)     nystatin (MYCOSTATIN) 094049 UNIT/ML suspension     omeprazole (PRILOSEC) 20 MG DR capsule     SUMAtriptan (IMITREX) 100 MG tablet     syringe/needle, disp, 25G X 1\" 3 ML MISC     amphetamine-dextroamphetamine (ADDERALL) 10 MG tablet     fluticasone (FLONASE) 50 MCG/ACT nasal spray     ipratropium (ATROVENT) 0.03 % nasal spray     topiramate (TOPAMAX) 25 MG tablet     Current Facility-Administered Medications   Medication     cyanocobalamin injection 1,000 mcg        Allergies     No Known Allergies    Medical / Surgical History     Past Medical History:   Diagnosis Date     Hashimoto's thyroiditis      Past Surgical History:   Procedure Laterality Date     WISDOM TOOTH EXTRACTION         Social History     Social History     Socioeconomic History     Marital status: Single     Spouse name: Not on file     Number of children: Not on file     Years of education: Not on file     Highest education level: Not on file   Occupational History     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: [employer]   Tobacco Use     Smoking status: Never     Smokeless tobacco: Never   Vaping Use     Vaping Use: Never used   Substance and Sexual Activity     Alcohol use: No     Drug use: Yes     Types: Marijuana     Comment: Drug use: medicinal Marijuana     Sexual activity: Not Currently     Partners: Female, Male   Other Topics Concern     Not on file   Social History Narrative     Not on file     Social Determinants of Health     Financial Resource Strain: Not on file   Food Insecurity: Not on file   Transportation Needs: Not on file   Physical Activity: Not on file   Stress: Not on file   Social Connections: Not on file   Intimate Partner Violence: Not on file   Housing Stability: Not on file       Family History     Family History   Problem Relation Age of Onset     Depression Mother      Arthritis Mother      Anxiety Disorder Mother      Plantar fasciitis Mother      " Chronic Infections Mother         Chronic Yeast Infection     Arthritis Father      Depression Father      Anxiety Disorder Father      Sinusitis Father      Irritable Bowel Syndrome Father      Sinusitis Maternal Grandmother         Chronic Sinus Congestion     Arthritis Maternal Grandmother      Uterine Cancer Paternal Grandmother      Irritable Bowel Syndrome Paternal Grandmother      Cerebrovascular Disease Paternal Grandmother      Dementia Paternal Grandmother      Hyperthyroidism Paternal Grandfather      Skin Cancer Paternal Grandfather      Prostate Cancer Paternal Grandfather      Breast Cancer No family hx of      Colon Cancer No family hx of      Myocardial Infarction No family hx of        ROS     12 ROS completed, pertinent positive and negative in HPI  Answers for HPI/ROS submitted by the patient on 3/14/2023  General Symptoms: Yes  Skin Symptoms: No  HENT Symptoms: Yes  EYE SYMPTOMS: Yes  HEART SYMPTOMS: Yes  LUNG SYMPTOMS: No  INTESTINAL SYMPTOMS: Yes  URINARY SYMPTOMS: No  GYNECOLOGIC SYMPTOMS: Yes  REPRODUCTIVE SYMPTOMS: No  BREAST SYMPTOMS: No  SKELETAL SYMPTOMS: No  BLOOD SYMPTOMS: No  NERVOUS SYSTEM SYMPTOMS: No  MENTAL HEALTH SYMPTOMS: Yes  Ear pain: Yes  Ear discharge: No  Hearing loss: No  Tinnitus: No  Nosebleeds: No  Congestion: Yes  Sinus pain: Yes  Trouble swallowing: No   Voice hoarseness: No  Mouth sores: No  Sore throat: Yes  Tooth pain: No  Gum tenderness: No  Bleeding gums: No  Change in taste: No  Change in sense of smell: No  Dry mouth: No  Hearing aid used: No  Neck lump: No  Fever: No  Loss of appetite: Yes  Weight loss: No  Weight gain: No  Fatigue: Yes  Night sweats: No  Chills: No  Increased stress: Yes  Excessive hunger: No  Excessive thirst: Yes  Feeling hot or cold when others believe the temperature is normal: Yes  Loss of height: No  Post-operative complications: No  Surgical site pain: No  Hallucinations: No  Change in or Loss of Energy: Yes  Hyperactivity:  No  Confusion: No  Eye pain: No  Vision loss: No  Dry eyes: Yes  Watery eyes: No  Eye bulging: No  Double vision: No  Flashing of lights: No  Spots: No  Floaters: No  Redness: No  Crossed eyes: No  Tunnel Vision: No  Yellowing of eyes: No  Eye irritation: Yes  Chest pain or pressure: No  Pain in legs with walking: No  Trouble breathing while lying down: No  Fingers or toes appear blue: No  High blood pressure: No  Low blood pressure: Yes  Fainting: No  Murmurs: No  Pacemaker: No  Varicose veins: No  Edema or swelling: No  Wake up at night with shortness of breath: No  Light-headedness: Yes  Exercise intolerance: Yes  Heart burn or indigestion: Yes  Nausea: Yes  Vomiting: No  Abdominal pain: Yes  Bloating: Yes  Constipation: Yes  Diarrhea: Yes  Blood in stool: No  Black stools: No  Rectal or Anal pain: No  Fecal incontinence: No  Yellowing of skin or eyes: No  Vomit with blood: No  Change in stools: No  Bleeding or spotting between periods: No  Heavy or painful periods: Yes  Vaginal discharge: No  Hot flashes: No  Vaginal dryness: Yes  Genital ulcers: No  Reduced libido: No  Painful intercourse: No  Difficulty with sexual arousal: No  Post-menopausal bleeding: No  Nervous or Anxious: Yes  Depression: Yes  Trouble sleeping: Yes  Trouble thinking or concentrating: Yes  Mood changes: Yes  Panic attacks: No      Physical Exam   There were no vitals taken for this visit.   GENERAL: Healthy, alert and no distress  EYES: Eyes grossly normal to inspection.  No discharge or erythema, or obvious scleral/conjunctival abnormalities.  RESP: No audible wheeze, cough, or visible cyanosis.  No visible retractions or increased work of breathing.    SKIN: Visible skin clear. No significant rash, abnormal pigmentation or lesions.  NEURO: Cranial nerves grossly intact.  Mentation and speech appropriate for age.  PSYCH: Mentation appears normal, affect normal/bright, judgement and insight intact, normal speech and appearance  well-groomed.     Labs/Imaging     Pertinent Labs were reviewed and updated in EPIC and discussed briefly.  Radiology Results were  reviewed and updated in EPIC and discussed briefly.    Summary of recent findings:   Lab Results   Component Value Date    A1C 5.1 2017    A1C 5.3 2017       TSH   Date Value Ref Range Status   2023 0.71 0.30 - 4.20 uIU/mL Final   2023 1.05 0.30 - 4.20 uIU/mL Final   2022 1.05 0.30 - 4.20 uIU/mL Final   01/10/2022 0.45 0.30 - 5.00 uIU/mL Final   2021 0.24 (L) 0.30 - 5.00 uIU/mL Final   2021 0.17 (L) 0.30 - 5.00 uIU/mL Final   2020 0.94 0.30 - 5.00 uIU/mL Final   2019 0.08 (L) 0.30 - 5.00 uIU/mL Final     Free T4   Date Value Ref Range Status   2022 1.25 0.90 - 1.70 ng/dL Final   06/10/2022 1.04 0.70 - 1.80 ng/dL Final     Comment:     Performance of the Free T4 test has not been established with  specimens (<= 2 months of age).     01/10/2022 0.98 0.70 - 1.80 ng/dL Final     Comment:     Performance of the Free T4 test has not been established with  specimens (<= 2 months of age).     2021 1.04 0.70 - 1.80 ng/dL Final     Comment:     Performance of the Free T4 test has not been established with  specimens (<= 2 months of age).         Creatinine   Date Value Ref Range Status   06/10/2022 0.78 0.60 - 1.30 mg/dL Final     Comment:     Age                     Creatinine (mg/dL)    0-22 Days               0.30-1.00    22 Days to 13 Months    0.10-0.60     13 Months to 6 Years    Female 0.10-0.50    Male 0.10-0.60    6 Years to 11 Years     Female 0.20-0.60    Male 0.20-0.70    11 Years to 16 Years    Female 0.40-0.70    Male 0.30-0.90    16 Years and Older      Female 0.60-1.10    Male 0.70-1.30            Recent Labs   Lab Test 17  0824   CHOL 272*   HDL 78   *   TRIG 60       No results found for: [id number], [id number], [id number]    I personally reviewed the patient's outside records  from [facility] E[facility] and Care Everywhere. Summary of pertinent findings in HPI.    Impression / Plan     1. Hashimoto hypothyroidism  2. Nonspecific fatigue  We reviewed some general information about the thyroid function. We discussed her diagnosis of hypothyroidism, I do not have the initial work up/labs. We reviewed how to manage hypothyroidism and what are the JULIO recommendation in regard of T4 and T3 use. We discussed always treating to normal TSH and not to over treat. Adjust dose base on labs and not on symptoms. Her symptoms are not due to her thyroid at this point given persistent sx despite normal TFT.     T4 and T3 are prescribed and refilled by her PCP. She is planning to follow up with PCP.    3. Menorrhagia  4. Possible PMS  We reviewed the use of OCP. Possible structural problem with menorrhagia, better managed by Gyn.  Given her reported excessive bleeding, will check her iron panel which may explain her fatigue.      Test and/or medications prescribed today:  No orders of the defined types were placed in this encounter.        Follow up: SHYAM Lockett MD  Endocrinology, Diabetes and Metabolism  [facility]

## 2023-03-14 NOTE — NURSING NOTE
Is the patient currently in the state of [place]? YES    Visit mode:VIDEO    If the visit is dropped, the patient can be reconnected by: VIDEO VISIT: Text to cell phone: [phone]    Will anyone else be joining the visit? NO      How would you like to obtain your AVS? MyChart    Are changes needed to the allergy or medication list? NO    Reason for visit: follow up

## 2023-03-14 NOTE — PROGRESS NOTES
Endocrinology Clinic Visit 3/14/2023      Video-Visit Details    Type of service:  Video Visit    Joined the call at 3/14/2023, 9:11:30 am.  Left the call at 3/14/2023, 10:00:20 am.  Originating Location (pt. Location): Home        Distant Location (provider location):  Off-site    Mode of Communication:  Video Conference via [employer]    Physician has received verbal consent for a Video Visit from the patient? Yes    I spent a total of 60 minutes on the date of encounter reviewing medical records, evaluating the patient, coordinating care and documenting in the EHR, as detailed above.      NAME:  Agustina Nam  PCP:  Dalton Zhao  MRN:  [id number]  Reason for Consult:  Hashimoto   Requesting Provider:  Chelo Park    Chief Complaint     Chief Complaint   Patient presents with     Video Visit     New Patient       History of Present Illness     Agustina Nam is a 32 year old adult who is seen in video visit for hashimotos    She was diagnosed back in 2014 at [facility].  She tried to stop T3 but her symptoms got worse and was put back on it.  She is currently on 100 mcg LT4 and 2.5 mcg TID. Dose was increased in February from 88 to 100 mcg based on her sx, TSH was 0.71. T 3 was increased from 2 per day to 3 times per day 11/2022. She thinks increasing the dose improved with energy and dizziness.     Today she reported worsening symptoms:  Severe fatigue, unable to complete her work. She wakes up everyday not well rested. She has dizziness/vertigo and working with neurology. Dizziness is mainly when lying flat before going to sleep. It is getting better now. She said there is no structural vestibular problem but maybe functional. She has a lot of GI symptoms, following with GI, diagnosed with IBS-C. She said she was diagnosed with SIBO in the past.    She was told she has pernicious anemia when she was diagnosed with hashimoto back in [facility]. She also thinks she has heavy  "periods and loose a lot of blood. She has regular monthly periods. Right before her periods she has mood changes with sadness and lethargy. In college she tried OCP and she did not like it.   She reported low BP at home around 80/50 sometimes 100/60.   Stable weight.    Pertinent labs: 1/23/23 acth stim test: baseline cortisol 11.9, 60 min 24.7  Most recent TSH 2/14/23 0.71.    Family hx: depression and anxiety, ADHD brother, alcoholism father. Paternal grandfather with thyroid disease.        Problem List     Patient Active Problem List   Diagnosis     Metrorrhagia     Chronic Sinusitis     Autoimmune disease (H)     Irritable bowel syndrome     Hashimoto's disease     Postural dizziness with presyncope     Adrenal disorder (H)     Fatigue     Low back pain of over 3 months duration     Tension headache     Right calf pain     Generalized anxiety disorder     Insomnia     Iodine deficiency     Pernicious anemia     Small intestinal bacterial overgrowth     Gastroesophageal reflux disease with esophagitis without hemorrhage        Medications     Current Outpatient Medications   Medication     amitriptyline (ELAVIL) 25 MG tablet     COMPOUNDED NON-CONTROLLED SUBSTANCE (CMPD RX) - PHARMACY TO MIX COMPOUNDED MEDICATION     COMPOUNDED NON-CONTROLLED SUBSTANCE (CMPD RX) - PHARMACY TO MIX COMPOUNDED MEDICATION     COMPOUNDED NON-CONTROLLED SUBSTANCE (CMPD RX) - PHARMACY TO MIX COMPOUNDED MEDICATION     cyanocobalamin (CYANOCOBALAMIN) 1000 MCG/ML injection     fluconazole (DIFLUCAN) 100 MG tablet     guaiFENesin (MUCINEX) 600 MG 12 hr tablet     linaclotide (LINZESS) 72 MCG capsule     meclizine 50 MG TABS     medical cannabis (Patient's own supply)     nystatin (MYCOSTATIN) 665241 UNIT/ML suspension     omeprazole (PRILOSEC) 20 MG DR capsule     SUMAtriptan (IMITREX) 100 MG tablet     syringe/needle, disp, 25G X 1\" 3 ML MISC     amphetamine-dextroamphetamine (ADDERALL) 10 MG tablet     fluticasone (FLONASE) 50 MCG/ACT " nasal spray     ipratropium (ATROVENT) 0.03 % nasal spray     topiramate (TOPAMAX) 25 MG tablet     Current Facility-Administered Medications   Medication     cyanocobalamin injection 1,000 mcg        Allergies     No Known Allergies    Medical / Surgical History     Past Medical History:   Diagnosis Date     Hashimoto's thyroiditis      Past Surgical History:   Procedure Laterality Date     WISDOM TOOTH EXTRACTION         Social History     Social History     Socioeconomic History     Marital status: Single     Spouse name: Not on file     Number of children: Not on file     Years of education: Not on file     Highest education level: Not on file   Occupational History     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: masters of [employer]   Tobacco Use     Smoking status: Never     Smokeless tobacco: Never   Vaping Use     Vaping Use: Never used   Substance and Sexual Activity     Alcohol use: No     Drug use: Yes     Types: Marijuana     Comment: Drug use: medicinal Marijuana     Sexual activity: Not Currently     Partners: Female, Male   Other Topics Concern     Not on file   Social History Narrative     Not on file     Social Determinants of Health     Financial Resource Strain: Not on file   Food Insecurity: Not on file   Transportation Needs: Not on file   Physical Activity: Not on file   Stress: Not on file   Social Connections: Not on file   Intimate Partner Violence: Not on file   Housing Stability: Not on file       Family History     Family History   Problem Relation Age of Onset     Depression Mother      Arthritis Mother      Anxiety Disorder Mother      Plantar fasciitis Mother      Chronic Infections Mother         Chronic Yeast Infection     Arthritis Father      Depression Father      Anxiety Disorder Father      Sinusitis Father      Irritable Bowel Syndrome Father      Sinusitis Maternal Grandmother         Chronic Sinus Congestion     Arthritis Maternal Grandmother      Uterine Cancer  Paternal Grandmother      Irritable Bowel Syndrome Paternal Grandmother      Cerebrovascular Disease Paternal Grandmother      Dementia Paternal Grandmother      Hyperthyroidism Paternal Grandfather      Skin Cancer Paternal Grandfather      Prostate Cancer Paternal Grandfather      Breast Cancer No family hx of      Colon Cancer No family hx of      Myocardial Infarction No family hx of        ROS     12 ROS completed, pertinent positive and negative in HPI  Answers for HPI/ROS submitted by the patient on 3/14/2023  General Symptoms: Yes  Skin Symptoms: No  HENT Symptoms: Yes  EYE SYMPTOMS: Yes  HEART SYMPTOMS: Yes  LUNG SYMPTOMS: No  INTESTINAL SYMPTOMS: Yes  URINARY SYMPTOMS: No  GYNECOLOGIC SYMPTOMS: Yes  REPRODUCTIVE SYMPTOMS: No  BREAST SYMPTOMS: No  SKELETAL SYMPTOMS: No  BLOOD SYMPTOMS: No  NERVOUS SYSTEM SYMPTOMS: No  MENTAL HEALTH SYMPTOMS: Yes  Ear pain: Yes  Ear discharge: No  Hearing loss: No  Tinnitus: No  Nosebleeds: No  Congestion: Yes  Sinus pain: Yes  Trouble swallowing: No   Voice hoarseness: No  Mouth sores: No  Sore throat: Yes  Tooth pain: No  Gum tenderness: No  Bleeding gums: No  Change in taste: No  Change in sense of smell: No  Dry mouth: No  Hearing aid used: No  Neck lump: No  Fever: No  Loss of appetite: Yes  Weight loss: No  Weight gain: No  Fatigue: Yes  Night sweats: No  Chills: No  Increased stress: Yes  Excessive hunger: No  Excessive thirst: Yes  Feeling hot or cold when others believe the temperature is normal: Yes  Loss of height: No  Post-operative complications: No  Surgical site pain: No  Hallucinations: No  Change in or Loss of Energy: Yes  Hyperactivity: No  Confusion: No  Eye pain: No  Vision loss: No  Dry eyes: Yes  Watery eyes: No  Eye bulging: No  Double vision: No  Flashing of lights: No  Spots: No  Floaters: No  Redness: No  Crossed eyes: No  Tunnel Vision: No  Yellowing of eyes: No  Eye irritation: Yes  Chest pain or pressure: No  Pain in legs with walking:  No  Trouble breathing while lying down: No  Fingers or toes appear blue: No  High blood pressure: No  Low blood pressure: Yes  Fainting: No  Murmurs: No  Pacemaker: No  Varicose veins: No  Edema or swelling: No  Wake up at night with shortness of breath: No  Light-headedness: Yes  Exercise intolerance: Yes  Heart burn or indigestion: Yes  Nausea: Yes  Vomiting: No  Abdominal pain: Yes  Bloating: Yes  Constipation: Yes  Diarrhea: Yes  Blood in stool: No  Black stools: No  Rectal or Anal pain: No  Fecal incontinence: No  Yellowing of skin or eyes: No  Vomit with blood: No  Change in stools: No  Bleeding or spotting between periods: No  Heavy or painful periods: Yes  Vaginal discharge: No  Hot flashes: No  Vaginal dryness: Yes  Genital ulcers: No  Reduced libido: No  Painful intercourse: No  Difficulty with sexual arousal: No  Post-menopausal bleeding: No  Nervous or Anxious: Yes  Depression: Yes  Trouble sleeping: Yes  Trouble thinking or concentrating: Yes  Mood changes: Yes  Panic attacks: No      Physical Exam   There were no vitals taken for this visit.   GENERAL: Healthy, alert and no distress  EYES: Eyes grossly normal to inspection.  No discharge or erythema, or obvious scleral/conjunctival abnormalities.  RESP: No audible wheeze, cough, or visible cyanosis.  No visible retractions or increased work of breathing.    SKIN: Visible skin clear. No significant rash, abnormal pigmentation or lesions.  NEURO: Cranial nerves grossly intact.  Mentation and speech appropriate for age.  PSYCH: Mentation appears normal, affect normal/bright, judgement and insight intact, normal speech and appearance well-groomed.     Labs/Imaging     Pertinent Labs were reviewed and updated in EPIC and discussed briefly.  Radiology Results were  reviewed and updated in EPIC and discussed briefly.    Summary of recent findings:   Lab Results   Component Value Date    A1C 5.1 07/12/2017    A1C 5.3 05/12/2017       TSH   Date Value Ref Range  Status   2023 0.71 0.30 - 4.20 uIU/mL Final   2023 1.05 0.30 - 4.20 uIU/mL Final   2022 1.05 0.30 - 4.20 uIU/mL Final   01/10/2022 0.45 0.30 - 5.00 uIU/mL Final   2021 0.24 (L) 0.30 - 5.00 uIU/mL Final   2021 0.17 (L) 0.30 - 5.00 uIU/mL Final   2020 0.94 0.30 - 5.00 uIU/mL Final   2019 0.08 (L) 0.30 - 5.00 uIU/mL Final     Free T4   Date Value Ref Range Status   2022 1.25 0.90 - 1.70 ng/dL Final   06/10/2022 1.04 0.70 - 1.80 ng/dL Final     Comment:     Performance of the Free T4 test has not been established with  specimens (<= 2 months of age).     01/10/2022 0.98 0.70 - 1.80 ng/dL Final     Comment:     Performance of the Free T4 test has not been established with  specimens (<= 2 months of age).     2021 1.04 0.70 - 1.80 ng/dL Final     Comment:     Performance of the Free T4 test has not been established with  specimens (<= 2 months of age).         Creatinine   Date Value Ref Range Status   06/10/2022 0.78 0.60 - 1.30 mg/dL Final     Comment:     Age                     Creatinine (mg/dL)    0-22 Days               0.30-1.00    22 Days to 13 Months    0.10-0.60     13 Months to 6 Years    Female 0.10-0.50    Male 0.10-0.60    6 Years to 11 Years     Female 0.20-0.60    Male 0.20-0.70    11 Years to 16 Years    Female 0.40-0.70    Male 0.30-0.90    16 Years and Older      Female 0.60-1.10    Male 0.70-1.30            Recent Labs   Lab Test 17  0824   CHOL 272*   HDL 78   *   TRIG 60       No results found for: [id number], [id number], [id number]    I personally reviewed the patient's outside records from [facility] EMR and Care Everywhere. Summary of pertinent findings in HPI.    Impression / Plan     1. Hashimoto hypothyroidism  2. Nonspecific fatigue  We reviewed some general information about the thyroid function. We discussed her diagnosis of hypothyroidism, I do not have the initial work up/labs. We reviewed how to manage  hypothyroidism and what are the JULIO recommendation in regard of T4 and T3 use. We discussed always treating to normal TSH and not to over treat. Adjust dose base on labs and not on symptoms. Her symptoms are not due to her thyroid at this point given persistent sx despite normal TFT.     T4 and T3 are prescribed and refilled by her PCP. She is planning to follow up with PCP.    3. Menorrhagia  4. Possible PMS  We reviewed the use of OCP. Possible structural problem with menorrhagia, better managed by Gyn.  Given her reported excessive bleeding, will check her iron panel which may explain her fatigue.      Test and/or medications prescribed today:  No orders of the defined types were placed in this encounter.        Follow up: SHYAM Lockett MD  Endocrinology, Diabetes and Met[facility]  [facility]

## 2023-03-15 ENCOUNTER — LAB (OUTPATIENT)
Dept: LAB | Facility: CLINIC | Age: 33
End: 2023-03-15
Payer: COMMERCIAL

## 2023-03-15 DIAGNOSIS — E06.3 HASHIMOTO'S DISEASE: ICD-10-CM

## 2023-03-15 LAB
FERRITIN SERPL-MCNC: 13 NG/ML (ref 6–409)
IRON BINDING CAPACITY (ROCHE): 339 UG/DL (ref 240–430)
IRON SATN MFR SERPL: 40 % (ref 15–46)
IRON SERPL-MCNC: 134 UG/DL (ref 37–157)
TSH SERPL DL<=0.005 MIU/L-ACNC: 0.35 UIU/ML (ref 0.3–4.2)

## 2023-03-15 PROCEDURE — 84443 ASSAY THYROID STIM HORMONE: CPT

## 2023-03-15 PROCEDURE — 83540 ASSAY OF IRON: CPT

## 2023-03-15 PROCEDURE — 83550 IRON BINDING TEST: CPT

## 2023-03-15 PROCEDURE — 82728 ASSAY OF FERRITIN: CPT

## 2023-03-15 PROCEDURE — 36415 COLL VENOUS BLD VENIPUNCTURE: CPT

## 2023-03-16 ENCOUNTER — MYC MEDICAL ADVICE (OUTPATIENT)
Dept: INTERNAL MEDICINE | Facility: CLINIC | Age: 33
End: 2023-03-16
Payer: COMMERCIAL

## 2023-03-16 DIAGNOSIS — E06.3 HASHIMOTO'S DISEASE: ICD-10-CM

## 2023-03-28 ENCOUNTER — VIRTUAL VISIT (OUTPATIENT)
Dept: PSYCHOLOGY | Facility: CLINIC | Age: 33
End: 2023-03-28
Attending: INTERNAL MEDICINE
Payer: COMMERCIAL

## 2023-03-28 DIAGNOSIS — K58.1 IRRITABLE BOWEL SYNDROME WITH CONSTIPATION: ICD-10-CM

## 2023-03-28 DIAGNOSIS — F41.1 GENERALIZED ANXIETY DISORDER: Primary | ICD-10-CM

## 2023-03-28 PROCEDURE — 90837 PSYTX W PT 60 MINUTES: CPT | Mod: VID | Performed by: PSYCHOLOGIST

## 2023-03-28 NOTE — PROGRESS NOTES
Health Psychology          Lauryn Eli, Ph.D.,  [phone]  Joyce Ybarra, Ph.D.,  [phone]  Rosenda Alfaro, Ph.D.,  [phone]  Jana Bloom, Ph.D., , [facility] [phone]  Dalton Arguelles, Ph.D., , [facility] [phone]  Mariposa William, Ph.D.,  [phone]  Sherrie Dudley, Ph.D., , [facility] [phone]    [facility], 3rd Floor  [address] [place] [address]       Health Psychology Progress Note    Patient Name: Agustina Nam    Service Type: Individual  Length of Visit: 62 minutes - extended visit due to complexity of case and content  Start time: 1:00 PM  Stop time: 2:02 PM   Attendees: Patient attended alone  Session #: 1    Telemedicine Information:     Telemedicine Visit: The patient's condition can be safely assessed and treated via synchronous audio and visual telemedicine encounter.    Reason for Telemedicine Visit: Patient has requested telehealth visit and Patient convenience (e.g. access to timely appointments / distance to available provider)  Originating Site (Patient Location): Patient's home, [place]  Distant Location (provider location):  On-site: [facility]  Consent:  The patient/guardian has verbally consented to: the potential risks and benefits of telemedicine (video visit) versus in person care; bill my insurance or make self-payment for services provided; and responsibility for payment of non-covered services.   Mode of Communication:  Video Conference via [place]  As the provider I attest to compliance with applicable laws and regulations related to telemedicine.     Identifying Information and Presenting Problem:  The patient is a 32 year old adult who is being seen for problematic symptoms of anxiety in the context of multiple chronic medical concerns.    Topics Discussed/Interventions Provided:    Orientation to Service:    Is patient the family member of an employee who works at this  clinic or connected to a patient health psychology provider is already treating? No    Discussed dual role conflicts? Yes     Discussed privacy and confidentiality, including limits? Yes     Is patient already established with a mental health provider? Yes: Patient is established with a therapist for weekly psychotherapy      Health Psychology Service Introduction: Discussed the health psychology service. Provided education about role as health psychology provider and model of care. Patient was given the opportunity to ask questions and state goals/expectations for initiating services with the health psychology provider. Patient is interested in establishing services with the health psychology provider.    Session Content:       Background History/Context: Patient with a PMH significant for Hashimoto's, IBS, vertigo, and anxiety who was referred for health psychology services by GI specialist, Laila Lester DO, for assistance with distress and impairment related to IBS symptoms. Patient reports being diagnosed with Hashimoto's disease in 2014 and then experienced a significant health crisis in [id number] when patient's thyroid condition worsened with a change in medication. Patient reports health improvement in [id number] including an improvement in vertigo and then symptoms worsened again in 2022. Also had COVID19 in Spring 2022, which marked a recurrence of vertigo. Patient reports ongoing symptoms of vertigo after COVID19 illness.       With regard to GI symptoms, patient notes a longstanding history of GI distress with an onset in elementary school. Patient recalls getting very bloated after eating at school and notes that peers would comment on patient's stomach size after eating. Patient also notes pain with bowel movements from this early age as well. Patient reports trying multiple treatments over the years including medications, eliminating gluten from diet, other dietary restrictions, and using enemas to  have bowel movement due to significant constipation. Patient has been diagnosed with IBS, GERD, and SIBO. Patient was then referred to health psychology in November 2022 after reaching out to GI specialist due to concerns about worsening GI symptoms that were distressing and impairing. Patient describes these symptoms as a series of physiological responses that included having a strong hot flash, feeling like patient needed to have a BM, and when patient did not have a BM would experience involuntary vomiting. Patient also reports experiencing syncopal episodes as well. Patient does note that some symptoms may correspond with high levels of stress. Patient notes wondering if patient may have ileocecal valve syndrome.        Update: Currently, patient reports no longer using enemas for having bowel movements (since summer 2022) and is taking Linzess. Since patient discontinued enemas, patient reports currently having more gas, bloating, and burping. Reports recently leaving work due to medical symptoms impairing occupational functioning. Patient also notes currently seeing a therapist who specializes in somatic experiencing therapy. Has been seeing this therapist for 2-3 years.      Skills/intervention: Discussed treatment goals and whether a health psychology intervention would be helpful in addition to patient's current course of psychotherapy. Provided psychoeducation about empirically supported treatments and what treatment modalities could be offered via a health psychology intervention. Patient notes experience with DBT and CBT as well as mindfulness meditation. Patient expressed interest in learning more about ACT and brain-gut psychotherapies. Mutually agreed to complete a few sessions as a trial to determine whether a health psychology intervention would be helpful as a complement to current treatment.     Treatment Objective(s) Addressed in This Session:  Psychological distress related to GI  "disease    Progress on / Status of Treatment Objective(s) / Homework:  In progress    Medication Adherence: Patient did not report medication changes. Current medication list is below:     Current Outpatient Medications   Medication     amitriptyline (ELAVIL) 25 MG tablet     COMPOUNDED NON-CONTROLLED SUBSTANCE (CMPD RX) - PHARMACY TO MIX COMPOUNDED MEDICATION     COMPOUNDED NON-CONTROLLED SUBSTANCE (CMPD RX) - PHARMACY TO MIX COMPOUNDED MEDICATION     COMPOUNDED NON-CONTROLLED SUBSTANCE (CMPD RX) - PHARMACY TO MIX COMPOUNDED MEDICATION     cyanocobalamin (CYANOCOBALAMIN) 1000 MCG/ML injection     guaiFENesin (MUCINEX) 600 MG 12 hr tablet     linaclotide (LINZESS) 72 MCG capsule     meclizine 50 MG TABS     medical cannabis (Patient's own supply)     nystatin (MYCOSTATIN) 263622 UNIT/ML suspension     omeprazole (PRILOSEC) 20 MG DR capsule     SUMAtriptan (IMITREX) 100 MG tablet     syringe/needle, disp, 25G X 1\" 3 ML MISC     Current Facility-Administered Medications   Medication     cyanocobalamin injection 1,000 mcg     Assessment: The patient appeared to be active and engaged in today's session and was receptive to feedback.     Mental Status Exam:   Appearance: Appropriate   Eye Contact: Good    Orientation: Yes, x4  Behavior/relationship to provider/demeanor: Cooperative, Engaged and Pleasant  Motor Activity: normal or unremarkable  Mood (subjective report): Anxious  Affect (objective appearance): Appropriate/mood congruent  Speech Rate: Normal  Speech Volume: Normal  Speech Articulation: Normal  Speech Coherence: Normal  Speech Spontaneity: Normal  Thought Content: no suicidal/homicidal ideation, plans, or intent, endorses anxious cognitions  Thought Process (associations): Logical, Linear and Goal directed  Thought Process (rate): Normal  Abnormal Perception: None  Attention/Concentration: Normal  Memory: Appears grossly intact  Fund of Knowledge: Above average  Abstraction:  Normal  Insight:  " Good  Judgment:  Good    Does the patient appear to be at imminent risk of harm to self/others at this time? No    The session was necessary to address anxiety in the context of chronic digestive disease.  Ongoing psychotherapy is necessary to improve functioning with daily activities, provide psychoeducation and teach mind-body skills.    Diagnoses:    1. Generalized anxiety disorder    2. Irritable bowel syndrome with constipation      Plan:    1. Follow-up in-person visit with me on 4/13/23 at 11:00am  2. Patient to use 911 or other crisis resources in the event of a psychiatric emergency  3. Primary care needs and medications are managed by Dalton Zhao MD. Referring provider is Laila Lester DO  4. Next visit agenda/goals:   a. Review information about ACT  b. Treatment planning    NOTE: Treatment plan due in future visit    Sherrie Dudley, Ph.D., , [facility]  Clinical Health Psychologist  Phone: [phone]   Pager: [phone]  3/28/2023 12:59 PM

## 2023-03-29 ENCOUNTER — TELEPHONE (OUTPATIENT)
Dept: INTERNAL MEDICINE | Facility: CLINIC | Age: 33
End: 2023-03-29

## 2023-03-29 ENCOUNTER — TELEPHONE (OUTPATIENT)
Dept: PHARMACY | Facility: CLINIC | Age: 33
End: 2023-03-29
Payer: COMMERCIAL

## 2023-03-29 DIAGNOSIS — E06.3 HASHIMOTO'S DISEASE: ICD-10-CM

## 2023-03-29 NOTE — TELEPHONE ENCOUNTER
Per patient request, T3 was sent to the patient's preferred pharmacy to be compounded.     Thanks,     Kamryn Hernandez, PharmD     Medication Therapy Management (MTM) Pharmacist     [phone]     [email or web address]

## 2023-03-29 NOTE — TELEPHONE ENCOUNTER
Medication Question or Refill        What medication are you calling about (include dose and sig)?:     Could not pend medication-pt wants sent to [facility] as she is out of medication and [facility] told her she doesn't have any refills on file.    She is not sure if script needs to say something else as she is not sure if [facility] does compounding medications.  COMPOUNDED NON-CONTROLLED SUBSTANCE (CMPD RX) - PHARMACY TO MIX COMPOUNDED MEDICATION 90 capsule 3 11/10/2022  No   Sig: Compound T3 - 2.5 MCG capsules. Take one capsule by mouth three times daily   Sent to pharmacy as: COMPOUNDED NON-CONTROLLED SUBSTANCE - PHARMACY TO MIX COMPOUNDED MEDICATION   Class: E-Prescribe   Notes to Pharmacy: Dose increase   Order: [id number]   E-Prescribing Status: Receipt confirmed by pharmacy (11/10/2022  8:47 AM          Preferred Pharmacy:    [facility] - [place], [place] - [address]  [place] [address]  Phone: [phone] Fax: [phone]      Controlled Substance Agreement on file:   CSA -- Patient Level:    CSA: None found at the patient level.       Who prescribed the medication?: Dr Zhao    Do you need a refill? Yes    When did you use the medication last? today    Patient offered an appointment? No    Do you have any questions or concerns?  No      Could we send this information to you in [facility] or would you prefer to receive a phone call?:   Patient would prefer a phone call   Okay to leave a detailed message?: No at Cell number on file:    Telephone Information:   Mobile [phone]

## 2023-04-12 DIAGNOSIS — E06.3 HASHIMOTO'S DISEASE: ICD-10-CM

## 2023-04-13 ENCOUNTER — OFFICE VISIT (OUTPATIENT)
Dept: PSYCHOLOGY | Facility: CLINIC | Age: 33
End: 2023-04-13
Payer: COMMERCIAL

## 2023-04-13 DIAGNOSIS — K58.1 IRRITABLE BOWEL SYNDROME WITH CONSTIPATION: ICD-10-CM

## 2023-04-13 DIAGNOSIS — F41.1 GENERALIZED ANXIETY DISORDER: Primary | ICD-10-CM

## 2023-04-13 PROCEDURE — 90834 PSYTX W PT 45 MINUTES: CPT | Performed by: PSYCHOLOGIST

## 2023-04-13 NOTE — Clinical Note
Follow-up IN PERSON visit with me on 4/27/23 at 11:00am please. Thanks!  Sherrie Dudley, Ph.D., , [facility] Clinical Health Psychologist Phone: [phone]  Pager: [phone] 4/13/2023 11:57 AM

## 2023-04-13 NOTE — PROGRESS NOTES
"    Health Psychology          Lauryn Eli, Ph.D.,  [phone]  Joyce Ybarra, Ph.D.,  [phone]  Rosenda Alfaro, Ph.D.,  [phone]  Jana Bloom, Ph.D., , [facility] [phone]  Dalton Arguelles, Ph.D., , [facility] [phone]  Mariposa William, Ph.D.,  [phone]  Sherrie Dudley, Ph.D., , [facility] [phone]    [facility], 3rd Floor  [address] [place] [address]     Health Psychology Progress Note    Patient Name: Agustina Nam    Service Type: Individual  Length of Visit: 48 minutes  Start time: 11:10 AM  Stop time: 11:58 AM  Attendees: Patient attended alone  Session #: 2    Identifying Information and Presenting Problem:  The patient is a 32 year old adult who is being seen for problematic symptoms of anxiety in the context of multiple chronic medical concerns.    Topics Discussed/Interventions Provided:    Session Content:       Update: Patient reports increased distress in the past week but notes that time away in [place] was helpful. Notes that during menstrual cycle, gets severe mental health symptoms and this was their experience in the past week. Reports feeling \"mentally unstable,\" feeling overwhelmed, crying really easily, increased pain, and lack of bowel motility. Patient reports that this pattern occurs consistently monthly with their cycle. Has never been diagnosed with PMDD. Continues to see general mental health therapist 2-4x per month. Given patient is already established for general mental health concerns, a diagnostic assessment will not be duplicated at this time. Will consider completing a diagnostic assessment in the future regarding PMDD and other diagnoses as needed.       Treatment Plan: Completed treatment plan for health psychology intervention. See below.                                               Individual Treatment Plan    Patient's Name: Agustina Nam  YOB: 1990    Date of Creation: " "04/13/23  Date Treatment Plan Last Reviewed/Revised: 04/13/23     DSM5 Diagnoses:     1. Generalized anxiety disorder      Psychosocial / Contextual Factors: medical concerns, unemployment, interpersonal stressors  PROMIS (reviewed every 90 days):     Not administered. Will administer in future visit.     Referral / Collaboration:  Collaboration with medical team and specialists as needed.    Anticipated number of session for this episode of care: 9-12 sessions  Anticipation frequency of session: 2x per month  Anticipated Duration of each session: 38-52 minutes  Treatment plan will be reviewed in 90 days or when goals have been changed.       Measurable Treatment Goal(s) related to diagnosis / functional impairment(s)  Goal 1: Patient will experience an improvement in their ability to cope with medical concerns   \"I will know I've met my goal when I don't have as much anxiety or fear around getting sick.\"     Objective #A (Patient Action)    Patient will gain skill with knowing when to practice acceptance.  Status: New - Date: 4/13/23     Intervention(s)  Therapist will teach ACT skills    Objective #B  Patient will improve understanding of when to take steps for adaptation.  Status: New - Date: 4/13/23     Intervention(s)   Therapist will teach ACT skills    Objective #C  Patient will learn skills for navigating anxiety more skillfully in the context of medical symptoms (including anticipating circumstances in which symptoms may arise)   Status: New - Date: 4/13/23     Intervention(s)  Therapist will teach ACT skills    Objective #D  Patient will develop the capacity to remain present with other activities in the face of medical symptoms    Status: New - Date: 4/13/23      Intervention(s)  Therapist will teach ACT skills.    Objective #E  Patient will develop the ability to unhook from difficult experiences such as disappointing others, missing out on activities,     Status: New - Date: 4/13/23 " "    Intervention(s)  Therapist will teach ACT skills.      Patient has reviewed and agreed to the above plan.      Sherrie Dudley, PhD  April 13, 2023         Skills/intervention: Provided psychoeducation about ACT therapeutic framework. Watched intro to ACT video and introduced ACT principle of values clarification. Reviewed dissecting the problem and quick look at values exercises.     Treatment Objective(s) Addressed in This Session:  Psychological distress related to GI disease    Progress on / Status of Treatment Objective(s) / Homework:  In progress    Medication Adherence: Patient did not report medication changes. Current medication list is below:     Current Outpatient Medications   Medication     amitriptyline (ELAVIL) 25 MG tablet     COMPOUNDED NON-CONTROLLED SUBSTANCE (CMPD RX) - PHARMACY TO MIX COMPOUNDED MEDICATION     COMPOUNDED NON-CONTROLLED SUBSTANCE (CMPD RX) - PHARMACY TO MIX COMPOUNDED MEDICATION     COMPOUNDED NON-CONTROLLED SUBSTANCE (CMPD RX) - PHARMACY TO MIX COMPOUNDED MEDICATION     cyanocobalamin (CYANOCOBALAMIN) 1000 MCG/ML injection     guaiFENesin (MUCINEX) 600 MG 12 hr tablet     linaclotide (LINZESS) 72 MCG capsule     meclizine 50 MG TABS     medical cannabis (Patient's own supply)     nystatin (MYCOSTATIN) 744466 UNIT/ML suspension     omeprazole (PRILOSEC) 20 MG DR capsule     SUMAtriptan (IMITREX) 100 MG tablet     syringe/needle, disp, 25G X 1\" 3 ML MISC     Current Facility-Administered Medications   Medication     cyanocobalamin injection 1,000 mcg     Assessment: The patient appeared to be active and engaged in today's session and was receptive to feedback.     Mental Status Exam:   Appearance: Appropriate   Eye Contact: Good    Orientation: Yes, x4  Behavior/relationship to provider/demeanor: Cooperative, Engaged and Pleasant  Motor Activity: normal or unremarkable  Mood (subjective report): Anxious  Affect (objective appearance): Appropriate/mood congruent  Speech " Rate: Normal  Speech Volume: Normal  Speech Articulation: Normal  Speech Coherence: Normal  Speech Spontaneity: Normal  Thought Content: no suicidal/homicidal ideation, plans, or intent, endorses anxious cognitions  Thought Process (associations): Logical, Linear and Goal directed  Thought Process (rate): Normal  Abnormal Perception: None  Attention/Concentration: Normal  Memory: Appears grossly intact  Fund of Knowledge: Above average  Abstraction:  Normal  Insight:  Good  Judgment:  Good    Does the patient appear to be at imminent risk of harm to self/others at this time? No    The session was necessary to address anxiety in the context of chronic digestive disease.  Ongoing psychotherapy is necessary to improve functioning with daily activities, provide psychoeducation and teach mind-body skills.    Diagnoses:    1. Generalized anxiety disorder    2. Irritable bowel syndrome with constipation      R/O PMDD    Plan:    1. Follow-up in-person visit with me on 4/27/23 at 11:00am  2. Patient to use 911 or other crisis resources in the event of a psychiatric emergency  3. Primary care needs and medications are managed by Dalton Zhao MD. Referring provider is Laila Lester DO  4. Next visit agenda/goals:   a. Complete dissecting the problem exercise  b. Complete quick look at values exercise  c. Review article on ACT  d. Send patient information about PMDD and symptom tracking resources    NOTE: Treatment plan update due 4/13/24; 90 day treatment plan review due 7/12/23    Sherrie Dudley, Ph.D., , [facility]  Clinical Health Psychologist  Phone: [phone]   Pager: [phone]

## 2023-04-27 ENCOUNTER — OFFICE VISIT (OUTPATIENT)
Dept: PSYCHOLOGY | Facility: CLINIC | Age: 33
End: 2023-04-27
Payer: COMMERCIAL

## 2023-04-27 DIAGNOSIS — K58.1 IRRITABLE BOWEL SYNDROME WITH CONSTIPATION: ICD-10-CM

## 2023-04-27 DIAGNOSIS — F81.9 LEARNING DIFFICULTY: ICD-10-CM

## 2023-04-27 DIAGNOSIS — F81.0 READING DIFFICULTY: ICD-10-CM

## 2023-04-27 DIAGNOSIS — F41.1 GENERALIZED ANXIETY DISORDER: Primary | ICD-10-CM

## 2023-04-27 DIAGNOSIS — R41.840 ATTENTION AND CONCENTRATION DEFICIT: ICD-10-CM

## 2023-04-27 PROCEDURE — 90837 PSYTX W PT 60 MINUTES: CPT | Performed by: PSYCHOLOGIST

## 2023-04-27 NOTE — PROGRESS NOTES
"    Health Psychology          Lauryn Eli, Ph.D.,  [phone]  Joyce Ybarra, Ph.D.,  [phone]  Rosenda Alfaro, Ph.D.,  [phone]  Jana Bloom, Ph.D., , [facility] [phone]  Dalton Arguelles, Ph.D., , [facility] [phone]  Mariposa William, Ph.D.,  [phone]  Sherrie Dudley, Ph.D., , [facility] [phone]    [facility], 3rd Floor  [address] [place] [address]     Health Psychology Progress Note    Patient Name: Agustina Nam    Service Type: Individual  Length of Visit: 58 minutes - extended visit due to complexity of content  Start time: 11:05 AM  Stop time: 12:03 PM  Attendees: Patient attended alone  Session #: 4    Identifying Information and Presenting Problem:  The patient is a 32 year old adult who is being seen for problematic symptoms of anxiety in the context of multiple chronic medical concerns.    Topics Discussed/Interventions Provided:    Session Content:       Update: Patient reports feeling more grounded in the past week and a half. Had a follow-up with their other therapist and plan to be seeing her every week now.        Homework Review: Patient completed their homework assignments. Discussed insights from reading the article about ACT, completing dissecting the problem worksheet, and quick look at values exercise. Primary challenge is feeling as though they are disappointing other people with their limitations. Entanglement = disappointing other people, burdensomeness, inadequacy, selfishness, unworthiness; Life draining = taking on too much, over-helping, obsessively problem-solving, recalling and replaying past experiences, \"fixing behaviors,\" and people pleasing; Struggle = anger, shame, blame, grief, pleasure, loneliness, \"wrongness,\" exhaustion/fatigue, and disappointment; and Avoidance = parents, Anglican, friends, high-energy activities, social gatherings, going back to school, work, [place] community, resting, " "hobbies, travel, and enriching activities. Top 6 values identified included: adaptability, acceptance, gratitude, mindfulness, sensuality, and contribution.        Skills/intervention: Provided psychoeducation about ACT principles of defusion and committed action. Introduced getting hooked, vitality vs suffering, and willingness and action plan exercises. Taught 1 defusion strategy. Also gave patient tracking sheet on PMDD symptoms.     Treatment Objective(s) Addressed in This Session:  Psychological distress related to GI disease    Progress on / Status of Treatment Objective(s) / Homework:  In progress    Medication Adherence: Patient did not report medication changes. Notes that they did stop taking a supplement in anticipation of tracking PMDD symptoms. Current medication list is below:     Current Outpatient Medications   Medication     amitriptyline (ELAVIL) 25 MG tablet     COMPOUNDED NON-CONTROLLED SUBSTANCE (CMPD RX) - PHARMACY TO MIX COMPOUNDED MEDICATION     COMPOUNDED NON-CONTROLLED SUBSTANCE (CMPD RX) - PHARMACY TO MIX COMPOUNDED MEDICATION     COMPOUNDED NON-CONTROLLED SUBSTANCE (CMPD RX) - PHARMACY TO MIX COMPOUNDED MEDICATION     cyanocobalamin (CYANOCOBALAMIN) 1000 MCG/ML injection     guaiFENesin (MUCINEX) 600 MG 12 hr tablet     linaclotide (LINZESS) 72 MCG capsule     meclizine 50 MG TABS     medical cannabis (Patient's own supply)     nystatin (MYCOSTATIN) 001356 UNIT/ML suspension     omeprazole (PRILOSEC) 20 MG DR capsule     SUMAtriptan (IMITREX) 100 MG tablet     syringe/needle, disp, 25G X 1\" 3 ML MISC     Current Facility-Administered Medications   Medication     cyanocobalamin injection 1,000 mcg     Assessment: The patient appeared to be active and engaged in today's session and was receptive to feedback.     Mental Status Exam:   Appearance: Appropriate   Eye Contact: Good    Orientation: Yes, x4  Behavior/relationship to provider/demeanor: Cooperative, Engaged and Pleasant  Motor " "Activity: normal or unremarkable  Mood (subjective report): Anxious  Affect (objective appearance): Appropriate/mood congruent  Speech Rate: Normal  Speech Volume: Normal  Speech Articulation: Normal  Speech Coherence: Normal  Speech Spontaneity: Normal  Thought Content: no suicidal/homicidal ideation, plans, or intent, endorses anxious cognitions  Thought Process (associations): Logical, Linear and Goal directed  Thought Process (rate): Normal  Abnormal Perception: None  Attention/Concentration: Normal  Memory: Appears grossly intact  Fund of Knowledge: Above average  Abstraction:  Normal  Insight:  Good  Judgment:  Good    Does the patient appear to be at imminent risk of harm to self/others at this time? No    The session was necessary to address anxiety in the context of chronic digestive disease.  Ongoing psychotherapy is necessary to improve functioning with daily activities, provide psychoeducation and teach mind-body skills.    Diagnoses:    1. Generalized anxiety disorder    2. Irritable bowel syndrome with constipation      R/O PMDD    Plan:    1. Follow-up in-person visit with me on 5/11/23 at 11:00am  2. Patient to use 911 or other crisis resources in the event of a psychiatric emergency  3. Primary care needs and medications are managed by Dalton Zhao MD. Referring provider is Laila Lester DO  4. Next visit agenda/goals:   a. Complete vitality vs suffering diary and willingness and action plan  b. Complete getting hooked log  c. Start tracking PMDD symptoms    Skills taught/interventions used thus far:    Psychoeducation    Awareness training (dissecting the problem)    Values clarification (quick look at values)    Committed action (willingness and action plan, vitality vs suffering)    Defusion (getting hooked, \"I'm having the thought that)    NOTE: Treatment plan update due 4/13/24; 90 day treatment plan review due 7/12/23                                                  Individual " "Treatment Plan    Patient's Name: Agustina Nam  YOB: 1990    Date of Creation: 04/13/23  Date Treatment Plan Last Reviewed/Revised: 04/13/23     DSM5 Diagnoses:     1. Generalized anxiety disorder      Psychosocial / Contextual Factors: medical concerns, unemployment, interpersonal stressors  PROMIS (reviewed every 90 days):     Not administered. Will administer in future visit.     Referral / Collaboration:  Collaboration with medical team and specialists as needed.    Anticipated number of session for this episode of care: 9-12 sessions  Anticipation frequency of session: 2x per month  Anticipated Duration of each session: 38-52 minutes  Treatment plan will be reviewed in 90 days or when goals have been changed.       Measurable Treatment Goal(s) related to diagnosis / functional impairment(s)  Goal 1: Patient will experience an improvement in their ability to cope with medical concerns   \"I will know I've met my goal when I don't have as much anxiety or fear around getting sick.\"     Objective #A (Patient Action)    Patient will gain skill with knowing when to practice acceptance.  Status: New - Date: 4/13/23     Intervention(s)  Therapist will teach ACT skills    Objective #B  Patient will improve understanding of when to take steps for adaptation.  Status: New - Date: 4/13/23     Intervention(s)   Therapist will teach ACT skills    Objective #C  Patient will learn skills for navigating anxiety more skillfully in the context of medical symptoms (including anticipating circumstances in which symptoms may arise)   Status: New - Date: 4/13/23     Intervention(s)  Therapist will teach ACT skills    Objective #D  Patient will develop the capacity to remain present with other activities in the face of medical symptoms    Status: New - Date: 4/13/23      Intervention(s)  Therapist will teach ACT skills.    Objective #E  Patient will develop the ability to unhook from difficult experiences such as " disappointing others, missing out on activities,     Status: New - Date: 4/13/23     Intervention(s)  Therapist will teach ACT skills.      Patient has reviewed and agreed to the above plan.      Sherrie Dudley, PhD  April 13, 2023    Sherrie Dudley, Ph.D., , [facility]  Clinical Health Psychologist  Phone: [phone]   Pager: [phone]

## 2023-04-27 NOTE — Clinical Note
TWO follow-up IN PERSON visits with me on 5/11/23 and 5/25/23 at 11:00am please. NOTE: Please put a note in the appointment notes stating that patient will need to check in on the 4th floor & go to clinic 4F for their appointment.   Thank you!  Sherrie Dudley, Ph.D., , [facility] Phone: [phone]  Pager: [phone] 4/27/2023 12:10 PM

## 2023-05-01 ENCOUNTER — LAB (OUTPATIENT)
Dept: LAB | Facility: CLINIC | Age: 33
End: 2023-05-01
Payer: COMMERCIAL

## 2023-05-01 ENCOUNTER — VIRTUAL VISIT (OUTPATIENT)
Dept: INTERNAL MEDICINE | Facility: CLINIC | Age: 33
End: 2023-05-01
Payer: COMMERCIAL

## 2023-05-01 DIAGNOSIS — E27.9 ADRENAL DISORDER (H): ICD-10-CM

## 2023-05-01 DIAGNOSIS — K58.1 IRRITABLE BOWEL SYNDROME WITH CONSTIPATION: ICD-10-CM

## 2023-05-01 DIAGNOSIS — F32.81 PREMENSTRUAL DYSPHORIC DISORDER: ICD-10-CM

## 2023-05-01 DIAGNOSIS — J32.0 CHRONIC MAXILLARY SINUSITIS: ICD-10-CM

## 2023-05-01 DIAGNOSIS — B37.0 THRUSH: ICD-10-CM

## 2023-05-01 DIAGNOSIS — R53.83 FATIGUE, UNSPECIFIED TYPE: Primary | ICD-10-CM

## 2023-05-01 DIAGNOSIS — R42 VERTIGO: ICD-10-CM

## 2023-05-01 DIAGNOSIS — M35.9 AUTOIMMUNE DISEASE (H): ICD-10-CM

## 2023-05-01 LAB
FSH SERPL IRP2-ACNC: 4.4 MIU/ML (ref 1.5–134.8)
LH SERPL-ACNC: 9.7 MIU/ML
PROGEST SERPL-MCNC: 5.4 NG/ML

## 2023-05-01 PROCEDURE — 83001 ASSAY OF GONADOTROPIN (FSH): CPT

## 2023-05-01 PROCEDURE — 82672 ASSAY OF ESTROGEN: CPT | Mod: 90

## 2023-05-01 PROCEDURE — 99215 OFFICE O/P EST HI 40 MIN: CPT | Mod: VID | Performed by: INTERNAL MEDICINE

## 2023-05-01 PROCEDURE — 84403 ASSAY OF TOTAL TESTOSTERONE: CPT

## 2023-05-01 PROCEDURE — 83002 ASSAY OF GONADOTROPIN (LH): CPT

## 2023-05-01 PROCEDURE — 99000 SPECIMEN HANDLING OFFICE-LAB: CPT

## 2023-05-01 PROCEDURE — 84144 ASSAY OF PROGESTERONE: CPT

## 2023-05-01 PROCEDURE — 84270 ASSAY OF SEX HORMONE GLOBUL: CPT

## 2023-05-01 PROCEDURE — 36415 COLL VENOUS BLD VENIPUNCTURE: CPT

## 2023-05-01 RX ORDER — BUPROPION HYDROCHLORIDE 75 MG/1
75 TABLET ORAL EVERY MORNING
Qty: 30 TABLET | Refills: 11 | Status: SHIPPED | OUTPATIENT
Start: 2023-05-01 | End: 2023-08-14 | Stop reason: SINTOL

## 2023-05-01 RX ORDER — SERTRALINE HYDROCHLORIDE 25 MG/1
25 TABLET, FILM COATED ORAL DAILY
Qty: 30 TABLET | Refills: 11 | Status: SHIPPED | OUTPATIENT
Start: 2023-05-01 | End: 2024-03-14

## 2023-05-01 RX ORDER — FLUCONAZOLE 100 MG/1
100 TABLET ORAL DAILY
Qty: 28 TABLET | Refills: 3 | Status: SHIPPED | OUTPATIENT
Start: 2023-05-01 | End: 2023-05-08

## 2023-05-01 RX ORDER — MONTELUKAST SODIUM 10 MG/1
10 TABLET ORAL AT BEDTIME
Qty: 90 TABLET | Refills: 3 | Status: SHIPPED | OUTPATIENT
Start: 2023-05-01 | End: 2023-06-15

## 2023-05-01 RX ORDER — CYANOCOBALAMIN 1000 UG/ML
1 INJECTION, SOLUTION INTRAMUSCULAR; SUBCUTANEOUS
Qty: 12 ML | Refills: 3 | Status: SHIPPED | OUTPATIENT
Start: 2023-05-01 | End: 2024-05-15

## 2023-05-01 NOTE — PROGRESS NOTES
"Rustam is a 32 year old who is being evaluated via a billable video visit.      How would you like to obtain your AVS? MyChart  If the video visit is dropped, the invitation should be resent by: Text to cell phone: [phone]  Will anyone else be joining your video visit? No  {If patient encounters technical issues they should call [phone] :980510}        {PROVIDER CHARTING PREFERENCE:601277}    Subjective   Rustam is a 32 year old, presenting for the following health issues:  Recheck Medication and Labs (Questions about hormone panel)        11/1/2022     2:05 PM   Additional Questions   Roomed by Crystal     History of Present Illness       Reason for visit:  Med recheck / lab questions    Rustam Nam eats 2-3 servings of fruits and vegetables daily.Rustam Nam consumes 0 sweetened beverage(s) daily.Rustam Nam exercises with enough effort to increase Rustam aNm's heart rate 20 to 29 minutes per day.  Rustam Rocheyer exercises with enough effort to increase Rustam Nam's heart rate 4 days per week.   Rustam Nam is taking medications regularly.       {SUPERLIST (Optional):740655}  {additonal problems for provider to add (Optional):074889}      Review of Systems   {ROS COMP (Optional):513499}      Objective    Vitals - Patient Reported  Pain Score: Severe Pain (6) (Pt reports full body pain / fatigue. Neck, shoulders and jaw pt reports are the worst.)  Pain Loc: Shoulder        Physical Exam   {video visit exam brief selected:100086::\"GENERAL: Healthy, alert and no distress\",\"EYES: Eyes grossly normal to inspection.  No discharge or erythema, or obvious scleral/conjunctival abnormalities.\",\"RESP: No audible wheeze, cough, or visible cyanosis.  No visible retractions or increased work of breathing.  \",\"SKIN: Visible skin clear. No significant rash, abnormal pigmentation or lesions.\",\"NEURO: Cranial nerves grossly intact.  Mentation and speech appropriate for age.\",\"PSYCH: Mentation appears normal, " "affect normal/bright, judgement and insight intact, normal speech and appearance well-groomed.\"}    {Diagnostic Test Results (Optional):816482}    {AMBULATORY ATTESTATION (Optional):779635}        Video-Visit Details    Type of service:  Video Visit   Video Start Time: {video visit start/end time for provider to select:973972}  Video End Time:{video visit start/end time for provider to select:804865}    Originating Location (pt. Location): Home  {PROVIDER LOCATION On-site should be selected for visits conducted from your clinic location or adjoining [facility] hospital, academic office, or other nearby [facility] building. Off-site should be selected for all other provider locations, including home:039756}  Distant Location (provider location):  Off-site  Platform used for Video Visit: Shelly    "

## 2023-05-01 NOTE — PROGRESS NOTES
"Agustina is a 32 year old adult contacting the clinic today via video, who will use the platform: [employer] for the visit.  Phone # for [employer], or if Amwell drops:   Telephone Information:   Mobile [phone]          ASSESSMENT and PLAN:  1. Fatigue, unspecified type  Consider trial of stimulants.  Complete sleep study.  Continue to adjust thyroid.  Trial of stimulating antidepressants    2. Autoimmune disease (H)  Continue B12 shots.  - cyanocobalamin (CYANOCOBALAMIN) 1000 MCG/ML injection; Inject 1 mL (1,000 mcg) into the muscle every 7 days  Dispense: 12 mL; Refill: 3  - syringe/needle, disp, 25G X 1\" 3 ML MISC; Inject 1 each into the muscle once a week With B12  Dispense: 12 each; Refill: 3  - amitriptyline (ELAVIL) 25 MG tablet; Take 1 tablet (25 mg) by mouth At Bedtime  Dispense: 90 tablet; Refill: 3    3. Adrenal disorder (H)  Stable  - cyanocobalamin (CYANOCOBALAMIN) 1000 MCG/ML injection; Inject 1 mL (1,000 mcg) into the muscle every 7 days  Dispense: 12 mL; Refill: 3    4. Vertigo  Improved.  Perhaps after treatment with fluconazole    5. Premenstrual dysphoric disorder  Continue amitriptyline but trial of decreased dose due to hypotension.  Trial of bupropion and sertraline timed for menstrual cycle.  Okay to check hormone levels  - amitriptyline (ELAVIL) 25 MG tablet; Take 1 tablet (25 mg) by mouth At Bedtime  Dispense: 90 tablet; Refill: 3  - buPROPion (WELLBUTRIN) 75 MG tablet; Take 1 tablet (75 mg) by mouth every morning  Dispense: 30 tablet; Refill: 11  - sertraline (ZOLOFT) 25 MG tablet; Take 1 tablet (25 mg) by mouth daily  Dispense: 30 tablet; Refill: 11  - Follicle stimulating hormone; Future  - Luteinizing Hormone; Future  - Estrogens total; Future  - Testosterone Free and Total; Future  - Progesterone; Future    6. Thrush  Definite improvement on oral nystatin plus fluconazole.  Recommend moving forward with pulsed therapy    7. Irritable bowel syndrome with constipation  Continue " amitriptyline  - amitriptyline (ELAVIL) 25 MG tablet; Take 1 tablet (25 mg) by mouth At Bedtime  Dispense: 90 tablet; Refill: 3    8. Chronic maxillary sinusitis  Sinuses clear.  Continue to treat for fungal.  Trial of allergic with Singulair  - amitriptyline (ELAVIL) 25 MG tablet; Take 1 tablet (25 mg) by mouth At Bedtime  Dispense: 90 tablet; Refill: 3  - montelukast (SINGULAIR) 10 MG tablet; Take 1 tablet (10 mg) by mouth At Bedtime  Dispense: 90 tablet; Refill: 3  - fluconazole (DIFLUCAN) 100 MG tablet; Take 1 tablet (100 mg) by mouth daily for 7 days  Dispense: 28 tablet; Refill: 3    9.  Sleep disorder.  Reviewed [employer] messages and referral to sleep clinic scheduled later this year     Patient Instructions   Decrease amitriptyline to 25 mg at night    Bupropion 75 mg daily    Sertraline 25 mg daily    Montelukast 10 mg daily    Repeat fluconazole pulsed therapy 100 mg daily 7 days each month    Refill B12 shot    Continue thyroid    Labs to include hormone levels in addition to a standing order for TSH    Refer and proceed with sleep study            Return in about 3 months (around 8/1/2023) for using a video visit.       CHIEF COMPLAINT:  Chief Complaint   Patient presents with     Recheck Medication     Labs     Questions about hormone panel       HISTORY OF PRESENT ILLNESS:  Agustina is a 32 year old adult contacting the clinic today via video for review.  When we talked last she was tried on nystatin and fluconazole for chronic sinus symptoms.  Although she has not received many antibiotics, she nonetheless felt definite improvement with 1 month of fluconazole and oral nystatin.  No side effects.  Since completion her symptoms have slightly worsened and returned but not as bad as previous    Through [employer] message she commented that she had some trouble sleeping and was referred for a sleep study.  We discussed this as a possible reason for fatigue.  She reports no obvious snoring but does note restless  "legs, jerking, and nightmares.  We discussed this as a possible source of fatigue.Taking her cannabis at bedtime did not help any of the above-mentioned sleep problems.  She has decreased her use recently    She continues to complain of diffuse fatigue.  She has never tried a stimulant.  She has never taken bupropion    A counselor has suggested she may have premenstrual dysthymic disorder.  She has never tried a serotonin reuptake inhibitor or bupropion but she is interested in trying    History of Present Illness       Reason for visit:  Med recheck / lab questions    Rustam Nam eats 2-3 servings of fruits and vegetables daily.Rustam Nam consumes 0 sweetened beverage(s) daily.Rustam Nam exercises with enough effort to increase Rustam Nam's heart rate 20 to 29 minutes per day.  Rustam Nam exercises with enough effort to increase Rustam Nam's heart rate 4 days per week.   Rustam Nam is taking medications regularly.      REVIEW OF SYSTEMS:   Improved vertigo    PFSH:  Social History     Social History Narrative     Not on file     Quit her job.  Lives with her partner    TOBACCO USE:  History   Smoking Status     Never   Smokeless Tobacco     Never       VITALS:  There were no vitals filed for this visit.  LMP 04/08/2023 (Exact Date)  Estimated body mass index is 22.05 kg/m  as calculated from the following:    Height as of 10/14/22: 1.727 m (5' 8\").    Weight as of 11/1/22: 65.8 kg (145 lb).    PHYSICAL EXAM:  (observations via Video)  Alert and oriented    MEDICATIONS:   Current Outpatient Medications   Medication Sig Dispense Refill     amitriptyline (ELAVIL) 25 MG tablet Take 1 tablet (25 mg) by mouth At Bedtime 90 tablet 3     buPROPion (WELLBUTRIN) 75 MG tablet Take 1 tablet (75 mg) by mouth every morning 30 tablet 11     COMPOUNDED NON-CONTROLLED SUBSTANCE (CMPD RX) - PHARMACY TO MIX COMPOUNDED MEDICATION Compound T3 - 2.5 MCG capsules. Take one capsule by mouth three times daily 90 " "capsule 3     COMPOUNDED NON-CONTROLLED SUBSTANCE (CMPD RX) - PHARMACY TO MIX COMPOUNDED MEDICATION Compound T4 88mcg capsule. Take 1 capsule by mouth every morning 180 capsule 3     cyanocobalamin (CYANOCOBALAMIN) 1000 MCG/ML injection Inject 1 mL (1,000 mcg) into the muscle every 7 days 12 mL 3     fluconazole (DIFLUCAN) 100 MG tablet Take 1 tablet (100 mg) by mouth daily for 7 days 28 tablet 3     guaiFENesin (MUCINEX) 600 MG 12 hr tablet Take 1 tablet (600 mg) by mouth 2 times daily 60 tablet 2     meclizine 50 MG TABS Take 50 mg by mouth 3 times daily as needed for dizziness 30 tablet 3     medical cannabis (Patient's own supply) by Other route See Admin Instructions 0 Information only      montelukast (SINGULAIR) 10 MG tablet Take 1 tablet (10 mg) by mouth At Bedtime 90 tablet 3     sertraline (ZOLOFT) 25 MG tablet Take 1 tablet (25 mg) by mouth daily 30 tablet 11     syringe/needle, disp, 25G X 1\" 3 ML MISC Inject 1 each into the muscle once a week With B12 12 each 3     SUMAtriptan (IMITREX) 100 MG tablet Take 100 mg by mouth at onset of headache (Patient not taking: Reported on 5/1/2023)         Outside Notes summarized: MyChart messages x3  Labs, x-rays, cardiology, GI tests reviewed: CT sinus  Recent Labs   Lab Test 03/15/23  1316 02/14/23  0856 01/23/23  0819 09/23/22  1607 07/27/22  1434 06/10/22  1435 09/07/21  0958 08/17/21  1326   HGB  --   --   --   --   --  12.9  --  12.3   WBC  --   --   --   --   --  7.3  --  7.9   NA  --   --  140  --   --  140  --   --    POTASSIUM  --   --  3.8  --   --  4.2  --   --    CR  --   --   --   --   --  0.78  --   --    URIC  --   --   --   --  2.6  --   --   --    B12  --   --   --   --  1,268*  --   --   --    TSH 0.35 0.71  --    < >  --   --    < >  --    VITDT  --   --   --   --   --   --   --  44   SED  --   --   --   --  8  --   --  5   CRP  --   --   --   --   --   --   --  <0.1    < > = values in this interval not displayed.     No results found for: " [id number]  Lab Results   Component Value Date    CHOL 272 07/12/2017     New orders:   Orders Placed This Encounter   Procedures     Follicle stimulating hormone     Luteinizing Hormone     Estrogens total     Progesterone     Testosterone Free and Total       Independent review of:     Dalton Zhao MD  [facility]    Video-Visit Details  Type of service:  Video Visit  Patient has given verbal consent to a Video visit?  Yes  How would you like to obtain your AVS?  MyChart  Will anyone else be joining your video visit, giving supplemental history? No    Originating location (pt location): Home    Distant Location (provider location):  Off-site    Video Start Time: 10:16 AM  Video End time:  10:53 AM  Face to face plus orders: 37 minutes  Documentation time:  3 minutes     The visit lasted a total of 40 minutes

## 2023-05-01 NOTE — PATIENT INSTRUCTIONS
Decrease amitriptyline to 25 mg at night    Bupropion 75 mg daily    Sertraline 25 mg daily    Montelukast 10 mg daily    Repeat fluconazole pulsed therapy 100 mg daily 7 days each month    Refill B12 shot    Continue thyroid    Labs to include hormone levels in addition to a standing order for TSH    Refer and proceed with sleep study

## 2023-05-02 LAB — SHBG SERPL-SCNC: 229 NMOL/L (ref 11–135)

## 2023-05-04 LAB
TESTOST FREE SERPL-MCNC: 0.08 NG/DL
TESTOST SERPL-MCNC: 20 NG/DL (ref 8–950)

## 2023-05-08 ENCOUNTER — TELEPHONE (OUTPATIENT)
Dept: NEUROPSYCHOLOGY | Facility: CLINIC | Age: 33
End: 2023-05-08
Payer: COMMERCIAL

## 2023-05-08 DIAGNOSIS — E06.3 HASHIMOTO'S DISEASE: Primary | ICD-10-CM

## 2023-05-08 NOTE — TELEPHONE ENCOUNTER
Neuropsychology referral declined. Unfortunately, our clinic does not see patients for diagnostic questions related to learning disabilities, ADHD, and/or autism spectrum disorders. In short, we do not currently perform assessments solely for the retrospective diagnosis of developmental conditions in adults.     Community neuropsychologists and psychologists include:    - Dr. [email or web address]  [phone]    - [facility] - with locations throughout the [facility] area: [phone].    - [facility] - with locations throughout the [facility] area: [phone].    Ref prov notified via staff msg. Kate Whittington  Psychometrist

## 2023-05-09 LAB — SCANNED LAB RESULT: NORMAL

## 2023-05-17 ENCOUNTER — LAB (OUTPATIENT)
Dept: LAB | Facility: CLINIC | Age: 33
End: 2023-05-17
Payer: COMMERCIAL

## 2023-05-17 DIAGNOSIS — E06.3 HASHIMOTO'S DISEASE: ICD-10-CM

## 2023-05-17 LAB — TSH SERPL DL<=0.005 MIU/L-ACNC: 0.2 UIU/ML (ref 0.3–4.2)

## 2023-05-17 PROCEDURE — 84443 ASSAY THYROID STIM HORMONE: CPT

## 2023-05-17 PROCEDURE — 36415 COLL VENOUS BLD VENIPUNCTURE: CPT

## 2023-05-19 ENCOUNTER — MYC MEDICAL ADVICE (OUTPATIENT)
Dept: INTERNAL MEDICINE | Facility: CLINIC | Age: 33
End: 2023-05-19
Payer: COMMERCIAL

## 2023-05-19 ENCOUNTER — TELEPHONE (OUTPATIENT)
Dept: PLASTIC SURGERY | Facility: CLINIC | Age: 33
End: 2023-05-19
Payer: COMMERCIAL

## 2023-05-19 DIAGNOSIS — E06.3 HASHIMOTO'S DISEASE: ICD-10-CM

## 2023-05-19 NOTE — CONFIDENTIAL NOTE
Writer called to follow up on patient voicemail re: seeking HRT provider who can also discuss management of hashimoto's disease. Writer sent a message to [facility] on 5/18 asking if any of their providers would be a good fit. Writer informed pt of this and stated they still haven't heard back, but will follow up once they do. Writer discussed other HRT options and sent pt list of providers via [employer].

## 2023-05-19 NOTE — TELEPHONE ENCOUNTER
Sent script to [facility]. T3 2.5 mcg twice daily, and T4 75 mcg every morning.           Thanks,    Kamryn Hernandez, PharmD     Medication Therapy Management (MTM) Pharmacist     [phone]     [email or web address]

## 2023-05-22 ENCOUNTER — NURSE TRIAGE (OUTPATIENT)
Dept: NURSING | Facility: CLINIC | Age: 33
End: 2023-05-22
Payer: COMMERCIAL

## 2023-05-22 ENCOUNTER — MYC MEDICAL ADVICE (OUTPATIENT)
Dept: INTERNAL MEDICINE | Facility: CLINIC | Age: 33
End: 2023-05-22
Payer: COMMERCIAL

## 2023-05-22 NOTE — TELEPHONE ENCOUNTER
Pt is having slightly raised itchy red bumps on her face     Pt states that her face is swollen     No fever     Rates pain from rash 3/10    Pt states that she has been taking several new medications recently     Pt is not having any breathing difficulties or tongue swelling     Pt states that at one time the rash was going down pts neck and that has resolved itself     Per disposition: See in Office Today    Transferred to scheduling and if no appointment available, pt will go to [facility] for evaluation     Care advice given per protocol and when to call back. Pt verbalized understanding and agrees to plan of care.    Dionna Medina RN  [place] Nurse Advisor  1:21 PM 5/22/2023          Reason for Disposition    Face swelling is painful to touch    Additional Information    Negative: Sounds like a life-threatening emergency to the triager    Negative: Athlete's Foot suspected (i.e., itchy rash between the toes)    Negative: Insect bite(s) suspected    Negative: Jock Itch suspected (i.e., itchy rash on inner thighs near genital area)    Negative: Localized lump (or swelling) without redness or rash    Negative: Poison ivy, oak, or sumac contact suspected    Negative: Rash of female genital area (vulva)    Negative: Rash of male genital area (penis or scrotum)    Negative: Redness of immunization site    Negative: Shingles suspected (i.e., painful rash, multiple small blisters grouped together in one area of body; dermatomal distribution)    Negative: Small spot, skin growth, or mole    Negative: Wound infection suspected (i.e., pain, spreading redness, or pus; in a cut, puncture, scrape or sutured wound)    Negative: Fever and localized purple or blood-colored spots or dots that are not from injury or friction    Negative: Fever and localized rash is very painful    Negative: Patient sounds very sick or weak to the triager    Negative: Looks like a boil, infected sore, deep ulcer, or other infected rash (spreading  redness, pus)    Negative: Painful rash with multiple small blisters grouped together (i.e., dermatomal distribution or 'band' or 'stripe')    Negative: Localized rash is very painful (no fever)    Negative: Localized purple or blood-colored spots or dots that are not from injury or friction (no fever)    Negative: Lyme disease suspected (e.g., bull's-eye rash or tick bite / exposure)    Negative: Life-threatening reaction (anaphylaxis) in the past to similar substance (e.g., food, insect bite/sting, chemical, etc.) and < 2 hours since exposure    Negative: Unresponsive, passed out or very weak    Negative: Swollen tongue    Negative: Difficulty breathing or wheezing    Negative: Sounds like a life-threatening emergency to the triager    Negative: Followed an injury to face    Negative: Bee sting and within last 24 hours    Negative: Mosquito bite suspected    Negative: Insect bite suspected    Negative: SEVERE swelling of entire face and < 2 hours since exposure to high-risk allergen (e.g., peanuts, tree nuts, fish, shellfish or 1st dose of drug) and no serious symptoms AND [4] no serious allergic reaction in the past    Negative: Pregnant > 20 weeks    Negative: Postpartum (from 0 to 6 weeks after delivery)    Negative: Fever    Negative: Taking an ACE Inhibitor medication  (e.g., benazepril/LOTENSIN, captopril/CAPOTEN, enalapril/VASOTEC, lisinopril/ZESTRIL)    Negative: Patient sounds very sick or weak to the triager    Negative: SEVERE swelling of the entire face    Negative: Face swelling began after taking a drug    Negative: Looks infected (e.g., spreading redness, pus)    Negative: Looks like a boil or infected sore    Negative: Painful rash with multiple small blisters grouped together (i.e., dermatomal distribution or 'band' or 'stripe')    Negative: Swelling of both lower legs (i.e., bilateral pedal edema)    Negative: Widespread rash on body    Protocols used: FACE SWELLING-A-OH, RASH OR REDNESS -  [id number]

## 2023-05-24 ENCOUNTER — VIRTUAL VISIT (OUTPATIENT)
Dept: INTERNAL MEDICINE | Facility: CLINIC | Age: 33
End: 2023-05-24
Payer: COMMERCIAL

## 2023-05-24 DIAGNOSIS — R21 FACIAL RASH: Primary | ICD-10-CM

## 2023-05-24 DIAGNOSIS — E06.3 HASHIMOTO'S DISEASE: ICD-10-CM

## 2023-05-24 DIAGNOSIS — M35.9 AUTOIMMUNE DISEASE (H): ICD-10-CM

## 2023-05-24 PROCEDURE — 99215 OFFICE O/P EST HI 40 MIN: CPT | Mod: VID | Performed by: INTERNAL MEDICINE

## 2023-05-24 RX ORDER — TRIAMCINOLONE ACETONIDE 1 MG/G
CREAM TOPICAL 2 TIMES DAILY
Qty: 30 G | Refills: 1 | Status: SHIPPED | OUTPATIENT
Start: 2023-05-24 | End: 2024-01-26

## 2023-05-24 RX ORDER — HYDROXYCHLOROQUINE SULFATE 200 MG/1
200 TABLET, FILM COATED ORAL DAILY
Qty: 90 TABLET | Refills: 3 | Status: SHIPPED | OUTPATIENT
Start: 2023-05-24 | End: 2023-06-15

## 2023-05-24 RX ORDER — LORATADINE 10 MG/1
10 TABLET ORAL DAILY
COMMUNITY
End: 2024-01-26

## 2023-05-24 ASSESSMENT — ENCOUNTER SYMPTOMS: WHEEZING: 1

## 2023-05-24 NOTE — PROGRESS NOTES
Agustina is a 32 year old adult contacting the clinic today via video, who will use the platform: [employer] for the visit.  Phone # for [employer], or if Amwell drops:   Telephone Information:   Mobile [phone]          ASSESSMENT and PLAN:  1. Facial rash  In greater context, strongly suspicious of lupus.  Recheck labs.  Trial of Plaquenil and higher potency cortisone cream  - Adult Allergy/Asthma Referral; Future  - Erythrocyte sedimentation rate auto; Future  - CRP inflammation; Future  - Cyclic Citrullinated Peptide Antibody IgG; Future  - Rheumatoid factor; Future  - Anti Nuclear Caridad IgG by IFA with Reflex; Future  - hydroxychloroquine (PLAQUENIL) 200 MG tablet; Take 1 tablet (200 mg) by mouth daily  Dispense: 90 tablet; Refill: 3  - triamcinolone (KENALOG) 0.1 % external cream; Apply topically 2 times daily  Dispense: 30 g; Refill: 1    2. Autoimmune disease (H)  As above.  Have been highly suspicious for greater than 1 year  - hydroxychloroquine (PLAQUENIL) 200 MG tablet; Take 1 tablet (200 mg) by mouth daily  Dispense: 90 tablet; Refill: 3  - triamcinolone (KENALOG) 0.1 % external cream; Apply topically 2 times daily  Dispense: 30 g; Refill: 1    3. Hashimoto's disease  Thyroid over replaced.  Dose decreased last week with assistance of Pharm.D.       Patient Instructions   Update autoimmune labs    Trial of Plaquenil    Allergy referral    Triamcinolone cream as needed    Adjust sertraline 25 to 50 mg    See endocrine as scheduled            Return in about 4 months (around 9/24/2023) for using a video visit.       CHIEF COMPLAINT:  Chief Complaint   Patient presents with     Allergies     Facial rash/hives after eating Kiwi        HISTORY OF PRESENT ILLNESS:  Agustina is a 32 year old adult contacting the clinic today via video for discussion of facial rash.  She developed a rash approximately 10 days ago.  This was over both cheeks and somewhat spared her nose and nasolabial fold.  She thinks this was somewhat  "worse after eating kiwi fruit.  She has been starting some new medications, but notes that the rash has improved with conservative treatments and Claritin despite continuing the new medicines.  She has a long history of diffuse arthralgias, myalgias, mood swings, and nasal and mouth sores.  We have been strongly suspicious of a connective tissue disease but labs done last year were negative    TSH was suppressed and dose of thyroid was adjusted downward slightly formulated through [place] pharmacy    She initiated bupropion and Zoloft.  She felt an improvement in her energy and anxiety and PMS type symptoms    Hormone levels were drawn.  This showed an elevated sex binding hormone.  She will see an endocrinologist to further discuss this.  Her other hormone levels seem to be those typical of a 32-year-old woman    Allergies    History of Present Illness       Reason for visit:  Allergic Reaction  Symptom onset:  1-2 weeks ago  Symptoms include:  Facial Rash and Throat Region  Symptom intensity:  Mild  Symptom progression:  Improving  Had these symptoms before:  No  What makes it worse:  Eating Kiwi  What makes it better:  [place]ales    Rustam Rocheyer eats 2-3 servings of fruits and vegetables daily.Rustam LAMA Hommeyer consumes 0 sweetened beverage(s) daily.Rustam LAMA Hommeyer exercises with enough effort to increase Ray KELBY Hommeyer's heart rate 30 to 60 minutes per day.  Rustam LAMA Hommeyer exercises with enough effort to increase Ray KELBY Hommeyer's heart rate 4 days per week.   Rustam Nam is taking medications regularly.      REVIEW OF SYSTEMS:   Improved mood and energy    PFSH:  Social History     Social History Narrative     Not on file       TOBACCO USE:  History   Smoking Status     Never   Smokeless Tobacco     Never       VITALS:  There were no vitals filed for this visit.  LMP 04/08/2023 (Exact Date)  Estimated body mass index is 22.05 kg/m  as calculated from the following:    Height as of 10/14/22: 1.727 m (5' 8\").    " "Weight as of 11/1/22: 65.8 kg (145 lb).    PHYSICAL EXAM:  (observations via Video)  Alert and oriented.  Very faint residual rash    MEDICATIONS:   Current Outpatient Medications   Medication Sig Dispense Refill     amitriptyline (ELAVIL) 25 MG tablet Take 1 tablet (25 mg) by mouth At Bedtime 90 tablet 3     buPROPion (WELLBUTRIN) 75 MG tablet Take 1 tablet (75 mg) by mouth every morning 30 tablet 11     COMPOUNDED NON-CONTROLLED SUBSTANCE (CMPD RX) - PHARMACY TO MIX COMPOUNDED MEDICATION Compound T4  75 mcg capsule. Take 1 capsule by mouth every morning 180 capsule 3     COMPOUNDED NON-CONTROLLED SUBSTANCE (CMPD RX) - PHARMACY TO MIX COMPOUNDED MEDICATION Compound T3 - 2.5 MCG capsules. Take one capsule by mouth two times daily 90 capsule 3     cyanocobalamin (CYANOCOBALAMIN) 1000 MCG/ML injection Inject 1 mL (1,000 mcg) into the muscle every 7 days 12 mL 3     guaiFENesin (MUCINEX) 600 MG 12 hr tablet Take 1 tablet (600 mg) by mouth 2 times daily 60 tablet 2     hydroxychloroquine (PLAQUENIL) 200 MG tablet Take 1 tablet (200 mg) by mouth daily 90 tablet 3     loratadine (CLARITIN) 10 MG tablet Take 10 mg by mouth daily       meclizine 50 MG TABS Take 50 mg by mouth 3 times daily as needed for dizziness 30 tablet 3     medical cannabis (Patient's own supply) by Other route See Admin Instructions 0 Information only      montelukast (SINGULAIR) 10 MG tablet Take 1 tablet (10 mg) by mouth At Bedtime 90 tablet 3     sertraline (ZOLOFT) 25 MG tablet Take 1 tablet (25 mg) by mouth daily 30 tablet 11     syringe/needle, disp, 25G X 1\" 3 ML MISC Inject 1 each into the muscle once a week With B12 12 each 3     triamcinolone (KENALOG) 0.1 % external cream Apply topically 2 times daily 30 g 1       Outside Notes summarized: Email with Pharm.D.  Labs, x-rays, cardiology, GI tests reviewed: TSH 0.2.  Previous antinuclear antibody negative  Recent Labs   Lab Test 05/17/23  1044 03/15/23  1316 02/14/23  0856 01/23/23  0819 " 09/23/22  1607 07/27/22  1434 06/10/22  1435 09/07/21  0958 08/17/21  1326   HGB  --   --   --   --   --   --  12.9  --  12.3   WBC  --   --   --   --   --   --  7.3  --  7.9   NA  --   --   --  140  --   --  140  --   --    POTASSIUM  --   --   --  3.8  --   --  4.2  --   --    CR  --   --   --   --   --   --  0.78  --   --    URIC  --   --   --   --   --  2.6  --   --   --    B12  --   --   --   --   --  1,268*  --   --   --    TSH 0.20* 0.35   < >  --    < >  --   --    < >  --    VITDT  --   --   --   --   --   --   --   --  44   SED  --   --   --   --   --  8  --   --  5   CRP  --   --   --   --   --   --   --   --  <0.1    < > = values in this interval not displayed.     No results found for: [id number]  Lab Results   Component Value Date    CHOL 272 07/12/2017     New orders:   Orders Placed This Encounter   Procedures     Erythrocyte sedimentation rate auto     CRP inflammation     Cyclic Citrullinated Peptide Antibody IgG     Rheumatoid factor     Anti Nuclear Caridad IgG by IFA with Reflex     Adult Allergy/Asthma Referral       Independent review of:     Dalton Zhao MD  [facility]    Video-Visit Details  Type of service:  Video Visit  Patient has given verbal consent to a Video visit?  Yes  How would you like to obtain your AVS?  MyChart  Will anyone else be joining your video visit, giving supplemental history? No    Originating location (pt location): Home    Distant Location (provider location):  Off-site    Video Start Time: 10:48 AM  Video End time:  11:30 AM  Face to face plus orders:  42 minutes  Documentation time:  3 minutes     The visit lasted a total of 45 minutes

## 2023-05-24 NOTE — PATIENT INSTRUCTIONS
Update autoimmune labs    Trial of Plaquenil    Allergy referral    Triamcinolone cream as needed    Adjust sertraline 25 to 50 mg    See endocrine as scheduled    Decrease thyroid-done with assistance of Pharm.D.

## 2023-06-01 ENCOUNTER — LAB (OUTPATIENT)
Dept: LAB | Facility: CLINIC | Age: 33
End: 2023-06-01
Payer: COMMERCIAL

## 2023-06-01 DIAGNOSIS — R21 FACIAL RASH: ICD-10-CM

## 2023-06-01 LAB
CRP SERPL-MCNC: <3 MG/L
ERYTHROCYTE [SEDIMENTATION RATE] IN BLOOD BY WESTERGREN METHOD: 8 MM/HR (ref 0–20)

## 2023-06-01 PROCEDURE — 86140 C-REACTIVE PROTEIN: CPT

## 2023-06-01 PROCEDURE — 36415 COLL VENOUS BLD VENIPUNCTURE: CPT

## 2023-06-01 PROCEDURE — 86431 RHEUMATOID FACTOR QUANT: CPT

## 2023-06-01 PROCEDURE — 85652 RBC SED RATE AUTOMATED: CPT

## 2023-06-01 PROCEDURE — 86038 ANTINUCLEAR ANTIBODIES: CPT

## 2023-06-01 PROCEDURE — 86200 CCP ANTIBODY: CPT

## 2023-06-02 LAB
ANA SER QL IF: NEGATIVE
RHEUMATOID FACT SER NEPH-ACNC: <7 IU/ML

## 2023-06-03 LAB — CCP AB SER IA-ACNC: 0.6 U/ML

## 2023-06-08 ENCOUNTER — VIRTUAL VISIT (OUTPATIENT)
Dept: PSYCHOLOGY | Facility: CLINIC | Age: 33
End: 2023-06-08
Payer: COMMERCIAL

## 2023-06-08 DIAGNOSIS — K58.1 IRRITABLE BOWEL SYNDROME WITH CONSTIPATION: ICD-10-CM

## 2023-06-08 DIAGNOSIS — F41.1 GENERALIZED ANXIETY DISORDER: Primary | ICD-10-CM

## 2023-06-08 PROCEDURE — 90834 PSYTX W PT 45 MINUTES: CPT | Mod: VID | Performed by: PSYCHOLOGIST

## 2023-06-08 NOTE — PROGRESS NOTES
Health Psychology          Lauryn Eli, Ph.D.,  [phone]  Joyce Ybarra, Ph.D.,  [phone]  Rosenda Alfaro, Ph.D.,  [phone]  Jana Bloom, Ph.D., , [facility] [phone]  Dalton Arguelles, Ph.D., , [facility] [phone]  Mariposa William, Ph.D.,  [phone]  Sherrie Dudley, Ph.D., , [facility] [phone]    [facility], 3rd Floor  [address] [place] [address]     Health Psychology Progress Note    Patient Name: Agustina Nam    Service Type: Individual  Length of Visit: 43 minutes  Start time: 11:03 AM  Stop time: 11:46 AM   Attendees: Patient attended alone  Session #: 5    Telemedicine Information:     Telemedicine Visit: The patient's condition can be safely assessed and treated via synchronous audio and visual telemedicine encounter.    Reason for Telemedicine Visit: Patient has requested telehealth visit and Patient convenience (e.g. access to timely appointments / distance to available provider)  Originating Site (Patient Location): Patient's home, [place]  Distant Location (provider location):  On-site: [facility]  Consent:  The patient/guardian has verbally consented to: the potential risks and benefits of telemedicine (video visit) versus in person care; bill my insurance or make self-payment for services provided; and responsibility for payment of non-covered services.   Mode of Communication:  Video Conference via [employer]  As the provider I attest to compliance with applicable laws and regulations related to telemedicine.      Identifying Information and Presenting Problem:  The patient is a 32 year old adult who is being seen for problematic symptoms of anxiety in the context of multiple chronic medical concerns.    Topics Discussed/Interventions Provided:    Session Content: Patient reports a mixture of moods and mood fluctuations since the last visit. They are continuing with individual psychotherapy,  pain reprocessing therapy, and initiating couple's therapy. Today will be our last session given their therapy needs are being met with other providers. They would like to focus on reviewing the PMDD chart and discuss insights from their tracking. Patient notes that they have an upcoming endocrinology follow-up to discuss their thyroid condition, hormonal fluctuations, and possible HRT.  PMDD tracker is consistent with a worsening of mood symptoms immediately prior to and upon initiating menstruation. Patient tracked for 2 cycles but notes that they initiated sertraline and bupropion after the first cycle. Symptoms improved significantly with the initiation of these medications. Of note, data were not collected for 2 cycles in the absence of psychotropic medication. Patient notes that some symptoms remained well-controlled prior to the 2nd cycle but anxiety increased again, though not as severely. It is unclear whether patient's symptoms reflect a diagnosis of PMDD or an exacerbation of her underlying psychiatric conditions, though the latter appears more likely given their symptoms remain present between cycles. Patient has several questions about medications and potential interactions as well as hormonal contributions to their mood fluctuations. Recommended patient discuss changes to her psychiatric medications with her PCP and also consider an [facility] pharmacy visit for education and recommendations regarding multiple psychotropic medications as well as the impact these may have if initiating HRT. Also encouraged patient to speak with their endocrinologist about hormonal contributions as well. Patient expressed appreciation for the visits and notes that they will return in the future if a health psychology intervention is warranted.      Treatment Objective(s) Addressed in This Session:  Psychological distress related to GI disease    Progress on / Status of Treatment Objective(s) / Homework:  In  "progress    Medication Adherence: Patient did not report medication changes. Notes that they did stop taking a supplement in anticipation of tracking PMDD symptoms. Current medication list is below:     Current Outpatient Medications   Medication     amitriptyline (ELAVIL) 25 MG tablet     buPROPion (WELLBUTRIN) 75 MG tablet     COMPOUNDED NON-CONTROLLED SUBSTANCE (CMPD RX) - PHARMACY TO MIX COMPOUNDED MEDICATION     COMPOUNDED NON-CONTROLLED SUBSTANCE (CMPD RX) - PHARMACY TO MIX COMPOUNDED MEDICATION     cyanocobalamin (CYANOCOBALAMIN) 1000 MCG/ML injection     guaiFENesin (MUCINEX) 600 MG 12 hr tablet     hydroxychloroquine (PLAQUENIL) 200 MG tablet     loratadine (CLARITIN) 10 MG tablet     meclizine 50 MG TABS     medical cannabis (Patient's own supply)     montelukast (SINGULAIR) 10 MG tablet     sertraline (ZOLOFT) 25 MG tablet     syringe/needle, disp, 25G X 1\" 3 ML MISC     triamcinolone (KENALOG) 0.1 % external cream     Current Facility-Administered Medications   Medication     cyanocobalamin injection 1,000 mcg     Assessment: The patient appeared to be active and engaged in today's session and was receptive to feedback.     Mental Status Exam:   Appearance: Appropriate   Eye Contact: Good    Orientation: Yes, x4  Behavior/relationship to provider/demeanor: Cooperative, Engaged and Pleasant  Motor Activity: normal or unremarkable  Mood (subjective report): Anxious, somewhat better   Affect (objective appearance): Appropriate/mood congruent  Speech Rate: Normal  Speech Volume: Normal  Speech Articulation: Normal  Speech Coherence: Normal  Speech Spontaneity: Normal  Thought Content: no suicidal/homicidal ideation, plans, or intent, endorses anxious cognitions  Thought Process (associations): Logical, Linear and Goal directed  Thought Process (rate): Normal  Abnormal Perception: None  Attention/Concentration: Normal  Memory: Appears grossly intact  Fund of Knowledge: Above average  Abstraction:  " "Normal  Insight:  Good  Judgment:  Good    Does the patient appear to be at imminent risk of harm to self/others at this time? No    The session was necessary to address anxiety in the context of chronic digestive disease.  Ongoing psychotherapy is necessary to improve functioning with daily activities, provide psychoeducation and teach mind-body skills.    Diagnoses:    1. Generalized anxiety disorder    2. Irritable bowel syndrome with constipation      R/O PMDD    Plan:    1. Patient discharged from health psychology services at this time. They are welcome to return in the future if a health psychology intervention is desired/warranted.   2. Patient to use 911 or other crisis resources in the event of a psychiatric emergency  3. Primary care needs and medications are managed by Dalton Zhao MD. Referring provider is Laila Lester DO    Skills taught/interventions used thus far:    Psychoeducation    Awareness training (dissecting the problem)    Values clarification (quick look at values)    Committed action (willingness and action plan, vitality vs suffering)    Defusion (getting hooked, \"I'm having the thought that)    NOTE: Treatment plan update due 4/13/24; 90 day treatment plan review due 7/12/23                                                  Individual Treatment Plan    Patient's Name: Agustina Nam  YOB: 1990    Date of Creation: 04/13/23  Date Treatment Plan Last Reviewed/Revised: 04/13/23     DSM5 Diagnoses:     1. Generalized anxiety disorder      Psychosocial / Contextual Factors: medical concerns, unemployment, interpersonal stressors  PROMIS (reviewed every 90 days):     Not administered. Will administer in future visit.     Referral / Collaboration:  Collaboration with medical team and specialists as needed.    Anticipated number of session for this episode of care: 9-12 sessions  Anticipation frequency of session: 2x per month  Anticipated Duration of each session: " "38-52 minutes  Treatment plan will be reviewed in 90 days or when goals have been changed.       Measurable Treatment Goal(s) related to diagnosis / functional impairment(s)  Goal 1: Patient will experience an improvement in their ability to cope with medical concerns   \"I will know I've met my goal when I don't have as much anxiety or fear around getting sick.\"     Objective #A (Patient Action)    Patient will gain skill with knowing when to practice acceptance.  Status: New - Date: 4/13/23     Intervention(s)  Therapist will teach ACT skills    Objective #B  Patient will improve understanding of when to take steps for adaptation.  Status: New - Date: 4/13/23     Intervention(s)   Therapist will teach ACT skills    Objective #C  Patient will learn skills for navigating anxiety more skillfully in the context of medical symptoms (including anticipating circumstances in which symptoms may arise)   Status: New - Date: 4/13/23     Intervention(s)  Therapist will teach ACT skills    Objective #D  Patient will develop the capacity to remain present with other activities in the face of medical symptoms    Status: New - Date: 4/13/23      Intervention(s)  Therapist will teach ACT skills.    Objective #E  Patient will develop the ability to unhook from difficult experiences such as disappointing others, missing out on activities,     Status: New - Date: 4/13/23     Intervention(s)  Therapist will teach ACT skills.      Patient has reviewed and agreed to the above plan.      Sherrie Dudley, PhD  April 13, 2023    Sherrie Dudley, Ph.D., , [facility]  Clinical Health Psychologist  Phone: [phone]   Pager: [phone]        "

## 2023-06-09 DIAGNOSIS — M35.9 AUTOIMMUNE DISEASE (H): Primary | ICD-10-CM

## 2023-06-09 DIAGNOSIS — E27.9 ADRENAL DISORDER (H): ICD-10-CM

## 2023-06-15 ENCOUNTER — OFFICE VISIT (OUTPATIENT)
Dept: PHARMACY | Facility: CLINIC | Age: 33
End: 2023-06-15
Attending: INTERNAL MEDICINE
Payer: COMMERCIAL

## 2023-06-15 VITALS — BODY MASS INDEX: 20.5 KG/M2 | WEIGHT: 134.8 LBS

## 2023-06-15 DIAGNOSIS — F41.1 GENERALIZED ANXIETY DISORDER: Primary | ICD-10-CM

## 2023-06-15 DIAGNOSIS — R42 VERTIGO: ICD-10-CM

## 2023-06-15 DIAGNOSIS — F51.01 PRIMARY INSOMNIA: ICD-10-CM

## 2023-06-15 DIAGNOSIS — Z78.9 TAKES DIETARY SUPPLEMENTS: ICD-10-CM

## 2023-06-15 DIAGNOSIS — E06.3 HASHIMOTO'S DISEASE: ICD-10-CM

## 2023-06-15 PROCEDURE — 99607 MTMS BY PHARM ADDL 15 MIN: CPT

## 2023-06-15 PROCEDURE — 99605 MTMS BY PHARM NP 15 MIN: CPT

## 2023-06-15 RX ORDER — MULTIVITAMIN WITH IRON
TABLET ORAL
COMMUNITY
End: 2024-04-22

## 2023-06-15 RX ORDER — IBUPROFEN 200 MG
CAPSULE ORAL
COMMUNITY
End: 2024-01-03

## 2023-06-15 RX ORDER — CHOLECALCIFEROL (VITAMIN D3) 50 MCG
1 TABLET ORAL DAILY
COMMUNITY
End: 2024-03-14

## 2023-06-15 RX ORDER — CHLORAL HYDRATE 500 MG
CAPSULE ORAL
COMMUNITY
End: 2024-03-14

## 2023-06-15 RX ORDER — FLUCONAZOLE 100 MG/1
1 TABLET ORAL
COMMUNITY
Start: 2023-05-30 | End: 2024-07-29

## 2023-06-15 NOTE — Clinical Note
Patient was taking a lot meds that increase QTC, it would reasonablet QTC levels.   We are holding amitriptyline, sertraline, and hydroxychloroquine

## 2023-06-15 NOTE — PROGRESS NOTES
Medication Therapy Management (MTM) Encounter    ASSESSMENT:                            Medication Adherence/Access: No issues identified     Depression/Anxiety/Insomnia: Appropriate to hold sertraline and amitriptyline at this time considering their ineffectiveness and potential side effects. Bupropion improved energy while sertraline did not. Appropriate to continue bupropion. Combined anticholinergic effects, serotonin syndrome and QTC prolongation is the potential side effects of taking antidepressants, allergy medications together. Bupropion side effect of lowering seizures threshold is worsened when taken with other antidepressants such as sertraline, and amitriptyline. QTC prolongation with sertraline, hydroxychloroquine, amitriptyline, and also fluconazole. However, currently not taking most of them. Appropriate to check electrolytes, and necessary also to check QTC level even though not taking most of the medication now.  Sleeping fine without medications. Appropriate to hold amitriptyline, and consider other options if needed in the future. Appropriate to stopping montelukast due to side effects of Neuropsychiatric reactions( anxiety, depression, insomnia, obsessive compulsive behavior).     Hashimoto's disease: Heart palpitation and pounding could be due to hormonal imbalances mainly thyroid. Appropriate to adjust based on levels. Lack of energy could be also due thyroid levels or possibly contributing factor. Patient is following endocrinology.     Vertigo: Controlled on as needed basis meclizine, but meclizine could exacerbate anticholinergic effects when taken with other medication. Patient rarely using.     Dietary Supplements: Continue taking current medications. Patient is taking many supplements, and med is list updated accordingly.     PLAN:                            1. Consider stopping montelukast due to side effects of Neuropsychiatric reactions( anxiety, depression, insomnia, obsessive  compulsive behavior)  2. Hold amitriptyline, and sertraline considering the possible side effects of QTC prolongation, and anticholinergic effect  3. Continue taking bupropion considering it is increasing your energy and positive mood   4. Consider doing BMP  to check your electrolytes, and also B12 and vitamin D. PharmD to order these tests.   5. PharmD to communicate with PCP on doing EKG to determine QTC levels since patient was taking many medication that prolong QTC levels     Follow-up: When needed     SUBJECTIVE/OBJECTIVE:                          Rustam Nam is a 32 year old adult coming in for an initial visit. Rustam Nam was referred to me from Dr. Zhao.    Reason for visit: Medication Management Initial.    Allergies/ADRs: Reviewed in chart  Past Medical History: Reviewed in chart  Tobacco: Rustam Nam reports that Rustam Nam has never smoked. Rustam Nam has never used smokeless tobacco.  Alcohol: none at this time. Rare use due to concerns for worsening stomach symptoms.     Medication Adherence/Access: no issues reported.      Depression/Anxiety/Insomnia:  Currently taking bupropion 75 mg daily, but previously was on sertraline and amitriptyline. Amitriptyline was used for sleep. Patient stopped bupropion for four days, and was just on sertraline 25 mg, but energy did not improve, and did not see much difference from what they were feeling. Then they stopped sertraline, and then started taking bupropion and energy improved. Currently not taking sertraline, and is on bupropion 75 mg. We discussed about the seizures lowering effect of bupropion when specifically when taken with other medication such as sertraline, meclizine, hydroxychloroquine, and amitriptyline. We also discussed the QTc prolongation effect of number of medication they were taking such as sertraline, hydroxychloroquine, amitriptyline, and also fluconazole.  Takes fluconazole for mucus production specially at night,  7 days out of the month. Patient asked what are the signs and symptoms of low onset of seizures to which I did not provide good answers and they will be talking to their provider. Explained that bupropion side effects and seizure threshold lowering effects can be exacerbated when taken with antidepressants, and anticholinergics and when someone has hyponatremia, hypocalcemia and hypomagnesemia. Its side effects of tachycardia and cardiac arrhthymias might be increased with the above medications.  QTC  Prolongation early sign is heart palpitation and pounding. At some point patient was taking four or five medication with that side effect.     Patient's palpitation and pounding of the heart has been going for some time, but it was worse when taking the above medications all together: had palpitation while sleeping and can hear heart pounding strongly.  Heart palpitation is still going on, but on a lesser scale.        Montelukast has side effects of Neuropsychiatric reactions( anxiety, depression, insomnia, obsessive compulsive behavior) that might be exacerbating lack of energy and anxiety.     Hashimoto's disease: Currently taking compounded thyroid medications. Compounded T3  2.5 mcg two times daily, and compounded T4 75 mcg daily. Recently T4 dose decreased. Increased dose of thyroid hormone cause heart palpitation, tachycardia and arrhythmias. Discussed this could be another contributing factor to the heart palpitation and pounding. It is usually important to rule out diseases induced depressive symptoms, and medication induced diseases such as metabolic disorders and endocrine disorders. Patient is following endocrinology. Medications such as steroids, interferons, and monotherapy with benzodiazepines can exacerbate the situation.    Vertigo:  Currently taking meclizine on as needed basis. It has been a while since she has dizziness. Discussed meclizine when combined with other anticholinergics and  antidepressants can exacerbate side effects of the above mentions medications.     Dietary supplement: Currently taking magnesium 250 mg daily, iodine 600 mcg with 30 mg calcium 1 tablet daily, Dim complex hormonal support, Vitamin D 50 mcg daily, takes fish oil -omega -3 fatty acid takes 630 mg daily, calcium 75 mg with magnesium,       I spent 60 minutes with this patient today. All changes were made via collaborative practice agreement with Dalton Zhao MD. A copy of the visit note was provided to the patient's provider(s).    A summary of these recommendations was given to the patient.     Medication Therapy Recommendations  Generalized anxiety disorder    Rationale: Medication requires monitoring - Needs additional monitoring   Recommendation: Self-Monitoring - Check electroly levels   Status: Accepted per [facility]         Insomnia    Current Medication: amitriptyline (ELAVIL) 25 MG tablet   Rationale: Undesirable effect - Adverse medication event - Safety   Recommendation: Discontinue Medication - Hold amitriptyline and sertraline   Status: Accepted per [facility]         Sinusitis, chronic    Current Medication: montelukast (SINGULAIR) 10 MG tablet (Discontinued)   Rationale: Undesirable effect - Adverse medication event - Safety   Recommendation: Discontinue Medication   Status: Accepted per [facility]                Kamryn Hernandez, PharmD     Medication Therapy Management (MTM) Pharmacist     [phone]     [email or web address]

## 2023-06-15 NOTE — PATIENT INSTRUCTIONS
"Recommendations from today's MTM visit:                                                    MTM (medication therapy management) is a service provided by a clinical pharmacist designed to help you get the most of out of your medicines.   Today we reviewed what your medicines are for, how to know if they are working, that your medicines are safe and how to make your medicine regimen as easy as possible.      Consider stopping montelukast due to side effects of Neuropsychiatric reactions( anxiety, depression, insomnia, obsessive compulsive behavior)  Hold amitriptyline, and sertraline considering the possible side effects of QTC prolongation, and anticholinergic effect  Continue taking bupropion considering it is increasing your energy and positive mood   Consider doing BMP  to check your electrolytes, and also B12 and vitamin D. PharmD to order these tests.   PharmD to communicate with PCP on doing EKG to determine QTC levels since patient was taking many medication that prolong QTC levels     Follow-up: When needed     It was great speaking with you today.  I value your experience and would be very thankful for your time in providing feedback in our clinic survey. In the next few days, you may receive an email or text message from [employer] with a link to a survey related to your  clinical pharmacist.\"     To schedule another MTM appointment, please call the clinic directly or you may call the MTM scheduling line at [phone] or toll-free at [phone].     My Clinical Pharmacist's contact information:                                                      Please feel free to contact me with any questions or concerns you have.        Kamryn Hernandez, PharmD     Medication Therapy Management (MTM) Pharmacist     [phone]     [email or web address]     [facility]    "

## 2023-06-19 ENCOUNTER — MYC MEDICAL ADVICE (OUTPATIENT)
Dept: PHARMACY | Facility: CLINIC | Age: 33
End: 2023-06-19

## 2023-06-19 DIAGNOSIS — E06.3 HASHIMOTO'S DISEASE: ICD-10-CM

## 2023-06-20 NOTE — TELEPHONE ENCOUNTER
Sent the prescription to [facility] pharmacy first, but called and cancelled them. I sent new Rxs to the  compounding pharmacy.     50mcg T4/Levothyroxine in the morning  2.5mcg T3/Liothyroinine in the morning  1.25mcg T3/Liothyroinine in the afternoon      ThanksKamryn

## 2023-06-21 ENCOUNTER — VIRTUAL VISIT (OUTPATIENT)
Dept: INTERNAL MEDICINE | Facility: CLINIC | Age: 33
End: 2023-06-21
Payer: COMMERCIAL

## 2023-06-21 DIAGNOSIS — R53.83 FATIGUE, UNSPECIFIED TYPE: ICD-10-CM

## 2023-06-21 DIAGNOSIS — R00.2 PALPITATIONS: Primary | ICD-10-CM

## 2023-06-21 PROCEDURE — 99215 OFFICE O/P EST HI 40 MIN: CPT | Mod: VID | Performed by: INTERNAL MEDICINE

## 2023-06-21 RX ORDER — POTASSIUM CHLORIDE 1500 MG/1
20 TABLET, EXTENDED RELEASE ORAL DAILY
Qty: 30 TABLET | Refills: 11 | Status: SHIPPED | OUTPATIENT
Start: 2023-06-21 | End: 2024-06-20

## 2023-06-21 NOTE — PROGRESS NOTES
Agustina is a 32 year old adult contacting the clinic today via video, who will use the platform: [employer] for the visit.  Phone # for [employer], or if [facility] drops:   Telephone Information:   Mobile [phone]          ASSESSMENT and PLAN:  1. Palpitations  Probably PVCs or PACs.  Agree with trial of medication substitution and lower dose thyroid.  If symptoms persist, event monitor.  Supplement potassium and magnesium  - potassium chloride ER (KLOR-CON M) 20 MEQ CR tablet; Take 1 tablet (20 mEq) by mouth daily  Dispense: 30 tablet; Refill: 11  - Cardiac Event Monitor Adult/Pediatric; Future  - Magnesium; Future    2. Fatigue, unspecified type  Agree with trial of lower dose thyroid.  Sleep study pending    3.  PMS.  Continue to adjust medication     Patient Instructions   Okay to continue to hold bupropion, Singulair, and amitriptyline    Continue sertraline 25 mg daily    Adjust thyroid through endocrine    Event monitor    Potassium 20 mill equivalents daily as needed    Resume and increase magnesium supplements            Return in about 4 months (around 10/21/2023) for using a video visit.       CHIEF COMPLAINT:  Chief Complaint   Patient presents with     Recheck Medication     office visit      Pt is being seen due to heart pounding and medication        HISTORY OF PRESENT ILLNESS:  Agustina is a 32 year old adult contacting the clinic today via video for complaints of palpitations.  She has talked with endocrine and Pharm.D.  She has been having palpitations intermittently since February however they have worsened recently.  These are worse when resting quietly or lying down at night.  They do not occur with exercise.  They have awakened her from sleep at least once.  Blood pressure typically is on the low side with systolics between 90 and 110.    She has tried substituting her sertraline and bupropion.  She finds her PMS type symptoms are slightly worse and is experimenting.  She has talked with endocrine and  "is trying a lower dose of formulated thyroid      History of Present Illness       Reason for visit:  Heart concerns and medication    Rustam Nam eats 2-3 servings of fruits and vegetables daily.Rustam Nam consumes 2 sweetened beverage(s) daily.Rustam Nam exercises with enough effort to increase Rustam Nam's heart rate 30 to 60 minutes per day.  Rustam Nam exercises with enough effort to increase Rustam Nam's heart rate 3 or less days per week.   Rustam Nam is taking medications regularly.      REVIEW OF SYSTEMS:   Intermittent vertigo    PFSH:  Social History     Social History Narrative     Not on file     Unmarried    TOBACCO USE:  History   Smoking Status     Never   Smokeless Tobacco     Never       VITALS:  There were no vitals filed for this visit.  LMP 06/20/2023 (Exact Date)  Estimated body mass index is 20.5 kg/m  as calculated from the following:    Height as of 10/14/22: 1.727 m (5' 8\").    Weight as of 6/15/23: 61.1 kg (134 lb 12.8 oz).    PHYSICAL EXAM:  (observations via Video)  Alert and oriented    MEDICATIONS:   Current Outpatient Medications   Medication Sig Dispense Refill     amitriptyline (ELAVIL) 25 MG tablet Take 1 tablet (25 mg) by mouth At Bedtime 90 tablet 3     buPROPion (WELLBUTRIN) 75 MG tablet Take 1 tablet (75 mg) by mouth every morning 30 tablet 11     calcium citrate (CITRACAL) 950 (200 Ca) MG tablet . Patient takes 75 mg of calcium with magnesium         COMPOUNDED NON-CONTROLLED SUBSTANCE (CMPD RX) - PHARMACY TO MIX COMPOUNDED MEDICATION Compound T4/Levothyroxine 50 mcg capsule. Take 1 capsule by mouth every morning 90 capsule 3     COMPOUNDED NON-CONTROLLED SUBSTANCE (CMPD RX) - PHARMACY TO MIX COMPOUNDED MEDICATION Compound T3/Liothyroinine - 2.5 MCG capsules. Take one capsule by mouth in the morning 90 capsule 3     COMPOUNDED NON-CONTROLLED SUBSTANCE (CMPD RX) - PHARMACY TO MIX COMPOUNDED MEDICATION Compound T3/Liothyroinine 1.25mcg capsules. Take " "one capsule by mouth in the afternoon. 90 capsule 3     cyanocobalamin (CYANOCOBALAMIN) 1000 MCG/ML injection Inject 1 mL (1,000 mcg) into the muscle every 7 days 12 mL 3     fish oil-omega-3 fatty acids 1000 MG capsule Patient takes 630 mg daily       fluconazole (DIFLUCAN) 100 MG tablet Take 1 tablet by mouth Take it 7 days out of a month       loratadine (CLARITIN) 10 MG tablet Take 10 mg by mouth daily       magnesium 250 MG tablet Patient takes  75 mg daily with calcium       meclizine 50 MG TABS Take 50 mg by mouth 3 times daily as needed for dizziness 30 tablet 3     medical cannabis (Patient's own supply) by Other route See Admin Instructions 0 Information only      potassium chloride ER (KLOR-CON M) 20 MEQ CR tablet Take 1 tablet (20 mEq) by mouth daily 30 tablet 11     sertraline (ZOLOFT) 25 MG tablet Take 1 tablet (25 mg) by mouth daily 30 tablet 11     syringe/needle, disp, 25G X 1\" 3 ML MISC Inject 1 each into the muscle once a week With B12 12 each 3     triamcinolone (KENALOG) 0.1 % external cream Apply topically 2 times daily 30 g 1     UNABLE TO FIND Take 1 tablet by mouth daily MEDICATION NAME: iodine 600 mcg with 30 mg calcium       UNABLE TO FIND Take 2 tablets by mouth daily MEDICATION NAME: DIM complex hormonal support       vitamin D3 (CHOLECALCIFEROL) 50 mcg (2000 units) tablet Take 1 tablet by mouth daily         Outside Notes summarized: Pharm.D. note.  Endocrine note  Labs, x-rays, cardiology, GI tests reviewed: Negative antinuclear antibody  Recent Labs   Lab Test 06/15/23  1005 06/01/23  1055 05/17/23  1044 03/15/23  1316 02/14/23  0856 01/23/23  0819 09/23/22  1607 07/27/22  1434 06/10/22  1435 09/07/21  0958 08/17/21  1326   HGB  --   --   --   --   --   --   --   --  12.9  --  12.3   WBC  --   --   --   --   --   --   --   --  7.3  --  7.9     --   --   --   --  140  --   --  140   < >  --    POTASSIUM 4.2  --   --   --   --  3.8  --   --  4.2  --   --    CR 0.83  --   --   --   " --   --   --   --  0.78  --   --    URIC  --   --   --   --   --   --   --  2.6  --   --   --    B12 1,942*  --   --   --   --   --   --  1,268*  --   --   --    TSH  --   --  0.20* 0.35   < >  --    < >  --   --    < >  --    VITDT 54  --   --   --   --   --   --   --   --   --  44   SED  --  8  --   --   --   --   --  8  --   --  5   CRP  --   --   --   --   --   --   --   --   --   --  <0.1    < > = values in this interval not displayed.     No results found for: [id number]  Lab Results   Component Value Date    CHOL 272 07/12/2017     New orders:   Orders Placed This Encounter   Procedures     Magnesium     Cardiac Event Monitor Adult/Pediatric       Independent review of:     Dalton Zhao MD  [facility]    Video-Visit Details  Type of service:  Video Visit  Patient has given verbal consent to a Video visit?  Yes  How would you like to obtain your AVS?  MyChart  Will anyone else be joining your video visit, giving supplemental history? No    Originating location (pt location): Home    Distant Location (provider location):  Off-site    Video Start Time: 10:38 AM  Video End time:  11:19 AM  Face to face plus orders: 41 minutes  Documentation time:  3 minutes     The visit lasted a total of 44 minutes

## 2023-06-21 NOTE — PROGRESS NOTES
"Rustam is a 32 year old who is being evaluated via a billable video visit.      How would you like to obtain your AVS? MyChart  If the video visit is dropped, the invitation should be resent by: Text to cell phone: [phone]  Will anyone else be joining your video visit? No  {If patient encounters technical issues they should call [phone] :320466}        {PROVIDER CHARTING PREFERENCE:387742}    Subjective   Rustam is a 32 year old, presenting for the following health issues:  Recheck Medication and office visit  (Pt is being seen due to heart pounding and medication )        6/21/2023    10:06 AM   Additional Questions   Roomed by lisa   Accompanied by krys         6/21/2023    10:06 AM   Patient Reported Additional Medications   Patient reports taking the following new medications none     History of Present Illness       Reason for visit:  Heart concerns and medication    Rustam Nam eats 2-3 servings of fruits and vegetables daily.Rustam Nam consumes 2 sweetened beverage(s) daily.Rustam Nam exercises with enough effort to increase Rutsam Nam's heart rate 30 to 60 minutes per day.  Rustam Nam exercises with enough effort to increase Rustam Nam's heart rate 3 or less days per week.   Rustam Nam is taking medications regularly.       Pt is being seen due to heart pounding and possible medication recommendation   {additonal problems for provider to add (Optional):762293}      Review of Systems   {ROS COMP (Optional):487419}      Objective           Vitals:  No vitals were obtained today due to virtual visit.    Physical Exam   {video visit exam brief selected:155234::\"GENERAL: Healthy, alert and no distress\",\"EYES: Eyes grossly normal to inspection.  No discharge or erythema, or obvious scleral/conjunctival abnormalities.\",\"RESP: No audible wheeze, cough, or visible cyanosis.  No visible retractions or increased work of breathing.  \",\"SKIN: Visible skin clear. No significant rash, " "abnormal pigmentation or lesions.\",\"NEURO: Cranial nerves grossly intact.  Mentation and speech appropriate for age.\",\"PSYCH: Mentation appears normal, affect normal/bright, judgement and insight intact, normal speech and appearance well-groomed.\"}    {Diagnostic Test Results (Optional):120188}    {AMBULATORY ATTESTATION (Optional):380330}        Video-Visit Details    Type of service:  Video Visit   Video Start Time: 10:10 AM  Video End Time:{video visit start/end time for provider to select:338240}    Originating Location (pt. Location): Home  {PROVIDER LOCATION On-site should be selected for visits conducted from your clinic location or adjoining [facility] hospital, academic office, or other nearby [facility] building. Off-site should be selected for all other provider locations, including home:241156}  Distant Location (provider location):  {virtual location provider:476002}  Platform used for Video Visit: {Virtual Visit Platforms:332573::\"[employer]\"}    "

## 2023-06-21 NOTE — PATIENT INSTRUCTIONS
Okay to continue to hold bupropion, Singulair, and amitriptyline    Continue sertraline 25 mg daily    Adjust thyroid through endocrine    Event monitor    Potassium 20 mill equivalents daily as needed    Resume and increase magnesium supplements

## 2023-06-28 ENCOUNTER — NURSE TRIAGE (OUTPATIENT)
Dept: NURSING | Facility: CLINIC | Age: 33
End: 2023-06-28

## 2023-06-28 NOTE — TELEPHONE ENCOUNTER
As you can see from the orders, the heart monitor order was placed on June 21.  Please assist with scheduling    Her other concerns of been addressed by [employer] messaging

## 2023-06-28 NOTE — TELEPHONE ENCOUNTER
Patient contacted and given the following information to schedule Holter Monitor:    For [facility] please call [phone] and for [facility] please call [phone].  It looks like [facility] left a message 6/22.  Thank you

## 2023-06-28 NOTE — TELEPHONE ENCOUNTER
"Pt is calling to check on the status of her heart monitor and to report to her PCP that she has a new sx of \"chest pressure\" and \"pain\" in her chest.     Other sx:   Having chest pain that is \"pressure like\". Strong family hx of high cholesteerol, but she has not had her cholesterol checked  Feeling lightheaded periodically   No difficulty breathing right now    Pt declined triage advisement and is asking that a message be routed to her clinic to let her PCP know that she is having new sx and would like to know when she can get her heart monitor.    Routing to PCP and pool at clinic to follow up with patient on the above     Reason for Disposition    Chest pain lasting longer than 5 minutes and ANY of the following:* Over 44 years old* Over 30 years old and at least one cardiac risk factor (e.g., diabetes mellitus, high blood pressure, high cholesterol, smoker, or strong family history of heart disease)* History of heart disease (i.e., angina, heart attack, heart failure, bypass surgery, takes nitroglycerin)* Pain is crushing, pressure-like, or heavy    Additional Information    Negative: SEVERE difficulty breathing (e.g., struggling for each breath, speaks in single words)    Negative: Passed out (i.e., fainted, collapsed and was not responding)    Negative: Difficult to awaken or acting confused (e.g., disoriented, slurred speech)    Negative: Shock suspected (e.g., cold/pale/clammy skin, too weak to stand, low BP, rapid pulse)    Protocols used: CHEST PAIN-A-OH    Olya Albarado RN  [facility] Nurse Advisor 8:15 AM 6/28/2023  "

## 2023-06-29 ENCOUNTER — HOSPITAL ENCOUNTER (OUTPATIENT)
Dept: CARDIOLOGY | Facility: HOSPITAL | Age: 33
Discharge: HOME | End: 2023-06-29
Attending: INTERNAL MEDICINE | Admitting: INTERNAL MEDICINE
Payer: COMMERCIAL

## 2023-06-29 DIAGNOSIS — R00.2 PALPITATIONS: ICD-10-CM

## 2023-06-29 PROCEDURE — 93270 REMOTE 30 DAY ECG REV/REPORT: CPT

## 2023-06-30 DIAGNOSIS — R00.2 PALPITATIONS: Primary | ICD-10-CM

## 2023-06-30 DIAGNOSIS — K21.9 GASTROESOPHAGEAL REFLUX DISEASE WITHOUT ESOPHAGITIS: ICD-10-CM

## 2023-07-05 ENCOUNTER — VIRTUAL VISIT (OUTPATIENT)
Dept: PSYCHOLOGY | Facility: CLINIC | Age: 33
End: 2023-07-05
Attending: PSYCHOLOGIST
Payer: COMMERCIAL

## 2023-07-05 DIAGNOSIS — F41.1 GENERALIZED ANXIETY DISORDER: Primary | ICD-10-CM

## 2023-07-05 DIAGNOSIS — F33.1 MAJOR DEPRESSIVE DISORDER, RECURRENT EPISODE, MODERATE (H): ICD-10-CM

## 2023-07-05 PROCEDURE — 90791 PSYCH DIAGNOSTIC EVALUATION: CPT | Mod: VID | Performed by: PSYCHOLOGIST

## 2023-07-05 ASSESSMENT — ANXIETY QUESTIONNAIRES
1. FEELING NERVOUS, ANXIOUS, OR ON EDGE: NEARLY EVERY DAY
7. FEELING AFRAID AS IF SOMETHING AWFUL MIGHT HAPPEN: SEVERAL DAYS
GAD7 TOTAL SCORE: 19
6. BECOMING EASILY ANNOYED OR IRRITABLE: NEARLY EVERY DAY
3. WORRYING TOO MUCH ABOUT DIFFERENT THINGS: NEARLY EVERY DAY
IF YOU CHECKED OFF ANY PROBLEMS ON THIS QUESTIONNAIRE, HOW DIFFICULT HAVE THESE PROBLEMS MADE IT FOR YOU TO DO YOUR WORK, TAKE CARE OF THINGS AT HOME, OR GET ALONG WITH OTHER PEOPLE: SOMEWHAT DIFFICULT
5. BEING SO RESTLESS THAT IT IS HARD TO SIT STILL: NEARLY EVERY DAY
GAD7 TOTAL SCORE: 19
2. NOT BEING ABLE TO STOP OR CONTROL WORRYING: NEARLY EVERY DAY

## 2023-07-05 ASSESSMENT — PATIENT HEALTH QUESTIONNAIRE - PHQ9
SUM OF ALL RESPONSES TO PHQ QUESTIONS 1-9: 12
5. POOR APPETITE OR OVEREATING: NEARLY EVERY DAY

## 2023-07-05 ASSESSMENT — COLUMBIA-SUICIDE SEVERITY RATING SCALE - C-SSRS
1. HAVE YOU WISHED YOU WERE DEAD OR WISHED YOU COULD GO TO SLEEP AND NOT WAKE UP?: YES
REASONS FOR IDEATION LIFETIME: MOSTLY TO END OR STOP THE PAIN (YOU COULDN'T GO ON LIVING WITH THE PAIN OR HOW YOU WERE FEELING)
TOTAL  NUMBER OF ABORTED OR SELF INTERRUPTED ATTEMPTS LIFETIME: NO
1. IN THE PAST MONTH, HAVE YOU WISHED YOU WERE DEAD OR WISHED YOU COULD GO TO SLEEP AND NOT WAKE UP?: NO
ATTEMPT LIFETIME: NO
TOTAL  NUMBER OF INTERRUPTED ATTEMPTS LIFETIME: NO
6. HAVE YOU EVER DONE ANYTHING, STARTED TO DO ANYTHING, OR PREPARED TO DO ANYTHING TO END YOUR LIFE?: NO
2. HAVE YOU ACTUALLY HAD ANY THOUGHTS OF KILLING YOURSELF?: NO

## 2023-07-05 NOTE — PROGRESS NOTES
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 information about the  effects of chemical use on their health and well being. Recommendations:  NA .     8. Records:   These were reviewed at time of assessment.   Information in this assessment was obtained from the medical record and  provided by patient who is a good historian.    Patient will have open access to their mental health medical record.    9.   Interactive Complexity: No      Provider Name/ Credentials:  Lainey Preston, PhD, LP  July 5, 2023

## 2023-07-06 NOTE — TELEPHONE ENCOUNTER
Refill Request  Did you contact pharmacy: No  Medication name:   Requested Prescriptions     Pending Prescriptions Disp Refills     liothyronine (CYTOMEL) 5 MCG tablet 180 tablet 3     Sig: Taking 7 mcg daily- compounded formulation.     levothyroxine (SYNTHROID, LEVOTHROID) 75 MCG tablet 90 tablet 0     Sig: Take 1 tablet (75 mcg total) by mouth daily.     Who prescribed the medication: Mylene Tracey MD   Requested Pharmacy: [facility]  Is patient out of medication: No.  5 days left  Patient notified refills processed in 3 business days:  no  Okay to leave a detailed message: yes    Caller stated that she would really like for refills to get sent over as soon as possible. Caller stated that she has a week left and does not want to miss taking it.    Detail Level: Detailed

## 2023-07-13 ENCOUNTER — MYC MEDICAL ADVICE (OUTPATIENT)
Dept: PHARMACY | Facility: CLINIC | Age: 33
End: 2023-07-13
Payer: COMMERCIAL

## 2023-07-13 DIAGNOSIS — E06.3 HASHIMOTO'S DISEASE: ICD-10-CM

## 2023-07-13 NOTE — TELEPHONE ENCOUNTER
New updated order was sent for 75mcg T4 once daily / 2.5mcg T3 TWICE daily (total 5mcg) to the  compounding pharmacy.           Kamryn Hernandez, PharmD     Medication Therapy Management (MTM) Pharmacist     [phone]     [email or web address]

## 2023-07-14 ENCOUNTER — DOCUMENTATION ONLY (OUTPATIENT)
Dept: PSYCHOLOGY | Facility: CLINIC | Age: 33
End: 2023-07-14
Payer: COMMERCIAL

## 2023-07-14 ENCOUNTER — VIRTUAL VISIT (OUTPATIENT)
Dept: PSYCHOLOGY | Facility: CLINIC | Age: 33
End: 2023-07-14
Attending: PSYCHOLOGIST
Payer: COMMERCIAL

## 2023-07-14 DIAGNOSIS — F41.1 GENERALIZED ANXIETY DISORDER: Primary | ICD-10-CM

## 2023-07-14 DIAGNOSIS — F33.1 MAJOR DEPRESSIVE DISORDER, RECURRENT EPISODE, MODERATE (H): ICD-10-CM

## 2023-07-14 PROCEDURE — 90832 PSYTX W PT 30 MINUTES: CPT | Mod: VID | Performed by: PSYCHOLOGIST

## 2023-07-14 NOTE — PROGRESS NOTES
Name: Agustina Nam   MRN: [id number]  : 1990    Client completed the [place] Multiphasic Personality Inventory-2 (MMPI-2), a self-report personality inventory, as part of their evaluation. Validity scales indicate that the client responded in an open and consistent manner, resulting in a valid profile. While overreporting is possible, it is also likely that the Client has limited resources for coping with the stresses and demands of daily life. The following results should be interpreted with caution and in light of other information. Their profile reflects a high level of general emotional distress. Individuals with similar profiles experience depression with tension, anxiety, worry, intrusive thoughts, insecurity, and apprehensiveness. They experience nervousness, disturbed sleep, and problems with attention and concentration. They are preoccupied and concerned about their health status and physical functioning and have a tendency to develop physical symptoms in response to stress. Individuals with similar profiles may ruminate about personal shortcomings, over-anticipate negative outcomes, and overreact to minor problems and mistakes. They may be withdrawn and introverted, feel awkward and self-conscious, and be easily embarrassed around others.  They may experience self-doubt, reduced occupational performance, problems with concentration, memory, judgment, and decision making. They may also experience vegetative symptoms of depression such as anhedonia, sleep disturbance, and loss of interest, energy and motivation. They may experience a sense of mental failure or decline and the depletion of energy needed to accomplish mental work. Thinking and problem-solving are experienced as effortful and as subject to going off course even when significant effort is made. Thinking may be viewed as impaired or unreliable; they may have a sense that  I can t seem to get my mind right.
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
## 2023-07-17 PROCEDURE — 93272 ECG/REVIEW INTERPRET ONLY: CPT | Performed by: INTERNAL MEDICINE

## 2023-07-26 ENCOUNTER — DOCUMENTATION ONLY (OUTPATIENT)
Dept: PSYCHOLOGY | Facility: CLINIC | Age: 33
End: 2023-07-26
Payer: COMMERCIAL

## 2023-07-26 NOTE — PROGRESS NOTES
"Name: Agustina Nam  MRN:?[id number]  :?1990     [place] Adult ADHD Rating Scale-IV: Self and Other Reports (BAARS-IV)   The BAARS-IV assesses for symptoms of ADHD that are experienced in one's daily life. This assessment measure includes self and collateral rating scales designed to provide information regarding current and childhood symptoms of ADHD including inattention, hyperactivity, and impulsivity. Self-report scores are reported as percentiles. Scores at the 76th-83rd percentile are considered marginal, scores at the 84th-92nd percentile are considered borderline, scores at the 93rd-95th percentile are considered mild, scores at the 96th-98th percentile are considered moderate, and those at the 99th percentile are considered severe. Collateral or \"other\" rating scales are reported as number of symptoms observed in comparison to those reported by the client. Norms and percentile scores are not available for collateral reports.    ?   Current Symptoms Scale--Self Report:    Client completed the self-report inventory of current symptoms. The results indicate that the client's Total ADHD Score was 59 which places them in the 99th percentile for overall ADHD symptoms. In addition, the client endorsed the following occur \"often\" or \"very often\": 8/9 (98th percentile) Inattention symptoms, 7/9 (98th?percentile) Hyperactivity/Impulsivity symptoms, and 9/9 (99th percentile) Sluggish Cognitive Tempo symptoms. Overall, the results suggest the client is reporting moderate symptoms of inattention and moderate symptoms of hyperactivity/impulsivity at this time.    ?   Current Symptoms Scale--Other Report:   Client's partner completed the collateral report inventory of current symptoms. Based on the collateral contact's observation of symptoms, the client demonstrates the following \"often\" or \"very often\": 3/9 Inattention symptoms, 1/5 Hyperactivity symptoms, 0/4 Impulsivity symptoms, and 4/9 Sluggish " "Cognitive Tempo symptoms. The client's Total ADHD Score was 35. The collateral- and self-report scores are discrepant. Their partner noted fewer ADHD symptoms at this time.      Childhood Symptoms Scale--Self-Report:   Client completed the self-report inventory of childhood symptoms. The results indicate that the client's Total ADHD Score was 59 which places them in the 98th percentile for overall ADHD symptoms in childhood. In addition, the client endorsed having experienced the following \"often\" or \"very often\": 7/9 (96th percentile) Inattention symptoms and 8/9 (98th percentile) Hyperactivity-Impulsivity symptoms. Overall, the results suggest the client experienced moderate symptoms of inattention and moderate symptoms of hyperactivity/impulsivity in childhood.      Childhood Symptoms Scale--Other Report:   Client's sister completed the collateral report inventory of childhood symptoms. Based on the collateral contact's recollection of client's childhood symptoms, the client demonstrated the following \"often\" or \"very often\": 0/9 Inattention symptoms and 0/9 Hyperactivity-Impulsivity symptoms. The client's Total ADHD Score was 25. The collateral-report and self-report scores are discrepant. Their sister did not note symptoms of ADHD in childhood.     [place] Functional Impairment Scale: Self and Other Reports (BFIS)   The BFIS is used to assess an individuals' psychosocial impairment in major life/daily activities that may be due to a mental health disorder. This assessment measure includes self and collateral rating scales. Self-report scores are reported as percentiles. Scores at the 76th-83rd percentile are considered marginal, scores at the 84th-92nd percentile are considered borderline, scores at the 93rd-95th percentile are considered mild, scores at the 96th-98th percentile are considered moderate, and those at the 99th percentile are considered severe. Collateral or \"other\" rating scales are reported as " "number of symptoms observed in comparison to those reported by the client. Norms and percentile scores are not available for collateral reports.    ?   Results indicate the client identified impairment (scores at or greater than 93rd percentile) in the following areas: home-family, home-chores, work, community activities, education, money management, driving, daily responsibilities and self-care routines. The client's Mean Impairment Score was 6.85 (97th percentile) indicating the client is reporting moderate impairment in functioning across domains. Client's partner completed the collateral rating scale, which indicated discrepant results. Their scores were generally lower (e.g., Mean Impairment Score of 3.57). They noted impairment in the areas of: home-chores and work.      [place] Deficits in Executive Functioning Scale (BDEFS)   The BDEFS is a measure used for evaluating dimensions of adult executive functioning in daily life. This assessment measure includes self and collateral rating scales. Self-report scores are reported as percentiles. Scores at the 76th-83rd percentile are considered marginal, scores at the 84th-92nd percentile are considered borderline, scores at the 93rd-95th percentile are considered mild, scores at the 96th-98th percentile are considered moderate, and those at the 99th percentile are considered severe. Collateral or \"other\" rating scales are reported as number of symptoms observed in comparison to those reported by the client. Norms and percentile scores are not available for collateral reports.    ?   Results indicate the client's Total Executive Functioning Score was 268 (99th percentile). The ADHD-Executive Functioning Index score was 34 (99th percentile). These scores suggest the client is reporting severe deficits in executive functioning. These deficits may be due to ADHD or other mental health conditions. They noted the following deficits: self-management to time (moderate); " self-organization/problem-solving (severe); self-restraint (mild); self-motivation (moderate) and self-regulation of emotions (moderate). Client's partner completed the collateral report which indicated discrepant results. They did not note deficits in any domains.     Generalized Anxiety Disorder Questionnaire (LILIANA-7)   This questionnaire is designed to screen for anxiety in adults. Based on the client's score of 17, they are reporting moderate symptoms of anxiety at this time. Client identified the following symptoms of anxiety: feeling nervous/anxious/on edge; not being able to stop or control worrying; worrying too much about different things; trouble relaxing, restlessness and feeling and becoming easily annoyed or irritable.   ?   Patient Health Questionnaire- 9 (PHQ-9)    This questionnaire is designed to screen for depression in adults. Based on the client's score of 11, they are reporting moderate symptoms of depression at this time. Client identified the following symptoms of depression: little interest or pleasure in doing things; feeling down/depressed/hopeless; trouble falling asleep/staying asleep/sleeping too much; feeling tired or having little energy; poor appetite or overeating; feeling bad about self; and poor concentration.

## 2023-07-31 ENCOUNTER — DOCUMENTATION ONLY (OUTPATIENT)
Dept: PSYCHOLOGY | Facility: CLINIC | Age: 33
End: 2023-07-31
Payer: COMMERCIAL

## 2023-07-31 NOTE — PROGRESS NOTES
CNS Vital Signs Neurocognitive Battery   The CNS Vital Signs Neurocognitive Battery is a remotely-administered assessment comprised of seven core subtests to individually measure the patient's verbal memory, visual memory, motor speed, psychomotor speed, reaction time, focus, ability to sustain attention and ability to adapt to changing rules and tasks.        Above average domain scores indicate a standard score (SS) greater than 109 or a Percentile Rank (WA) greater than 74, indicating a high functioning test subject. Average is a SS  or WA 25-74, indicating normal function. Low Average is a SS 80-89 or WA 9-24 indicating a slight deficit or impairment. Below Average is a SS 70-79 or WA 2-8, indicating a moderate level of deficit or impairment. Very Low is a SS less than 70 or a WA less than 2, indicating a deficit and impairment.  Validity Indicator denotes a guideline for representing the possibility of an invalid test or domain score, and can be influenced by patient understanding, effort, or other conditions.     The patient's results are detailed below:      Domain  Standard Score  Percentile  Description  Validity    Neurocognitive Index   89 23 Low Average Y   Composite?Memory?Measure  71 3 Low Y   Verbal Memory  74 4 Low  Y   Visual?Memory  79 8 Low  Y   Psychomotor Speed  103 58 Average Y   Reaction Time  95 37 Average Y   Complex Attention  87 19 Low Average Y   Cognitive Flexibility  90 25 Average Y   Processing Speed  124 95 Above Average Y    Executive Function  90 25 Average Y   Simple Attention  91 27 Average Y   Motor Speed  91 27 Average Y      Neurocognitive?Index?(NCI): Measures an average score derived from the domain scores or a general assessment of the overall neurocognitive status of the patient. The patient's NCI score is 89, with a percentile of 23, and falls within the Low Average range.      Composite?Memory: Measures how well subject can recognize, remember, and retrieve words  and geometric figures, and is comprised of the Visual and Verbal Memory domains. The patient's Composite Memory score is 71, with a percentile of 3, and falls within the Low range.      Verbal?Memory: Measures how well subject can recognize, remember, and retrieve words. The patient's Verbal Memory score is 74, with a percentile of 4, and falls within the Low range.      Visual?Memory: Measures how well subject can recognize, remember and retrieve geometric figures. The patient's Visual Memory score is 79, with a percentile of 8, and falls within the Low range.      Psychomotor?Speed: Measures how well a subject perceives, attends, responds to complex visual-perceptual information and performs simple fine motor coordination, and is comprised of the Motor Speed and Processing Speed indexes. The patient's Psychomotor Speed score is 103, with a percentile of 58, and falls within the Average range.      Reaction?Time: Measures how quickly the subject can react, in milliseconds, to a simple and increasingly complex direction set. The patient's Reaction Time score is 95, with a percentile of 37, and falls within the Average range.      Complex?Attention: Measures the ability to track and respond to a variety of stimuli over lengthy periods of time and/or perform complex mental tasks requiring vigilance quickly and accurately. The patient's Complex Attention score is 87, with a percentile of 19, and falls within the Low Average range.      Cognitive?Flexibility: Measures how well subject is able to adapt to rapidly changing and increasingly complex set of directions and/or to manipulate the information. The patient's Cognitive Flexibility score is 90, with a percentile of 25, and falls within the Average range.      Processing?Speed: Measures how well a subject recognizes and processes information i.e., perceiving, attending/responding to incoming information, motor speed, fine motor coordination, and visual-perceptual  ability. The patient's Processing Speed score is 124, with a percentile of 95, and falls within the Above Average range.      Executive?Function: Measures how well a subject recognizes rules, categories, and manages or navigates rapid decision making. The patient's Executive Function score is 90, with a percentile of 25, and falls within the Average range.      Simple?Attention: Measures the ability to track and respond to a single defined stimulus over lengthy periods of time while performing vigilance and response inhibition quickly and accurately to a simple task. The patient's Simple Attention score is 91, with a percentile of 27, and falls within the Average range.      Motor?Speed: Measure: Ability to perform simple movements to produce and satisfy an intention towards a manual action and goal. The patient's Motor Speed score is 91, with a percentile of 27, and falls within the Average range.

## 2023-08-03 ENCOUNTER — DOCUMENTATION ONLY (OUTPATIENT)
Dept: PSYCHOLOGY | Facility: CLINIC | Age: 33
End: 2023-08-03
Payer: COMMERCIAL
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
## 2023-08-08 ENCOUNTER — VIRTUAL VISIT (OUTPATIENT)
Dept: PSYCHOLOGY | Facility: CLINIC | Age: 33
End: 2023-08-08
Payer: COMMERCIAL

## 2023-08-08 DIAGNOSIS — F90.2 ATTENTION DEFICIT HYPERACTIVITY DISORDER, COMBINED TYPE: ICD-10-CM

## 2023-08-08 DIAGNOSIS — F41.1 GENERALIZED ANXIETY DISORDER: Primary | ICD-10-CM

## 2023-08-08 DIAGNOSIS — F33.1 MAJOR DEPRESSIVE DISORDER, RECURRENT EPISODE, MODERATE (H): ICD-10-CM

## 2023-08-08 PROCEDURE — 96130 PSYCL TST EVAL PHYS/QHP 1ST: CPT | Mod: GT | Performed by: PSYCHOLOGIST

## 2023-08-08 PROCEDURE — 96131 PSYCL TST EVAL PHYS/QHP EA: CPT | Mod: GT | Performed by: PSYCHOLOGIST

## 2023-08-08 PROCEDURE — 99207 PR NO CHARGE LOS: CPT | Mod: GT | Performed by: PSYCHOLOGIST

## 2023-08-08 ASSESSMENT — ANXIETY QUESTIONNAIRES
GAD7 TOTAL SCORE: 13
4. TROUBLE RELAXING: NEARLY EVERY DAY
7. FEELING AFRAID AS IF SOMETHING AWFUL MIGHT HAPPEN: NOT AT ALL
GAD7 TOTAL SCORE: 13
6. BECOMING EASILY ANNOYED OR IRRITABLE: SEVERAL DAYS
1. FEELING NERVOUS, ANXIOUS, OR ON EDGE: MORE THAN HALF THE DAYS
2. NOT BEING ABLE TO STOP OR CONTROL WORRYING: MORE THAN HALF THE DAYS
5. BEING SO RESTLESS THAT IT IS HARD TO SIT STILL: NEARLY EVERY DAY
3. WORRYING TOO MUCH ABOUT DIFFERENT THINGS: MORE THAN HALF THE DAYS
IF YOU CHECKED OFF ANY PROBLEMS ON THIS QUESTIONNAIRE, HOW DIFFICULT HAVE THESE PROBLEMS MADE IT FOR YOU TO DO YOUR WORK, TAKE CARE OF THINGS AT HOME, OR GET ALONG WITH OTHER PEOPLE: VERY DIFFICULT

## 2023-08-08 NOTE — PROGRESS NOTES
"Client Name: Agustina Nam   Date: 8/8/23       Service Type: Individual (ADHD Evaluation feedback session)   ?   Session Start Time: 8:00 Session End Time: 8:32     ?   Session Length: 32 minutes    ?   Session #: (feedback)   ?   Attendees: Client attended alone      The patient has been notified of the following:      \"We have found that certain health care needs can be provided without the need for a face to face visit.  This service lets us provide the care you need with a phone conversation.       I will have full access to your [place] medical record during this entire phone call.   I will be taking notes for your medical record.      Since this is like an office visit, we will bill your insurance company for this service.       There are potential benefits and risks of telephone visits (e.g. limits to patient confidentiality) that differ from in-person visits.?  Confidentiality still applies for telephone services, and nobody will record the visit.  It is important to be in a quiet, private space that is free of distractions (including cell phone or other devices) during the visit.??      If during the course of the call I believe a telephone visit is not appropriate, you will not be charged for this service\"     Consent has been obtained for this service by care team member: Yes     Telemedicine Visit: The patient's condition can be safely assessed and treated via synchronous audio and visual telemedicine encounter.        Reason for Telemedicine Visit: Services only offered telehealth      Originating Site (Patient Location): Patient's home            Distant Location (provider location):? Off-site      Consent:  The patient/guardian has verbally consented to: the potential risks and benefits of telemedicine (video visit) versus in person care; bill my insurance or make self-payment for services provided; and responsibility for payment of non-covered services.       Mode of Communication:? Video " Conference via Lenet      As the provider I attest to compliance with applicable laws and regulations related to telemedicine.      ?   DATA   ?   ?   Treatment Objective(s) Addressed in This Session:    Provided feedback on ADHD evaluation. Reviewed test results in depth and answered client's questions. Client diagnosed with ADHD Combined Presentation (BORDERLINE); Major Depressive Disorder, Recurrent, Moderate; Generalized Anxiety Disorder; and Other Trauma and Stressor Related Disorder. This provider also completed full written report of evaluation, including integration of testing data, summary, and recommendations. Please see Documentation Only dated 8/3/23.   ?   Progress on / Status of Treatment Objective(s) / Homework:    Completed    ?   Intervention:   ADHD Evaluation feedback; Reviewed report (can be found in Documentation Only encounter dated 8/3/23); Client was appreciative of the feedback and expressed understanding of the diagnoses.    ?   ?   ASSESSMENT: Current Emotional / Mental Status (status of significant symptoms):   Risk status (Self / Other harm or suicidal ideation)   Client denies current fears or concerns for personal safety.   Client denies current or recent suicidal ideation or behaviors.   Client denies current or recent homicidal ideation or behaviors.   Client denies current or recent self-injurious behavior or ideation.   Client denies other safety concerns.   A safety and risk management plan has not been developed at this time, however client was given the after-hours number / 911 should there be a change in any of these risk factors.   ?   Appearance: Unable to assess on phone   Eye Contact: Unable to assess on phone   Psychomotor Behavior: Unable to assess on phone   Attitude: Cooperative    Orientation: All   Speech   Rate / Production: Normal    Volume: Normal    Mood: Normal   Affect: Appropriate    Thought Content: Clear    Thought Form: Coherent Logical    Insight:  Good    ?   Medication Review:   Client is currently prescribed Zoloft     Medication Compliance:   Yes  ?   Changes in Health Issues:   None reported   ?   Chemical Use Review:   Substance Use: Chemical use reviewed, no active concerns identified    Tobacco Use: No current tobacco use.    ?   Collateral Reports Completed:   Routed note to Care Team Member(s)   ?   PLAN: (Homework, other)   ?   Recommendations:        1. Schedule a medication evaluation with your physician. Medications are often beneficial in treating anxiety and depression symptoms as well as ADHD. It will be important that each condition is treated, as treatment for one without the other may lead to an increase in symptoms (e.g., treating ADHD, but not anxiety, can lead to increased anxiety).?   ??   2. Access resources through websites, books, and articles such as those provided in the Adult ADHD Symptom Management handout. ??   ??   3. Consider working with an ADHD  or individual therapist to learn skills to?assist with symptom management, as well as ways to improve relationships, etc. that may have been impacted by your symptoms.?   ??   4. Individual therapy is recommended. Therapies focused on identifying and challenging problematic thought and behavior patterns while increasing the use of healthy coping skills has been found to be effective in treating anxiety and depression. It will be important to set goals in this therapy and work actively toward achieving short-term successes that lead to the completion of each goal. Action-oriented therapies, such as CBT and ACT (Acceptance and Commitment Therapy) are particularly recommended for the treatment of chronic anxiety and depression.??Prolonged Exposure (PE) and Cognitive Processing Therapy (CPT) are evidence-based treatments that are effective in treating trauma and stressor related disorders.  ?   5. The use of behavioral strategies such as diaphragmatic breathing, guided imagery, and  mindfulness is often helpful in the management of anxiety and depressive symptoms.      6. ?The following compensatory strategies may be useful to cope with reported inattention symptoms:    a. Maintaining a predictable routine and structured environment that incorporates prioritized checklists and reminders (e.g., Post-Its).   b. When completing tasks, try to focus on one task at a time and complete it in its entirety before moving on to the next task.   c. Minimize background distractions when working on complex tasks. For example, TV, radio or ongoing conversations in the background may hinder ability to focus on the task at hand.   d. Take regular breaks from tasks that require prolonged attention. In general, regular breaks from complex tasks can help prevent lapses in attention, which can result in errors.   e. Outline the steps required to complete a task prior to beginning it, which can help ensure an organized approach. Use the outline to refer to throughout the task as a reminder of the steps to be completed.      Lainey Preston, PhD,    Licensed Psychologist        Psychological Testing    Billing/Services Summary    ?    Testing Evaluation Services  Base: 94059   (1st 60 mins)  Add-on: 03123   (each addtl 60 mins)    Record Review and Clarify Referral Question    (12:40/1:00), (7/5/23)  20 minutes    Integration/Report Generation    (11:00/12:00), (7/14/23) - MMPI-2   (11:00/12:00), (7/26/23) - Nohelia Scales   (3:00/4:00), (7/31/23) - CNS Vital Signs  (12:00/1:00), (8/3/23) - Report  60 minutes   60 minutes   60 minutes   60 minutes   (8:00/8:32), (8/8/23) - Interactive Feedback session   32 minutes    Total Time:  292 minutes (4 hours, 52 minutes)     Total Units:  1  4   ?    ?    Diagnoses: (ICD-10)   ADHD Combined Presentation (F90.2) - BORDERLINE/MILD  Major Depressive Disorder, Recurrent Episode, Moderate (F33.1)  Generalized Anxiety Disorder (F41.1)  Other Trauma and Stressor Related Disorder  (I13.8)

## 2023-08-11 DIAGNOSIS — E83.42 HYPOMAGNESEMIA: Primary | ICD-10-CM

## 2023-08-11 RX ORDER — MAGNESIUM OXIDE 400 MG/1
400 TABLET ORAL 2 TIMES DAILY
Qty: 180 TABLET | Refills: 3 | Status: SHIPPED | OUTPATIENT
Start: 2023-08-11 | End: 2024-08-10

## 2023-08-14 ENCOUNTER — VIRTUAL VISIT (OUTPATIENT)
Dept: INTERNAL MEDICINE | Facility: CLINIC | Age: 33
End: 2023-08-14
Payer: COMMERCIAL

## 2023-08-14 DIAGNOSIS — B97.89 VIRAL RESPIRATORY INFECTION: ICD-10-CM

## 2023-08-14 DIAGNOSIS — D51.0 PERNICIOUS ANEMIA: ICD-10-CM

## 2023-08-14 DIAGNOSIS — J98.8 VIRAL RESPIRATORY INFECTION: ICD-10-CM

## 2023-08-14 DIAGNOSIS — K58.9 IRRITABLE BOWEL SYNDROME, UNSPECIFIED TYPE: ICD-10-CM

## 2023-08-14 DIAGNOSIS — F90.2 ATTENTION DEFICIT HYPERACTIVITY DISORDER (ADHD), COMBINED TYPE: Primary | ICD-10-CM

## 2023-08-14 PROCEDURE — 99214 OFFICE O/P EST MOD 30 MIN: CPT | Mod: VID | Performed by: INTERNAL MEDICINE

## 2023-08-14 RX ORDER — ATOMOXETINE 10 MG/1
10 CAPSULE ORAL DAILY
Qty: 10 CAPSULE | Refills: 0 | Status: SHIPPED | OUTPATIENT
Start: 2023-08-14 | End: 2023-08-22

## 2023-08-14 RX ORDER — TESTOSTERONE 1.62 MG/G
40.5 GEL TRANSDERMAL
COMMUNITY
Start: 2023-08-01

## 2023-08-14 RX ORDER — DEXTROAMPHETAMINE SACCHARATE, AMPHETAMINE ASPARTATE MONOHYDRATE, DEXTROAMPHETAMINE SULFATE AND AMPHETAMINE SULFATE 2.5; 2.5; 2.5; 2.5 MG/1; MG/1; MG/1; MG/1
10 CAPSULE, EXTENDED RELEASE ORAL DAILY
Qty: 10 CAPSULE | Refills: 0 | Status: SHIPPED | OUTPATIENT
Start: 2023-08-14 | End: 2023-08-22

## 2023-08-14 RX ORDER — LISDEXAMFETAMINE DIMESYLATE 10 MG/1
10 CAPSULE ORAL EVERY MORNING
Qty: 10 CAPSULE | Refills: 0 | Status: SHIPPED | OUTPATIENT
Start: 2023-08-14 | End: 2023-08-22

## 2023-08-14 ASSESSMENT — PATIENT HEALTH QUESTIONNAIRE - PHQ9
SUM OF ALL RESPONSES TO PHQ QUESTIONS 1-9: 11
10. IF YOU CHECKED OFF ANY PROBLEMS, HOW DIFFICULT HAVE THESE PROBLEMS MADE IT FOR YOU TO DO YOUR WORK, TAKE CARE OF THINGS AT HOME, OR GET ALONG WITH OTHER PEOPLE: SOMEWHAT DIFFICULT
SUM OF ALL RESPONSES TO PHQ QUESTIONS 1-9: 11

## 2023-08-14 NOTE — PROGRESS NOTES
"Rustam is a 32 year old who is being evaluated via a billable video visit.      How would you like to obtain your AVS? MyChart  If the video visit is dropped, the invitation should be resent by: Text to cell phone: [phone]  Will anyone else be joining your video visit? No  {If patient encounters technical issues they should call [phone] :606753}        {PROVIDER CHARTING PREFERENCE:068617}    Subjective   Rustam is a 32 year old, presenting for the following health issues:  Recheck Medication and OFFICE VISIT  (Pt is here medication ADHD )      8/14/2023     9:55 AM   Additional Questions   Roomed by lisa         8/14/2023     9:55 AM   Patient Reported Additional Medications   Patient reports taking the following new medications no       History of Present Illness       Reason for visit:  ADHD, GI    Rustam Nam eats 2-3 servings of fruits and vegetables daily.Rustam Nam consumes 2 sweetened beverage(s) daily.Rustam Nam exercises with enough effort to increase Rustam Nam's heart rate 30 to 60 minutes per day.  Rustam Nam exercises with enough effort to increase Rustam Nam's heart rate 4 days per week.   Rustam Nam is taking medications regularly.       Pt is here for medication questions   {additonal problems for provider to add (Optional):092969}      Review of Systems   {ROS COMP (Optional):095184}      Objective    Vitals - Patient Reported  Pain Score: No Pain (0)        Physical Exam   {video visit exam brief selected:735111}    {Diagnostic Test Results (Optional):227242}    {AMBULATORY ATTESTATION (Optional):688307}        Video-Visit Details    Type of service:  Video Visit   Video Start Time: {video visit start/end time for provider to select:471288}  Video End Time:{video visit start/end time for provider to select:879637}    Originating Location (pt. Location): {video visit patient location:818912::\"Home\"}  {PROVIDER LOCATION On-site should be selected for visits " "conducted from your clinic location or adjoining [facility] hospital, academic office, or other nearby [facility] building. Off-site should be selected for all other provider locations, including home:311790}  Distant Location (provider location):  {virtual location provider:452223}  Platform used for Video Visit: {Virtual Visit Platforms:828729::\"Last Guide\"}    Answers submitted by the patient for this visit:  Patient Health Questionnaire (Submitted on 8/14/2023)  If you checked off any problems, how difficult have these problems made it for you to do your work, take care of things at home, or get along with other people?: Somewhat difficult  PHQ9 TOTAL SCORE: 11  General Questionnaire (Submitted on 8/11/2023)  Chief Complaint: Chronic problems general questions HPI Form  What is the reason for your visit today? : ADHD, GI  How many servings of fruits and vegetables do you eat daily?: 2-3  On average, how many sweetened beverages do you drink each day (Examples: soda, juice, sweet tea, etc.  Do NOT count diet or artificially sweetened beverages)?: 2  How many minutes a day do you exercise enough to make your heart beat faster?: 30 to 60  How many days a week do you exercise enough to make your heart beat faster?: 4  How many days per week do you miss taking your medication?: 0    "

## 2023-08-14 NOTE — PATIENT INSTRUCTIONS
Trial of Adderall extended release 10 mg daily    Trial of Vyvanse 10 mg daily    Trial of Strattera 10 mg daily    Decrease frequency of vitamin B12 shots    Discussed magnesium    Trial off omeprazole for diarrhea    Trial of sertraline for diarrhea    Initiation of testosterone noted    Follow-up through email

## 2023-08-14 NOTE — PROGRESS NOTES
Agustina is a 32 year old adult contacting the clinic today via video, who will use the platform: [employer] for the visit.  Phone # for [employer], or if Amwell drops:   Telephone Information:   Mobile [phone]          ASSESSMENT and PLAN:  1. Attention deficit hyperactivity disorder (ADHD), combined type  Okay to try stimulants and nonstimulant eating medication.  Low doses of 3 options given  - amphetamine-dextroamphetamine (ADDERALL XR) 10 MG 24 hr capsule; Take 1 capsule (10 mg) by mouth daily  Dispense: 10 capsule; Refill: 0  - atomoxetine (STRATTERA) 10 MG capsule; Take 1 capsule (10 mg) by mouth daily  Dispense: 10 capsule; Refill: 0  - lisdexamfetamine (VYVANSE) 10 MG capsule; Take 1 capsule (10 mg) by mouth every morning for 10 days  Dispense: 10 capsule; Refill: 0    2. Pernicious anemia  Okay to decrease frequency of B12 shots    3. Viral respiratory infection  Day 5.  Advised standard symptomatic treatment    4. Irritable bowel syndrome, unspecified type  Advised monitoring of iron and magnesium supplements       Patient Instructions   Trial of Adderall extended release 10 mg daily    Trial of Vyvanse 10 mg daily    Trial of Strattera 10 mg daily    Decrease frequency of vitamin B12 shots    Discussed magnesium    Trial off omeprazole for diarrhea    Trial of sertraline for diarrhea    Initiation of testosterone noted    Follow-up through email            Return in about 4 months (around 12/14/2023) for using a video visit.       CHIEF COMPLAINT:  Chief Complaint   Patient presents with    Recheck Medication    OFFICE VISIT      Pt is here medication ADHD            8/14/2023     9:55 AM   Additional Questions   Roomed by lisa         8/14/2023     9:55 AM   Patient Reported Additional Medications   Patient reports taking the following new medications no       HISTORY OF PRESENT ILLNESS:  Agustina is a 32 year old adult contacting the clinic today via video for request for stimulants.  She has recently  "been diagnosed with ADD with some components of hyperactivity.  We had discussed a trial of Adderall last year but she never picked up the prescription    She is adjusting her doses of iron and magnesium and we discussed symptoms of irritable bowel and diarrhea    She has been placed on testosterone.  She finds this slightly energizing    She has had a diagnosis of pernicious anemia since teenage years.  She is taking oral medications and wonders if she can decrease frequency of B12 shots    History of Present Illness       Reason for visit:  ADHD, GI    Rustam Nam eats 2-3 servings of fruits and vegetables daily.Rustam Nam consumes 2 sweetened beverage(s) daily.Rustam Nam exercises with enough effort to increase Rustam Nam's heart rate 30 to 60 minutes per day.  Rustam Nam exercises with enough effort to increase Rustam Nam's heart rate 4 days per week.   Rustam Nam is taking medications regularly.      REVIEW OF SYSTEMS:   Menstrual cramps    PFSH:  Social History     Social History Narrative    Not on file         TOBACCO USE:  History   Smoking Status    Never   Smokeless Tobacco    Never       VITALS:  There were no vitals filed for this visit.  LMP 07/07/2023 (Exact Date)  Estimated body mass index is 20.5 kg/m  as calculated from the following:    Height as of 10/14/22: 1.727 m (5' 8\").    Weight as of 6/15/23: 61.1 kg (134 lb 12.8 oz).    PHYSICAL EXAM:  (observations via Video)  Alert and oriented taking notes    MEDICATIONS:   Current Outpatient Medications   Medication Sig Dispense Refill    amphetamine-dextroamphetamine (ADDERALL XR) 10 MG 24 hr capsule Take 1 capsule (10 mg) by mouth daily 10 capsule 0    atomoxetine (STRATTERA) 10 MG capsule Take 1 capsule (10 mg) by mouth daily 10 capsule 0    calcium citrate (CITRACAL) 950 (200 Ca) MG tablet . Patient takes 75 mg of calcium with magnesium        COMPOUNDED NON-CONTROLLED SUBSTANCE (CMPD RX) - PHARMACY TO MIX COMPOUNDED " "MEDICATION Compound T4/Levothyroxine 75 mcg capsule. Take 1 capsule by mouth every morning 90 capsule 3    COMPOUNDED NON-CONTROLLED SUBSTANCE (CMPD RX) - PHARMACY TO MIX COMPOUNDED MEDICATION Compound T3/Liothyroinine - 2.5 MCG capsules. Take one capsule by mouth twice daily. 180 capsule 3    cyanocobalamin (CYANOCOBALAMIN) 1000 MCG/ML injection Inject 1 mL (1,000 mcg) into the muscle every 7 days 12 mL 3    fish oil-omega-3 fatty acids 1000 MG capsule Patient takes 630 mg daily      fluconazole (DIFLUCAN) 100 MG tablet Take 1 tablet by mouth Take it 7 days out of a month      lisdexamfetamine (VYVANSE) 10 MG capsule Take 1 capsule (10 mg) by mouth every morning for 10 days 10 capsule 0    loratadine (CLARITIN) 10 MG tablet Take 10 mg by mouth daily      magnesium 250 MG tablet Patient takes  75 mg daily with calcium      magnesium oxide (MAG-OX) 400 MG tablet Take 1 tablet (400 mg) by mouth 2 times daily 180 tablet 3    meclizine 50 MG TABS Take 50 mg by mouth 3 times daily as needed for dizziness 30 tablet 3    potassium chloride ER (KLOR-CON M) 20 MEQ CR tablet Take 1 tablet (20 mEq) by mouth daily 30 tablet 11    sertraline (ZOLOFT) 25 MG tablet Take 1 tablet (25 mg) by mouth daily 30 tablet 11    syringe/needle, disp, 25G X 1\" 3 ML MISC Inject 1 each into the muscle once a week With B12 12 each 3    testosterone (ANDROGEL 1.62 % PUMP) 20.25 MG/ACT gel Place 40.5 mg onto the skin      triamcinolone (KENALOG) 0.1 % external cream Apply topically 2 times daily 30 g 1    UNABLE TO FIND Take 1 tablet by mouth daily MEDICATION NAME: iodine 600 mcg with 30 mg calcium      UNABLE TO FIND Take 2 tablets by mouth daily MEDICATION NAME: DIM complex hormonal support      vitamin D3 (CHOLECALCIFEROL) 50 mcg (2000 units) tablet Take 1 tablet by mouth daily      medical cannabis (Patient's own supply) by Other route See Admin Instructions (Patient not taking: Reported on 7/5/2023) 0 Information only        Outside Notes " summarized:   Labs, x-rays, cardiology, GI tests reviewed:   Recent Labs   Lab Test 06/15/23  1005 06/01/23  1055 05/17/23  1044 03/15/23  1316 02/14/23  0856 01/23/23  0819 09/23/22  1607 07/27/22  1434 06/10/22  1435 09/07/21  0958 08/17/21  1326   HGB  --   --   --   --   --   --   --   --  12.9  --  12.3   WBC  --   --   --   --   --   --   --   --  7.3  --  7.9     --   --   --   --  140  --   --  140   < >  --    POTASSIUM 4.2  --   --   --   --  3.8  --   --  4.2  --   --    CR 0.83  --   --   --   --   --   --   --  0.78  --   --    URIC  --   --   --   --   --   --   --  2.6  --   --   --    B12 1,942*  --   --   --   --   --   --  1,268*  --   --   --    TSH  --   --  0.20* 0.35   < >  --    < >  --   --    < >  --    VITDT 54  --   --   --   --   --   --   --   --   --  44   SED  --  8  --   --   --   --   --  8  --   --  5   CRP  --   --   --   --   --   --   --   --   --   --  <0.1    < > = values in this interval not displayed.     No results found for: [id number]  Lab Results   Component Value Date    CHOL 272 07/12/2017     New orders: No orders of the defined types were placed in this encounter.      Independent review of:     Dalton Zhao MD  [facility]    Video-Visit Details  Type of service:  Video Visit  Patient has given verbal consent to a Video visit?  Yes  How would you like to obtain your AVS?  MyChart  Will anyone else be joining your video visit, giving supplemental history? No    Originating location (pt location): Home    Distant Location (provider location):  Off-site    Video Start Time: 10:19 AM  Video End time:  10:52 AM  Face to face plus orders: 33 minutes  Documentation time:  3 minutes     The visit lasted a total of 33 minutes

## 2023-08-21 PROBLEM — F32.A DEPRESSION: Chronic | Status: ACTIVE | Noted: 2023-08-21

## 2023-08-21 PROBLEM — E53.8 B12 DEFICIENCY: Chronic | Status: ACTIVE | Noted: 2017-03-31

## 2023-08-21 PROBLEM — F32.81 PREMENSTRUAL DYSPHORIC DISORDER: Status: ACTIVE | Noted: 2023-08-21

## 2023-08-21 PROBLEM — F90.2 ATTENTION DEFICIT HYPERACTIVITY DISORDER (ADHD), COMBINED TYPE: Chronic | Status: ACTIVE | Noted: 2023-08-14

## 2023-08-21 PROBLEM — E61.8 IODINE DEFICIENCY: Status: ACTIVE | Noted: 2017-03-31

## 2023-08-21 PROBLEM — E03.9 HYPOTHYROIDISM: Chronic | Status: ACTIVE | Noted: 2023-08-21

## 2023-08-21 PROBLEM — G43.909 MIGRAINE: Status: ACTIVE | Noted: 2023-08-21

## 2023-08-21 PROBLEM — M26.609 TMJ (TEMPOROMANDIBULAR JOINT SYNDROME): Status: ACTIVE | Noted: 2023-08-21

## 2023-08-21 PROBLEM — E53.8 B12 DEFICIENCY: Status: ACTIVE | Noted: 2017-03-31

## 2023-08-21 PROBLEM — F41.1 GENERALIZED ANXIETY DISORDER: Chronic | Status: ACTIVE | Noted: 2017-03-31

## 2023-08-21 PROBLEM — K63.8219 SMALL INTESTINAL BACTERIAL OVERGROWTH: Status: ACTIVE | Noted: 2017-03-31

## 2023-08-21 PROBLEM — G47.00 INSOMNIA: Status: ACTIVE | Noted: 2017-03-31

## 2023-08-21 PROBLEM — F32.A DEPRESSION: Status: ACTIVE | Noted: 2023-08-21

## 2023-08-21 PROBLEM — K58.9 IRRITABLE BOWEL SYNDROME: Chronic | Status: ACTIVE | Noted: 2017-05-07

## 2023-08-21 PROBLEM — K21.9 GASTROESOPHAGEAL REFLUX DISEASE: Status: ACTIVE | Noted: 2023-02-16

## 2023-08-21 PROBLEM — D51.0 PERNICIOUS ANEMIA: Status: ACTIVE | Noted: 2017-03-31

## 2023-08-22 ENCOUNTER — VIRTUAL VISIT (OUTPATIENT)
Dept: SLEEP MEDICINE | Facility: CLINIC | Age: 33
End: 2023-08-22
Payer: COMMERCIAL

## 2023-08-22 VITALS — BODY MASS INDEX: 21.22 KG/M2 | HEIGHT: 68 IN | WEIGHT: 140 LBS

## 2023-08-22 DIAGNOSIS — G47.9 DISTURBANCE IN SLEEP BEHAVIOR: ICD-10-CM

## 2023-08-22 DIAGNOSIS — F51.04 CHRONIC INSOMNIA: ICD-10-CM

## 2023-08-22 DIAGNOSIS — R53.83 OTHER FATIGUE: Primary | ICD-10-CM

## 2023-08-22 PROBLEM — G44.209 TENSION HEADACHE: Chronic | Status: ACTIVE | Noted: 2020-07-10

## 2023-08-22 PROBLEM — G43.909 MIGRAINE: Chronic | Status: ACTIVE | Noted: 2023-08-21

## 2023-08-22 PROCEDURE — 99204 OFFICE O/P NEW MOD 45 MIN: CPT | Mod: VID | Performed by: INTERNAL MEDICINE

## 2023-08-22 RX ORDER — ZOLPIDEM TARTRATE 5 MG/1
TABLET ORAL
Qty: 1 TABLET | Refills: 0 | Status: SHIPPED | OUTPATIENT
Start: 2023-08-22 | End: 2024-01-09

## 2023-08-22 ASSESSMENT — SLEEP AND FATIGUE QUESTIONNAIRES
HOW LIKELY ARE YOU TO NOD OFF OR FALL ASLEEP WHILE SITTING AND READING: WOULD NEVER DOZE
HOW LIKELY ARE YOU TO NOD OFF OR FALL ASLEEP WHILE WATCHING TV: SLIGHT CHANCE OF DOZING
HOW LIKELY ARE YOU TO NOD OFF OR FALL ASLEEP WHILE LYING DOWN TO REST IN THE AFTERNOON WHEN CIRCUMSTANCES PERMIT: MODERATE CHANCE OF DOZING
HOW LIKELY ARE YOU TO NOD OFF OR FALL ASLEEP WHEN YOU ARE A PASSENGER IN A CAR FOR AN HOUR WITHOUT A BREAK: MODERATE CHANCE OF DOZING
HOW LIKELY ARE YOU TO NOD OFF OR FALL ASLEEP WHILE SITTING QUIETLY AFTER LUNCH WITHOUT ALCOHOL: SLIGHT CHANCE OF DOZING
HOW LIKELY ARE YOU TO NOD OFF OR FALL ASLEEP IN A CAR, WHILE STOPPED FOR A FEW MINUTES IN TRAFFIC: WOULD NEVER DOZE
HOW LIKELY ARE YOU TO NOD OFF OR FALL ASLEEP WHILE SITTING AND TALKING TO SOMEONE: WOULD NEVER DOZE
HOW LIKELY ARE YOU TO NOD OFF OR FALL ASLEEP WHILE SITTING INACTIVE IN A PUBLIC PLACE: SLIGHT CHANCE OF DOZING

## 2023-08-22 ASSESSMENT — PAIN SCALES - GENERAL: PAINLEVEL: NO PAIN (0)

## 2023-08-22 NOTE — NURSING NOTE
Is the patient currently in the state of [place]? YES    Visit mode:VIDEO    If the visit is dropped, the patient can be reconnected by: VIDEO VISIT: Send to e-mail at: [email or web address]    Will anyone else be joining the visit? NO    How would you like to obtain your AVS? MyChart    Are changes needed to the allergy or medication list? Pt stated no changes to allergies and Pt stated no med changes    Reason for visit: Consult    Has patient had flu shot for current/most recent flu season? If so, when? Yes: 10/27/2022    Ruma Newby LPN

## 2023-08-22 NOTE — PROGRESS NOTES
Virtual Visit Details    Type of service:  Video Visit   Video Start Time: 7:22 AM  Video End Time:8:10 AM    Originating Location (pt. Location): Home    Distant Location (provider location):  Off-site  Platform used for Video Visit: Well
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
## 2023-10-12 ENCOUNTER — TELEPHONE (OUTPATIENT)
Dept: ALLERGY | Facility: CLINIC | Age: 33
End: 2023-10-12

## 2023-10-12 ENCOUNTER — LAB (OUTPATIENT)
Dept: LAB | Facility: CLINIC | Age: 33
End: 2023-10-12
Payer: COMMERCIAL

## 2023-10-12 ENCOUNTER — OFFICE VISIT (OUTPATIENT)
Dept: ALLERGY | Facility: CLINIC | Age: 33
End: 2023-10-12
Payer: COMMERCIAL

## 2023-10-12 VITALS — WEIGHT: 146.6 LBS | HEIGHT: 68 IN | OXYGEN SATURATION: 98 % | HEART RATE: 81 BPM | BODY MASS INDEX: 22.22 KG/M2

## 2023-10-12 DIAGNOSIS — J31.0 CHRONIC RHINITIS: ICD-10-CM

## 2023-10-12 DIAGNOSIS — R21 FACIAL RASH: ICD-10-CM

## 2023-10-12 DIAGNOSIS — E55.9 VITAMIN D DEFICIENCY: ICD-10-CM

## 2023-10-12 DIAGNOSIS — L50.8 CHRONIC AUTOIMMUNE URTICARIA: Primary | ICD-10-CM

## 2023-10-12 DIAGNOSIS — E55.9 VITAMIN D DEFICIENCY: Primary | ICD-10-CM

## 2023-10-12 PROCEDURE — 82785 ASSAY OF IGE: CPT

## 2023-10-12 PROCEDURE — 36415 COLL VENOUS BLD VENIPUNCTURE: CPT

## 2023-10-12 PROCEDURE — 86003 ALLG SPEC IGE CRUDE XTRC EA: CPT

## 2023-10-12 PROCEDURE — 99203 OFFICE O/P NEW LOW 30 MIN: CPT | Performed by: ALLERGY & IMMUNOLOGY

## 2023-10-12 RX ORDER — CHOLECALCIFEROL (VITAMIN D3) 2400/ML
2400 LIQUID (ML) MISCELLANEOUS 2 TIMES DAILY
Qty: 10 ML | Refills: 11 | Status: SHIPPED | OUTPATIENT
Start: 2023-10-12 | End: 2024-03-14

## 2023-10-12 RX ORDER — CHOLECALCIFEROL (VITAMIN D3) 50 MCG
1 TABLET ORAL 3 TIMES DAILY
Qty: 90 TABLET | Refills: 3 | Status: SHIPPED | OUTPATIENT
Start: 2023-10-12 | End: 2024-03-14

## 2023-10-12 NOTE — PATIENT INSTRUCTIONS
Chronic autoimmune hives    Cetirizine (Zyrtec) 10 mg up to two tabs twice daily     Record all events of previous few hours and take pictures    Check allergy panel

## 2023-10-12 NOTE — LETTER
"    10/12/2023         RE: Agustina Nam  [address]  [place] [address]        Dear Colleague,    Thank you for referring your patient, Agustina Nam, to the [facility]. Please see a copy of my visit note below.        John Easton is a 32 year old, presenting for the following health issues:  Allergy Consult (Facial rash)    HPI       Chief complaint: Rash    History of present illness: This is a pleasant 32-year-old patient that I was asked to see for evaluation by Dr. Zhao in regards to an allergic reaction.  Patient states that this May they developed a rash on her face neck and chest.  They describes it as red itchy raised lesions of the shower.  They states previously she had intermittent hives but not to this degree.  The rash lasted for about 10 days.  They took Benadryl which did not help with the itching was Claritin 12-hour and that seemed to help.  No bruising left behind.  They does not have pictures of the rash.  Originally, the patient's primary thought perhaps it was lupus but work-up was negative.  Patient does have a history of some sinus disease.  Patient has she was tested via skin test in 2017 for allergies and was negative at that time.  Patient notes runny nose sneezing some drainage from back of the throat.  It was suggested that the patient try montelukast and believes it helped them breathe better.  They does not use any nasal sprays or rinses regularly but they does have a saline rinse to use at home. Has tried Flonase without improvement in symptoms.    Past medical history: Hashimoto's, pernicious anemia, ADHD    Social history: Patient is a student with a dog, non-smoking environment    Family history: Unknown in regards to allergies    Review of Systems   Constitutional, HEENT, cardiovascular, pulmonary, gi and gu systems are negative, except as otherwise noted.      Objective   Pulse 81   Ht 1.727 m (5' 8\")   Wt 66.5 kg (146 lb " 9.6 oz)   LMP 07/07/2023 (Exact Date)   SpO2 98%   BMI 22.29 kg/m    Body mass index is 22.29 kg/m .  Physical Exam   Gen: Pleasant patient not in acute distress  HEENT: Eyes no erythema of the bulbar or palpebral conjunctiva, no edema. Ears: TMs well visualized, no effusions. Nose: No congestion, mucosa normal. Mouth: Throat clear, no lip or tongue edema.   Neck: No visible masses lesions or swelling  Respiratory: No coughing with breathing, no retractions  Lymph: No visible supraclavicular or cervical lymphadenopathy  Skin: No rashes or lesions, no hives  Psych: Alert and oriented times 3    Impression report and plan:  1.  Possible chronic urticaria    Suspect this is an exacerbation of chronic urticaria given that patient has had hives previously.  If the hives were to recur, recommend pictures and recording all events of the previous few hours.  Recommended Zyrtec up to 2 tablets twice daily for 10 mg dose.  Reviewed exacerbating factors for chronic urticaria.    2.  Chronic rhinitis    Patient has a lot of postnasal drip.  Recommended specific IgE testing to the environmental panel.  Pending this consider nasal rinses regularly and Astelin nasal spray.                        Again, thank you for allowing me to participate in the care of your patient.        Sincerely,        Debi FRITZ MD

## 2023-10-12 NOTE — TELEPHONE ENCOUNTER
[facility] Call Center    Phone Message    May a detailed message be left on voicemail: yes     Reason for Call: Medication Question or concern regarding medication   Prescription Clarification  Name of Medication:   Cholecalciferol (VITAMIN D3) 2400 UNIT/ML LIQD   Prescribing Provider: Dr. Vieira   Pharmacy:   [facility] - [place], [place] - [address]    What on the order needs clarification? Pharm called and said that they cannot find the 2400 unit commercially. They can either compound it or change it to 2000 unit. Please call them back to discuss. Thanks       Action Taken: Message routed to:  Other: [facility]    Travel Screening: Not Applicable

## 2023-10-12 NOTE — PROGRESS NOTES
"    John   Ray is a 32 year old, presenting for the following health issues:  Allergy Consult (Facial rash)    HPI       Chief complaint: Rash    History of present illness: This is a pleasant 32-year-old patient that I was asked to see for evaluation by Dr. Zhao in regards to an allergic reaction.  Patient states that this May they developed a rash on her face neck and chest.  They describes it as red itchy raised lesions of the shower.  They states previously she had intermittent hives but not to this degree.  The rash lasted for about 10 days.  They took Benadryl which did not help with the itching was Claritin 12-hour and that seemed to help.  No bruising left behind.  They does not have pictures of the rash.  Originally, the patient's primary thought perhaps it was lupus but work-up was negative.  Patient does have a history of some sinus disease.  Patient has she was tested via skin test in 2017 for allergies and was negative at that time.  Patient notes runny nose sneezing some drainage from back of the throat.  It was suggested that the patient try montelukast and believes it helped them breathe better.  They does not use any nasal sprays or rinses regularly but they does have a saline rinse to use at home. Has tried Flonase without improvement in symptoms.    Past medical history: Hashimoto's, pernicious anemia, ADHD    Social history: Patient is a student with a dog, non-smoking environment    Family history: Unknown in regards to allergies    Review of Systems   Constitutional, HEENT, cardiovascular, pulmonary, gi and gu systems are negative, except as otherwise noted.      Objective    Pulse 81   Ht 1.727 m (5' 8\")   Wt 66.5 kg (146 lb 9.6 oz)   LMP 07/07/2023 (Exact Date)   SpO2 98%   BMI 22.29 kg/m    Body mass index is 22.29 kg/m .  Physical Exam   Gen: Pleasant patient not in acute distress  HEENT: Eyes no erythema of the bulbar or palpebral conjunctiva, no edema. Ears: TMs well " visualized, no effusions. Nose: No congestion, mucosa normal. Mouth: Throat clear, no lip or tongue edema.   Neck: No visible masses lesions or swelling  Respiratory: No coughing with breathing, no retractions  Lymph: No visible supraclavicular or cervical lymphadenopathy  Skin: No rashes or lesions, no hives  Psych: Alert and oriented times 3    Impression report and plan:  1.  Possible chronic urticaria    Suspect this is an exacerbation of chronic urticaria given that patient has had hives previously.  If the hives were to recur, recommend pictures and recording all events of the previous few hours.  Recommended Zyrtec up to 2 tablets twice daily for 10 mg dose.  Reviewed exacerbating factors for chronic urticaria.    2.  Chronic rhinitis    Patient has a lot of postnasal drip.  Recommended specific IgE testing to the environmental panel.  Pending this consider nasal rinses regularly and Astelin nasal spray.

## 2023-10-12 NOTE — TELEPHONE ENCOUNTER
"Pharmacy states they would have to compound solution to get 2400units/mL which they stated is not \"practical\". They stated they have 2000 unit capsules. Attempted to call patient and see if would like us to send the capsules, no answer. Sent [employer] message to see patient preference.   "

## 2023-10-13 LAB
A ALTERNATA IGE QN: <0.1 KU(A)/L
A FUMIGATUS IGE QN: <0.1 KU(A)/L
BERMUDA GRASS IGE QN: <0.1 KU(A)/L
C HERBARUM IGE QN: <0.1 KU(A)/L
CAT DANDER IGG QN: <0.1 KU(A)/L
CEDAR IGE QN: <0.1 KU(A)/L
COMMON RAGWEED IGE QN: <0.1 KU(A)/L
COTTONWOOD IGE QN: <0.1 KU(A)/L
D FARINAE IGE QN: <0.1 KU(A)/L
D PTERONYSS IGE QN: 0.11 KU(A)/L
DOG DANDER+EPITH IGE QN: <0.1 KU(A)/L
IGE SERPL-ACNC: 11 KU/L (ref 0–114)
MAPLE IGE QN: <0.1 KU(A)/L
MARSH ELDER IGE QN: <0.1 KU(A)/L
MOUSE URINE PROT IGE QN: <0.1 KU(A)/L
NETTLE IGE QN: <0.1 KU(A)/L
P NOTATUM IGE QN: <0.1 KU(A)/L
ROACH IGE QN: <0.1 KU(A)/L
SALTWORT IGE QN: <0.1 KU(A)/L
SILVER BIRCH IGE QN: <0.1 KU(A)/L
TIMOTHY IGE QN: <0.1 KU(A)/L
WHITE ASH IGE QN: <0.1 KU(A)/L
WHITE ELM IGE QN: <0.1 KU(A)/L
WHITE MULBERRY IGE QN: <0.1 KU(A)/L
WHITE OAK IGE QN: <0.1 KU(A)/L

## 2023-10-23 DIAGNOSIS — M35.9 AUTOIMMUNE DISEASE (H): Primary | ICD-10-CM

## 2023-10-23 RX ORDER — OMEGA-3 FATTY ACIDS/FISH OIL 300-1000MG
2 CAPSULE ORAL DAILY
Qty: 100 CAPSULE | Refills: 11 | Status: SHIPPED | OUTPATIENT
Start: 2023-10-23 | End: 2024-07-29

## 2023-10-25 ENCOUNTER — IMMUNIZATION (OUTPATIENT)
Dept: FAMILY MEDICINE | Facility: CLINIC | Age: 33
End: 2023-10-25
Payer: COMMERCIAL

## 2023-10-25 PROCEDURE — 91320 SARSCV2 VAC 30MCG TRS-SUC IM: CPT

## 2023-10-25 PROCEDURE — 90686 IIV4 VACC NO PRSV 0.5 ML IM: CPT

## 2023-10-25 PROCEDURE — 90471 IMMUNIZATION ADMIN: CPT

## 2023-10-25 PROCEDURE — 90480 ADMN SARSCOV2 VAC 1/ONLY CMP: CPT

## 2023-10-26 DIAGNOSIS — F51.04 CHRONIC INSOMNIA: Primary | ICD-10-CM

## 2023-10-26 RX ORDER — ZOLPIDEM TARTRATE 5 MG/1
TABLET ORAL
Qty: 1 TABLET | Refills: 0 | Status: CANCELLED | OUTPATIENT
Start: 2023-10-26

## 2023-10-26 NOTE — TELEPHONE ENCOUNTER
Zolpidem #1 for sleep study      Last Written Prescription Date:  8/22/2023  Last Fill Quantity: 1,   # refills: 0  Last Office Visit: 8/22/2023  Future Office visit:   Sleep Study 12/18/23  Ashley Seay, CMA

## 2023-10-26 NOTE — TELEPHONE ENCOUNTER
Spoke with pharmacy. Medication is filled but patient has not yet picked up. Pharmacy will let patient know its ready.    Tawnya GARCIA RN  [facility]

## 2023-11-18 ENCOUNTER — HEALTH MAINTENANCE LETTER (OUTPATIENT)
Age: 33
End: 2023-11-18

## 2023-11-21 DIAGNOSIS — E06.3 HASHIMOTO'S DISEASE: Primary | ICD-10-CM

## 2023-11-21 RX ORDER — LEVOTHYROXINE SODIUM 75 UG/1
75 TABLET ORAL DAILY
Qty: 90 TABLET | Refills: 3 | Status: SHIPPED | OUTPATIENT
Start: 2023-11-21 | End: 2024-07-29

## 2023-12-18 ENCOUNTER — THERAPY VISIT (OUTPATIENT)
Dept: SLEEP MEDICINE | Facility: CLINIC | Age: 33
End: 2023-12-18
Attending: INTERNAL MEDICINE
Payer: COMMERCIAL

## 2023-12-18 DIAGNOSIS — R53.83 OTHER FATIGUE: ICD-10-CM

## 2023-12-18 DIAGNOSIS — F51.04 CHRONIC INSOMNIA: ICD-10-CM

## 2023-12-18 DIAGNOSIS — G47.9 DISTURBANCE IN SLEEP BEHAVIOR: ICD-10-CM

## 2023-12-18 DIAGNOSIS — B37.0 THRUSH: ICD-10-CM

## 2023-12-18 PROCEDURE — 95810 POLYSOM 6/> YRS 4/> PARAM: CPT | Performed by: INTERNAL MEDICINE

## 2023-12-18 RX ORDER — NYSTATIN 100000/ML
500000 SUSPENSION, ORAL (FINAL DOSE FORM) ORAL 4 TIMES DAILY
Qty: 400 ML | Refills: 1 | Status: SHIPPED | OUTPATIENT
Start: 2023-12-18 | End: 2024-03-14

## 2023-12-18 ASSESSMENT — SLEEP AND FATIGUE QUESTIONNAIRES
HOW LIKELY ARE YOU TO NOD OFF OR FALL ASLEEP WHILE SITTING AND READING: SLIGHT CHANCE OF DOZING
HOW LIKELY ARE YOU TO NOD OFF OR FALL ASLEEP WHILE LYING DOWN TO REST IN THE AFTERNOON WHEN CIRCUMSTANCES PERMIT: MODERATE CHANCE OF DOZING
HOW LIKELY ARE YOU TO NOD OFF OR FALL ASLEEP WHILE SITTING INACTIVE IN A PUBLIC PLACE: WOULD NEVER DOZE
HOW LIKELY ARE YOU TO NOD OFF OR FALL ASLEEP IN A CAR, WHILE STOPPED FOR A FEW MINUTES IN TRAFFIC: WOULD NEVER DOZE
HOW LIKELY ARE YOU TO NOD OFF OR FALL ASLEEP WHILE SITTING QUIETLY AFTER LUNCH WITHOUT ALCOHOL: SLIGHT CHANCE OF DOZING
HOW LIKELY ARE YOU TO NOD OFF OR FALL ASLEEP WHILE WATCHING TV: WOULD NEVER DOZE
HOW LIKELY ARE YOU TO NOD OFF OR FALL ASLEEP WHEN YOU ARE A PASSENGER IN A CAR FOR AN HOUR WITHOUT A BREAK: MODERATE CHANCE OF DOZING
HOW LIKELY ARE YOU TO NOD OFF OR FALL ASLEEP WHILE SITTING AND TALKING TO SOMEONE: WOULD NEVER DOZE

## 2023-12-19 NOTE — NURSING NOTE
Diagnostic PSG completed per provider order.  Patient did not met criteria for PAP therapy.Pt did not qualify for the PAP Therapy as her AHI is 0.8.

## 2023-12-19 NOTE — PATIENT INSTRUCTIONS
[facility]    1. Your sleep study will be reviewed by a sleep physician within the next few days.     2. Please follow up in the sleep clinic as scheduled, or, make an appointment with your sleep provider to be seen within two weeks to discuss the results of the sleep study.    3. If you have any questions or problems with your treatment plan, please contact your sleep clinic provider at [phone] to further manage your condition.    4. Please review your attached medication list, and, at your follow-up appointment advise your sleep clinic provider about any changes.    5. Go to [email or web address] for more information about your sleep problems.    Lucho Rodriguez, PSGT  December 19, 2023

## 2023-12-20 NOTE — PROCEDURES
"         SLEEP STUDY INTERPRETATION  DIAGNOSTIC POLYSOMNOGRAPHY REPORT      Patient: TOREY CAMPBELL  YOB: 1990  Study Date: 12/18/2023  MRN: [id number]  Referring Provider: -  Ordering Provider: -    Indications for Polysomnography: The patient is a 33 year old Female who is 5' 8\" and weighs 140.0 lbs. Her BMI is 21.5, Leon sleepiness scale 7 and neck circumference is 33 CM cm.   A diagnostic polysomnogram was performed to evaluate for Fatigue (ESS 7), frequent morning headaches, heartburn at night, night sweats.    Polysomnogram Data: A full night polysomnogram recorded the standard physiologic parameters including EEG, EOG, EMG, ECG, nasal and oral airflow. Respiratory parameters of chest and abdominal movements were recorded with respiratory inductance plethysmography. Oxygen saturation was recorded by pulse oximetry. Hypopnea scoring rule used: 1B 4%.    Sleep Architecture:    The total recording time of the polysomnogram was 497.5 minutes. The total sleep time was 406.0 minutes. Sleep latency was increased at 49.1 minutes with the use of a sleep aid (zolpidem) while audio playing and patient making frequent movements. REM latency was 135.0 minutes. Arousal index was 19.4 arousals per hour. Sleep efficiency was normal at 81.6%. Wake after sleep onset was 42.0 minutes. The patient spent 4.4% of total sleep time in Stage N1, 47.5% in Stage N2, 24.1% in Stage N3, and 23.9% in REM. Time in REM supine was 49.5 minutes.    Respiration:   Events ? The polysomnogram revealed a presence of 0 obstructive, 4 central, and 0 mixed apneas resulting in an apnea index of 0.6 events per hour. There were 1 obstructive hypopneas and 0 central hypopneas resulting in an obstructive hypopnea index of 0.1 and central hypopnea index of 0 events per hour. The combined apnea/hypopnea index was 0.7 events per hour (central apnea/hypopnea index was 0.6 events per hour). The REM AHI was 1.9 events per hour. The supine " AHI was 0.9 events per hour. The RERA index was 5.0 events per hour.  The RDI was 5.8 events per hour.  Snoring - was reported as mild and intermittent   Respiratory rate and pattern - was notable for normal respiratory rate and pattern.  Sustained Sleep Associated Hypoventilation - Transcutaneous carbon dioxide monitoring was not used, however significant hypoventilation was not suggested by oximetry  Sleep Associated Hypoxemia - (Greater than 5 minutes O2 sat at or below 88%) was not present. Baseline oxygen saturation was 95.3%. Lowest oxygen saturation was 90.0%. Time spent less than or equal to 88% was 0 minutes. Time spent less than or equal to 89% was 0 minutes.    Movement Activity:    Periodic Limb Activity - There were 0 PLMs during the entire study. The PLM index was - movements per hour. The PLM Arousal Index was - per hour.  REM EMG Activity - Excessive transient/sustained muscle activity was not present.  Nocturnal Behavior - Abnormal sleep related behaviors were not noted    Bruxism - None apparent.    Cardiac Summary:    The average pulse rate was 68.9 bpm. The minimum pulse rate was 54.0 bpm while the maximum pulse rate was 104.0 bpm.  Arrhythmias were not noted.      Assessment:   No evidence of obstructive sleep apnea  Some supine-associated sleep disordered breathing is suggested    Recommendations:  Consider a trial of treatment for motor restlessness.   Suggest optimizing sleep schedule        Diagnostic Codes:   Unspecified Sleep Disturbance G47.9        _____________________________________   Electronically Signed By: Jase Bailon MD 12/20/23           Range(%) Time in range (min)   0.0 - 89.0 -   0.0 - 88.0 -         Stage Min(mm Hg) Max(mm Hg)   Wake - -   NREM(1+2+3) - -   REM - -       Range(mmHg) Time in range (min)   55.0 - 100.0 -   Excluded data <20.0 & >65.0 498.0

## 2023-12-21 LAB — SLPCOMP: NORMAL

## 2024-01-09 ENCOUNTER — VIRTUAL VISIT (OUTPATIENT)
Dept: SLEEP MEDICINE | Facility: CLINIC | Age: 34
End: 2024-01-09
Payer: COMMERCIAL

## 2024-01-09 VITALS — WEIGHT: 145 LBS | HEIGHT: 68 IN | BODY MASS INDEX: 21.98 KG/M2

## 2024-01-09 DIAGNOSIS — F51.04 CHRONIC INSOMNIA: Primary | ICD-10-CM

## 2024-01-09 PROCEDURE — 99214 OFFICE O/P EST MOD 30 MIN: CPT | Mod: 95 | Performed by: INTERNAL MEDICINE

## 2024-01-09 RX ORDER — PROGESTERONE 200 MG/1
1 CAPSULE ORAL DAILY
COMMUNITY
Start: 2023-11-06

## 2024-01-09 ASSESSMENT — SLEEP AND FATIGUE QUESTIONNAIRES
HOW LIKELY ARE YOU TO NOD OFF OR FALL ASLEEP WHILE SITTING QUIETLY AFTER LUNCH WITHOUT ALCOHOL: WOULD NEVER DOZE
HOW LIKELY ARE YOU TO NOD OFF OR FALL ASLEEP WHILE SITTING AND READING: SLIGHT CHANCE OF DOZING
HOW LIKELY ARE YOU TO NOD OFF OR FALL ASLEEP WHILE SITTING AND TALKING TO SOMEONE: WOULD NEVER DOZE
HOW LIKELY ARE YOU TO NOD OFF OR FALL ASLEEP WHILE LYING DOWN TO REST IN THE AFTERNOON WHEN CIRCUMSTANCES PERMIT: MODERATE CHANCE OF DOZING
HOW LIKELY ARE YOU TO NOD OFF OR FALL ASLEEP WHEN YOU ARE A PASSENGER IN A CAR FOR AN HOUR WITHOUT A BREAK: HIGH CHANCE OF DOZING
HOW LIKELY ARE YOU TO NOD OFF OR FALL ASLEEP WHILE SITTING INACTIVE IN A PUBLIC PLACE: WOULD NEVER DOZE
HOW LIKELY ARE YOU TO NOD OFF OR FALL ASLEEP WHILE WATCHING TV: SLIGHT CHANCE OF DOZING
HOW LIKELY ARE YOU TO NOD OFF OR FALL ASLEEP IN A CAR, WHILE STOPPED FOR A FEW MINUTES IN TRAFFIC: WOULD NEVER DOZE

## 2024-01-09 ASSESSMENT — PAIN SCALES - GENERAL: PAINLEVEL: MODERATE PAIN (4)

## 2024-01-09 NOTE — PATIENT INSTRUCTIONS
Consider use of lightbox (10,000 lux, full spectrum or 450-500 nanometers) for 20-30 minutes every evening.   Avoid light exposure, consider use of sunglasses for the first 2 hours of the morning

## 2024-01-09 NOTE — NURSING NOTE
Has patient had flu shot for current/most recent flu season? If so, when? Yes: 10/25/23    Is the patient currently in the state of [place]? YES    Visit mode:VIDEO    If the visit is dropped, the patient can be reconnected by: VIDEO VISIT: Send to e-mail at: [email or web address]    Will anyone else be joining the visit? NO  (If patient encounters technical issues they should call [phone]    How would you like to obtain your AVS? MyChart    Are changes needed to the allergy or medication list? No    Reason for visit: RECHECK    Aracelis HAQUE

## 2024-01-09 NOTE — PROGRESS NOTES
Virtual Visit Details    Type of service:  Video Visit   Video Start Time: 2:00 PM  Video End Time:2:28 PM    Originating Location (pt. Location): Home    Distant Location (provider location):  Off-site  Platform used for Video Visit: Shelly

## 2024-01-09 NOTE — PROGRESS NOTES
Sleep Study Follow-Up Visit:    Date on this visit: 1/9/2024    Agustina Nam comes in today for follow-up of Rustam LAMA Kaleygiancarlo's sleep study done at the [facility] for Fatigue (ESS 7), frequent morning headaches, heartburn at night, night sweats. No snoring, apneas. Occasional jerking/gasping awake  - Akanksha-menstrual sleep onset difficulties (JENNIFER 16).      Study Date: 12/18/2023 (140 lbs)     Sleep Architecture:    The total recording time of the polysomnogram was 497.5 minutes. The total sleep time was 406.0 minutes. Sleep latency was increased at 49.1 minutes with the use of a sleep aid (zolpidem) while audio playing and patient making frequent movements. REM latency was 135.0 minutes. Arousal index was 19.4 arousals per hour. Sleep efficiency was normal at 81.6%. Wake after sleep onset was 42.0 minutes. The patient spent 4.4% of total sleep time in Stage N1, 47.5% in Stage N2, 24.1% in Stage N3, and 23.9% in REM. Time in REM supine was 49.5 minutes.     Respiration:   Events ? The polysomnogram revealed a presence of 0 obstructive, 4 central, and 0 mixed apneas resulting in an apnea index of 0.6 events per hour. There were 1 obstructive hypopneas and 0 central hypopneas resulting in an obstructive hypopnea index of 0.1 and central hypopnea index of 0 events per hour. The combined apnea/hypopnea index was 0.7 events per hour (central apnea/hypopnea index was 0.6 events per hour). The REM AHI was 1.9 events per hour. The supine AHI was 0.9 events per hour. The RERA index was 5.0 events per hour.  The RDI was 5.8 events per hour.  Snoring - was reported as mild and intermittent   Respiratory rate and pattern - was notable for normal respiratory rate and pattern.  Sustained Sleep Associated Hypoventilation - Transcutaneous carbon dioxide monitoring was not used, however significant hypoventilation was not suggested by oximetry  Sleep Associated Hypoxemia - (Greater than 5 minutes O2 sat at or below  88%) was not present. Baseline oxygen saturation was 95.3%. Lowest oxygen saturation was 90.0%. Time spent less than or equal to 88% was 0 minutes. Time spent less than or equal to 89% was 0 minutes.     Movement Activity:    Periodic Limb Activity - There were 0 PLMs during the entire study.   REM EMG Activity - Excessive transient/sustained muscle activity was not present.  Nocturnal Behavior - Abnormal sleep related behaviors were not noted    Bruxism - None apparent.     Cardiac Summary:    The average pulse rate was 68.9 bpm. The minimum pulse rate was 54.0 bpm while the maximum pulse rate was 104.0 bpm.  Arrhythmias were not noted.       Falling asleep is better, but still awakens early    Bedtime is 930-1030. She is sleepy   Alarm is set for 620, sometimes is up at 4 AM             1/9/2024     1:43 PM   [place] Total Score   Total score - [place] 7           Insomnia Severity Index    Difficulty falling asleep 0    Difficulty staying asleep 1    Problems waking up too early 2    How SATISFIED/DISSATISFIED are you with your CURRENT sleep pattern? 2    How NOTICEABLE to others do you think your sleep problem is in terms of impairing the quality of your life? 2    How WORRIED/DISTRESSED are you about your current sleep problem? 1    To what extent do you consider your sleep problem to INTERFERE with your daily functioning (e.g. daytime fatigue, mood, ability to function at work/daily chores, concentration, memory, mood, etc.) CURRENTLY? 3    Total Score 11          These findings were reviewed with patient.     Past medical/surgical history, family history, social history, medications and allergies were reviewed.      Problem List:  Patient Active Problem List    Diagnosis Date Noted    Premenstrual dysphoric disorder 08/21/2023     Priority: Medium    Depression 08/21/2023     Priority: Medium    Migraine 08/21/2023     Priority: Medium    Attention deficit hyperactivity disorder (ADHD), combined type  08/14/2023     Priority: Medium    Tension headache 07/10/2020     Priority: Medium    Generalized anxiety disorder 03/31/2017     Priority: Medium    Hypothyroidism 08/21/2023     Priority: Low    TMJ (temporomandibular joint syndrome) 08/21/2023     Priority: Low    Gastroesophageal reflux disease 02/16/2023     Priority: Low    Low back pain of over 3 months duration 07/10/2020     Priority: Low    Right calf pain 07/10/2020     Priority: Low    Fatigue 10/11/2017     Priority: Low    Postural dizziness with presyncope 05/16/2017     Priority: Low    Irritable bowel syndrome 05/07/2017     Priority: Low    Hashimoto's disease 05/07/2017     Priority: Low    Chronic insomnia 03/31/2017     Priority: Low    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      B12 deficiency 03/31/2017     Priority: Low     Does B12 injections 95114 microgram twice a week      Small intestinal bacterial overgrowth 03/31/2017     Priority: Low     Treating with oregano oil      Chronic Sinusitis      Priority: Low    Metrorrhagia      Priority: Low        Impression/Plan:      No evidence of obstructive sleep apnea  Some supine-associated sleep disordered breathing is suggested    Insomnia  Improved.   Now with sleep maintenance difficulties (JENNIFER 11)  Has an element of advanced sleep phase syndrome and is likely a shorter sleeper  We reviewed stimulus control, sleep needs, sleep restriction and regular wake times.   - Consider use of lightbox (10,000 lux, full spectrum or 450-500 nanometers) for 20-30 minutes every evening.   Avoid light exposure, consider use of sunglasses for the first 2 hours of the morning  Consider use of melatonin 1-3 mg in morning     Rustam Nam will follow up with me as needed      I spent 25 minutes with patient including counseling, and >5 minutes with chart review, and documentation

## 2024-01-22 ENCOUNTER — MYC MEDICAL ADVICE (OUTPATIENT)
Dept: INTERNAL MEDICINE | Facility: CLINIC | Age: 34
End: 2024-01-22
Payer: COMMERCIAL

## 2024-01-23 ENCOUNTER — NURSE TRIAGE (OUTPATIENT)
Dept: INTERNAL MEDICINE | Facility: CLINIC | Age: 34
End: 2024-01-23
Payer: COMMERCIAL

## 2024-01-23 DIAGNOSIS — R04.2 HEMOPTYSIS: Primary | ICD-10-CM

## 2024-01-23 NOTE — TELEPHONE ENCOUNTER
Nurse Triage SBAR    Is this a 2nd Level Triage? NO    Situation: Patient sent mychart reporting blood in sputum     Background: Patient reports history of coughing up mucus daily and has been assessed for this. History of nasal drainage. History of heart palpitations but has been assessed with heart monitor and sleep study and reports normal results per patient.     Assessment: Patient reports coughing up mucus that was clear and spit that had approximately a few drops of blood in. Patient reports throat is slightly irritated. No known fever. Denies chest pain or difficulty breathing. Denies having an acute cough but reports that patients baseline is to cough up mucus at least once daily.       Separately reported hand shaking that occurs in the dominant hand with fine motor skills such as holding a pencil. Reports this is intermittent. No known injury or strain to hand. No recent medication changes.     Protocol Recommended Disposition:   Home Care    Recommendation: Advised message would be sent to Dr. Zhao for review. Advised to return call if this occurs agian. Advised to make an appointment for assessment of hand.     Routed to provider    Does the patient meet one of the following criteria for ADS visit consideration? 16+ years old, with an [facility] PCP     TIP  Providers, please consider if this condition is appropriate for management at one of our Acute and Diagnostic Services sites.     If patient is a good candidate, please use dotphrase <dot>triageresponse and select Refer to ADS to document.    Reason for Disposition    Few streaks of blood mixed in with yellow or green sputum    Additional Information    Negative: SEVERE difficulty breathing (e.g., struggling for each breath, speaks in single words)    Negative: Chest pain and difficulty breathing    Negative: Bluish (or gray) lips or face now    Negative: Passed out (i.e., lost consciousness, collapsed and was not responding)    Negative: Shock  suspected (e.g., cold/pale/clammy skin, too weak to stand, low BP, rapid pulse)    Negative: Difficult to awaken or acting confused (e.g., disoriented, slurred speech)    Negative: Recent chest injury (i.e., past 24 hours)    Negative: Coughed up blood and large amount (Such as: 'a half cup of blood')    Negative: Sounds like a life-threatening emergency to the triager    Negative: MODERATE difficulty breathing (e.g., speaks in phrases, SOB even at rest, pulse 100-120) and still present when not coughing    Negative: Chest pain    Negative: Unclear to triager if the patient is coughing up blood or vomiting blood    Negative: History of prior 'blood clot' in leg or lungs (i.e., deep vein thrombosis, pulmonary embolism)    Negative: History of inherited increased risk of blood clots (e.g., Factor 5 Leiden, Anti-thrombin 3, Protein C or Protein S deficiency, Prothrombin mutation)    Negative: Pregnant or postpartum (from 0 to 6 weeks after delivery)    Negative: Hip or leg fracture (broken bone) in past month (or had cast on leg or ankle in past month)    Negative: Long-distance travel in past month (e.g., car, bus, train, plane; with trip lasting 6 or more hours)    Negative: Bedridden (e.g., CVA, chronic illness, recovering from surgery)    Negative: Patient sounds very sick or weak to the triager    Negative: MILD difficulty breathing (e.g., minimal/no SOB at rest, SOB with walking, pulse <100) and still present when not coughing (Exception: No change from usual, chronic shortness of breath.)    Negative: Coughed up blood and > 1 tablespoon (15 ml)    Negative: Fever > 103 F (39.4 C)    Negative: Fever > 101 F (38.3 C) and age > 60 years    Negative: Fever > 100.0 F (37.8 C) and diabetes mellitus or weak immune system (e.g., HIV positive, cancer chemo, splenectomy, organ transplant, chronic steroids)    Negative: Has underlying lung disease (e.g., COPD, chronic bronchitis or emphysema) and sputum has turned yellow  "or green in color    Negative: Coughing up janice-colored sputum    Negative: Coughing up yellow or green sputum and present > 5 days    Negative: Fever present > 3 days (72 hours)    Negative: Taking Coumadin (warfarin) or other strong blood thinner, or known bleeding disorder (e.g., thrombocytopenia)    Negative: Patient wants to be seen    Negative: Nasal discharge and present > 10 days     Chronic sinusitis and not applicable    Negative: Cough has been present for > 3 weeks     Not acute cough    Negative: More than one episode of coughing up blood and < 1 tablespoon (15 ml) of pure red blood    Negative: Exposure to TB (Tuberculosis)    Answer Assessment - Initial Assessment Questions  1. ONSET: \"When did the cough begin?\"       *No Answer*  2. SEVERITY: \"How bad is the cough today?\" \"Did the blood appear after a coughing spell?\"       *No Answer*  3. SPUTUM: \"Describe the color of your sputum\" (none, dry cough; clear, white, yellow, green)      Clear, white, or yellow  4. HEMOPTYSIS: \"How much blood?\" (flecks, streaks, tablespoons, etc.)      *No Answer*  5. DIFFICULTY BREATHING: \"Are you having difficulty breathing?\" If Yes, ask: \"How bad is it?\" (e.g., mild, moderate, severe)     - MILD: No SOB at rest, mild SOB with walking, speaks normally in sentences, can lie down, no retractions, pulse < 100.     - MODERATE: SOB at rest, SOB with minimal exertion and prefers to sit, cannot lie down flat, speaks in phrases, mild retractions, audible wheezing, pulse 100-120.     - SEVERE: Very SOB at rest, speaks in single words, struggling to breathe, sitting hunched forward, retractions, pulse > 120       Denies   6. FEVER: \"Do you have a fever?\" If Yes, ask: \"What is your temperature, how was it measured, and when did it start?\"      98.5  7. CARDIAC HISTORY: \"Do you have any history of heart disease?\" (e.g., heart attack, congestive heart failure)       Heart palpitations regularly (has been evaluated for this)  8. " "LUNG HISTORY: \"Do you have any history of lung disease?\"  (e.g., pulmonary embolus, asthma, emphysema)      NA  9. PE RISK FACTORS: \"Do you have a history of blood clots?\" (or: recent major surgery, recent prolonged travel, bedridden)      NA  10. OTHER SYMPTOMS: \"Do you have any other symptoms?\" (e.g., runny nose, wheezing, chest pain)        Post nasal drip   11. PREGNANCY: \"Is there any chance you are pregnant?\" \"When was your last menstrual period?\"          12. TRAVEL: \"Have you traveled out of the country in the last month?\" (e.g., travel history, exposures)    Protocols used: Coughing Up Blood-A-[place]    "

## 2024-01-23 NOTE — TELEPHONE ENCOUNTER
"Patient states they believe the blood came from \"down below which would be my lungs\".  Patient would like to go ahead with the chest imaging.  Writer provided radiology scheduling phone number.    "

## 2024-01-23 NOTE — TELEPHONE ENCOUNTER
Pt has not coughed up any more blood    It was a very small amount that she spit out    Pt stating she does not feel concerned any longer    Call back pt if having further questions

## 2024-01-23 NOTE — TELEPHONE ENCOUNTER
Typically any evidence of blood requires a workup    If she thinks it came from coughing from her lungs we should get a CAT scan of the chest    If she thinks it came from her teeth or mouth she should see a dentist    If she thinks it came from her sinuses, we should check a CT scan of her sinuses    Found MyChart message from yesterday.  Responded to patient and ordered CT chest

## 2024-01-24 ENCOUNTER — TELEPHONE (OUTPATIENT)
Dept: INTERNAL MEDICINE | Facility: CLINIC | Age: 34
End: 2024-01-24

## 2024-01-24 NOTE — TELEPHONE ENCOUNTER
Patient calling clinic, coughed up mucous with blood again this morning.  See MyChart photos.  Patient states they are feeling some discomfort in upper ribcage between breasts.  Patient states they are able to breath normally, some tightness with deep breaths.  Patient speaking in clear sentences without audible wheezing.  No fevers.  Patient states they feel fatigued and slightly achy, but also states they are having menstrual cycle and this is normal for this time of the month.  Patient states they are having new shaking of right hand at times especially in the morning.  Patient states they have had bloody mucous in tissue after blowing their nose since Sunday.      CT is schedule for 2/30 at 4:20.  To get sooner appointment, order needs to be put in as urgent.

## 2024-01-26 ENCOUNTER — VIRTUAL VISIT (OUTPATIENT)
Dept: INTERNAL MEDICINE | Facility: CLINIC | Age: 34
End: 2024-01-26
Payer: COMMERCIAL

## 2024-01-26 DIAGNOSIS — R04.2 HEMOPTYSIS: ICD-10-CM

## 2024-01-26 DIAGNOSIS — J32.1 CHRONIC FRONTAL SINUSITIS: Primary | ICD-10-CM

## 2024-01-26 DIAGNOSIS — F33.1 MAJOR DEPRESSIVE DISORDER, RECURRENT EPISODE, MODERATE (H): ICD-10-CM

## 2024-01-26 DIAGNOSIS — Z00.00 PREVENTATIVE HEALTH CARE: ICD-10-CM

## 2024-01-26 DIAGNOSIS — R53.83 FATIGUE, UNSPECIFIED TYPE: ICD-10-CM

## 2024-01-26 PROCEDURE — 99215 OFFICE O/P EST HI 40 MIN: CPT | Mod: 95 | Performed by: INTERNAL MEDICINE

## 2024-01-26 RX ORDER — LIOTHYRONINE SODIUM 50 UG/1
TABLET ORAL
COMMUNITY
Start: 2024-01-18

## 2024-01-26 RX ORDER — LEVALBUTEROL TARTRATE 45 UG/1
2 AEROSOL, METERED ORAL EVERY 4 HOURS PRN
Qty: 15 G | Refills: 3 | Status: SHIPPED | OUTPATIENT
Start: 2024-01-26 | End: 2024-05-22

## 2024-01-26 RX ORDER — PREDNISONE 20 MG/1
20 TABLET ORAL EVERY MORNING
Qty: 4 TABLET | Refills: 0 | Status: SHIPPED | OUTPATIENT
Start: 2024-01-26 | End: 2024-01-30

## 2024-01-26 RX ORDER — SULFAMETHOXAZOLE/TRIMETHOPRIM 800-160 MG
1 TABLET ORAL 2 TIMES DAILY
Qty: 20 TABLET | Refills: 0 | Status: SHIPPED | OUTPATIENT
Start: 2024-01-26 | End: 2024-02-05

## 2024-01-26 RX ORDER — CETIRIZINE HYDROCHLORIDE, PSEUDOEPHEDRINE HYDROCHLORIDE 5; 120 MG/1; MG/1
1 TABLET, FILM COATED, EXTENDED RELEASE ORAL 2 TIMES DAILY
Qty: 20 TABLET | Refills: 0 | Status: SHIPPED | OUTPATIENT
Start: 2024-01-26 | End: 2024-02-08

## 2024-01-26 ASSESSMENT — PATIENT HEALTH QUESTIONNAIRE - PHQ9
10. IF YOU CHECKED OFF ANY PROBLEMS, HOW DIFFICULT HAVE THESE PROBLEMS MADE IT FOR YOU TO DO YOUR WORK, TAKE CARE OF THINGS AT HOME, OR GET ALONG WITH OTHER PEOPLE: SOMEWHAT DIFFICULT
SUM OF ALL RESPONSES TO PHQ QUESTIONS 1-9: 6
SUM OF ALL RESPONSES TO PHQ QUESTIONS 1-9: 6

## 2024-01-26 NOTE — PROGRESS NOTES
Agustina is a 33 year old adult contacting the clinic today via video, who will use the platform: [employer] for the visit.  Phone # for [employer], or if [facility] drops:   Telephone Information:   Mobile [phone]          ASSESSMENT and PLAN:  1. Chronic frontal sinusitis  Reviewed CT scan.  Burst of prednisone and decongestants.  If no improvement add antibiotic  - predniSONE (DELTASONE) 20 MG tablet; Take 1 tablet (20 mg) by mouth every morning for 4 days  Dispense: 4 tablet; Refill: 0  - sulfamethoxazole-trimethoprim (BACTRIM DS) 800-160 MG tablet; Take 1 tablet by mouth 2 times daily for 10 days  Dispense: 20 tablet; Refill: 0  - cetirizine-pseudoePHEDrine ER (ZYRTEC-D) 5-120 MG 12 hr tablet; Take 1 tablet by mouth 2 times daily for 10 days  Dispense: 20 tablet; Refill: 0  - levalbuterol (XOPENEX HFA) 45 MCG/ACT inhaler; Inhale 2 puffs into the lungs every 4 hours as needed for shortness of breath or wheezing  Dispense: 15 g; Refill: 3    2. Hemoptysis  Noted earlier this week.  Minimal, scant, and possibly related to upper respiratory however check CT scan to be thorough.  Reassured.  Consider tuberculosis although no history of exposure    3. Major depressive disorder, recurrent episode, moderate (H)  Stable, but still fatigued    4. Preventative health care  - REVIEW OF HEALTH MAINTENANCE PROTOCOL ORDERS    5. Fatigue, unspecified type  Sleep architecture good.  Prednisone or decongestant may help.  Underlying infection may be contributing       Patient Instructions   Zyrtec-D twice daily 1 to 2 weeks    Prednisone 20 mg daily for 4 days    If no improvement begin sulfa DS 1 twice daily for 10 days    CT chest for hemoptysis     reviewed    Sleep study and pulmonary note reviewed            Return in about 4 months (around 5/26/2024) for using a video visit.       CHIEF COMPLAINT:  Chief Complaint   Patient presents with    Sinus Problem     Pt c/o acute exacerbation of otherwise chronic sinus and chest  congestion.           1/26/2024     1:32 PM   Additional Questions   Roomed by Javon LE RN       HISTORY OF PRESENT ILLNESS:  Agustina is a 33 year old adult contacting the clinic today via video for complaints of hemoptysis.  She has a long history of sinus congestion and irritation.  CT scan done last year showed sinus irritation but no obvious focal disease.  She worsened around [place].  Chronic symptoms include congestion and nasal plugging.  Trials of fluconazole, [place] pot, and nasal sprays have not been particularly helpful    This week she coughed so severely that she coughed up scant blood.  She contacted nursing.  The next day she reported doing it again.  With the first episode I decided and ordered a CT scan of the chest.  Discussed differential.  Discussed that this is not an emergency, but does require a thorough evaluation.  No fever or chills.  No exposure to tuberculosis    Formal sleep study normal without restless legs or apnea.  Still fatigued.  Trial of Ritalin helped but caused eye pain.  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  Rustam LAMA Hommeyer exercises with enough effort to increase Ray KELBY Hommeyer's heart rate 3 or less days per week.   Rustam Nam is taking medications regularly.      REVIEW OF SYSTEMS:   Improving sleep    PFSH:  Social History     Social History Narrative    Not on file       TOBACCO USE:  History   Smoking Status    Never   Smokeless Tobacco    Never       VITALS:  There were no vitals filed for this visit.  LMP 01/24/2024 (Exact Date)   "Estimated body mass index is 22.05 kg/m  as calculated from the following:    Height as of 1/9/24: 1.727 m (5' 8\").    Weight as of 1/9/24: 65.8 kg (145 lb).    PHYSICAL EXAM:  (observations via Video)  Alert and oriented without obvious cough or infection or wheezing    MEDICATIONS:   Current Outpatient Medications   Medication Sig Dispense Refill    cetirizine-pseudoePHEDrine ER (ZYRTEC-D) 5-120 MG 12 hr tablet Take 1 tablet by mouth 2 times daily for 10 days 20 tablet 0    Cholecalciferol (VITAMIN D3) 2400 UNIT/ML LIQD 2,400 Units 2 times daily 10 mL 11    COMPOUNDED NON-CONTROLLED SUBSTANCE (CMPD RX) - PHARMACY TO MIX COMPOUNDED MEDICATION Compound T4/Levothyroxine 75 mcg capsule. Take 1 capsule by mouth every morning 90 capsule 3    COMPOUNDED NON-CONTROLLED SUBSTANCE (CMPD RX) - PHARMACY TO MIX COMPOUNDED MEDICATION Compound T3/Liothyroinine - 2.5 MCG capsules. Take one capsule by mouth twice daily. 180 capsule 3    cyanocobalamin (CYANOCOBALAMIN) 1000 MCG/ML injection Inject 1 mL (1,000 mcg) into the muscle every 7 days 12 mL 3    fish oil-omega-3 fatty acids 1000 MG capsule Patient takes 630 mg daily      fluconazole (DIFLUCAN) 100 MG tablet Take 1 tablet by mouth Take it 7 days out of a month      levalbuterol (XOPENEX HFA) 45 MCG/ACT inhaler Inhale 2 puffs into the lungs every 4 hours as needed for shortness of breath or wheezing 15 g 3    levothyroxine (SYNTHROID/LEVOTHROID) 75 MCG tablet Take 1 tablet (75 mcg) by mouth daily 90 tablet 3    liothyronine (CYTOMEL) 50 MCG tablet       magnesium 250 MG tablet Patient takes  75 mg daily with calcium      magnesium oxide (MAG-OX) 400 MG tablet Take 1 tablet (400 mg) by mouth 2 times daily 180 tablet 3    meclizine 50 MG TABS Take 50 mg by mouth 3 times daily as needed for dizziness 30 tablet 3    nystatin (MYCOSTATIN) 469135 UNIT/ML suspension Take 5 mLs (500,000 Units) by mouth 4 times daily 400 mL 1    omega 3 1000 MG CAPS Take 2 capsules by mouth daily 100 " "capsule 11    potassium chloride ER (KLOR-CON M) 20 MEQ CR tablet Take 1 tablet (20 mEq) by mouth daily 30 tablet 11    predniSONE (DELTASONE) 20 MG tablet Take 1 tablet (20 mg) by mouth every morning for 4 days 4 tablet 0    progesterone (PROMETRIUM) 200 MG capsule Take 1 capsule by mouth daily      sertraline (ZOLOFT) 25 MG tablet Take 1 tablet (25 mg) by mouth daily 30 tablet 11    sulfamethoxazole-trimethoprim (BACTRIM DS) 800-160 MG tablet Take 1 tablet by mouth 2 times daily for 10 days 20 tablet 0    syringe/needle, disp, 25G X 1\" 3 ML MISC Inject 1 each into the muscle once a week With B12 12 each 3    testosterone (ANDROGEL 1.62 % PUMP) 20.25 MG/ACT gel Place 40.5 mg onto the skin      UNABLE TO FIND Take 2 tablets by mouth daily MEDICATION NAME: DIM complex hormonal support      vitamin D3 (CHOLECALCIFEROL) 50 mcg (2000 units) tablet Take 1 tablet (50 mcg) by mouth 3 times daily 90 tablet 3    vitamin D3 (CHOLECALCIFEROL) 50 mcg (2000 units) tablet Take 1 tablet by mouth daily         Outside Notes summarized: Phone note x 2  Labs, x-rays, cardiology, GI tests reviewed: CT sinuses.  CT chest ordered      Recent Labs   Lab Test 06/15/23  1005 06/01/23  1055 05/17/23  1044 03/15/23  1316 02/14/23  0856 01/23/23  0819 09/23/22  1607 07/27/22  1434 06/10/22  1435   HGB  --   --   --   --   --   --   --   --  12.9   WBC  --   --   --   --   --   --   --   --  7.3     --   --   --   --  140  --   --  140   POTASSIUM 4.2  --   --   --   --  3.8  --   --  4.2   CR 0.83  --   --   --   --   --   --   --  0.78   URIC  --   --   --   --   --   --   --  2.6  --    B12 1,942*  --   --   --   --   --   --  1,268*  --    TSH  --   --  0.20* 0.35   < >  --    < >  --   --    VITDT 54  --   --   --   --   --   --   --   --    SED  --  8  --   --   --   --   --  8  --    CRPI  --  <3.00  --   --   --   --   --  <3.00  --     < > = values in this interval not displayed.     No results found for: \"HSTUX10JHW\"  Lab " Results   Component Value Date    CHOL 272 07/12/2017     New orders:   Orders Placed This Encounter   Procedures    REVIEW OF HEALTH MAINTENANCE PROTOCOL ORDERS       Independent review of:     Dalton Zhao MD  [facility]    Video-Visit Details  Type of service:  Video Visit  Patient has given verbal consent to a Video visit?  Yes  How would you like to obtain your AVS?  MyChart  Will anyone else be joining your video visit, giving supplemental history? No    Originating location (pt location): Home    Distant Location (provider location):  Off-site    Video Start Time: 2:07 PM  Video End time:  2:43 PM  Face to face plus orders: 36 minutes  Documentation time:  6 minutes     The visit lasted a total of 45   minutes

## 2024-01-26 NOTE — PROGRESS NOTES
"Rustam is a 33 year old who is being evaluated via a billable video visit.      How would you like to obtain your AVS? MyChart  If the video visit is dropped, the invitation should be resent by: Send to e-mail at: [email or web address]  Will anyone else be joining your video visit? No  {If patient encounters technical issues they should call [phone] :127145}        {PROVIDER CHARTING PREFERENCE:921378}    Subjective   Rustam is a 33 year old, presenting for the following health issues:  Sinus Problem (Pt c/o acute exacerbation of otherwise chronic sinus and chest congestion.)      1/26/2024     1:32 PM   Additional Questions   Roomed by Javon LE RN     Sinus Problem     History of Present Illness       Reason for visit:  Chronic sinus congestion possibly linked to blood in spit, also following up about sleep study/fatigue    Rustam Nam eats 2-3 servings of fruits and vegetables daily.Rustam Nam consumes 1 sweetened beverage(s) daily.Rustam Nam exercises with enough effort to increase Rustam Nam's heart rate 20 to 29 minutes per day.  Rustam Nam exercises with enough effort to increase Rustam Nam's heart rate 3 or less days per week.   Rustam Nam is taking medications regularly.       {SUPERLIST (Optional):237350}  {additonal problems for provider to add (Optional):623015}    {ROS Picklists (Optional):537802}      Objective    Vitals - Patient Reported  Systolic (Patient Reported): 98  Diastolic (Patient Reported): 54  Weight (Patient Reported): 65.8 kg (145 lb)  Height (Patient Reported): 172.7 cm (5' 8\")  BMI (Based on Pt Reported Ht/Wt): 22.05  Pulse (Patient Reported): 70  Pain Score: No Pain (0)        Physical Exam   {video visit exam brief selected:484526}    {Diagnostic Test Results (Optional):975777}      Video-Visit Details    Type of service:  Video Visit   Video Start Time: {video visit start/end time for provider to select:542583}  Video End Time:{video visit start/end time " "for provider to select:006093}    Originating Location (pt. Location): {video visit patient location:534682::\"Home\"}  {PROVIDER LOCATION On-site should be selected for visits conducted from your clinic location or adjoining [facility] hospital, academic office, or other nearby [facility] building. Off-site should be selected for all other provider locations, including home:767420}  Distant Location (provider location):  {virtual location provider:050998}  Platform used for Video Visit: {Virtual Visit Platforms:041602::\"[employer]\"}  Signed Electronically by: Dalton Zhao MD  {Email feedback regarding this note to [email or web address]   :703586}  "

## 2024-01-26 NOTE — PATIENT INSTRUCTIONS
Zyrtec-D twice daily 1 to 2 weeks    Prednisone 20 mg daily for 4 days    If no improvement begin sulfa DS 1 twice daily for 10 days    CT chest for hemoptysis     reviewed    Sleep study and pulmonary note reviewed

## 2024-01-30 ENCOUNTER — HOSPITAL ENCOUNTER (OUTPATIENT)
Dept: CT IMAGING | Facility: HOSPITAL | Age: 34
Discharge: HOME | End: 2024-01-30
Attending: INTERNAL MEDICINE | Admitting: INTERNAL MEDICINE
Payer: COMMERCIAL

## 2024-01-30 DIAGNOSIS — R04.2 HEMOPTYSIS: ICD-10-CM

## 2024-01-30 PROCEDURE — 71250 CT THORAX DX C-: CPT

## 2024-02-08 DIAGNOSIS — J32.1 CHRONIC FRONTAL SINUSITIS: ICD-10-CM

## 2024-02-08 RX ORDER — CETIRIZINE HYDROCHLORIDE, PSEUDOEPHEDRINE HYDROCHLORIDE 5; 120 MG/1; MG/1
1 TABLET, FILM COATED, EXTENDED RELEASE ORAL EVERY MORNING
Qty: 90 TABLET | Refills: 1 | Status: SHIPPED | OUTPATIENT
Start: 2024-02-08 | End: 2024-04-22 | Stop reason: SINTOL

## 2024-02-28 DIAGNOSIS — K22.10 EROSIVE ESOPHAGITIS: Primary | ICD-10-CM

## 2024-03-14 ENCOUNTER — VIRTUAL VISIT (OUTPATIENT)
Dept: INTERNAL MEDICINE | Facility: CLINIC | Age: 34
End: 2024-03-14
Payer: COMMERCIAL

## 2024-03-14 DIAGNOSIS — F32.81 PREMENSTRUAL DYSPHORIC DISORDER: ICD-10-CM

## 2024-03-14 DIAGNOSIS — N94.3 PMS (PREMENSTRUAL SYNDROME): Primary | ICD-10-CM

## 2024-03-14 DIAGNOSIS — K22.10 EROSIVE ESOPHAGITIS: ICD-10-CM

## 2024-03-14 DIAGNOSIS — E55.9 VITAMIN D DEFICIENCY: ICD-10-CM

## 2024-03-14 DIAGNOSIS — F33.1 MAJOR DEPRESSIVE DISORDER, RECURRENT EPISODE, MODERATE (H): ICD-10-CM

## 2024-03-14 DIAGNOSIS — Z00.00 PREVENTATIVE HEALTH CARE: ICD-10-CM

## 2024-03-14 PROBLEM — F32.A DEPRESSION: Chronic | Status: RESOLVED | Noted: 2023-08-21 | Resolved: 2024-03-14

## 2024-03-14 PROCEDURE — 99215 OFFICE O/P EST HI 40 MIN: CPT | Mod: 95 | Performed by: INTERNAL MEDICINE

## 2024-03-14 RX ORDER — SERTRALINE HYDROCHLORIDE 25 MG/1
25-50 TABLET, FILM COATED ORAL DAILY
Qty: 100 TABLET | Refills: 3 | Status: SHIPPED | OUTPATIENT
Start: 2024-03-14 | End: 2024-05-22

## 2024-03-14 RX ORDER — CHOLECALCIFEROL (VITAMIN D3) 50 MCG
1 TABLET ORAL 3 TIMES DAILY
Qty: 90 TABLET | Refills: 3 | Status: SHIPPED | OUTPATIENT
Start: 2024-03-14 | End: 2024-05-22

## 2024-03-14 RX ORDER — LAMOTRIGINE 25 MG/1
25 TABLET ORAL DAILY
Qty: 90 TABLET | Refills: 3 | Status: SHIPPED | OUTPATIENT
Start: 2024-03-14 | End: 2024-04-22

## 2024-03-14 ASSESSMENT — PATIENT HEALTH QUESTIONNAIRE - PHQ9
SUM OF ALL RESPONSES TO PHQ QUESTIONS 1-9: 9
10. IF YOU CHECKED OFF ANY PROBLEMS, HOW DIFFICULT HAVE THESE PROBLEMS MADE IT FOR YOU TO DO YOUR WORK, TAKE CARE OF THINGS AT HOME, OR GET ALONG WITH OTHER PEOPLE: VERY DIFFICULT
SUM OF ALL RESPONSES TO PHQ QUESTIONS 1-9: 9

## 2024-03-14 NOTE — PATIENT INSTRUCTIONS
Lamotrigine 25 mg daily    Consider flexing dose between 25 and 50 mg    Continue sertraline but consider flexing increased to 50 mg 1 week/month    Referral to OB/GYN    Referral to Pharm.D.    Cholesterol profile    Omeprazole 20 mg twice daily x 2 months-initiated February 28    After 2 months decrease to once daily

## 2024-03-14 NOTE — PROGRESS NOTES
Agustina is a 33 year old adult contacting the clinic today via video, who will use the platform: 28msec for the visit.  Phone # for Doximity, or if Amwell drops:   Telephone Information:   Mobile [phone]          ASSESSMENT and PLAN:  1. PMS (premenstrual syndrome)  Trial of lamotrigine to smooth mood swings.  Referral to pharmacy and OB/GYN to assist with more current medication options  - lamoTRIgine (LAMICTAL) 25 MG tablet; Take 1 tablet (25 mg) by mouth daily  Dispense: 90 tablet; Refill: 3  - Ob/Gyn  Referral; Future  - Med Therapy Management Referral    2. Major depressive disorder, recurrent episode, moderate (H)  Continue low-dose sertraline but discussed flexing dose up and down    3. Premenstrual dysphoric disorder  As above  - sertraline (ZOLOFT) 25 MG tablet; Take 1-2 tablets (25-50 mg) by mouth daily  Dispense: 100 tablet; Refill: 3    4. Erosive esophagitis  Markedly improved with initiation of omeprazole.  Continue at least 2 months, and possibly indefinitely.    5. Preventative health care  - Lipid panel reflex to direct LDL Fasting; Future       Patient Instructions   Lamotrigine 25 mg daily    Consider flexing dose between 25 and 50 mg    Continue sertraline but consider flexing increased to 50 mg 1 week/month    Referral to OB/GYN    Referral to Pharm.D.    Cholesterol profile    Omeprazole 20 mg twice daily x 2 months-initiated February 28    After 2 months decrease to once daily            Return in about 3 months (around 6/14/2024) for using a video visit.       CHIEF COMPLAINT:  Chief Complaint   Patient presents with    Amenorrhea     physical and emotional pain and discomfort from menses       HISTORY OF PRESENT ILLNESS:  Agustina is a 33 year old adult contacting the clinic today via video for review of cycling menstrual and premenstrual symptoms.  She reports fluctuating symptoms that follow her cycle.  She started progesterone last fall.  She wonders whether a higher dose of  "progesterone might help.  Sertraline seems to helps move slightly, but she does complain of cycling emotions.  She has never discussed this with Pharm.D. or gynecology.  Does still have regular periods however they are becoming more irregular    Complained of chest pain by Lorent.  Advised to begin omeprazole twice daily on February 28.  Has noticed marked improvement in symptoms however they persist.  Discussed presumptive diagnosis of erosive esophagitis, and 2-month healing.  Review of CAT scans and x-rays shows no evidence of large hiatal hernia    Initiation of sertraline has helped her mood slightly    History of Present Illness       Reason for visit:  Physical and emotional pain and discomfort from menses    Rustam Nam eats 2-3 servings of fruits and vegetables daily.Rustam Nam consumes 2 sweetened beverage(s) daily.Rustam Nam exercises with enough effort to increase Rustam Nam's heart rate 20 to 29 minutes per day.  Rustam Nam exercises with enough effort to increase Rustam Nam's heart rate 3 or less days per week.   Rustam Nam is taking medications regularly.      REVIEW OF SYSTEMS:   Improved chest pain and acid.  No further hemoptysis.  CT normal    Today's PHQ-2 Score:       1/9/2024     1:40 PM   PHQ-2 ( 1999 [employer])   Q1: Little interest or pleasure in doing things 0   Q2: Feeling down, depressed or hopeless 0   PHQ-2 Score 0       PFSH:  Social History     Social History Narrative    Not on file     Unmarried    TOBACCO USE:  History   Smoking Status    Never   Smokeless Tobacco    Never       VITALS:  There were no vitals filed for this visit.  LMP 02/24/2024 (Exact Date)  Estimated body mass index is 22.05 kg/m  as calculated from the following:    Height as of 1/9/24: 1.727 m (5' 8\").    Weight as of 1/9/24: 65.8 kg (145 lb).    PHYSICAL EXAM:  (observations via Video)  Alert and oriented    MEDICATIONS:   Current Outpatient Medications   Medication Sig Dispense Refill " "   cetirizine-pseudoePHEDrine ER (ZYRTEC-D) 5-120 MG 12 hr tablet Take 1 tablet by mouth every morning for 180 days 90 tablet 1    COMPOUNDED NON-CONTROLLED SUBSTANCE (CMPD RX) - PHARMACY TO MIX COMPOUNDED MEDICATION Compound T4/Levothyroxine 75 mcg capsule. Take 1 capsule by mouth every morning 90 capsule 3    COMPOUNDED NON-CONTROLLED SUBSTANCE (CMPD RX) - PHARMACY TO MIX COMPOUNDED MEDICATION Compound T3/Liothyroinine - 2.5 MCG capsules. Take one capsule by mouth twice daily. 180 capsule 3    cyanocobalamin (CYANOCOBALAMIN) 1000 MCG/ML injection Inject 1 mL (1,000 mcg) into the muscle every 7 days 12 mL 3    fluconazole (DIFLUCAN) 100 MG tablet Take 1 tablet by mouth Take it 7 days out of a month      lamoTRIgine (LAMICTAL) 25 MG tablet Take 1 tablet (25 mg) by mouth daily 90 tablet 3    levalbuterol (XOPENEX HFA) 45 MCG/ACT inhaler Inhale 2 puffs into the lungs every 4 hours as needed for shortness of breath or wheezing 15 g 3    levothyroxine (SYNTHROID/LEVOTHROID) 75 MCG tablet Take 1 tablet (75 mcg) by mouth daily 90 tablet 3    liothyronine (CYTOMEL) 50 MCG tablet       magnesium 250 MG tablet Patient takes  75 mg daily with calcium      magnesium oxide (MAG-OX) 400 MG tablet Take 1 tablet (400 mg) by mouth 2 times daily 180 tablet 3    meclizine 50 MG TABS Take 50 mg by mouth 3 times daily as needed for dizziness 30 tablet 3    omega 3 1000 MG CAPS Take 2 capsules by mouth daily 100 capsule 11    omeprazole (PRILOSEC) 20 MG DR capsule Take 1 capsule (20 mg) by mouth 2 times daily 180 capsule 3    potassium chloride ER (KLOR-CON M) 20 MEQ CR tablet Take 1 tablet (20 mEq) by mouth daily 30 tablet 11    progesterone (PROMETRIUM) 200 MG capsule Take 1 capsule by mouth daily      sertraline (ZOLOFT) 25 MG tablet Take 1-2 tablets (25-50 mg) by mouth daily 100 tablet 3    syringe/needle, disp, 25G X 1\" 3 ML MISC Inject 1 each into the muscle once a week With B12 12 each 3    testosterone (ANDROGEL 1.62 % PUMP) " "20.25 MG/ACT gel Place 40.5 mg onto the skin      UNABLE TO FIND Take 2 tablets by mouth daily MEDICATION NAME: DIM complex hormonal support      vitamin D3 (CHOLECALCIFEROL) 50 mcg (2000 units) tablet TAKE 1 TABLET BY MOUTH 3 TIMES DAILY 90 tablet 3       Outside Notes summarized: Quan messages x 3 since last visit  Labs, x-rays, cardiology, GI tests reviewed: CT chest for hemoptysis.  Labs  Recent Labs   Lab Test 06/15/23  1005 06/01/23  1055 05/17/23  1044 03/15/23  1316 02/14/23  0856 01/23/23  0819 09/23/22  1607 07/27/22  1434 06/10/22  1435   HGB  --   --   --   --   --   --   --   --  12.9   WBC  --   --   --   --   --   --   --   --  7.3     --   --   --   --  140  --   --  140   POTASSIUM 4.2  --   --   --   --  3.8  --   --  4.2   CR 0.83  --   --   --   --   --   --   --  0.78   URIC  --   --   --   --   --   --   --  2.6  --    B12 1,942*  --   --   --   --   --   --  1,268*  --    TSH  --   --  0.20* 0.35   < >  --    < >  --   --    VITDT 54  --   --   --   --   --   --   --   --    SED  --  8  --   --   --   --   --  8  --    CRPI  --  <3.00  --   --   --   --   --  <3.00  --     < > = values in this interval not displayed.     No results found for: \"PGXTQ27NAO\"  Lab Results   Component Value Date    CHOL 272 07/12/2017     New orders:   Orders Placed This Encounter   Procedures    Lipid panel reflex to direct LDL Fasting    Ob/Gyn  Referral    Med Therapy Management Referral       Independent review of:         3/14/2024     9:05 AM   Additional Questions   Roomed by MAGDA Kaur   Accompanied by alone       Video-Visit Details  Type of service:  Video Visit  Patient has given verbal consent to a Video visit?  Yes  How would you like to obtain your AVS?  MyChart  Will anyone else be joining your video visit, giving supplemental history? No    Originating location (pt location): Home    Distant Location (provider location):  Off-site    Video Start Time: 10:05 AM  Video End time:  " 10:47 AM  Face to face plus orders: 42 minutes  Documentation time:  3 minutes     The visit lasted a total of 42 minutes    Dalton Zhao MD  [facility]

## 2024-03-14 NOTE — PROGRESS NOTES
Rustam is a 33 year old who is being evaluated via a billable video visit.    How would you like to obtain your AVS? MyChart  If the video visit is dropped, the invitation should be resent by: Send to e-mail at: [email or web address]  Will anyone else be joining your video visit? No  {If patient encounters technical issues they should call [phone] :567503}    {PROVIDER CHARTING PREFERENCE:129050}    Subjective   Rustam is a 33 year old, presenting for the following health issues:  Amenorrhea (physical and emotional pain and discomfort from menses)      3/14/2024     9:05 AM   Additional Questions   Roomed by MAGDA Kaur   Accompanied by alone     Video Start Time: {video visit start/end time for provider to select:459267}    History of Present Illness       Reason for visit:  Physical and emotional pain and discomfort from menses    Rustam Nam eats 2-3 servings of fruits and vegetables daily.Rustam Nam consumes 2 sweetened beverage(s) daily.Rustam Nam exercises with enough effort to increase Rustam Nam's heart rate 20 to 29 minutes per day.  Rustam Nam exercises with enough effort to increase Rustam Nam's heart rate 3 or less days per week.   Rustam Nam is taking medications regularly.       {SUPERLIST (Optional):775223}  {additonal problems for provider to add (Optional):854046}    {ROS Picklists (Optional):946436}      Objective           Vitals:  No vitals were obtained today due to virtual visit.    Physical Exam   {video visit exam brief selected:470540}    {Diagnostic Test Results (Optional):045522}      Video-Visit Details    Type of service:  Video Visit   Video End Time:{video visit start/end time for provider to select:842886}  Originating Location (pt. Location): Home  {PROVIDER LOCATION On-site should be selected for visits conducted from your clinic location or adjoining [facility] hospital, academic office, or other nearby [facility] building. Off-site should be selected for all other  provider locations, including home:321938}  Distant Location (provider location):  Off-site  Platform used for Video Visit: Shelly  Signed Electronically by: Dalton Zhao MD  {Email feedback regarding this note to [email or web address]   :219249}

## 2024-03-20 ENCOUNTER — TELEPHONE (OUTPATIENT)
Dept: INTERNAL MEDICINE | Facility: CLINIC | Age: 34
End: 2024-03-20

## 2024-03-20 NOTE — TELEPHONE ENCOUNTER
MTM referral from: [facility] visit (referral by provider)    MTM referral outreach attempt #2 on March 20, 2024 at 9:06 AM      Outcome: Patient not reachable after several attempts, will route to MTM Pharmacist/Provider as an FYI.  [facility] scheduling number is .  Thank you for the referral.    Use chelsea lee for the carrier/Plan on the flowsheet      [employer] Message Sent    Sophie Darby CPhT  MT

## 2024-04-22 ENCOUNTER — VIRTUAL VISIT (OUTPATIENT)
Dept: INTERNAL MEDICINE | Facility: CLINIC | Age: 34
End: 2024-04-22
Payer: COMMERCIAL

## 2024-04-22 DIAGNOSIS — N94.3 PMS (PREMENSTRUAL SYNDROME): ICD-10-CM

## 2024-04-22 DIAGNOSIS — R00.0 SINUS TACHYCARDIA: Primary | ICD-10-CM

## 2024-04-22 DIAGNOSIS — F41.1 GENERALIZED ANXIETY DISORDER: Chronic | ICD-10-CM

## 2024-04-22 DIAGNOSIS — D50.0 IRON DEFICIENCY ANEMIA DUE TO CHRONIC BLOOD LOSS: ICD-10-CM

## 2024-04-22 DIAGNOSIS — K22.10 EROSIVE ESOPHAGITIS: ICD-10-CM

## 2024-04-22 DIAGNOSIS — J32.1 CHRONIC FRONTAL SINUSITIS: ICD-10-CM

## 2024-04-22 PROCEDURE — G2211 COMPLEX E/M VISIT ADD ON: HCPCS | Mod: 95 | Performed by: INTERNAL MEDICINE

## 2024-04-22 PROCEDURE — 99215 OFFICE O/P EST HI 40 MIN: CPT | Mod: 95 | Performed by: INTERNAL MEDICINE

## 2024-04-22 RX ORDER — FEXOFENADINE HCL 180 MG/1
180 TABLET ORAL DAILY
COMMUNITY
Start: 2024-04-22 | End: 2024-07-29

## 2024-04-22 RX ORDER — ACETAMINOPHEN AND CODEINE PHOSPHATE 120; 12 MG/5ML; MG/5ML
1 SOLUTION ORAL DAILY
COMMUNITY
Start: 2024-03-28 | End: 2025-03-28

## 2024-04-22 RX ORDER — PROPRANOLOL HYDROCHLORIDE 10 MG/1
10 TABLET ORAL EVERY 8 HOURS PRN
Qty: 20 TABLET | Refills: 2 | Status: SHIPPED | OUTPATIENT
Start: 2024-04-22

## 2024-04-22 RX ORDER — FERROUS GLUCONATE 324(38)MG
324 TABLET ORAL
Qty: 90 TABLET | Refills: 3 | Status: SHIPPED | OUTPATIENT
Start: 2024-04-22 | End: 2024-07-29

## 2024-04-22 RX ORDER — CETIRIZINE HYDROCHLORIDE 10 MG/1
10 TABLET ORAL DAILY
Qty: 90 TABLET | Refills: 3 | Status: SHIPPED | OUTPATIENT
Start: 2024-04-22 | End: 2025-04-22

## 2024-04-22 RX ORDER — LAMOTRIGINE 25 MG/1
25 TABLET ORAL 2 TIMES DAILY
Qty: 180 TABLET | Refills: 3 | Status: SHIPPED | OUTPATIENT
Start: 2024-04-22 | End: 2024-07-29

## 2024-04-22 ASSESSMENT — PATIENT HEALTH QUESTIONNAIRE - PHQ9
10. IF YOU CHECKED OFF ANY PROBLEMS, HOW DIFFICULT HAVE THESE PROBLEMS MADE IT FOR YOU TO DO YOUR WORK, TAKE CARE OF THINGS AT HOME, OR GET ALONG WITH OTHER PEOPLE: SOMEWHAT DIFFICULT
SUM OF ALL RESPONSES TO PHQ QUESTIONS 1-9: 9
SUM OF ALL RESPONSES TO PHQ QUESTIONS 1-9: 9

## 2024-04-22 NOTE — PROGRESS NOTES
Rustam is a 33 year old who is being evaluated via a billable video visit.    {ROOMING STAFF complete during rooming of virtual visit (Optional):388939}  {If patient encounters technical issues they should call [phone] :322320}    {PROVIDER CHARTING PREFERENCE:934887}    Subjective   Rustam is a 33 year old, presenting for the following health issues:  Follow Up ( My chest still hurts, after two months of omeprazole and I've been having daily and increased heart palpitations and heart pounding again. I've paused some of my meds. I looking forward to hearing next steps. I'm pretty uncomfortable and worn down.)      4/22/2024    10:21 AM   Additional Questions   Roomed by Breanna CUNNINGHAM     Video Start Time: {video visit start/end time for provider to select:137141}    History of Present Illness       Reason for visit:  Chest and throat pain (omeprozole), heart palpitations and pounding    Rustam eats 2-3 servings of fruits and vegetables daily.Rustam consumes 1 sweetened beverage(s) daily.Rustam exercises with enough effort to increase Rustam's heart rate 20 to 29 minutes per day.  Rustam exercises with enough effort to increase Ray's heart rate 3 or less days per week.   Rustam is taking medications regularly.       {SUPERLIST (Optional):559633}  {additonal problems for provider to add (Optional):338300}    {ROS Picklists (Optional):848587}      Objective    Vitals - Patient Reported  Systolic (Patient Reported):  (does not monitor at home)      Vitals:  No vitals were obtained today due to virtual visit.    Physical Exam   {video visit exam brief selected:894936}    {Diagnostic Test Results (Optional):337189}      Video-Visit Details    Type of service:  Video Visit   Video End Time:{video visit start/end time for provider to select:475703}  Originating Location (pt. Location): Home  {PROVIDER LOCATION On-site should be selected for visits conducted from your clinic location or adjoining [facility] hospital, academic office, or other nearby  [facility] building. Off-site should be selected for all other provider locations, including home:967376}  Distant Location (provider location):  On-site  Platform used for Video Visit: Shelly  Signed Electronically by: Dalton Zhao MD  {Email feedback regarding this note to [email or web address]   :515154}

## 2024-04-22 NOTE — PROGRESS NOTES
OFFICE VISIT--Video    Agustina is a 33 year old adult contacting the clinic today via video, who will use the platform: [employer] for the visit.  Phone # for Doximity, or if Amwell drops:   Telephone Information:   Mobile [phone]          ASSESSMENT and PLAN:  1. Sinus tachycardia  Consider inhaler.  Consider excess thyroid.  Consider anemia or hypotension.  Consider side effect of Sudafed.  Trial of medicine readjustment.  Further labs if no improvement  - TSH; Future  - Basic metabolic panel; Future  - Magnesium; Future  - propranolol (INDERAL) 10 MG tablet; Take 1 tablet (10 mg) by mouth every 8 hours as needed (anxiety or tachycardia)  Dispense: 20 tablet; Refill: 2    2. Iron deficiency anemia due to chronic blood loss  Begin iron supplementation.  Consider infusion.  Resume birth control pill  - ferrous gluconate (FERGON) 324 (38 Fe) MG tablet; Take 1 tablet (324 mg) by mouth daily (with breakfast)  Dispense: 90 tablet; Refill: 3    3. Generalized anxiety disorder  Propranolol as needed.  Resume sertraline when possible  - propranolol (INDERAL) 10 MG tablet; Take 1 tablet (10 mg) by mouth every 8 hours as needed (anxiety or tachycardia)  Dispense: 20 tablet; Refill: 2    4. Chronic frontal sinusitis  Trial of plain antihistamine without decongestant  - fexofenadine (ALLEGRA) 180 MG tablet; Take 1 tablet (180 mg) by mouth daily  - cetirizine (ZYRTEC) 10 MG tablet; Take 1 tablet (10 mg) by mouth daily  Dispense: 90 tablet; Refill: 3    5. Erosive esophagitis  Possible side effect of Sudafed.  Proceed to endoscopy if not rapidly improved  - UPPER GI ENDOSCOPY; Future    6. PMS (premenstrual syndrome)  - lamoTRIgine (LAMICTAL) 25 MG tablet; Take 1 tablet (25 mg) by mouth 2 times daily  Dispense: 180 tablet; Refill: 3       Patient Instructions   Discontinue Zyrtec daily    Trial of plain Zyrtec 10 mg or Allegra 180 mg    Ferrous gluconate once daily and if tolerates twice daily    Resume birth control pill    If  symptoms persist check labs for tachycardia and fatigue    Propranolol 10 mg every 8 hours as needed if systolic blood pressure greater than 100    If chest pain persists after another 2 to 3 days proceed to upper GI endoscopy    Trial again to decrease omeprazole to once daily after 2 to 3 days    Resume sertraline when stable after a week or 2 of iron replacement    Resume lamotrigine after sertraline but twice daily            Return in about 4 months (around 8/22/2024) for using a video visit.       CHIEF COMPLAINT:  Chief Complaint   Patient presents with    Follow Up      My chest still hurts, after two months of omeprazole and I've been having daily and increased heart palpitations and heart pounding again. I've paused some of my meds. I looking forward to hearing next steps. I'm pretty uncomfortable and worn down.       HISTORY OF PRESENT ILLNESS:  Agustina is a 33 year old adult contacting the clinic today via video for multiple concerns.  She took omeprazole for 2 months for acid reflux and noticed improvement.  Per instructions she decreased from twice daily to once daily and developed recurrent symptoms within 48 hours.  Increasing back to twice daily has not relieved those symptoms.  We discussed possible medicine side effects and adjustment of medicines.  If no rapid improvement within a few days proceed to endoscopy    Complains of frequent palpitations and tachycardia.  Holter monitor last year showed no PVCs or PACs but episodes of frequent pounding and tachycardia.  Discussed proper terminology and she agrees that she feels her heart pounding more than skip.  Discussed possible side effect of Sudafed.  She has stopped her Zoloft and lamotrigine and birth control pills all of which were slightly new    Trial of lamotrigine did help her anxiety slightly before she stopped    Noted to have hemoglobin of 11.4 at gynecology with low ferritin.  Previous baseline 13.5 and discussed iron supplementation.   "Blood loss from heavy periods    History of Present Illness       Reason for visit:  Chest and throat pain (omeprozole), heart palpitations and pounding    Rustam eats 2-3 servings of fruits and vegetables daily.Rustam consumes 1 sweetened beverage(s) daily.Ray exercises with enough effort to increase Ray's heart rate 20 to 29 minutes per day.  Ray exercises with enough effort to increase Ray's heart rate 3 or less days per week.   Rustam is taking medications regularly.      REVIEW OF SYSTEMS:   Worsening anxiety    Today's PHQ-2 Score:       1/9/2024     1:40 PM   PHQ-2 ( 1999 Pfizer)   Q1: Little interest or pleasure in doing things 0   Q2: Feeling down, depressed or hopeless 0   PHQ-2 Score 0       PFSH:  Social History     Social History Narrative    Not on file       TOBACCO USE:  History   Smoking Status    Never   Smokeless Tobacco    Never       VITALS:  There were no vitals filed for this visit.  LMP 02/24/2024 (Exact Date)  Estimated body mass index is 22.05 kg/m  as calculated from the following:    Height as of 1/9/24: 1.727 m (5' 8\").    Weight as of 1/9/24: 65.8 kg (145 lb).    PHYSICAL EXAM:  (observations via Video)  Alert and oriented wearing stocking.  Slightly anxious    MEDICATIONS:   Current Outpatient Medications   Medication Sig Dispense Refill    cetirizine (ZYRTEC) 10 MG tablet Take 1 tablet (10 mg) by mouth daily 90 tablet 3    COMPOUNDED NON-CONTROLLED SUBSTANCE (CMPD RX) - PHARMACY TO MIX COMPOUNDED MEDICATION Compound T4/Levothyroxine 75 mcg capsule. Take 1 capsule by mouth every morning 90 capsule 3    COMPOUNDED NON-CONTROLLED SUBSTANCE (CMPD RX) - PHARMACY TO MIX COMPOUNDED MEDICATION Compound T3/Liothyroinine - 2.5 MCG capsules. Take one capsule by mouth twice daily. 180 capsule 3    cyanocobalamin (CYANOCOBALAMIN) 1000 MCG/ML injection Inject 1 mL (1,000 mcg) into the muscle every 7 days 12 mL 3    ferrous gluconate (FERGON) 324 (38 Fe) MG tablet Take 1 tablet (324 mg) by mouth daily (with " "breakfast) 90 tablet 3    fexofenadine (ALLEGRA) 180 MG tablet Take 1 tablet (180 mg) by mouth daily      fluconazole (DIFLUCAN) 100 MG tablet Take 1 tablet by mouth Take it 7 days out of a month      lamoTRIgine (LAMICTAL) 25 MG tablet Take 1 tablet (25 mg) by mouth 2 times daily 180 tablet 3    levalbuterol (XOPENEX HFA) 45 MCG/ACT inhaler Inhale 2 puffs into the lungs every 4 hours as needed for shortness of breath or wheezing 15 g 3    levothyroxine (SYNTHROID/LEVOTHROID) 75 MCG tablet Take 1 tablet (75 mcg) by mouth daily 90 tablet 3    liothyronine (CYTOMEL) 50 MCG tablet       magnesium oxide (MAG-OX) 400 MG tablet Take 1 tablet (400 mg) by mouth 2 times daily 180 tablet 3    meclizine 50 MG TABS Take 50 mg by mouth 3 times daily as needed for dizziness 30 tablet 3    norethindrone (MICRONOR) 0.35 MG tablet Take 1 tablet by mouth daily      omega 3 1000 MG CAPS Take 2 capsules by mouth daily 100 capsule 11    omeprazole (PRILOSEC) 20 MG DR capsule Take 1 capsule (20 mg) by mouth 2 times daily 180 capsule 3    potassium chloride ER (KLOR-CON M) 20 MEQ CR tablet Take 1 tablet (20 mEq) by mouth daily 30 tablet 11    progesterone (PROMETRIUM) 200 MG capsule Take 1 capsule by mouth daily      propranolol (INDERAL) 10 MG tablet Take 1 tablet (10 mg) by mouth every 8 hours as needed (anxiety or tachycardia) 20 tablet 2    sertraline (ZOLOFT) 25 MG tablet Take 1-2 tablets (25-50 mg) by mouth daily 100 tablet 3    syringe/needle, disp, 25G X 1\" 3 ML MISC Inject 1 each into the muscle once a week With B12 12 each 3    testosterone (ANDROGEL 1.62 % PUMP) 20.25 MG/ACT gel Place 40.5 mg onto the skin      UNABLE TO FIND Take 2 tablets by mouth daily MEDICATION NAME: DIM complex hormonal support      vitamin D3 (CHOLECALCIFEROL) 50 mcg (2000 units) tablet TAKE 1 TABLET BY MOUTH 3 TIMES DAILY 90 tablet 3       Outside Notes summarized: GYN  Labs, x-rays, cardiology, GI tests reviewed: Hemoglobin 11.4  Recent Labs   Lab Test " "06/15/23  1005 06/01/23  1055 05/17/23  1044 03/15/23  1316 02/14/23  0856 01/23/23  0819 09/23/22  1607 07/27/22  1434 06/10/22  1435   HGB  --   --   --   --   --   --   --   --  12.9   WBC  --   --   --   --   --   --   --   --  7.3     --   --   --   --  140  --   --  140   POTASSIUM 4.2  --   --   --   --  3.8  --   --  4.2   CR 0.83  --   --   --   --   --   --   --  0.78   URIC  --   --   --   --   --   --   --  2.6  --    B12 1,942*  --   --   --   --   --   --  1,268*  --    TSH  --   --  0.20* 0.35   < >  --    < >  --   --    VITDT 54  --   --   --   --   --   --   --   --    SED  --  8  --   --   --   --   --  8  --    CRPI  --  <3.00  --   --   --   --   --  <3.00  --     < > = values in this interval not displayed.     No results found for: \"IRDMB17KLF\"  Lab Results   Component Value Date    CHOL 272 07/12/2017     New orders:   Orders Placed This Encounter   Procedures    UPPER GI ENDOSCOPY    TSH    Basic metabolic panel    Magnesium       Independent review of:         4/22/2024    10:21 AM   Additional Questions   Roomed by Breanna CUNNINGHAM       Video-Visit Details  Type of service:  Video Visit  Patient has given verbal consent to a Video visit?  Yes  How would you like to obtain your AVS?  MyChart  Will anyone else be joining your video visit, giving supplemental history? No    Originating location (pt location): Home    Distant Location (provider location):  Off-site    Video Start Time: 10:37 AM  Video End time:  11:16 AM  Face to face plus orders: 39 minutes  Documentation time:  3 minutes     The visit lasted a total of 42 minutes    Dalton Zhao MD  [facility]"

## 2024-05-08 ENCOUNTER — LAB (OUTPATIENT)
Dept: LAB | Facility: CLINIC | Age: 34
End: 2024-05-08
Payer: COMMERCIAL

## 2024-05-08 DIAGNOSIS — R00.0 SINUS TACHYCARDIA: ICD-10-CM

## 2024-05-08 DIAGNOSIS — Z00.00 PREVENTATIVE HEALTH CARE: ICD-10-CM

## 2024-05-08 LAB
ANION GAP SERPL CALCULATED.3IONS-SCNC: 11 MMOL/L (ref 7–15)
BUN SERPL-MCNC: 6.8 MG/DL (ref 6–20)
CALCIUM SERPL-MCNC: 9.4 MG/DL (ref 8.6–10)
CHLORIDE SERPL-SCNC: 106 MMOL/L (ref 98–107)
CREAT SERPL-MCNC: 0.78 MG/DL (ref 0.51–1.17)
DEPRECATED HCO3 PLAS-SCNC: 23 MMOL/L (ref 22–29)
EGFRCR SERPLBLD CKD-EPI 2021: >90 ML/MIN/1.73M2
GLUCOSE SERPL-MCNC: 102 MG/DL (ref 70–99)
MAGNESIUM SERPL-MCNC: 2.1 MG/DL (ref 1.7–2.3)
POTASSIUM SERPL-SCNC: 4 MMOL/L (ref 3.4–5.3)
SODIUM SERPL-SCNC: 140 MMOL/L (ref 135–145)
TSH SERPL DL<=0.005 MIU/L-ACNC: 0.14 UIU/ML (ref 0.3–4.2)

## 2024-05-08 PROCEDURE — 36415 COLL VENOUS BLD VENIPUNCTURE: CPT

## 2024-05-08 PROCEDURE — 80048 BASIC METABOLIC PNL TOTAL CA: CPT

## 2024-05-08 PROCEDURE — 84443 ASSAY THYROID STIM HORMONE: CPT

## 2024-05-08 PROCEDURE — 83735 ASSAY OF MAGNESIUM: CPT

## 2024-05-15 DIAGNOSIS — E27.9 ADRENAL DISORDER (H): ICD-10-CM

## 2024-05-15 DIAGNOSIS — M35.9 AUTOIMMUNE DISEASE (H): ICD-10-CM

## 2024-05-15 RX ORDER — SYRINGE WITH NEEDLE, 1 ML 25GX5/8"
SYRINGE, EMPTY DISPOSABLE MISCELLANEOUS
Qty: 4 EACH | Refills: 3 | Status: SHIPPED | OUTPATIENT
Start: 2024-05-15

## 2024-05-15 RX ORDER — CYANOCOBALAMIN 1000 UG/ML
1 INJECTION, SOLUTION INTRAMUSCULAR; SUBCUTANEOUS
Qty: 4 ML | Refills: 3 | Status: SHIPPED | OUTPATIENT
Start: 2024-05-15

## 2024-05-21 ASSESSMENT — PATIENT HEALTH QUESTIONNAIRE - PHQ9
SUM OF ALL RESPONSES TO PHQ QUESTIONS 1-9: 8
10. IF YOU CHECKED OFF ANY PROBLEMS, HOW DIFFICULT HAVE THESE PROBLEMS MADE IT FOR YOU TO DO YOUR WORK, TAKE CARE OF THINGS AT HOME, OR GET ALONG WITH OTHER PEOPLE: VERY DIFFICULT
SUM OF ALL RESPONSES TO PHQ QUESTIONS 1-9: 8

## 2024-05-22 ENCOUNTER — VIRTUAL VISIT (OUTPATIENT)
Dept: INTERNAL MEDICINE | Facility: CLINIC | Age: 34
End: 2024-05-22
Payer: COMMERCIAL

## 2024-05-22 DIAGNOSIS — J32.1 CHRONIC FRONTAL SINUSITIS: ICD-10-CM

## 2024-05-22 DIAGNOSIS — E27.9 ADRENAL DISORDER (H): Primary | ICD-10-CM

## 2024-05-22 DIAGNOSIS — D50.0 IRON DEFICIENCY ANEMIA DUE TO CHRONIC BLOOD LOSS: ICD-10-CM

## 2024-05-22 DIAGNOSIS — R00.0 SINUS TACHYCARDIA: ICD-10-CM

## 2024-05-22 DIAGNOSIS — E55.9 VITAMIN D DEFICIENCY: ICD-10-CM

## 2024-05-22 DIAGNOSIS — K22.10 EROSIVE ESOPHAGITIS: ICD-10-CM

## 2024-05-22 PROCEDURE — G2211 COMPLEX E/M VISIT ADD ON: HCPCS | Mod: 95 | Performed by: INTERNAL MEDICINE

## 2024-05-22 PROCEDURE — 99215 OFFICE O/P EST HI 40 MIN: CPT | Mod: 95 | Performed by: INTERNAL MEDICINE

## 2024-05-22 RX ORDER — CHOLECALCIFEROL (VITAMIN D3) 25 MCG
3 CAPSULE ORAL 2 TIMES DAILY
COMMUNITY
Start: 2024-03-14 | End: 2024-05-22

## 2024-05-22 RX ORDER — LEVOTHYROXINE SODIUM 125 UG/1
62.5 TABLET ORAL DAILY
COMMUNITY
Start: 2024-05-14

## 2024-05-22 RX ORDER — DOXYCYCLINE HYCLATE 100 MG
100 TABLET ORAL 2 TIMES DAILY
Qty: 20 TABLET | Refills: 0 | Status: SHIPPED | OUTPATIENT
Start: 2024-05-22 | End: 2024-06-01

## 2024-05-22 RX ORDER — MIDODRINE HYDROCHLORIDE 5 MG/1
5 TABLET ORAL 3 TIMES DAILY PRN
Qty: 90 TABLET | Refills: 0 | Status: SHIPPED | OUTPATIENT
Start: 2024-05-22 | End: 2024-06-21

## 2024-05-22 RX ORDER — CHOLECALCIFEROL (VITAMIN D3) 25 MCG
1 CAPSULE ORAL DAILY
COMMUNITY
Start: 2024-05-22 | End: 2024-07-29

## 2024-05-22 RX ORDER — CHOLECALCIFEROL (VITAMIN D3) 50 MCG
1 TABLET ORAL DAILY
COMMUNITY
Start: 2024-05-22 | End: 2024-05-22

## 2024-05-22 NOTE — PROGRESS NOTES
Rustam is a 33 year old who is being evaluated via a billable video visit.    How would you like to obtain your AVS? MyChart  If the video visit is dropped, the invitation should be resent by: Text to cell phone: [phone]  Will anyone else be joining your video visit? No  {If patient encounters technical issues they should call [phone] :701831}    {PROVIDER CHARTING PREFERENCE:070532}    Subjective   Rustam is a 33 year old, presenting for the following health issues:  Follow Up  {(!) Visit Details have not yet been documented.  Please enter Visit Details and then use this list to pull in documentation. (Optional):839802}    Video Start Time: {video visit start/end time for provider to select:771254}    History of Present Illness       Hypothyroidism:     Since last visit, patient describes the following symptoms::  Fatigue    Reason for visit:  Concerns about heart pounding, light headedness, dizziness, energy    Rustam eats 2-3 servings of fruits and vegetables daily.Rustam consumes 1 sweetened beverage(s) daily.Ray exercises with enough effort to increase Ray's heart rate 20 to 29 minutes per day.  Ray exercises with enough effort to increase Ray's heart rate 3 or less days per week.   Rustam is taking medications regularly.       --follow up  --having a lot of heart pounding, dizzyness, and lightheaded  --states heart rate yesterday was 105 resting, after waking up from a nap  --thyroid medication 62.5      {SUPERLIST (Optional):130592}  {additonal problems for provider to add (Optional):566585}    {ROS Picklists (Optional):923562}      Objective           Vitals:  No vitals were obtained today due to virtual visit.    Physical Exam   {video visit exam brief selected:709078}    {Diagnostic Test Results (Optional):756022}      Video-Visit Details    Type of service:  Video Visit   Video End Time:{video visit start/end time for provider to select:339434}  Originating Location (pt. Location): {video visit patient  "location:695188::\"Home\"}  {PROVIDER LOCATION On-site should be selected for visits conducted from your clinic location or adjoining [facility] hospital, academic office, or other nearby [facility] building. Off-site should be selected for all other provider locations, including home:384470}  Distant Location (provider location):  {virtual location provider:628799}  Platform used for Video Visit: {Virtual Visit Platforms:305543::\"Warm Health\"}  Signed Electronically by: aDlton Zhao MD  {Email feedback regarding this note to [email or web address]   :148230}  "

## 2024-05-22 NOTE — PATIENT INSTRUCTIONS
Clarify not taking sertraline, lamotrigine, or omeprazole    Decrease vitamin D to 25 mcg once daily    Issues of thyroid and progesterone to specialty    Potassium and magnesium levels noted on supplements    Decrease iron to once daily    Repeat ferritin and CBC    Discontinue inhaler    Clarify not taking lamotrigine or Allegra    Doxycycline 100 mg twice daily for 10 days    Stress echocardiogram    Midodrine 5 mg every 8 hours as needed for systolic blood pressure less than 100    Multiple measurements of heart rate and blood pressure to determine dynamic interaction    Consider Penny Glass communication initiated

## 2024-05-22 NOTE — PROGRESS NOTES
OFFICE VISIT--Video    Agustina is a 33 year old adult contacting the clinic today via video, who will use the platform: [employer] for the visit.  Phone # for [employer], or if Amwell drops:   Telephone Information:   Mobile [phone]          ASSESSMENT and PLAN:  1. Erosive esophagitis  Improved.  Recheck hemoglobin off proton pump inhibitor  - CBC with Platelets & Differential; Future    2. Adrenal disorder (H24)  Reviewed normal adrenal cosyntropin stim test    3. Chronic frontal sinusitis  Persisting symptoms.  Will treat.  May explain some of her vertigo and lightheadedness  - doxycycline hyclate (VIBRA-TABS) 100 MG tablet; Take 1 tablet (100 mg) by mouth 2 times daily for 10 days  Dispense: 20 tablet; Refill: 0    4. Vitamin D deficiency  Decrease intensity of treatment  - Cholecalciferol (D3 HIGH POTENCY) 25 MCG (1000 UT) CAPS; Take 1 tablet by mouth daily    5. Sinus tachycardia  Trial of midodrine.  Stress echocardiogram plus more intensive monitoring to determine appropriate versus inappropriate response  - midodrine (PROAMATINE) 5 MG tablet; Take 1 tablet (5 mg) by mouth 3 times daily as needed (systolic blood pressure below 100)  Dispense: 90 tablet; Refill: 0  - Echocardiogram Exercise Stress; Future    6. Iron deficiency anemia due to chronic blood loss  Recheck after 2 months of supplementation  - CBC with Platelets & Differential; Future  - Ferritin; Future       Patient Instructions   Clarify not taking sertraline, lamotrigine, or omeprazole    Decrease vitamin D to 25 mcg once daily    Issues of thyroid and progesterone to specialty    Potassium and magnesium levels noted on supplements    Decrease iron to once daily    Repeat ferritin and CBC    Discontinue inhaler    Clarify not taking lamotrigine or Allegra    Doxycycline 100 mg twice daily for 10 days    Stress echocardiogram    Midodrine 5 mg every 8 hours as needed for systolic blood pressure less than 100    Multiple measurements of heart rate  and blood pressure to determine dynamic interaction    Consider Penny Glass communication initiated            Return in about 4 weeks (around 6/19/2024) for using a video visit.       CHIEF COMPLAINT:  Chief Complaint   Patient presents with    Follow Up       HISTORY OF PRESENT ILLNESS:  Agustina is a 33 year old adult contacting the clinic today via video for multiple concerns.  She feels dizzy, lightheaded, and weak.  Heart rate is frequently pounding and increased.  Heart monitor last year showed ectopics but no sustained pauses or supraventricular arrhythmia.    Endocrine has been lowering her T4.  This has not helped.  TSH most recently was suppressed    Frequently tachycardic with heart rate overall 100.  Unclear whether tachycardia begins first followed by hypotension or vice versa.  Adrenal insufficiency was checked 2 years ago and normal    Sinuses continue to be congested and purulent.  CT scan a year ago was relatively clear.  Trial of fluconazole in affected    No further menstrual bleeding since initiation of progesterone.  52 days since last.    History of Present Illness       Hypothyroidism:     Since last visit, patient describes the following symptoms::  Fatigue    Reason for visit:  Concerns about heart pounding, light headedness, dizziness, energy    Rustam eats 2-3 servings of fruits and vegetables daily.Rustam consumes 1 sweetened beverage(s) daily.Rustam exercises with enough effort to increase Rustam's heart rate 20 to 29 minutes per day.  Rustam exercises with enough effort to increase Rustam's heart rate 3 or less days per week.   Rustam is taking medications regularly.      REVIEW OF SYSTEMS:   Resolved acid reflux and able to stop omeprazole    Today's PHQ-2 Score:       1/9/2024     1:40 PM   PHQ-2 ( 1999 [employer])   Q1: Little interest or pleasure in doing things 0   Q2: Feeling down, depressed or hopeless 0   PHQ-2 Score 0       PFSH:  Social History     Social History Narrative    Not on file  "      TOBACCO USE:  History   Smoking Status    Never   Smokeless Tobacco    Never       VITALS:  There were no vitals filed for this visit.  LMP 04/03/2024 (Exact Date)   Breastfeeding No  Estimated body mass index is 22.05 kg/m  as calculated from the following:    Height as of 1/9/24: 1.727 m (5' 8\").    Weight as of 1/9/24: 65.8 kg (145 lb).    PHYSICAL EXAM:  (observations via Video)  Alert and oriented.  Slightly anxious and slightly agitated    MEDICATIONS:   Current Outpatient Medications   Medication Sig Dispense Refill    B-D LUER-DINORA SYRINGE 25G X 1\" 3 ML MISC INJECT 1 EACH INTO THE MUSCLE ONCE A WEEK WITH B12 4 each 3    cetirizine (ZYRTEC) 10 MG tablet Take 1 tablet (10 mg) by mouth daily 90 tablet 3    Cholecalciferol (D3 HIGH POTENCY) 25 MCG (1000 UT) CAPS Take 1 tablet by mouth daily      COMPOUNDED NON-CONTROLLED SUBSTANCE (CMPD RX) - PHARMACY TO MIX COMPOUNDED MEDICATION Compound T3/Liothyroinine - 2.5 MCG capsules. Take one capsule by mouth twice daily. 180 capsule 3    cyanocobalamin (CYANOCOBALAMIN) 1000 MCG/ML injection INJECT 1 ML (1,000 MCG) INTO THE MUSCLE EVERY 7 DAYS 4 mL 3    doxycycline hyclate (VIBRA-TABS) 100 MG tablet Take 1 tablet (100 mg) by mouth 2 times daily for 10 days 20 tablet 0    ferrous gluconate (FERGON) 324 (38 Fe) MG tablet Take 1 tablet (324 mg) by mouth daily (with breakfast) (Patient taking differently: Take 324 mg by mouth 2 times daily) 90 tablet 3    levothyroxine (SYNTHROID/LEVOTHROID) 125 MCG tablet Take 62.5 mcg by mouth daily      magnesium oxide (MAG-OX) 400 MG tablet Take 1 tablet (400 mg) by mouth 2 times daily 180 tablet 3    meclizine 50 MG TABS Take 50 mg by mouth 3 times daily as needed for dizziness 30 tablet 3    midodrine (PROAMATINE) 5 MG tablet Take 1 tablet (5 mg) by mouth 3 times daily as needed (systolic blood pressure below 100) 90 tablet 0    norethindrone (MICRONOR) 0.35 MG tablet Take 1 tablet by mouth daily      omega 3 1000 MG CAPS Take 2 " capsules by mouth daily 100 capsule 11    potassium chloride ER (KLOR-CON M) 20 MEQ CR tablet Take 1 tablet (20 mEq) by mouth daily 30 tablet 11    propranolol (INDERAL) 10 MG tablet Take 1 tablet (10 mg) by mouth every 8 hours as needed (anxiety or tachycardia) 20 tablet 2    testosterone (ANDROGEL 1.62 % PUMP) 20.25 MG/ACT gel Place 40.5 mg onto the skin      COMPOUNDED NON-CONTROLLED SUBSTANCE (CMPD RX) - PHARMACY TO MIX COMPOUNDED MEDICATION Compound T4/Levothyroxine 75 mcg capsule. Take 1 capsule by mouth every morning (Patient not taking: Reported on 5/22/2024) 90 capsule 3    fexofenadine (ALLEGRA) 180 MG tablet Take 1 tablet (180 mg) by mouth daily (Patient not taking: Reported on 5/22/2024)      fluconazole (DIFLUCAN) 100 MG tablet Take 1 tablet by mouth Take it 7 days out of a month (Patient not taking: Reported on 5/22/2024)      lamoTRIgine (LAMICTAL) 25 MG tablet Take 1 tablet (25 mg) by mouth 2 times daily (Patient not taking: Reported on 5/22/2024) 180 tablet 3    levothyroxine (SYNTHROID/LEVOTHROID) 75 MCG tablet Take 1 tablet (75 mcg) by mouth daily (Patient not taking: Reported on 5/22/2024) 90 tablet 3    liothyronine (CYTOMEL) 50 MCG tablet  (Patient not taking: Reported on 5/22/2024)      progesterone (PROMETRIUM) 200 MG capsule Take 1 capsule by mouth daily (Patient not taking: Reported on 5/22/2024)         Outside Notes summarized:   Labs, x-rays, cardiology, GI tests reviewed:   Recent Labs   Lab Test 05/08/24  1059 06/15/23  1005 06/01/23  1055 05/17/23  1044 09/23/22  1607 07/27/22  1434 06/10/22  1435   HGB  --   --   --   --   --   --  12.9   WBC  --   --   --   --   --   --  7.3    139  --   --    < >  --  140   POTASSIUM 4.0 4.2  --   --    < >  --  4.2   CR 0.78 0.83  --   --   --   --  0.78   URIC  --   --   --   --   --  2.6  --    B12  --  1,942*  --   --   --  1,268*  --    TSH 0.14*  --   --  0.20*   < >  --   --    VITDT  --  54  --   --   --   --   --    SED  --   --  8   "--   --  8  --    CRPI  --   --  <3.00  --   --  <3.00  --     < > = values in this interval not displayed.     No results found for: \"COUYP49KZV\"  Lab Results   Component Value Date    CHOL 272 07/12/2017     New orders:   Orders Placed This Encounter   Procedures    Ferritin    Echocardiogram Exercise Stress    CBC with Platelets & Differential       Independent review of:   Patient would like to receive their AVS by [employer]    Video-Visit Details  Type of service:  Video Visit      5/22/2024    10:27 AM   Additional Questions   Roomed by Racquel BINGHAM CMA         5/22/2024    10:27 AM   Patient Reported Additional Medications   Patient reports taking the following new medications na     Patient has given verbal consent to a Video visit?  Yes  How would you like to obtain your AVS?  [employer]  Will anyone else be joining your video visit, giving supplemental history? No    Originating location (pt location): Home    Distant Location (provider location):  Off-site    Video Start Time: 10:42 AM  Video End time:  11:29 AM  Face to face plus orders: 47 minutes  Documentation time:  3 minutes     The visit lasted a total of 50 minutes    Dalton Zhao MD  Internal Medicine  [facility]     "

## 2024-05-23 ENCOUNTER — LAB (OUTPATIENT)
Dept: LAB | Facility: CLINIC | Age: 34
End: 2024-05-23
Payer: COMMERCIAL

## 2024-05-23 DIAGNOSIS — K22.10 EROSIVE ESOPHAGITIS: ICD-10-CM

## 2024-05-23 DIAGNOSIS — Z00.00 PREVENTATIVE HEALTH CARE: ICD-10-CM

## 2024-05-23 DIAGNOSIS — D50.0 IRON DEFICIENCY ANEMIA DUE TO CHRONIC BLOOD LOSS: ICD-10-CM

## 2024-05-23 LAB
BASOPHILS # BLD AUTO: 0 10E3/UL (ref 0–0.2)
BASOPHILS NFR BLD AUTO: 1 %
EOSINOPHIL # BLD AUTO: 0.1 10E3/UL (ref 0–0.7)
EOSINOPHIL NFR BLD AUTO: 1 %
ERYTHROCYTE [DISTWIDTH] IN BLOOD BY AUTOMATED COUNT: 17.4 % (ref 10–15)
HCT VFR BLD AUTO: 43.2 % (ref 35–53)
HGB BLD-MCNC: 13.8 G/DL (ref 11.7–17.7)
IMM GRANULOCYTES # BLD: 0 10E3/UL
IMM GRANULOCYTES NFR BLD: 0 %
LYMPHOCYTES # BLD AUTO: 1.6 10E3/UL (ref 0.8–5.3)
LYMPHOCYTES NFR BLD AUTO: 29 %
MCH RBC QN AUTO: 29.5 PG (ref 26.5–33)
MCHC RBC AUTO-ENTMCNC: 31.9 G/DL (ref 31.5–36.5)
MCV RBC AUTO: 92 FL (ref 78–100)
MONOCYTES # BLD AUTO: 0.6 10E3/UL (ref 0–1.3)
MONOCYTES NFR BLD AUTO: 11 %
NEUTROPHILS # BLD AUTO: 3.2 10E3/UL (ref 1.6–8.3)
NEUTROPHILS NFR BLD AUTO: 58 %
PLATELET # BLD AUTO: 292 10E3/UL (ref 150–450)
RBC # BLD AUTO: 4.68 10E6/UL (ref 3.8–5.9)
WBC # BLD AUTO: 5.5 10E3/UL (ref 4–11)

## 2024-05-23 PROCEDURE — 85025 COMPLETE CBC W/AUTO DIFF WBC: CPT

## 2024-05-23 PROCEDURE — 36415 COLL VENOUS BLD VENIPUNCTURE: CPT

## 2024-05-23 PROCEDURE — 82728 ASSAY OF FERRITIN: CPT

## 2024-05-23 PROCEDURE — 80061 LIPID PANEL: CPT

## 2024-05-24 LAB
CHOLEST SERPL-MCNC: 193 MG/DL
FASTING STATUS PATIENT QL REPORTED: ABNORMAL
FERRITIN SERPL-MCNC: 21 NG/ML (ref 6–409)
HDLC SERPL-MCNC: 64 MG/DL
LDLC SERPL CALC-MCNC: 123 MG/DL
NONHDLC SERPL-MCNC: 129 MG/DL
TRIGL SERPL-MCNC: 32 MG/DL

## 2024-05-29 ENCOUNTER — TELEPHONE (OUTPATIENT)
Dept: FAMILY MEDICINE | Facility: CLINIC | Age: 34
End: 2024-05-29
Payer: COMMERCIAL

## 2024-05-29 NOTE — TELEPHONE ENCOUNTER
Please follow up with this patient to get them scheduled with RN or MTM at PCP clinic for education on B12 self-administration. B12 injections were ordered by PCP Dr. Zhao. Patient is scheduled with a provider at [facility] tomorrow 5/30 which will need to be canceled.     I did call patient and inform them that we will be cancelling visit with Dr. Tavares tomorrow and that message has been sent to PCP team to follow up.     Thanks!  Alexandra Milligan RN [facility]      ----- Message from Sissy Tavares MD sent at 5/29/2024  2:04 PM CDT -----  This is a Dr Zhao pt  They are scheduled to see me tomorrow to learn how to do b12 shots.    Can you please get them scheduled instead with RN at either our clinic or their primary clinic or MTM    MD not needed for this.     Sissy

## 2024-05-29 NOTE — TELEPHONE ENCOUNTER
Called patient and scheduled for RN visit for B12 self admin.     Juan Manuel Stevens RN  [facility]

## 2024-05-30 ENCOUNTER — MYC MEDICAL ADVICE (OUTPATIENT)
Dept: INTERNAL MEDICINE | Facility: CLINIC | Age: 34
End: 2024-05-30

## 2024-05-30 DIAGNOSIS — J32.0 CHRONIC MAXILLARY SINUSITIS: ICD-10-CM

## 2024-05-31 ENCOUNTER — HOSPITAL ENCOUNTER (OUTPATIENT)
Dept: CARDIOLOGY | Facility: CLINIC | Age: 34
Discharge: HOME | End: 2024-05-31
Attending: INTERNAL MEDICINE | Admitting: INTERNAL MEDICINE
Payer: COMMERCIAL

## 2024-05-31 DIAGNOSIS — R00.0 SINUS TACHYCARDIA: ICD-10-CM

## 2024-05-31 PROCEDURE — 93325 DOPPLER ECHO COLOR FLOW MAPG: CPT | Mod: TC

## 2024-05-31 PROCEDURE — 93321 DOPPLER ECHO F-UP/LMTD STD: CPT | Mod: 26 | Performed by: INTERNAL MEDICINE

## 2024-05-31 PROCEDURE — 93350 STRESS TTE ONLY: CPT | Mod: 26 | Performed by: INTERNAL MEDICINE

## 2024-05-31 PROCEDURE — 93016 CV STRESS TEST SUPVJ ONLY: CPT | Performed by: INTERNAL MEDICINE

## 2024-05-31 PROCEDURE — 93018 CV STRESS TEST I&R ONLY: CPT | Performed by: INTERNAL MEDICINE

## 2024-05-31 PROCEDURE — 93325 DOPPLER ECHO COLOR FLOW MAPG: CPT | Mod: 26 | Performed by: INTERNAL MEDICINE

## 2024-05-31 PROCEDURE — 93350 STRESS TTE ONLY: CPT | Mod: TC

## 2024-06-03 ENCOUNTER — ALLIED HEALTH/NURSE VISIT (OUTPATIENT)
Dept: FAMILY MEDICINE | Facility: CLINIC | Age: 34
End: 2024-06-03
Payer: COMMERCIAL

## 2024-06-03 VITALS — SYSTOLIC BLOOD PRESSURE: 102 MMHG | DIASTOLIC BLOOD PRESSURE: 62 MMHG

## 2024-06-03 DIAGNOSIS — E53.8 B12 DEFICIENCY: Primary | Chronic | ICD-10-CM

## 2024-06-03 PROCEDURE — 99207 PR NO CHARGE NURSE ONLY: CPT

## 2024-06-03 RX ORDER — MECLIZINE HYDROCHLORIDE 50 MG/1
50 TABLET ORAL 3 TIMES DAILY PRN
Qty: 30 TABLET | Refills: 3 | Status: SHIPPED | OUTPATIENT
Start: 2024-06-03

## 2024-06-03 NOTE — PROGRESS NOTES
Patient presents to clinic for self administered B12 injection teaching. RN reviewed verbally with patient and provided a demonstration. Patient verbalized understanding. Patient was able to demonstrate correct medication preparation and administration to RN. Provided take home education resources.           Patient requested RN check her BP monitor.   BP monitor reading 92/57 HR 73  Manual /62

## 2024-06-11 DIAGNOSIS — E27.9 ADRENAL DISORDER (H): Primary | ICD-10-CM

## 2024-06-11 RX ORDER — POTASSIUM CHLORIDE 1500 MG/1
TABLET, EXTENDED RELEASE ORAL
Qty: 30 TABLET | Refills: 11 | Status: SHIPPED | OUTPATIENT
Start: 2024-06-11

## 2024-06-19 DIAGNOSIS — J32.0 CHRONIC MAXILLARY SINUSITIS: Primary | ICD-10-CM

## 2024-06-19 RX ORDER — LEVOFLOXACIN 500 MG/1
500 TABLET, FILM COATED ORAL DAILY
Qty: 7 TABLET | Refills: 0 | Status: SHIPPED | OUTPATIENT
Start: 2024-06-19 | End: 2024-06-26

## 2024-07-04 ENCOUNTER — MYC MEDICAL ADVICE (OUTPATIENT)
Dept: INTERNAL MEDICINE | Facility: CLINIC | Age: 34
End: 2024-07-04
Payer: COMMERCIAL

## 2024-07-04 DIAGNOSIS — K21.9 GASTROESOPHAGEAL REFLUX DISEASE WITHOUT ESOPHAGITIS: Primary | ICD-10-CM

## 2024-07-08 NOTE — TELEPHONE ENCOUNTER
MD please review [employer] message and advise.     Previous Rx for Omeprazole 20 mg cap bid pended for approval if ok for patient to resume, than you.

## 2024-07-09 ENCOUNTER — NURSE TRIAGE (OUTPATIENT)
Dept: INTERNAL MEDICINE | Facility: CLINIC | Age: 34
End: 2024-07-09
Payer: COMMERCIAL

## 2024-07-09 PROCEDURE — 99207 REFERRAL TO ACUTE AND DIAGNOSTIC SERVICES: CPT | Performed by: INTERNAL MEDICINE

## 2024-07-09 NOTE — TELEPHONE ENCOUNTER
RN spoke with patient who reports that she has had no more diarrhea and has vomited multiple times and now her pain is completely resolved. Reports that she feels a little weak and has some aches. Able to drink fluids.     Patient is supposed to be flying to [place] tomorrow which is an 11 hour flight and she is unsure if she should still go or not.     Doesn't feel she needs to go in anymore because she is feeling better.

## 2024-07-09 NOTE — TELEPHONE ENCOUNTER
RN attempted to call patient to relay Dr. Zhao's recommendation. Patient did not answer phone. RN will send [employer] message.

## 2024-07-09 NOTE — TELEPHONE ENCOUNTER
See [employer] message to patient. Phones are down today 7/9/24 so patient is aware RN was going to send her a message.

## 2024-07-09 NOTE — TELEPHONE ENCOUNTER
"Nurse Triage SBAR    Is this a 2nd Level Triage? YES, LICENSED PRACTITIONER REVIEW IS REQUIRED    Situation: Patient calls stating she has abdominal pain.     Background:  Reports that she ate some banana bread yesterday which she normally doesn't eat deserts and thinks she has food poisoning.     Assessment:  Pain onset 0400 where she felt like she had to run to the toilet. Has had 4 episodes of diarrhea since. Feels nauseated but has not had any emesis. Describes pain as \"knots\" in her stomach and states it is a 7/10 in severity. No blood in stools. Able to keep down clear liquids. Denies abdominal bloating. No chance of pregnancy per patient report.     Protocol Recommended Disposition:   Call ADS/Go to ED/UCC Now (Or To Office with PCP Approval)    Recommendation: Patient would like a message sent to Dr. Zhao to get his advice before she goes anywhere for a visit.     Routed to provider    Does the patient meet one of the following criteria for ADS visit consideration? 16+ years old, with an [facility] PCP     TIP  Providers, please consider if this condition is appropriate for management at one of our Acute and Diagnostic Services sites.     If patient is a good candidate, please use dotphrase <dot>triageresponse and select Refer to ADS to document.       Reason for Disposition   MILD TO MODERATE constant pain lasting > 2 hours    Additional Information   Negative: Passed out (i.e., fainted, collapsed and was not responding)   Negative: Shock suspected (e.g., cold/pale/clammy skin, too weak to stand, low BP, rapid pulse)   Negative: Sounds like a life-threatening emergency to the triager   Negative: Followed an abdomen (stomach) injury   Negative: Chest pain   Negative: Abdominal pain and pregnant < 20 weeks   Negative: Abdominal pain and pregnant 20 or more weeks   Negative: Pain is mainly in upper abdomen (if needed ask: 'is it mainly above the belly button?')   Negative: Abdomen bloating or swelling are main " "symptoms   Negative: SEVERE abdominal pain (e.g., excruciating)   Negative: Vomiting red blood or black (coffee ground) material   Negative: Blood in bowel movements  (Exception: Blood on surface of BM with constipation.)   Negative: Black or tarry bowel movements  (Exception: Chronic-unchanged black-grey BMs AND is taking iron pills or Pepto-Bismol.)    Answer Assessment - Initial Assessment Questions  1. LOCATION: \"Where does it hurt?\"       In my abdomen and back  2. RADIATION: \"Does the pain shoot anywhere else?\" (e.g., chest, back)      Yes it radiates to my back.   3. ONSET: \"When did the pain begin?\" (e.g., minutes, hours or days ago)       0400  4. SUDDEN: \"Gradual or sudden onset?\"      Sudden onset.   5. PATTERN \"Does the pain come and go, or is it constant?\"     - If it comes and goes: \"How long does it last?\" \"Do you have pain now?\"      (Note: Comes and goes means the pain is intermittent. It goes away completely between bouts.)     - If constant: \"Is it getting better, staying the same, or getting worse?\"       (Note: Constant means the pain never goes away completely; most serious pain is constant and gets worse.)       Constant pain  6. SEVERITY: \"How bad is the pain?\"  (e.g., Scale 1-10; mild, moderate, or severe)     - MILD (1-3): Doesn't interfere with normal activities, abdomen soft and not tender to touch.      - MODERATE (4-7): Interferes with normal activities or awakens from sleep, abdomen tender to touch.      - SEVERE (8-10): Excruciating pain, doubled over, unable to do any normal activities.        7/10 cramping.   7. RECURRENT SYMPTOM: \"Have you ever had this type of stomach pain before?\" If Yes, ask: \"When was the last time?\" and \"What happened that time?\"       Yes but it's been a long time.   8. CAUSE: \"What do you think is causing the stomach pain?\"      I think maybe food poisoning?   9. RELIEVING/AGGRAVATING FACTORS: \"What makes it better or worse?\" (e.g., antacids, bending or " "twisting motion, bowel movement)      Took 2 ibuprofen that did not help.   10. OTHER SYMPTOMS: \"Do you have any other symptoms?\" (e.g., back pain, diarrhea, fever, urination pain, vomiting)        Diarrhea, nausea.   11. PREGNANCY: \"Is there any chance you are pregnant?\" \"When was your last menstrual period?\"        No    Protocols used: Abdominal Pain - Female-A-[place]    "

## 2024-07-29 ENCOUNTER — VIRTUAL VISIT (OUTPATIENT)
Dept: INTERNAL MEDICINE | Facility: CLINIC | Age: 34
End: 2024-07-29
Payer: COMMERCIAL

## 2024-07-29 DIAGNOSIS — U07.1 COVID-19 VIRUS INFECTION: Primary | ICD-10-CM

## 2024-07-29 DIAGNOSIS — K22.10 EROSIVE ESOPHAGITIS: ICD-10-CM

## 2024-07-29 DIAGNOSIS — E55.9 VITAMIN D DEFICIENCY: ICD-10-CM

## 2024-07-29 DIAGNOSIS — M35.9 AUTOIMMUNE DISEASE (H): ICD-10-CM

## 2024-07-29 DIAGNOSIS — J32.0 CHRONIC MAXILLARY SINUSITIS: ICD-10-CM

## 2024-07-29 DIAGNOSIS — R42 VERTIGO: ICD-10-CM

## 2024-07-29 PROCEDURE — G2211 COMPLEX E/M VISIT ADD ON: HCPCS | Mod: 95 | Performed by: INTERNAL MEDICINE

## 2024-07-29 PROCEDURE — 99215 OFFICE O/P EST HI 40 MIN: CPT | Mod: 95 | Performed by: INTERNAL MEDICINE

## 2024-07-29 RX ORDER — NYSTATIN 100000/ML
500000 SUSPENSION, ORAL (FINAL DOSE FORM) ORAL 4 TIMES DAILY
Qty: 400 ML | Refills: 1 | Status: SHIPPED | OUTPATIENT
Start: 2024-07-29 | End: 2024-08-16

## 2024-07-29 RX ORDER — LEVOFLOXACIN 500 MG/1
500 TABLET, FILM COATED ORAL DAILY
Qty: 10 TABLET | Refills: 0 | Status: SHIPPED | OUTPATIENT
Start: 2024-07-29 | End: 2024-08-08

## 2024-07-29 RX ORDER — OMEGA-3 FATTY ACIDS/FISH OIL 300-1000MG
2 CAPSULE ORAL DAILY
Qty: 100 CAPSULE | Refills: 11 | Status: SHIPPED | OUTPATIENT
Start: 2024-07-29

## 2024-07-29 RX ORDER — CHOLECALCIFEROL (VITAMIN D3) 25 MCG
1 CAPSULE ORAL DAILY
Qty: 100 CAPSULE | Refills: 3 | Status: SHIPPED | OUTPATIENT
Start: 2024-07-29 | End: 2025-07-29

## 2024-07-29 RX ORDER — NORETHINDRONE ACETATE 5 MG
TABLET ORAL
COMMUNITY
Start: 2024-07-02

## 2024-07-29 RX ORDER — PREDNISONE 20 MG/1
20 TABLET ORAL EVERY MORNING
Qty: 20 TABLET | Refills: 1 | Status: SHIPPED | OUTPATIENT
Start: 2024-07-29 | End: 2024-08-02

## 2024-07-29 RX ORDER — FLUCONAZOLE 100 MG/1
100 TABLET ORAL DAILY
Qty: 15 TABLET | Refills: 0 | Status: SHIPPED | OUTPATIENT
Start: 2024-07-29 | End: 2024-08-16

## 2024-07-29 RX ORDER — TOPIRAMATE 25 MG/1
25 TABLET, FILM COATED ORAL 2 TIMES DAILY
Qty: 180 TABLET | Refills: 3 | Status: SHIPPED | OUTPATIENT
Start: 2024-07-29 | End: 2025-07-29

## 2024-07-29 ASSESSMENT — PATIENT HEALTH QUESTIONNAIRE - PHQ9
10. IF YOU CHECKED OFF ANY PROBLEMS, HOW DIFFICULT HAVE THESE PROBLEMS MADE IT FOR YOU TO DO YOUR WORK, TAKE CARE OF THINGS AT HOME, OR GET ALONG WITH OTHER PEOPLE: NOT DIFFICULT AT ALL
SUM OF ALL RESPONSES TO PHQ QUESTIONS 1-9: 4
SUM OF ALL RESPONSES TO PHQ QUESTIONS 1-9: 4

## 2024-07-29 NOTE — PROGRESS NOTES
Rustam is a 33 year old who is being evaluated via a billable video visit.    {ROOMING STAFF complete during rooming of virtual visit (Optional):330794}  {If patient encounters technical issues they should call [phone] :215157}    {PROVIDER CHARTING PREFERENCE:882480}    Subjective   Rustam is a 33 year old, presenting for the following health issues:  Covid  (I got covid while traveling in [place]. Started having sx on July 18th, tested negative 7/20, tested positive 7/24.  Pt returned to [place] 7/27. Pt reports congestion, constant throat itching/sore, sob upon exertion, fatigue and intermittent coughing.) and Dizziness (Pt reports follow up on vertigo. Pt reports eye pain/strain after working on computer with nausea. Pt does wear glasses. )        7/29/2024     9:46 AM   Additional Questions   Roomed by Laila Linton Start Time: {video visit start/end time for provider to select:398697}    History of Present Illness       Reason for visit:  Positve for Covid - and Vertigo    Rustam eats 4 or more servings of fruits and vegetables daily.Rustam consumes 1 sweetened beverage(s) daily.Ray exercises with enough effort to increase Ray's heart rate 20 to 29 minutes per day.  Ray exercises with enough effort to increase Ray's heart rate 4 days per week.   Rustam is taking medications regularly.       {SUPERLIST (Optional):575577}  {additonal problems for provider to add (Optional):639320}    {ROS Picklists (Optional):492344}      Objective    Vitals - Patient Reported  Pain Score: Mild Pain (2)  Pain Loc: Throat      Vitals:  No vitals were obtained today due to virtual visit.    Physical Exam   {video visit exam brief selected:821928}    {Diagnostic Test Results (Optional):056266}      Video-Visit Details    Type of service:  Video Visit   Video End Time:{video visit start/end time for provider to select:496103}  Originating Location (pt. Location): Home  {PROVIDER LOCATION On-site should be selected for visits conducted from  your clinic location or adjoining [facility] hospital, academic office, or other nearby [facility] building. Off-site should be selected for all other provider locations, including home:184989}  Distant Location (provider location):  Off-site  Platform used for Video Visit: Shelly  Signed Electronically by: Dalton Zhao MD  {Email feedback regarding this note to [email or web address]   :070226}

## 2024-07-29 NOTE — PATIENT INSTRUCTIONS
Prednisone 20 mg daily for 4 days, for this episode and repeat as needed for future viral attacks    If no improvement fluconazole 100 mg daily for 15 days    Nystatin swish and swallow for 7 days with fluconazole    If no improvement levofloxacin 500 mg daily for 10 days    After sinuses cleared begin topiramate 25 mg twice daily to treat potential seizures or vertiginous migraines    Refill vitamin D and fish oil    Monitor vertigo and eye symptoms

## 2024-07-29 NOTE — PROGRESS NOTES
OFFICE VISIT--Video    Agustina is a 33 year old adult contacting the clinic today via video, who will use the platform: [employer] for the visit.  Phone # for [employer], or if Amwell drops:   Telephone Information:   Mobile [phone]          ASSESSMENT and PLAN:  1. COVID-19 virus infection  Discussed residual and possible recurrent sinus infection.  Discussed stepwise treatment  - nystatin (MYCOSTATIN) 747861 UNIT/ML suspension; Take 5 mLs (500,000 Units) by mouth 4 times daily  Dispense: 400 mL; Refill: 1    2. Chronic maxillary sinusitis  Discussed treatment for inflammation first, fungal second, and bacteria third  - nystatin (MYCOSTATIN) 398033 UNIT/ML suspension; Take 5 mLs (500,000 Units) by mouth 4 times daily  Dispense: 400 mL; Refill: 1  - fluconazole (DIFLUCAN) 100 MG tablet; Take 1 tablet (100 mg) by mouth daily for 15 days  Dispense: 15 tablet; Refill: 0  - predniSONE (DELTASONE) 20 MG tablet; Take 1 tablet (20 mg) by mouth every morning for 4 days And repeat as needed for future attacks  Dispense: 20 tablet; Refill: 1  - levofloxacin (LEVAQUIN) 500 MG tablet; Take 1 tablet (500 mg) by mouth daily for 10 days  Dispense: 10 tablet; Refill: 0    3. Vertigo  Monitor what happens with stepwise treatment.  After completion trial of topiramate for vertiginous migraines  - topiramate (TOPAMAX) 25 MG tablet; Take 1 tablet (25 mg) by mouth 2 times daily  Dispense: 180 tablet; Refill: 3    4. Vitamin D deficiency  Okay to fill  - Cholecalciferol (D3 HIGH POTENCY) 25 MCG (1000 UT) CAPS; Take 1 tablet by mouth daily  Dispense: 100 capsule; Refill: 3    5. Autoimmune disease (H24)  Okay to fill  - omega 3 1000 MG CAPS; Take 2 capsules by mouth daily  Dispense: 100 capsule; Refill: 11    6. Erosive esophagitis  Resume omeprazole       Patient Instructions   Prednisone 20 mg daily for 4 days, for this episode and repeat as needed for future viral attacks    If no improvement fluconazole 100 mg daily for 15  days    Nystatin swish and swallow for 7 days with fluconazole    If no improvement levofloxacin 500 mg daily for 10 days    After sinuses cleared begin topiramate 25 mg twice daily to treat potential seizures or vertiginous migraines    Refill vitamin D and fish oil    Monitor vertigo and eye symptoms            Return in about 4 months (around 11/29/2024) for using a video visit.       CHIEF COMPLAINT:  Chief Complaint   Patient presents with    Covid      I got covid while traveling in [place]. Started having sx on July 18th, tested negative 7/20, tested positive 7/24.  Pt returned to [place] 7/27. Pt reports congestion, constant throat itching/sore, sob upon exertion, fatigue and intermittent coughing.    Dizziness     Pt reports follow up on vertigo. Pt reports eye pain/strain after working on computer with nausea. Pt does wear glasses.        HISTORY OF PRESENT ILLNESS:  Agustina is a 33 year old adult contacting the clinic today via video for review of interval symptoms.  Developed COVID with first day of symptoms July 18 but not tested positive till July 24.  Usual symptoms of fever, chills, and sinus congestion.  Worsening visual problems and vertigo    Treated with levofloxacin on June 19 for refractory symptoms.  Reports levofloxacin improved symptoms approximately 90% but she did have some mild nausea and diarrhea    Has multiple types of vertigo and has been diagnosed with inner ear vertigo, noninner ear vertigo, noncentral vertigo, and possible vertiginous migraine.  Does not recall that she ever tried topiramate    Discussed possible yeast infection after antibiotics and steroids    History of Present Illness       Reason for visit:  Positve for Covid - and Vertigo    Rustam eats 4 or more servings of fruits and vegetables daily.Rustam consumes 1 sweetened beverage(s) daily.Ray exercises with enough effort to increase Rustam's heart rate 20 to 29 minutes per day.  Ray exercises with enough effort to increase Ray's  "heart rate 4 days per week.   Rustam is taking medications regularly.      REVIEW OF SYSTEMS:   Vertigo and trouble with focus    Today's PHQ-2 Score:       1/9/2024     1:40 PM   PHQ-2 ( 1999 Pfizer)   Q1: Little interest or pleasure in doing things 0   Q2: Feeling down, depressed or hopeless 0   PHQ-2 Score 0       PFSH:  Social History     Social History Narrative    Not on file     Developed COVID in [place]    TOBACCO USE:  History   Smoking Status    Never   Smokeless Tobacco    Never       VITALS:  There were no vitals filed for this visit.  LMP  (LMP Unknown)  Estimated body mass index is 22.05 kg/m  as calculated from the following:    Height as of 1/9/24: 1.727 m (5' 8\").    Weight as of 1/9/24: 65.8 kg (145 lb).    PHYSICAL EXAM:  (observations via Video)  Alert and oriented.  Taking copious notes    MEDICATIONS:   Current Outpatient Medications   Medication Sig Dispense Refill    B-D LUER-DINORA SYRINGE 25G X 1\" 3 ML MISC INJECT 1 EACH INTO THE MUSCLE ONCE A WEEK WITH B12 4 each 3    cetirizine (ZYRTEC) 10 MG tablet Take 1 tablet (10 mg) by mouth daily 90 tablet 3    Cholecalciferol (D3 HIGH POTENCY) 25 MCG (1000 UT) CAPS Take 1 tablet by mouth daily 100 capsule 3    COMPOUNDED NON-CONTROLLED SUBSTANCE (CMPD RX) - PHARMACY TO MIX COMPOUNDED MEDICATION Compound T4/Levothyroxine 75 mcg capsule. Take 1 capsule by mouth every morning 90 capsule 3    COMPOUNDED NON-CONTROLLED SUBSTANCE (CMPD RX) - PHARMACY TO MIX COMPOUNDED MEDICATION Compound T3/Liothyroinine - 2.5 MCG capsules. Take one capsule by mouth twice daily. 180 capsule 3    cyanocobalamin (CYANOCOBALAMIN) 1000 MCG/ML injection INJECT 1 ML (1,000 MCG) INTO THE MUSCLE EVERY 7 DAYS 4 mL 3    fluconazole (DIFLUCAN) 100 MG tablet Take 1 tablet (100 mg) by mouth daily for 15 days 15 tablet 0    levofloxacin (LEVAQUIN) 500 MG tablet Take 1 tablet (500 mg) by mouth daily for 10 days 10 tablet 0    levothyroxine (SYNTHROID/LEVOTHROID) 125 MCG tablet Take 62.5 mcg " "by mouth daily      liothyronine (CYTOMEL) 50 MCG tablet       magnesium oxide (MAG-OX) 400 MG tablet Take 1 tablet (400 mg) by mouth 2 times daily 180 tablet 3    Meclizine HCl 50 MG TABS Take 50 mg by mouth 3 times daily as needed (dizziness) 30 tablet 3    norethindrone (AYGESTIN) 5 MG tablet       norethindrone (MICRONOR) 0.35 MG tablet Take 1 tablet by mouth daily      nystatin (MYCOSTATIN) 330571 UNIT/ML suspension Take 5 mLs (500,000 Units) by mouth 4 times daily 400 mL 1    omega 3 1000 MG CAPS Take 2 capsules by mouth daily 100 capsule 11    omeprazole (PRILOSEC) 20 MG DR capsule Take 1 capsule (20 mg) by mouth 2 times daily 180 capsule 3    potassium chloride ER (K-TAB) 20 MEQ CR tablet Take 1 Tablet (20 mEq) by mouth once daily. 30 tablet 11    predniSONE (DELTASONE) 20 MG tablet Take 1 tablet (20 mg) by mouth every morning for 4 days And repeat as needed for future attacks 20 tablet 1    propranolol (INDERAL) 10 MG tablet Take 1 tablet (10 mg) by mouth every 8 hours as needed (anxiety or tachycardia) 20 tablet 2    testosterone (ANDROGEL 1.62 % PUMP) 20.25 MG/ACT gel Place 40.5 mg onto the skin      topiramate (TOPAMAX) 25 MG tablet Take 1 tablet (25 mg) by mouth 2 times daily 180 tablet 3    progesterone (PROMETRIUM) 200 MG capsule Take 1 capsule by mouth daily (Patient not taking: Reported on 5/22/2024)         Outside Notes summarized: MyChart and Levaquin  Labs, x-rays, cardiology, GI tests reviewed:   Recent Labs   Lab Test 05/23/24  0918 05/08/24  1059 06/15/23  1005 06/01/23  1055 05/17/23  1044   HGB 13.8  --   --   --   --    WBC 5.5  --   --   --   --    NA  --  140 139  --   --    POTASSIUM  --  4.0 4.2  --   --    CR  --  0.78 0.83  --   --    B12  --   --  1,942*  --   --    TSH  --  0.14*  --   --  0.20*   VITDT  --   --  54  --   --    SED  --   --   --  8  --    CRPI  --   --   --  <3.00  --      No results found for: \"XPAHB49IDR\"  Lab Results   Component Value Date    CHOL 193 05/23/2024 "     New orders: No orders of the defined types were placed in this encounter.      Independent review of:   Patient would like to receive their AVS by [employer]    Video-Visit Details  Type of service:  Video Visit      7/29/2024     9:46 AM   Additional Questions   Roomed by Laila     Patient has given verbal consent to a Video visit?  Yes  How would you like to obtain your AVS?  Quan  Will anyone else be joining your video visit, giving supplemental history? No    Originating location (pt location): Home    Distant Location (provider location):  Off-site    Video Start Time: 10:47 AM  Video End time:  11:34 AM  Face to face plus orders: 47 minutes  Documentation time:  3 minutes     The visit lasted a total of 50 minutes    Dalton Zhao MD  Internal Medicine  [facility]

## 2024-08-16 DIAGNOSIS — U07.1 COVID-19 VIRUS INFECTION: ICD-10-CM

## 2024-08-16 DIAGNOSIS — J32.0 CHRONIC MAXILLARY SINUSITIS: ICD-10-CM

## 2024-08-16 RX ORDER — FLUCONAZOLE 100 MG/1
100 TABLET ORAL DAILY
Qty: 15 TABLET | Refills: 0 | OUTPATIENT
Start: 2024-08-16 | End: 2024-08-31

## 2024-08-16 RX ORDER — NYSTATIN 100000/ML
500000 SUSPENSION, ORAL (FINAL DOSE FORM) ORAL 4 TIMES DAILY
Qty: 400 ML | Refills: 1 | Status: SHIPPED | OUTPATIENT
Start: 2024-08-16

## 2024-08-16 RX ORDER — FLUCONAZOLE 100 MG/1
100 TABLET ORAL DAILY
Qty: 15 TABLET | Refills: 1 | Status: SHIPPED | OUTPATIENT
Start: 2024-08-16

## 2024-10-28 ENCOUNTER — VIRTUAL VISIT (OUTPATIENT)
Dept: INTERNAL MEDICINE | Facility: CLINIC | Age: 34
End: 2024-10-28
Payer: COMMERCIAL

## 2024-10-28 DIAGNOSIS — R42 VERTIGO: ICD-10-CM

## 2024-10-28 DIAGNOSIS — J32.0 CHRONIC MAXILLARY SINUSITIS: ICD-10-CM

## 2024-10-28 DIAGNOSIS — M35.9 AUTOIMMUNE DISEASE (H): ICD-10-CM

## 2024-10-28 DIAGNOSIS — J01.00 ACUTE NON-RECURRENT MAXILLARY SINUSITIS: Primary | ICD-10-CM

## 2024-10-28 PROCEDURE — G2211 COMPLEX E/M VISIT ADD ON: HCPCS | Mod: 95 | Performed by: INTERNAL MEDICINE

## 2024-10-28 PROCEDURE — 99214 OFFICE O/P EST MOD 30 MIN: CPT | Mod: 95 | Performed by: INTERNAL MEDICINE

## 2024-10-28 RX ORDER — PREDNISONE 20 MG/1
TABLET ORAL
COMMUNITY
Start: 2024-10-20

## 2024-10-28 RX ORDER — COVID-19 MOLECULAR TEST ASSAY
1 KIT MISCELLANEOUS DAILY PRN
Qty: 4 KIT | Refills: 3 | Status: SHIPPED | OUTPATIENT
Start: 2024-10-28

## 2024-10-28 RX ORDER — NYSTATIN 100000 [USP'U]/ML
500000 SUSPENSION ORAL 4 TIMES DAILY
Qty: 400 ML | Refills: 4 | Status: SHIPPED | OUTPATIENT
Start: 2024-10-28

## 2024-10-28 RX ORDER — GUAIFENESIN 600 MG/1
600 TABLET, EXTENDED RELEASE ORAL 2 TIMES DAILY
Qty: 180 TABLET | Refills: 3 | Status: SHIPPED | OUTPATIENT
Start: 2024-10-28 | End: 2025-10-28

## 2024-10-28 RX ORDER — LEVOFLOXACIN 500 MG/1
500 TABLET, FILM COATED ORAL DAILY
Qty: 21 TABLET | Refills: 0 | Status: SHIPPED | OUTPATIENT
Start: 2024-10-28

## 2024-10-28 RX ORDER — FLUCONAZOLE 200 MG/1
200 TABLET ORAL DAILY
Qty: 30 TABLET | Refills: 1 | Status: SHIPPED | OUTPATIENT
Start: 2024-10-28

## 2024-10-28 NOTE — PATIENT INSTRUCTIONS
COVID home test    Refillable prescription for Levaquin 500 mg daily #30 to take as needed    Refillable prescription for fluconazole 200 mg daily to take for 7 to 10 days as needed    Refillable prescription for prednisone 10 to 20 mg 3 to 4 days-already has to treat inflammation    Nystatin swish and swallow as needed    Discussed attempt to use levofloxacin antibiotic last    Refill guaifenesin    Calcium with vitamin D

## 2024-10-28 NOTE — PROGRESS NOTES
OFFICE VISIT--Video    Agustina is a 33 year old adult contacting the clinic today via video, who will use the platform: [employer] for the visit.  Phone # for [employer], or if Amwell drops:   Telephone Information:   Mobile [phone]          ASSESSMENT and PLAN:  1. Acute non-recurrent maxillary sinusitis (Primary)  Difficult to differentiate between inflammatory, bacterial, and fungal.  Will provide tools to treat and always use antibiotics as last resort  - guaiFENesin (MUCINEX) 600 MG 12 hr tablet; Take 1 tablet (600 mg) by mouth 2 times daily.  Dispense: 180 tablet; Refill: 3    2. Chronic maxillary sinusitis  As above  - fluconazole (DIFLUCAN) 200 MG tablet; Take 1 tablet (200 mg) by mouth daily.  Dispense: 30 tablet; Refill: 1  - nystatin (MYCOSTATIN) 674313 UNIT/ML suspension; Take 5 mLs (500,000 Units) by mouth 4 times daily.  Dispense: 400 mL; Refill: 4  - levofloxacin (LEVAQUIN) 500 MG tablet; Take 1 tablet (500 mg) by mouth daily.  Dispense: 21 tablet; Refill: 0  - COVID-19 At Home Antigen Test (BINAXNOW COVID-19 AG HOME TEST) KIT; 1 kit by In Vitro route daily as needed (viral symptoms).  Dispense: 4 kit; Refill: 3    3. Vertigo  Discussed component of neuropathic versus inner ear    4. Autoimmune disease (H)  - calcium carbonate-vitamin D (OSCAL) 500-5 MG-MCG tablet; Take 1 tablet by mouth 2 times daily.  Dispense: 180 tablet; Refill: 3       Patient Instructions   COVID home test    Refillable prescription for Levaquin 500 mg daily #30 to take as needed    Refillable prescription for fluconazole 200 mg daily to take for 7 to 10 days as needed    Refillable prescription for prednisone 10 to 20 mg 3 to 4 days-already has to treat inflammation    Nystatin swish and swallow as needed    Discussed attempt to use levofloxacin antibiotic last    Refill guaifenesin    Calcium with vitamin D            Return in about 4 months (around 2/28/2025) for using a video visit.       CHIEF COMPLAINT:  Chief Complaint    Patient presents with    URI       HISTORY OF PRESENT ILLNESS:  Agustina is a 33 year old adult contacting the clinic today via video for complaints of recurrent respiratory and sinus symptoms.  Has been provided prednisone to take for 3 to 4 days which has helped.  Required levofloxacin in August.  Has also been treated with fluconazole.  Discussed etiology of fungal sinusitis, inflammatory or allergic sinusitis, or bacterial sinusitis.  Discussed difficulty if not impossibility of differentiating which.  Discussed that antibiotics are the most concerning and that a few days of prednisone and a few weeks of fluconazole are a very little permanent downside risk.  Discussed extensively.  Will provide her the equipment and tools to treat these as needed using antibiotics as a last resort    Requests home COVID tests and calcium prescription    Sees a specialist who is treating her for neuro circuit dizziness, a form of PTSD or neural trigger with some success      URI    History of Present Illness       Reason for visit:  Cold (tested neg for covid on home test)  Symptom onset:  1-3 days ago  Symptoms include:  Excess mucas, sore throat, stuffy nose, FATIGUE, achy body, dry and tired eyes  Symptom intensity:  Moderate  Symptom progression:  Staying the same  Had these symptoms before:  Yes  Has tried/received treatment for these symptoms:  Yes   Ray is taking medications regularly.    REVIEW OF SYSTEMS:   Energy stable    Today's PHQ-2 Score:       1/9/2024     1:40 PM   PHQ-2 ( 1999 Pfizer)   Q1: Little interest or pleasure in doing things 0   Q2: Feeling down, depressed or hopeless 0   PHQ-2 Score 0       PFSH:  Social History     Social History Narrative    Not on file       TOBACCO USE:  History   Smoking Status    Never   Smokeless Tobacco    Never       VITALS:  There were no vitals filed for this visit.  There were no vitals taken for this visit. Estimated body mass index is 22.05 kg/m  as calculated from the  "following:    Height as of 1/9/24: 1.727 m (5' 8\").    Weight as of 1/9/24: 65.8 kg (145 lb).    PHYSICAL EXAM:  (observations via Video)  Alert and oriented    MEDICATIONS:   Current Outpatient Medications   Medication Sig Dispense Refill    calcium carbonate-vitamin D (OSCAL) 500-5 MG-MCG tablet Take 1 tablet by mouth 2 times daily. 180 tablet 3    COVID-19 At Home Antigen Test (BINAXNOW COVID-19 AG HOME TEST) KIT 1 kit by In Vitro route daily as needed (viral symptoms). 4 kit 3    fluconazole (DIFLUCAN) 200 MG tablet Take 1 tablet (200 mg) by mouth daily. 30 tablet 1    guaiFENesin (MUCINEX) 600 MG 12 hr tablet Take 1 tablet (600 mg) by mouth 2 times daily. 180 tablet 3    levofloxacin (LEVAQUIN) 500 MG tablet Take 1 tablet (500 mg) by mouth daily. 21 tablet 0    nystatin (MYCOSTATIN) 249738 UNIT/ML suspension Take 5 mLs (500,000 Units) by mouth 4 times daily. 400 mL 4    predniSONE (DELTASONE) 20 MG tablet       B-D LUER-DINORA SYRINGE 25G X 1\" 3 ML MISC INJECT 1 EACH INTO THE MUSCLE ONCE A WEEK WITH B12 4 each 3    cetirizine (ZYRTEC) 10 MG tablet Take 1 tablet (10 mg) by mouth daily 90 tablet 3    Cholecalciferol (D3 HIGH POTENCY) 25 MCG (1000 UT) CAPS Take 1 tablet by mouth daily 100 capsule 3    COMPOUNDED NON-CONTROLLED SUBSTANCE (CMPD RX) - PHARMACY TO MIX COMPOUNDED MEDICATION Compound T4/Levothyroxine 75 mcg capsule. Take 1 capsule by mouth every morning 90 capsule 3    COMPOUNDED NON-CONTROLLED SUBSTANCE (CMPD RX) - PHARMACY TO MIX COMPOUNDED MEDICATION Compound T3/Liothyroinine - 2.5 MCG capsules. Take one capsule by mouth twice daily. 180 capsule 3    cyanocobalamin (CYANOCOBALAMIN) 1000 MCG/ML injection INJECT 1 ML (1,000 MCG) INTO THE MUSCLE EVERY 7 DAYS 4 mL 3    levothyroxine (SYNTHROID/LEVOTHROID) 125 MCG tablet Take 62.5 mcg by mouth daily      liothyronine (CYTOMEL) 50 MCG tablet       Meclizine HCl 50 MG TABS Take 50 mg by mouth 3 times daily as needed (dizziness) 30 tablet 3    norethindrone " "(AYGESTIN) 5 MG tablet       norethindrone (MICRONOR) 0.35 MG tablet Take 1 tablet by mouth daily      omega 3 1000 MG CAPS Take 2 capsules by mouth daily 100 capsule 11    omeprazole (PRILOSEC) 20 MG DR capsule Take 1 capsule (20 mg) by mouth 2 times daily 180 capsule 3    potassium chloride ER (K-TAB) 20 MEQ CR tablet Take 1 Tablet (20 mEq) by mouth once daily. 30 tablet 11    propranolol (INDERAL) 10 MG tablet Take 1 tablet (10 mg) by mouth every 8 hours as needed (anxiety or tachycardia) 20 tablet 2    testosterone (ANDROGEL 1.62 % PUMP) 20.25 MG/ACT gel Place 40.5 mg onto the skin      topiramate (TOPAMAX) 25 MG tablet Take 1 tablet (25 mg) by mouth 2 times daily 180 tablet 3       Outside Notes summarized:   Labs, x-rays, cardiology, GI tests reviewed:   Recent Labs   Lab Test 05/23/24  0918 05/08/24  1059 06/15/23  1005 06/01/23  1055 05/17/23  1044   HGB 13.8  --   --   --   --    WBC 5.5  --   --   --   --    NA  --  140 139  --   --    POTASSIUM  --  4.0 4.2  --   --    CR  --  0.78 0.83  --   --    B12  --   --  1,942*  --   --    TSH  --  0.14*  --   --  0.20*   VITDT  --   --  54  --   --    SED  --   --   --  8  --    CRPI  --   --   --  <3.00  --      No results found for: \"[id number]\"  Lab Results   Component Value Date    CHOL 193 05/23/2024     New orders: No orders of the defined types were placed in this encounter.      Independent review of:   Patient would like to receive their AVS by [employer]    Video-Visit Details  Type of service:  Video Visit      7/29/2024     9:46 AM   Additional Questions   Roomed by Laila     Patient has given verbal consent to a Video visit?  Yes  How would you like to obtain your AVS?  [employer]  Will anyone else be joining your video visit, giving supplemental history? No    Originating location (pt location): Home    Distant Location (provider location):  Off-site    Video Start Time: 10:06 AM  Video End time:  10:41 AM  Face to face plus orders: 35 " minutes  Documentation time:  3 minutes     The visit lasted a total of 38 minutes    Dalton Zhao MD  Internal Medicine  [facility]

## 2024-11-09 ENCOUNTER — IMMUNIZATION (OUTPATIENT)
Dept: FAMILY MEDICINE | Facility: CLINIC | Age: 34
End: 2024-11-09
Payer: COMMERCIAL

## 2024-11-09 PROCEDURE — 91320 SARSCV2 VAC 30MCG TRS-SUC IM: CPT

## 2024-11-09 PROCEDURE — 90673 RIV3 VACCINE NO PRESERV IM: CPT

## 2024-11-09 PROCEDURE — 90471 IMMUNIZATION ADMIN: CPT

## 2024-11-09 PROCEDURE — 90480 ADMN SARSCOV2 VAC 1/ONLY CMP: CPT

## 2024-12-02 ENCOUNTER — MYC MEDICAL ADVICE (OUTPATIENT)
Dept: INTERNAL MEDICINE | Facility: CLINIC | Age: 34
End: 2024-12-02
Payer: COMMERCIAL

## 2024-12-02 DIAGNOSIS — E27.9 ADRENAL DISORDER (H): ICD-10-CM

## 2024-12-02 DIAGNOSIS — M35.9 AUTOIMMUNE DISEASE (H): ICD-10-CM

## 2024-12-03 DIAGNOSIS — M35.9 AUTOIMMUNE DISEASE (H): ICD-10-CM

## 2024-12-03 DIAGNOSIS — E27.9 ADRENAL DISORDER (H): ICD-10-CM

## 2024-12-03 RX ORDER — SYRINGE WITH NEEDLE, 1 ML 25GX5/8"
1 SYRINGE, EMPTY DISPOSABLE MISCELLANEOUS WEEKLY
Qty: 4 EACH | Refills: 11 | Status: SHIPPED | OUTPATIENT
Start: 2024-12-03

## 2024-12-03 RX ORDER — CYANOCOBALAMIN 1000 UG/ML
1 INJECTION, SOLUTION INTRAMUSCULAR; SUBCUTANEOUS
Qty: 4 ML | Refills: 11 | Status: SHIPPED | OUTPATIENT
Start: 2024-12-03

## 2024-12-04 RX ORDER — CYANOCOBALAMIN 1000 UG/ML
INJECTION, SOLUTION INTRAMUSCULAR; SUBCUTANEOUS
Qty: 4 ML | Refills: 3 | OUTPATIENT
Start: 2024-12-04

## 2024-12-28 ENCOUNTER — HEALTH MAINTENANCE LETTER (OUTPATIENT)
Age: 34
End: 2024-12-28

## 2024-12-30 ENCOUNTER — NURSE TRIAGE (OUTPATIENT)
Dept: INTERNAL MEDICINE | Facility: CLINIC | Age: 34
End: 2024-12-30

## 2024-12-30 NOTE — TELEPHONE ENCOUNTER
No need to go to the emergency room.  No need to go to the office    Report any new symptoms over the next few days through [employer]

## 2024-12-30 NOTE — TELEPHONE ENCOUNTER
Nurse Triage SBAR    Is this a 2nd Level Triage? YES, LICENSED PRACTITIONER REVIEW IS REQUIRED    Situation: possible concussion.    Background: Happened yesterday while swimming.    Assessment: States they were swimming backstroke yesterday and hit their head on the wall after miscounting strokes. States they hit the top of their head on the wall. Denies loss of consciousness, bruising, cuts, dizziness, vision changes. States they do have a mild headache. States they also have a stiff neck, but state this is not a new symptom.    Protocol Recommended Disposition:   See in Office Today    Recommendation: Advised that usually the best place to be evaluated for a concussion is in the ER. Patient states they are not sure they need to go to the ER and are requesting a visit with Dr Zhao. Video visit scheduled per their request and informed them a message will be sent to Dr Zhao to determine if this is appropriate or if they should go to ER or UC.     Routed to provider    Does the patient meet one of the following criteria for ADS visit consideration? 16+ years old, with an [facility] PCP     TIP  Providers, please consider if this condition is appropriate for management at one of our Acute and Diagnostic Services sites.     If patient is a good candidate, please use dotphrase <dot>triageresponse and select Refer to ADS to document.      Reason for Disposition   Patient wants to be seen    Additional Information   Negative: One or two 'black eyes' (bruising, purple color of eyelids), onset over 24 hours after head injury   Negative: Patient is confused or is an unreliable provider of information (e.g., dementia, severe intellectual disability, alcohol intoxication)   Negative: No prior tetanus shots (or is not fully vaccinated) and any wound (e.g., cut or scrape)   Negative: HIV positive or severe immunodeficiency (severely weak immune system) and DIRTY cut or scrape   Negative: ACUTE NEURO SYMPTOM and now fine   (Definition: Difficult to awaken OR confused thinking and talking OR slurred speech OR weakness of arms or legs OR unsteady walking.)   Negative: Knocked out (unconscious) < 1 minute and now fine   Negative: SEVERE headache   Negative: Dangerous injury (e.g., MVA, diving, trampoline, contact sports, fall > 10 feet or 3 meters) or severe blow from hard object (e.g., golf club or baseball bat)   Negative: Large swelling or bruise (> 2 inches or 5 cm)   Negative: Skin is split open or gaping (length > 1/2 inch or 12 mm)   Negative: Bleeding won't stop after 10 minutes of direct pressure (using correct technique)   Negative: Taking Coumadin (warfarin) or other strong blood thinner, or known bleeding disorder (e.g., thrombocytopenia)   Negative: Age over 64 years with an area of head swelling or bruise   Negative: Sounds like a serious injury to the triager   Negative: Can't remember what happened (amnesia)   Negative: Vomiting once or more   Negative: Loss of vision or double vision is present now   Negative: Watery or blood-tinged fluid dripping from the nose or ears   Negative: Diagnosed with a concussion within last 14 days   Negative: ACUTE NEURO SYMPTOM and symptom present now (Definition: Difficult to awaken OR confused thinking and talking OR slurred speech OR weakness of arms or legs OR unsteady walking.)   Negative: Knocked out (unconscious) > 1 minute   Negative: Seizure (convulsion) occurred  (Exception: Prior history of seizures and now alert and without Acute Neuro Symptoms.)   Negative: Neck pain after dangerous injury (e.g., MVA, diving, trampoline, contact sports, fall > 10 feet or 3 meters)  (Exception: Neck pain began > 1 hour after injury.)   Negative: Major bleeding (actively dripping or spurting) that can't be stopped   Negative: Penetrating head injury (e.g., knife, gunshot wound, metal object)   Negative: Sounds like a life-threatening emergency to the triager    Answer Assessment - Initial  "Assessment Questions  1. MECHANISM: \"How did the injury happen?\" For falls, ask: \"What height did you fall from?\" and \"What surface did you fall against?\"       Hit head while swimming backstroke  2. ONSET: \"When did the injury happen?\" (e.g., minutes, hours ago)       yesterday  3. NEUROLOGIC SYMPTOMS: \"Was there any loss of consciousness?\" \"Are there any other neurological symptoms?\"       Brain fog today  4. MENTAL STATUS: \"Does the person know who they are, who you are, and where they are?\"       Intact   5. LOCATION: \"What part of the head was hit?\"       Top of head  6. SCALP APPEARANCE: \"What does the scalp look like? Is it bleeding now?\" If Yes, ask: \"Is it difficult to stop?\"       Denies   7. SIZE: For cuts, bruises, or swelling, ask: \"How large is it?\" (e.g., inches or centimeters)         8. PAIN: \"Is there any pain?\" If Yes, ask: \"How bad is it?\" (Scale 0-10; or none, mild, moderate, severe)      Mild headache, stiff neck but not new sx,  9. TETANUS: For any breaks in the skin, ask: \"When was the last tetanus booster?\"        10. BLOOD THINNERS: \"Do you take any blood thinners?\" (e.g., aspirin, clopidogrel / Plavix, coumadin, heparin). Notes: Other strong blood thinners include: Arixtra (fondaparinux), Eliquis (apixaban), Pradaxa (dabigatran), and Xarelto (rivaroxaban).        Denies   11. OTHER SYMPTOMS: \"Do you have any other symptoms?\" (e.g., neck pain, vomiting)        Denies other symptoms  12. PREGNANCY: \"Is there any chance you are pregnant?\" \"When was your last menstrual period?\"        Denies    Protocols used: Head Injury-A-OH    "

## 2024-12-30 NOTE — TELEPHONE ENCOUNTER
Spoke with patient and relayed information below from Dr Zhao. Patient verbalized understanding and has no further questions.

## 2025-01-07 DIAGNOSIS — J02.9 SORE THROAT: Primary | ICD-10-CM

## 2025-01-07 RX ORDER — LIDOCAINE HYDROCHLORIDE 20 MG/ML
15 SOLUTION OROPHARYNGEAL
Qty: 100 ML | Refills: 3 | Status: SHIPPED | OUTPATIENT
Start: 2025-01-07

## 2025-01-28 DIAGNOSIS — E83.42 HYPOMAGNESEMIA: ICD-10-CM

## 2025-01-28 RX ORDER — MAGNESIUM OXIDE 400 MG/1
400 TABLET ORAL DAILY
Qty: 90 TABLET | Refills: 3 | Status: SHIPPED | OUTPATIENT
Start: 2025-01-28 | End: 2026-01-28

## 2025-02-04 DIAGNOSIS — J01.00 ACUTE NON-RECURRENT MAXILLARY SINUSITIS: Primary | ICD-10-CM

## 2025-02-04 RX ORDER — AZITHROMYCIN 500 MG/1
500 TABLET, FILM COATED ORAL DAILY
Qty: 7 TABLET | Refills: 0 | Status: SHIPPED | OUTPATIENT
Start: 2025-02-04 | End: 2025-02-11

## 2025-02-11 DIAGNOSIS — J02.9 PHARYNGITIS, UNSPECIFIED ETIOLOGY: Primary | ICD-10-CM

## 2025-02-19 ENCOUNTER — OFFICE VISIT (OUTPATIENT)
Dept: FAMILY MEDICINE | Facility: CLINIC | Age: 35
End: 2025-02-19
Payer: COMMERCIAL

## 2025-02-19 VITALS
WEIGHT: 142.2 LBS | BODY MASS INDEX: 21.55 KG/M2 | OXYGEN SATURATION: 96 % | RESPIRATION RATE: 13 BRPM | TEMPERATURE: 98.6 F | HEART RATE: 94 BPM | SYSTOLIC BLOOD PRESSURE: 102 MMHG | HEIGHT: 68 IN | DIASTOLIC BLOOD PRESSURE: 60 MMHG

## 2025-02-19 DIAGNOSIS — J02.0 STREPTOCOCCAL PHARYNGITIS: ICD-10-CM

## 2025-02-19 DIAGNOSIS — J06.9 UPPER RESPIRATORY TRACT INFECTION, UNSPECIFIED TYPE: Primary | ICD-10-CM

## 2025-02-19 LAB
DEPRECATED S PYO AG THROAT QL EIA: POSITIVE
FLUAV RNA SPEC QL NAA+PROBE: NEGATIVE
FLUBV RNA RESP QL NAA+PROBE: NEGATIVE
RSV RNA SPEC NAA+PROBE: POSITIVE
SARS-COV-2 RNA RESP QL NAA+PROBE: NEGATIVE

## 2025-02-19 PROCEDURE — 87880 STREP A ASSAY W/OPTIC: CPT

## 2025-02-19 PROCEDURE — 87637 SARSCOV2&INF A&B&RSV AMP PRB: CPT

## 2025-02-19 PROCEDURE — 99213 OFFICE O/P EST LOW 20 MIN: CPT

## 2025-02-19 RX ORDER — AMOXICILLIN 500 MG/1
500 CAPSULE ORAL 2 TIMES DAILY
Qty: 20 CAPSULE | Refills: 0 | Status: SHIPPED | OUTPATIENT
Start: 2025-02-19 | End: 2025-03-01

## 2025-02-19 ASSESSMENT — PAIN SCALES - GENERAL: PAINLEVEL_OUTOF10: MODERATE PAIN (6)

## 2025-02-19 NOTE — PATIENT INSTRUCTIONS
We will test you today for strep throat    If your rapid strep test was positive today, we will treat with a course of antibiotics. Please complete the full course of antibiotics regardless of symptom improvement.  This medication may cause some stomach upset, so you can take it with food to prevent this. You will be contagious until you have completed 24 hours of the medication, so avoid coming in close contact with others, and especially sharing beverages or food.  Please discard and replace your toothbrush after 24 hours on antibiotics to avoid reinfection.    If your rapid strep test was negative today, we will need to wait for the strep PCR results to have a definitive diagnosis. If both test are negative, this likely means that your illness is related to a viral infection.  Viral infections generally clear up on their own within a week but there are some things you can do at home to make yourself more comfortable.      -Cepacol lozenges: These are lozenges that you will suck on like a cough drops or hard candy.  This will help to manage your throat pains that you are able to continue to eat and drink  -Saline spray: I would like you to  with saline spray over-the-counter.  This works best when you lean slightly forward, insert the spray nozzle into your nostril, and direct the nozzle towards the opposite side of your face.  This is sometimes easier to be done in the shower over the sink as it can get a little bit messy.  You will know that you have done this correctly if your eyes slightly water after spraying the solution.  You can do this as prescribed on the box for as long as it takes to treat your symptoms.  -Ibuprofen and Tylenol: Around the clock to manage fever and general discomfort. Follow the directions on the over-the-counter bottle for dosing  -Rest: encourage rest as much as possible! This may mean you need to stay home from work or activities to prevent prolonged illness course or  spreading illness to others  -Fluids and Nutrition: It is so important to support your immune system with hydration and nutrition. Though you may not have the best appetite, it is important to encourage regular fluid intake (water, juice, electrolyte beverages) to prevent dehydration, and to eat as many nutrient rih foods as possible  -Comfort: Popsicles, cold or warm beverages, and salt water gargles are great options to soothe a sore throat      Watch for resolution of symptoms in the next few days. If you begin to have high fevers, begins to have difficulty swallowing or breathing, if you notice neck pain or difficulty moving neck, please return to clinic or present to the ER immediately.  Otherwise, follow up with your PCP as needed

## 2025-02-19 NOTE — PROGRESS NOTES
Assessment & Plan     (J06.9) Upper respiratory tract infection, unspecified type  (primary encounter diagnosis)  (J02.0) Streptococcal pharyngitis  Comment: Subacute without improvement.  Vital signs are stable, patient is nontoxic-appearing, no concerns for acute distress that would warrant the need for immediate medical attention.  Differential diagnoses include viral versus bacterial etiology.  Patient is just finished up a course of azithromycin for upper respiratory infection and laryngitis, and worsening symptoms presented in the last 2 to 3 days.  Testing for strep today, as this would largely be the only diagnosis at the moment, warrant the need for medication management.  If testing for strep is positive, will treat with amoxicillin twice daily for 10 days.  Also testing for common viruses such as influenza, COVID, and RSV, and educated patient that if all testing is negative, this most likely means that they are experiencing a more minor viral illness that will run its course over the next 1 to 2 weeks.  Offered a number of supportive therapy options in the after visit summary, will update the plan of care appropriately based on lab results today. Offered education on medications including appropriate dosing, possible side effects, and possible adverse effects.  Education given on return to clinic instructions as well as alarm signs that would require the need for immediate medical attention.  Patient attested to understanding. ADDEND: Rapid strep testing is positive.  Have sent amoxicillin twice daily for 10 days into pharmacy.  Plan: Streptococcus A Rapid Screen w/Reflex to PCR -         Clinic Collect, Influenza A/B, RSV and         SARS-CoV2 PCR (COVID-19)      This progress note has been dictated, with use of voice recognition software. Any grammatical, typographical, or context errors are unintentional and inherent to use of voice recognition software.     Ordering of each unique test  I spent a  total of 12 minutes on the day of the visit.   Time spent by me today doing chart review, history and exam, documentation and further activities per the note    FUTURE APPOINTMENTS:       - Follow-up visit in 1 week if symptoms are not improving       - Follow-up for annual visit or as needed  Patient Instructions   We will test you today for strep throat    If your rapid strep test was positive today, we will treat with a course of antibiotics. Please complete the full course of antibiotics regardless of symptom improvement.  This medication may cause some stomach upset, so you can take it with food to prevent this. You will be contagious until you have completed 24 hours of the medication, so avoid coming in close contact with others, and especially sharing beverages or food.  Please discard and replace your toothbrush after 24 hours on antibiotics to avoid reinfection.    If your rapid strep test was negative today, we will need to wait for the strep PCR results to have a definitive diagnosis. If both test are negative, this likely means that your illness is related to a viral infection.  Viral infections generally clear up on their own within a week but there are some things you can do at home to make yourself more comfortable.      -Cepacol lozenges: These are lozenges that you will suck on like a cough drops or hard candy.  This will help to manage your throat pains that you are able to continue to eat and drink  -Saline spray: I would like you to  with saline spray over-the-counter.  This works best when you lean slightly forward, insert the spray nozzle into your nostril, and direct the nozzle towards the opposite side of your face.  This is sometimes easier to be done in the shower over the sink as it can get a little bit messy.  You will know that you have done this correctly if your eyes slightly water after spraying the solution.  You can do this as prescribed on the box for as long as it takes to  treat your symptoms.  -Ibuprofen and Tylenol: Around the clock to manage fever and general discomfort. Follow the directions on the over-the-counter bottle for dosing  -Rest: encourage rest as much as possible! This may mean you need to stay home from work or activities to prevent prolonged illness course or spreading illness to others  -Fluids and Nutrition: It is so important to support your immune system with hydration and nutrition. Though you may not have the best appetite, it is important to encourage regular fluid intake (water, juice, electrolyte beverages) to prevent dehydration, and to eat as many nutrient rih foods as possible  -Comfort: Popsicles, cold or warm beverages, and salt water gargles are great options to soothe a sore throat      Watch for resolution of symptoms in the next few days. If you begin to have high fevers, begins to have difficulty swallowing or breathing, if you notice neck pain or difficulty moving neck, please return to clinic or present to the ER immediately.  Otherwise, follow up with your PCP as needed       Subjective   Rustam is a 34 year old, presenting for the following health issues:  office visit (Patient reports they are here with possible strep. Sore throat ongoing, but this morning it was burning, and nose was runny. Beginning of January 2025 they had possible laryngitis. Just finished antibiotics, they helped a little bit. On Monday they have an appointment with ENT. )        2/19/2025    12:54 PM   Additional Questions   Roomed by Lyn   Accompanied by nima         2/19/2025    12:54 PM   Patient Reported Additional Medications   Patient reports taking the following new medications none     History of Present Illness       Reason for visit:  Sore throat, cold  Symptom onset:  1-3 days ago   Rustam is taking medications regularly.    Rustam is a 34-year-old patient with extensive past medical history including chronic sinusitis, IBS, Hashimoto's disease, tension type  "headaches, anxiety, insomnia, iodine and B12 deficiency, GERD, ADHD, TMJ, and depression who presents today with concerns for upper respiratory illness symptoms.  Patient was diagnosed with laryngitis in early January, and looks to many of those symptoms.  They eventually were started on a course of antibiotics from February 4 to February 11, as symptoms did not seem to be improving with laryngitis and upper respiratory type concerns.  They felt much better for about a week after, but up until the last 1 to 2 days, their symptoms have again worsened.  They woke up on the morning of February 19 with a severe sore throat that made it very painful to swallow.  They continue to have a mild runny nose, but declined other upper respiratory illness symptoms such as fever, cough, chest congestion, chest pain, shortness of breath, sinus pressure congestion, ear pain, headaches, blurry or double vision, lightheadedness or dizziness, or vomiting.      Review of Systems  Constitutional, HEENT, cardiovascular, pulmonary, gi and gu systems are negative, except as otherwise noted.      Objective    /60 (BP Location: Left arm, Patient Position: Sitting, Cuff Size: Adult Regular)   Pulse 94   Temp 98.6  F (37  C) (Temporal)   Resp 13   Ht 1.727 m (5' 8\")   Wt 64.5 kg (142 lb 3.2 oz)   LMP  (LMP Unknown)   SpO2 96%   BMI 21.62 kg/m    Body mass index is 21.62 kg/m .  Physical Exam   GENERAL: alert and no distress  EYES: Eyes grossly normal to inspection, PERRL and conjunctivae and sclerae normal  HENT: normal cephalic/atraumatic, ear canals and TM's normal, nose and mouth without ulcers or lesions, oral mucous membranes moist, and tonsillar erythema  NECK: no adenopathy, no asymmetry, masses, or scars  RESP: lungs clear to auscultation - no rales, rhonchi or wheezes  CV: regular rate and rhythm, normal S1 S2, no S3 or S4, no murmur, click or rub, no peripheral edema  ABDOMEN: soft, nontender, no hepatosplenomegaly, no " masses and bowel sounds normal  MS: no gross musculoskeletal defects noted, no edema    No results found for this or any previous visit (from the past 24 hours).        Signed Electronically by: ZAY Seo CNP

## 2025-03-12 ENCOUNTER — VIRTUAL VISIT (OUTPATIENT)
Dept: INTERNAL MEDICINE | Facility: CLINIC | Age: 35
End: 2025-03-12
Payer: COMMERCIAL

## 2025-03-12 DIAGNOSIS — Z00.00 PREVENTATIVE HEALTH CARE: ICD-10-CM

## 2025-03-12 DIAGNOSIS — J01.00 ACUTE NON-RECURRENT MAXILLARY SINUSITIS: Primary | ICD-10-CM

## 2025-03-12 DIAGNOSIS — J32.0 CHRONIC MAXILLARY SINUSITIS: ICD-10-CM

## 2025-03-12 DIAGNOSIS — E27.9 ADRENAL DISORDER: ICD-10-CM

## 2025-03-12 DIAGNOSIS — E06.3 HYPOTHYROIDISM DUE TO HASHIMOTO'S THYROIDITIS: ICD-10-CM

## 2025-03-12 DIAGNOSIS — F33.1 MAJOR DEPRESSIVE DISORDER, RECURRENT EPISODE, MODERATE (H): ICD-10-CM

## 2025-03-12 DIAGNOSIS — S61.211S LACERATION OF LEFT INDEX FINGER WITHOUT FOREIGN BODY WITHOUT DAMAGE TO NAIL, SEQUELA: ICD-10-CM

## 2025-03-12 PROBLEM — D51.0 PERNICIOUS ANEMIA: Status: ACTIVE | Noted: 2017-03-21

## 2025-03-12 PROCEDURE — 98007 SYNCH AUDIO-VIDEO EST HI 40: CPT | Performed by: INTERNAL MEDICINE

## 2025-03-12 RX ORDER — LAMOTRIGINE 25 MG/1
25 TABLET ORAL
COMMUNITY
Start: 2024-04-22

## 2025-03-12 RX ORDER — BENZONATATE 200 MG/1
200 CAPSULE ORAL 3 TIMES DAILY PRN
Qty: 20 CAPSULE | Refills: 1 | Status: SHIPPED | OUTPATIENT
Start: 2025-03-12

## 2025-03-12 RX ORDER — LIOTHYRONINE SODIUM 5 UG/1
TABLET ORAL
COMMUNITY
Start: 2025-02-07 | End: 2025-03-12

## 2025-03-12 RX ORDER — FERROUS GLUCONATE 324(38)MG
324 TABLET ORAL
COMMUNITY
Start: 2024-04-22

## 2025-03-12 RX ORDER — MIDODRINE HYDROCHLORIDE 5 MG/1
5 TABLET ORAL
COMMUNITY
Start: 2024-05-22

## 2025-03-12 RX ORDER — PROGESTERONE 200 MG/1
1 CAPSULE ORAL DAILY
COMMUNITY
Start: 2023-11-06

## 2025-03-12 RX ORDER — TESTOSTERONE GEL, 1% 10 MG/G
GEL TRANSDERMAL
COMMUNITY
Start: 2024-06-28 | End: 2025-03-12

## 2025-03-12 RX ORDER — DOXYCYCLINE 100 MG/1
100 CAPSULE ORAL
COMMUNITY
Start: 2024-05-22 | End: 2025-03-12

## 2025-03-12 RX ORDER — TESTOSTERONE 1.62 MG/G
1 GEL TRANSDERMAL DAILY
COMMUNITY
Start: 2025-03-12

## 2025-03-12 RX ORDER — LIOTHYRONINE SODIUM 5 UG/1
2.5 TABLET ORAL
COMMUNITY
Start: 2025-03-12

## 2025-03-12 ASSESSMENT — ENCOUNTER SYMPTOMS: COUGH: 1
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
## 2025-03-12 NOTE — PROGRESS NOTES
"Rustam is a 34 year old who is being evaluated via a billable video visit.    How would you like to obtain your AVS? MyChart  If the video visit is dropped, the invitation should be resent by: Send to e-mail at: [email or web address]  Will anyone else be joining your video visit? No  {If patient encounters technical issues they should call [phone] :072772}    {PROVIDER CHARTING PREFERENCE:823124}    Subjective   Rustam is a 34 year old, presenting for the following health issues:  Recheck Medication (Questions about fluconazole use) and Cough  {(!) Visit Details have not yet been documented.  Please enter Visit Details and then use this list to pull in documentation. (Optional):384042}    Video Start Time: {video visit start/end time for provider to select:590283}    Cough    History of Present Illness       Reason for visit:  Ongoing symptoms after strep and RSV   Rustam is taking medications regularly.        {SUPERLIST (Optional):159880}  {additonal problems for provider to add (Optional):834662}    {ROS Picklists (Optional):068651}      Objective           Vitals:  No vitals were obtained today due to virtual visit.    Physical Exam   {video visit exam brief selected:962980}    {Diagnostic Test Results (Optional):493657}      Video-Visit Details    Type of service:  Video Visit   Video End Time:{video visit start/end time for provider to select:481892}  Originating Location (pt. Location): {video visit patient location:386368::\"Home\"}  {PROVIDER LOCATION On-site should be selected for visits conducted from your clinic location or adjoining [facility] hospital, academic office, or other nearby [facility] building. Off-site should be selected for all other provider locations, including home:578828}  Distant Location (provider location):  {virtual location provider:578696}  Platform used for Video Visit: {Virtual Visit Platforms:609161::\"AmWell\"}  Signed Electronically by: Dalton Zhao MD  {Email feedback regarding this " note to [email or web address]   :162053}

## 2025-03-12 NOTE — PATIENT INSTRUCTIONS
Continue fluconazole 200 mg daily for 3 to 4 weeks or until no further improvement    Discontinue nystatin swish and swallow    Clarify completion of amoxicillin and azithromycin    Clarify not taking doxycycline or levofloxacin since last summer    Clarify dose of Cytomel and testosterone    ENT March 20    Monitor sweats    Finger laceration discussed wound care

## 2025-04-25 ENCOUNTER — OFFICE VISIT (OUTPATIENT)
Dept: FAMILY MEDICINE | Facility: CLINIC | Age: 35
End: 2025-04-25
Payer: COMMERCIAL

## 2025-04-25 VITALS
WEIGHT: 141 LBS | TEMPERATURE: 98.9 F | BODY MASS INDEX: 21.37 KG/M2 | RESPIRATION RATE: 14 BRPM | DIASTOLIC BLOOD PRESSURE: 60 MMHG | HEIGHT: 68 IN | OXYGEN SATURATION: 96 % | HEART RATE: 91 BPM | SYSTOLIC BLOOD PRESSURE: 94 MMHG

## 2025-04-25 DIAGNOSIS — J02.9 SORE THROAT: Primary | ICD-10-CM

## 2025-04-25 DIAGNOSIS — K30 STOMACH UPSET: ICD-10-CM

## 2025-04-25 DIAGNOSIS — Z12.4 CERVICAL CANCER SCREENING: ICD-10-CM

## 2025-04-25 DIAGNOSIS — R53.83 OTHER FATIGUE: ICD-10-CM

## 2025-04-25 LAB
DEPRECATED S PYO AG THROAT QL EIA: NEGATIVE
FLUAV RNA SPEC QL NAA+PROBE: NEGATIVE
FLUBV RNA RESP QL NAA+PROBE: NEGATIVE
RSV RNA SPEC NAA+PROBE: NEGATIVE
S PYO DNA THROAT QL NAA+PROBE: NOT DETECTED
SARS-COV-2 RNA RESP QL NAA+PROBE: NEGATIVE

## 2025-04-25 PROCEDURE — 87637 SARSCOV2&INF A&B&RSV AMP PRB: CPT | Performed by: FAMILY MEDICINE

## 2025-04-25 PROCEDURE — 87624 HPV HI-RISK TYP POOLED RSLT: CPT | Performed by: FAMILY MEDICINE

## 2025-04-25 PROCEDURE — 99213 OFFICE O/P EST LOW 20 MIN: CPT | Performed by: FAMILY MEDICINE

## 2025-04-25 PROCEDURE — 3078F DIAST BP <80 MM HG: CPT | Performed by: FAMILY MEDICINE

## 2025-04-25 PROCEDURE — 3074F SYST BP LT 130 MM HG: CPT | Performed by: FAMILY MEDICINE

## 2025-04-25 PROCEDURE — 1125F AMNT PAIN NOTED PAIN PRSNT: CPT | Performed by: FAMILY MEDICINE

## 2025-04-25 PROCEDURE — G0145 SCR C/V CYTO,THINLAYER,RESCR: HCPCS | Performed by: FAMILY MEDICINE

## 2025-04-25 PROCEDURE — 87651 STREP A DNA AMP PROBE: CPT | Performed by: FAMILY MEDICINE

## 2025-04-25 RX ORDER — PENICILLIN V POTASSIUM 500 MG/1
500 TABLET, FILM COATED ORAL 2 TIMES DAILY
Qty: 20 TABLET | Refills: 0 | Status: SHIPPED | OUTPATIENT
Start: 2025-04-25 | End: 2025-05-05

## 2025-04-25 ASSESSMENT — ENCOUNTER SYMPTOMS: SORE THROAT: 1

## 2025-04-25 ASSESSMENT — PAIN SCALES - GENERAL: PAINLEVEL_OUTOF10: MODERATE PAIN (5)

## 2025-04-25 NOTE — PROGRESS NOTES
"  Assessment & Plan     Sore throat  Other fatigue  Stomach upset  - Influenza A/B, RSV and SARS-CoV2 PCR (COVID-19) - negative  - Streptococcus A Rapid Screen w/Reflex to PCR - Clinic Collect -= rapid negative, PCR pending  - Group A Streptococcus PCR Throat Swab  - penicillin V (VEETID) 500 MG tablet  Dispense: 20 tablet; Refill: 0 - in case PCR come back positive over weekend    Note that all test ultimately came back negative    Cervical cancer screening  - HPV and Gynecologic Cytology Panel - Recommended Age 30-65 Years    Return if symptoms worsen or fail to improve.      John Easton is a 34 year old, presenting for the following health issues:  Pharyngitis (Recent strep/RSV infection in February of this year.  Patient presents today with sore throat x2 days and fatigue. )        4/25/2025    10:18 AM   Additional Questions   Roomed by nikolas CEE   Accompanied by alone     History of Present Illness       Reason for visit:  Sore throat  Symptom onset:  1-3 days ago  Symptom intensity:  Moderate  Symptom progression:  Worsening  Had these symptoms before:  Yes  Has tried/received treatment for these symptoms:  Yes  Previous treatment was successful:  Yes  Prior treatment description:  Antibiotics for strep  What makes it worse:  Talking  What makes it better:  Pain meds   Rustam is taking medications regularly.      \"I have had a lot of throat stuff this calendar year   - lost voice in January   - in February tested for strep and RSV    Current ,symptoms started yesterday   - no fever   - uncomfortable to swallow - hurts to talk   - no known exposure   - just using cough drops/ibuprofen   - stomach upset a little yesterday   - has had more frequent bowel movements   - no headaches   - fatigue    Has graduation this weekend and would like to know if they are a risk to others    Preventive   Rustam believes they had their 3rd Gardisil vaccine in college        Objective    BP 94/60 (BP Location: Left arm, Patient " "Position: Sitting, Cuff Size: Adult Regular)   Pulse 91   Temp 98.9  F (37.2  C) (Temporal)   Resp 14   Ht 1.725 m (5' 7.91\")   Wt 64 kg (141 lb)   SpO2 96%   BMI 21.49 kg/m    Body mass index is 21.49 kg/m .  Physical Exam   GENERAL: alert and no distress  EYES: Eyes grossly normal to inspection,  conjunctivae and sclerae normal  HENT: ear canals and TM's normal, nose and mouth without ulcers or lesions.  Faint posterior erythema, no enlarged tonsils  NECK: Small anterior cervical adenopathy  RESP: lungs clear to auscultation - no rales, rhonchi or wheeze  CV: regular rate and rhythm, normal S1 S2, no S3 or S4, no murmur, click or rub, no peripheral edema   Pelvic exam: normal vagina and vulva, normal cervix without lesions or tenderness, pap smear done.            Signed Electronically by: Sharita Coleman MD    "

## 2025-04-28 LAB
HPV HR 12 DNA CVX QL NAA+PROBE: NEGATIVE
HPV16 DNA CVX QL NAA+PROBE: NEGATIVE
HPV18 DNA CVX QL NAA+PROBE: NEGATIVE
HUMAN PAPILLOMA VIRUS FINAL DIAGNOSIS: NORMAL

## 2025-04-30 DIAGNOSIS — K21.9 GASTROESOPHAGEAL REFLUX DISEASE WITHOUT ESOPHAGITIS: ICD-10-CM

## 2025-04-30 LAB
BKR AP ASSOCIATED HPV REPORT: NORMAL
BKR LAB AP GYN ADEQUACY: NORMAL
BKR LAB AP GYN INTERPRETATION: NORMAL
BKR LAB AP LMP: NORMAL
BKR LAB AP PREVIOUS ABNORMAL: NORMAL
PATH REPORT.COMMENTS IMP SPEC: NORMAL
PATH REPORT.COMMENTS IMP SPEC: NORMAL
PATH REPORT.RELEVANT HX SPEC: NORMAL

## 2025-04-30 RX ORDER — OMEPRAZOLE 20 MG/1
20 CAPSULE, DELAYED RELEASE ORAL 2 TIMES DAILY
Qty: 180 CAPSULE | Refills: 3 | Status: SHIPPED | OUTPATIENT
Start: 2025-04-30

## 2025-05-01 DIAGNOSIS — J32.0 CHRONIC MAXILLARY SINUSITIS: ICD-10-CM

## 2025-05-01 RX ORDER — FLUCONAZOLE 200 MG/1
200 TABLET ORAL DAILY
Qty: 30 TABLET | Refills: 1 | Status: SHIPPED | OUTPATIENT
Start: 2025-05-01

## 2025-06-18 ENCOUNTER — E-VISIT (OUTPATIENT)
Dept: INTERNAL MEDICINE | Facility: CLINIC | Age: 35
End: 2025-06-18
Payer: COMMERCIAL

## 2025-06-18 DIAGNOSIS — N39.0 ACUTE UTI (URINARY TRACT INFECTION): ICD-10-CM

## 2025-06-18 DIAGNOSIS — R30.0 DYSURIA: Primary | ICD-10-CM

## 2025-06-18 RX ORDER — NITROFURANTOIN 25; 75 MG/1; MG/1
100 CAPSULE ORAL 2 TIMES DAILY
Qty: 10 CAPSULE | Refills: 0 | Status: SHIPPED | OUTPATIENT
Start: 2025-06-18 | End: 2025-06-23

## 2025-06-19 ENCOUNTER — RESULTS FOLLOW-UP (OUTPATIENT)
Dept: INTERNAL MEDICINE | Facility: CLINIC | Age: 35
End: 2025-06-19

## 2025-06-19 ENCOUNTER — LAB (OUTPATIENT)
Dept: LAB | Facility: CLINIC | Age: 35
End: 2025-06-19
Payer: COMMERCIAL

## 2025-06-19 DIAGNOSIS — R30.0 DYSURIA: ICD-10-CM

## 2025-06-19 LAB
ALBUMIN UR-MCNC: NEGATIVE MG/DL
AMORPH CRY #/AREA URNS HPF: ABNORMAL /HPF
APPEARANCE UR: ABNORMAL
BILIRUB UR QL STRIP: NEGATIVE
COLOR UR AUTO: YELLOW
GLUCOSE UR STRIP-MCNC: NEGATIVE MG/DL
HGB UR QL STRIP: NEGATIVE
KETONES UR STRIP-MCNC: NEGATIVE MG/DL
LEUKOCYTE ESTERASE UR QL STRIP: NEGATIVE
NITRATE UR QL: NEGATIVE
PH UR STRIP: 7.5 [PH] (ref 5–8)
RBC #/AREA URNS AUTO: ABNORMAL /HPF
SP GR UR STRIP: 1.01 (ref 1–1.03)
SQUAMOUS #/AREA URNS AUTO: ABNORMAL /LPF
UROBILINOGEN UR STRIP-ACNC: 0.2 E.U./DL
WBC #/AREA URNS AUTO: ABNORMAL /HPF

## 2025-06-19 NOTE — TELEPHONE ENCOUNTER
Refill Request  Did you contact pharmacy: incoming fax  Medication name:   Requested Prescriptions     Pending Prescriptions Disp Refills     levothyroxine (SYNTHROID, LEVOTHROID) 75 MCG tablet 90 tablet 0     Sig: Take 1 tablet (75 mcg total) by mouth daily.     liothyronine (CYTOMEL) 5 MCG tablet 180 tablet 0     Sig: Taking 7 mcg daily- compounded formulation.     Who prescribed the medication: Mylene Tracey MD    Requested Pharmacy: [place]  Is patient out of medication: n/a  Patient notified refills processed in 3 business days:  n/a  Okay to leave a detailed message: yes       [Follow - Up] : a follow-up visit [Osteoporosis] : osteoporosis

## 2025-07-08 DIAGNOSIS — J32.0 CHRONIC MAXILLARY SINUSITIS: ICD-10-CM

## 2025-07-08 DIAGNOSIS — E27.9 ADRENAL DISORDER: ICD-10-CM

## 2025-07-09 RX ORDER — POTASSIUM CHLORIDE 1500 MG/1
20 TABLET, EXTENDED RELEASE ORAL DAILY
Qty: 30 TABLET | Refills: 11 | Status: SHIPPED | OUTPATIENT
Start: 2025-07-09 | End: 2025-07-10

## 2025-07-09 RX ORDER — FLUCONAZOLE 200 MG/1
200 TABLET ORAL DAILY
Qty: 30 TABLET | Refills: 1 | Status: SHIPPED | OUTPATIENT
Start: 2025-07-09 | End: 2025-07-10

## 2025-07-10 RX ORDER — FLUCONAZOLE 200 MG/1
200 TABLET ORAL DAILY
Qty: 30 TABLET | Refills: 1 | Status: SHIPPED | OUTPATIENT
Start: 2025-07-10

## 2025-07-10 RX ORDER — POTASSIUM CHLORIDE 1500 MG/1
20 TABLET, EXTENDED RELEASE ORAL DAILY
Qty: 30 TABLET | Refills: 11 | Status: SHIPPED | OUTPATIENT
Start: 2025-07-10

## 2025-08-04 ENCOUNTER — VIRTUAL VISIT (OUTPATIENT)
Dept: INTERNAL MEDICINE | Facility: CLINIC | Age: 35
End: 2025-08-04
Payer: COMMERCIAL

## 2025-08-04 DIAGNOSIS — J02.9 PHARYNGITIS, UNSPECIFIED ETIOLOGY: Primary | ICD-10-CM

## 2025-08-04 DIAGNOSIS — M25.50 MULTIPLE JOINT PAIN: ICD-10-CM

## 2025-08-04 DIAGNOSIS — E06.3 HYPOTHYROIDISM DUE TO HASHIMOTO'S THYROIDITIS: ICD-10-CM

## 2025-08-04 DIAGNOSIS — R21 RASH AND NONSPECIFIC SKIN ERUPTION: ICD-10-CM

## 2025-08-04 DIAGNOSIS — K21.9 GASTROESOPHAGEAL REFLUX DISEASE WITHOUT ESOPHAGITIS: ICD-10-CM

## 2025-08-04 DIAGNOSIS — M35.9 AUTOIMMUNE DISEASE: ICD-10-CM

## 2025-08-04 DIAGNOSIS — J32.0 CHRONIC MAXILLARY SINUSITIS: ICD-10-CM

## 2025-08-04 PROBLEM — E03.9 HYPOTHYROIDISM: Chronic | Status: RESOLVED | Noted: 2023-08-21 | Resolved: 2025-08-04

## 2025-08-04 PROCEDURE — 98007 SYNCH AUDIO-VIDEO EST HI 40: CPT | Performed by: INTERNAL MEDICINE

## 2025-08-04 RX ORDER — FAMOTIDINE 20 MG/1
20 TABLET, FILM COATED ORAL 2 TIMES DAILY
Qty: 180 TABLET | Refills: 3 | Status: SHIPPED | OUTPATIENT
Start: 2025-08-04 | End: 2026-08-04

## 2025-08-04 RX ORDER — OMEPRAZOLE 20 MG/1
20 CAPSULE, DELAYED RELEASE ORAL 2 TIMES DAILY
Qty: 180 CAPSULE | Refills: 3 | OUTPATIENT
Start: 2025-08-04

## 2025-08-04 RX ORDER — AMITRIPTYLINE HYDROCHLORIDE 10 MG/1
10 TABLET ORAL AT BEDTIME
Qty: 30 TABLET | Refills: 11 | Status: SHIPPED | OUTPATIENT
Start: 2025-08-04 | End: 2026-08-04

## 2025-08-04 RX ORDER — PREDNISONE 20 MG/1
20 TABLET ORAL EVERY MORNING
Qty: 20 TABLET | Refills: 1 | Status: SHIPPED | OUTPATIENT
Start: 2025-08-04 | End: 2025-08-08

## 2025-08-04 RX ORDER — MELOXICAM 7.5 MG/1
7.5 TABLET ORAL DAILY PRN
Qty: 30 TABLET | Refills: 11 | Status: SHIPPED | OUTPATIENT
Start: 2025-08-04 | End: 2026-08-04

## 2025-08-05 ENCOUNTER — PATIENT OUTREACH (OUTPATIENT)
Dept: CARE COORDINATION | Facility: CLINIC | Age: 35
End: 2025-08-05
Payer: COMMERCIAL

## 2025-08-12 ENCOUNTER — THERAPY VISIT (OUTPATIENT)
Dept: SPEECH THERAPY | Facility: CLINIC | Age: 35
End: 2025-08-12
Attending: INTERNAL MEDICINE
Payer: COMMERCIAL

## 2025-08-12 DIAGNOSIS — K21.9 GASTROESOPHAGEAL REFLUX DISEASE WITHOUT ESOPHAGITIS: ICD-10-CM

## 2025-08-12 DIAGNOSIS — R13.10 DYSPHAGIA, UNSPECIFIED TYPE: Primary | ICD-10-CM

## 2025-08-12 PROCEDURE — 92526 ORAL FUNCTION THERAPY: CPT | Mod: GN

## 2025-08-12 PROCEDURE — 92610 EVALUATE SWALLOWING FUNCTION: CPT | Mod: GN

## 2025-08-13 DIAGNOSIS — J02.9 PHARYNGITIS, UNSPECIFIED ETIOLOGY: ICD-10-CM

## 2025-08-13 DIAGNOSIS — K21.9 GASTROESOPHAGEAL REFLUX DISEASE WITHOUT ESOPHAGITIS: Primary | ICD-10-CM

## 2025-09-02 ENCOUNTER — THERAPY VISIT (OUTPATIENT)
Dept: SPEECH THERAPY | Facility: CLINIC | Age: 35
End: 2025-09-02
Payer: COMMERCIAL

## 2025-09-02 DIAGNOSIS — R13.10 DYSPHAGIA, UNSPECIFIED TYPE: ICD-10-CM

## 2025-09-02 DIAGNOSIS — R49.0 DYSPHONIA: Primary | ICD-10-CM

## 2025-09-02 DIAGNOSIS — K21.9 GASTROESOPHAGEAL REFLUX DISEASE WITHOUT ESOPHAGITIS: ICD-10-CM

## 2025-09-02 PROCEDURE — 92524 BEHAVRAL QUALIT ANALYS VOICE: CPT | Mod: GN | Performed by: STUDENT IN AN ORGANIZED HEALTH CARE EDUCATION/TRAINING PROGRAM

## 2025-09-02 PROCEDURE — 92507 TX SP LANG VOICE COMM INDIV: CPT | Mod: GN | Performed by: STUDENT IN AN ORGANIZED HEALTH CARE EDUCATION/TRAINING PROGRAM
